# Patient Record
Sex: MALE | Race: WHITE | NOT HISPANIC OR LATINO
[De-identification: names, ages, dates, MRNs, and addresses within clinical notes are randomized per-mention and may not be internally consistent; named-entity substitution may affect disease eponyms.]

---

## 2019-10-28 PROBLEM — Z00.00 ENCOUNTER FOR PREVENTIVE HEALTH EXAMINATION: Status: ACTIVE | Noted: 2019-10-28

## 2019-11-20 ENCOUNTER — APPOINTMENT (OUTPATIENT)
Dept: CARDIOLOGY | Facility: CLINIC | Age: 64
End: 2019-11-20
Payer: MEDICARE

## 2019-11-20 ENCOUNTER — NON-APPOINTMENT (OUTPATIENT)
Age: 64
End: 2019-11-20

## 2019-11-20 VITALS
BODY MASS INDEX: 26.83 KG/M2 | OXYGEN SATURATION: 97 % | SYSTOLIC BLOOD PRESSURE: 130 MMHG | DIASTOLIC BLOOD PRESSURE: 80 MMHG | HEIGHT: 68 IN | WEIGHT: 177 LBS | HEART RATE: 55 BPM

## 2019-11-20 DIAGNOSIS — Z83.438 FAMILY HISTORY OF OTHER DISORDER OF LIPOPROTEIN METABOLISM AND OTHER LIPIDEMIA: ICD-10-CM

## 2019-11-20 DIAGNOSIS — Z82.49 FAMILY HISTORY OF ISCHEMIC HEART DISEASE AND OTHER DISEASES OF THE CIRCULATORY SYSTEM: ICD-10-CM

## 2019-11-20 DIAGNOSIS — F17.200 NICOTINE DEPENDENCE, UNSPECIFIED, UNCOMPLICATED: ICD-10-CM

## 2019-11-20 PROCEDURE — 93306 TTE W/DOPPLER COMPLETE: CPT

## 2019-11-20 PROCEDURE — 93000 ELECTROCARDIOGRAM COMPLETE: CPT

## 2019-11-20 PROCEDURE — 99205 OFFICE O/P NEW HI 60 MIN: CPT

## 2019-11-20 RX ORDER — ATENOLOL 100 MG/1
100 TABLET ORAL DAILY
Qty: 90 | Refills: 3 | Status: ACTIVE | COMMUNITY

## 2019-11-20 RX ORDER — AMLODIPINE BESYLATE AND BENAZEPRIL HYDROCHLORIDE 5; 40 MG/1; MG/1
5-40 CAPSULE ORAL DAILY
Refills: 3 | Status: ACTIVE | COMMUNITY

## 2019-11-20 RX ORDER — CLOPIDOGREL BISULFATE 75 MG/1
75 TABLET, FILM COATED ORAL
Refills: 2 | Status: ACTIVE | COMMUNITY

## 2019-11-20 RX ORDER — ATORVASTATIN CALCIUM 80 MG/1
80 TABLET, FILM COATED ORAL DAILY
Qty: 90 | Refills: 3 | Status: ACTIVE | COMMUNITY

## 2019-11-20 NOTE — HISTORY OF PRESENT ILLNESS
[FreeTextEntry1] : He is a pleasant 64-year-old gentleman with CAD, hypertension, prior PCI in approximately 2015, dyslipidemia, hypertension active tobacco smoker here for cardiac assessment and hopeful clearance for upcoming hernia repair. Complaints of exertional dyspnea at times. Reports eating generally healthy. Available labs reviewed.

## 2019-11-20 NOTE — DISCUSSION/SUMMARY
[___ Week(s)] : [unfilled] week(s) [FreeTextEntry3] : echo, aortic sono, nuclear stress test, carotid sono [FreeTextEntry1] : He is advised to continue on his current antihypertensive medication strategy as well as aspirin and Plavix for dual antiplatelet therapy and high-intensity statin for lipid lowering and CAD treatment. Smoking cessation was encouraged and I spoke with him on the matter for more than 3 minutes offering options. [facility] smoking cessation [facility] also encouraged. I've ordered an echocardiogram to assess LV function and valvular status along with carotid Doppler check carotid disease burden surface is surrogate marker for underlying risk. I've also ordered a nuclear exercise stress test to gauge his underlying coronary ischemic burden. Pending the results of these studies, and a dentist regarding fitness for surgery will be added. We'll call the test results and he will follow up with you for care.I recommended a heart healthy lifestyle including a low-saturated fat, low cholesterol diet with improved aerobic physical fitness over time for cardiovascular benefits.

## 2019-11-20 NOTE — PHYSICAL EXAM
[General Appearance - Well Developed] : well developed [Normal Appearance] : normal appearance [Well Groomed] : well groomed [General Appearance - Well Nourished] : well nourished [No Deformities] : no deformities [General Appearance - In No Acute Distress] : no acute distress [Normal Conjunctiva] : the conjunctiva exhibited no abnormalities [Eyelids - No Xanthelasma] : the eyelids demonstrated no xanthelasmas [Normal Oral Mucosa] : normal oral mucosa [No Oral Pallor] : no oral pallor [No Oral Cyanosis] : no oral cyanosis [Normal Jugular Venous A Waves Present] : normal jugular venous A waves present [Normal Jugular Venous V Waves Present] : normal jugular venous V waves present [No Jugular Venous Garcia A Waves] : no jugular venous garcia A waves [Heart Rate And Rhythm] : heart rate and rhythm were normal [Heart Sounds] : normal S1 and S2 [Murmurs] : no murmurs present [Edema] : no peripheral edema present [1+] : left 1+ [Respiration, Rhythm And Depth] : normal respiratory rhythm and effort [Exaggerated Use Of Accessory Muscles For Inspiration] : no accessory muscle use [Auscultation Breath Sounds / Voice Sounds] : lungs were clear to auscultation bilaterally [Bowel Sounds] : normal bowel sounds [Abdomen Soft] : soft [Abdomen Tenderness] : non-tender [Abdomen Mass (___ Cm)] : no abdominal mass palpated [Abnormal Walk] : normal gait [Gait - Sufficient For Exercise Testing] : the gait was sufficient for exercise testing [Nail Clubbing] : no clubbing of the fingernails [Cyanosis, Localized] : no localized cyanosis [Petechial Hemorrhages (___cm)] : no petechial hemorrhages [Skin Color & Pigmentation] : normal skin color and pigmentation [] : no rash [No Venous Stasis] : no venous stasis [Skin Lesions] : no skin lesions [No Skin Ulcers] : no skin ulcer [No Xanthoma] : no  xanthoma was observed [Oriented To Time, Place, And Person] : oriented to person, place, and time [Affect] : the affect was normal [Mood] : the mood was normal [No Anxiety] : not feeling anxious [Left Carotid Bruit] : no bruit heard over the left carotid [Right Carotid Bruit] : no bruit heard over the right carotid [Bruit] : no bruit heard

## 2019-11-25 ENCOUNTER — APPOINTMENT (OUTPATIENT)
Dept: CARDIOLOGY | Facility: CLINIC | Age: 64
End: 2019-11-25
Payer: MEDICARE

## 2019-11-25 PROCEDURE — 93880 EXTRACRANIAL BILAT STUDY: CPT

## 2019-11-25 PROCEDURE — 93978 VASCULAR STUDY: CPT

## 2019-12-09 PROBLEM — F17.200 CURRENT SMOKER: Status: ACTIVE | Noted: 2019-11-20

## 2019-12-11 ENCOUNTER — APPOINTMENT (OUTPATIENT)
Dept: CARDIOLOGY | Facility: CLINIC | Age: 64
End: 2019-12-11
Payer: MEDICARE

## 2019-12-11 DIAGNOSIS — I10 ESSENTIAL (PRIMARY) HYPERTENSION: ICD-10-CM

## 2019-12-11 DIAGNOSIS — R06.09 OTHER FORMS OF DYSPNEA: ICD-10-CM

## 2019-12-11 DIAGNOSIS — Z86.79 PERSONAL HISTORY OF OTHER DISEASES OF THE CIRCULATORY SYSTEM: ICD-10-CM

## 2019-12-11 DIAGNOSIS — Z86.39 PERSONAL HISTORY OF OTHER ENDOCRINE, NUTRITIONAL AND METABOLIC DISEASE: ICD-10-CM

## 2019-12-11 DIAGNOSIS — R94.31 ABNORMAL ELECTROCARDIOGRAM [ECG] [EKG]: ICD-10-CM

## 2019-12-11 PROCEDURE — A9500: CPT

## 2019-12-11 PROCEDURE — 93015 CV STRESS TEST SUPVJ I&R: CPT

## 2019-12-11 PROCEDURE — 78452 HT MUSCLE IMAGE SPECT MULT: CPT

## 2019-12-16 PROBLEM — Z86.79 HISTORY OF CORONARY ARTERY DISEASE: Status: RESOLVED | Noted: 2019-11-20 | Resolved: 2019-12-16

## 2019-12-16 PROBLEM — Z86.39 HISTORY OF HYPERLIPIDEMIA: Status: RESOLVED | Noted: 2019-11-20 | Resolved: 2019-12-16

## 2019-12-16 PROBLEM — R94.31 ABNORMAL ECG: Status: ACTIVE | Noted: 2019-11-20

## 2019-12-16 PROBLEM — I10 BENIGN ESSENTIAL HTN: Status: RESOLVED | Noted: 2019-11-20 | Resolved: 2019-12-16

## 2019-12-16 PROBLEM — R06.09 DOE (DYSPNEA ON EXERTION): Status: ACTIVE | Noted: 2019-11-20

## 2021-04-10 ENCOUNTER — APPOINTMENT (OUTPATIENT)
Dept: DISASTER EMERGENCY | Facility: OTHER | Age: 66
End: 2021-04-10
Payer: MEDICARE

## 2021-04-10 PROCEDURE — 0011A: CPT

## 2021-05-08 ENCOUNTER — APPOINTMENT (OUTPATIENT)
Dept: DISASTER EMERGENCY | Facility: OTHER | Age: 66
End: 2021-05-08

## 2021-05-10 ENCOUNTER — APPOINTMENT (OUTPATIENT)
Dept: DISASTER EMERGENCY | Facility: OTHER | Age: 66
End: 2021-05-10
Payer: MEDICARE

## 2021-05-10 PROCEDURE — 0012A: CPT

## 2025-01-01 ENCOUNTER — INPATIENT (INPATIENT)
Facility: HOSPITAL | Age: 70
LOS: 13 days | DRG: 293 | End: 2025-07-17
Attending: THORACIC SURGERY (CARDIOTHORACIC VASCULAR SURGERY) | Admitting: THORACIC SURGERY (CARDIOTHORACIC VASCULAR SURGERY)
Payer: COMMERCIAL

## 2025-01-01 ENCOUNTER — INPATIENT (INPATIENT)
Facility: HOSPITAL | Age: 70
LOS: 0 days | Discharge: ANOTHER HOSPITAL | End: 2025-07-03
Attending: INTERNAL MEDICINE | Admitting: INTERNAL MEDICINE
Payer: MEDICARE

## 2025-01-01 ENCOUNTER — RESULT REVIEW (OUTPATIENT)
Age: 70
End: 2025-01-01

## 2025-01-01 ENCOUNTER — APPOINTMENT (OUTPATIENT)
Dept: CARDIOTHORACIC SURGERY | Facility: HOSPITAL | Age: 70
End: 2025-01-01

## 2025-01-01 VITALS — DIASTOLIC BLOOD PRESSURE: 73 MMHG | HEART RATE: 67 BPM | SYSTOLIC BLOOD PRESSURE: 117 MMHG

## 2025-01-01 VITALS
HEART RATE: 69 BPM | TEMPERATURE: 98 F | SYSTOLIC BLOOD PRESSURE: 91 MMHG | RESPIRATION RATE: 12 BRPM | DIASTOLIC BLOOD PRESSURE: 59 MMHG

## 2025-01-01 VITALS — DIASTOLIC BLOOD PRESSURE: 105 MMHG | HEART RATE: 129 BPM | SYSTOLIC BLOOD PRESSURE: 131 MMHG

## 2025-01-01 VITALS — HEART RATE: 145 BPM | RESPIRATION RATE: 14 BRPM | OXYGEN SATURATION: 97 % | TEMPERATURE: 95 F

## 2025-01-01 DIAGNOSIS — R53.2 FUNCTIONAL QUADRIPLEGIA: ICD-10-CM

## 2025-01-01 DIAGNOSIS — E11.9 TYPE 2 DIABETES MELLITUS WITHOUT COMPLICATIONS: ICD-10-CM

## 2025-01-01 DIAGNOSIS — I48.91 UNSPECIFIED ATRIAL FIBRILLATION: ICD-10-CM

## 2025-01-01 DIAGNOSIS — Z98.89 OTHER SPECIFIED POSTPROCEDURAL STATES: Chronic | ICD-10-CM

## 2025-01-01 DIAGNOSIS — I25.5 ISCHEMIC CARDIOMYOPATHY: ICD-10-CM

## 2025-01-01 DIAGNOSIS — Z71.89 OTHER SPECIFIED COUNSELING: ICD-10-CM

## 2025-01-01 DIAGNOSIS — J96.01 ACUTE RESPIRATORY FAILURE WITH HYPOXIA: ICD-10-CM

## 2025-01-01 DIAGNOSIS — Z51.5 ENCOUNTER FOR PALLIATIVE CARE: ICD-10-CM

## 2025-01-01 DIAGNOSIS — R84.5 ABNORMAL MICROBIOLOGICAL FINDINGS IN SPECIMENS FROM RESPIRATORY ORGANS AND THORAX: ICD-10-CM

## 2025-01-01 DIAGNOSIS — R57.0 CARDIOGENIC SHOCK: ICD-10-CM

## 2025-01-01 DIAGNOSIS — N17.9 ACUTE KIDNEY FAILURE, UNSPECIFIED: ICD-10-CM

## 2025-01-01 DIAGNOSIS — I21.9 ACUTE MYOCARDIAL INFARCTION, UNSPECIFIED: ICD-10-CM

## 2025-01-01 DIAGNOSIS — G93.40 ENCEPHALOPATHY, UNSPECIFIED: ICD-10-CM

## 2025-01-01 LAB
-  AMOXICILLIN/CLAVULANIC ACID: SIGNIFICANT CHANGE UP
-  AMPICILLIN/SULBACTAM: SIGNIFICANT CHANGE UP
-  AMPICILLIN: SIGNIFICANT CHANGE UP
-  AZTREONAM: SIGNIFICANT CHANGE UP
-  CEFAZOLIN: SIGNIFICANT CHANGE UP
-  CEFEPIME: SIGNIFICANT CHANGE UP
-  CEFOXITIN: SIGNIFICANT CHANGE UP
-  CEFTRIAXONE: SIGNIFICANT CHANGE UP
-  CIPROFLOXACIN: SIGNIFICANT CHANGE UP
-  CLINDAMYCIN: SIGNIFICANT CHANGE UP
-  ERTAPENEM: SIGNIFICANT CHANGE UP
-  ERYTHROMYCIN: SIGNIFICANT CHANGE UP
-  GENTAMICIN: SIGNIFICANT CHANGE UP
-  GENTAMICIN: SIGNIFICANT CHANGE UP
-  IMIPENEM: SIGNIFICANT CHANGE UP
-  LEVOFLOXACIN: SIGNIFICANT CHANGE UP
-  MEROPENEM: SIGNIFICANT CHANGE UP
-  OXACILLIN: SIGNIFICANT CHANGE UP
-  PIPERACILLIN/TAZOBACTAM: SIGNIFICANT CHANGE UP
-  RIFAMPIN: SIGNIFICANT CHANGE UP
-  TETRACYCLINE: SIGNIFICANT CHANGE UP
-  TIGECYCLINE: SIGNIFICANT CHANGE UP
-  TOBRAMYCIN: SIGNIFICANT CHANGE UP
-  TRIMETHOPRIM/SULFAMETHOXAZOLE: SIGNIFICANT CHANGE UP
-  TRIMETHOPRIM/SULFAMETHOXAZOLE: SIGNIFICANT CHANGE UP
-  VANCOMYCIN: SIGNIFICANT CHANGE UP
A1C WITH ESTIMATED AVERAGE GLUCOSE RESULT: 8.9 % — HIGH (ref 4–5.6)
ALBUMIN SERPL ELPH-MCNC: 2.4 G/DL — LOW (ref 3.3–5)
ALBUMIN SERPL ELPH-MCNC: 3 G/DL — LOW (ref 3.3–5)
ALBUMIN SERPL ELPH-MCNC: 3.1 G/DL — LOW (ref 3.3–5)
ALBUMIN SERPL ELPH-MCNC: 3.3 G/DL — SIGNIFICANT CHANGE UP (ref 3.3–5)
ALBUMIN SERPL ELPH-MCNC: 3.4 G/DL — SIGNIFICANT CHANGE UP (ref 3.3–5)
ALBUMIN SERPL ELPH-MCNC: 3.6 G/DL — SIGNIFICANT CHANGE UP (ref 3.3–5)
ALBUMIN SERPL ELPH-MCNC: 3.7 G/DL — SIGNIFICANT CHANGE UP (ref 3.3–5)
ALBUMIN SERPL ELPH-MCNC: 3.7 G/DL — SIGNIFICANT CHANGE UP (ref 3.3–5)
ALP SERPL-CCNC: 102 U/L — SIGNIFICANT CHANGE UP (ref 40–120)
ALP SERPL-CCNC: 103 U/L — SIGNIFICANT CHANGE UP (ref 40–120)
ALP SERPL-CCNC: 109 U/L — SIGNIFICANT CHANGE UP (ref 40–120)
ALP SERPL-CCNC: 110 U/L — SIGNIFICANT CHANGE UP (ref 40–120)
ALP SERPL-CCNC: 132 U/L — HIGH (ref 40–120)
ALP SERPL-CCNC: 142 U/L — HIGH (ref 40–120)
ALP SERPL-CCNC: 144 U/L — HIGH (ref 40–120)
ALP SERPL-CCNC: 146 U/L — HIGH (ref 40–120)
ALP SERPL-CCNC: 170 U/L — HIGH (ref 40–120)
ALP SERPL-CCNC: 173 U/L — HIGH (ref 40–120)
ALP SERPL-CCNC: 176 U/L — HIGH (ref 40–120)
ALP SERPL-CCNC: 178 U/L — HIGH (ref 40–120)
ALP SERPL-CCNC: 179 U/L — HIGH (ref 40–120)
ALP SERPL-CCNC: 181 U/L — HIGH (ref 40–120)
ALP SERPL-CCNC: 186 U/L — HIGH (ref 40–120)
ALP SERPL-CCNC: 80 U/L — SIGNIFICANT CHANGE UP (ref 40–120)
ALP SERPL-CCNC: 83 U/L — SIGNIFICANT CHANGE UP (ref 40–120)
ALP SERPL-CCNC: 92 U/L — SIGNIFICANT CHANGE UP (ref 40–120)
ALP SERPL-CCNC: 99 U/L — SIGNIFICANT CHANGE UP (ref 40–120)
ALT FLD-CCNC: 10 U/L — SIGNIFICANT CHANGE UP (ref 10–45)
ALT FLD-CCNC: 12 U/L — SIGNIFICANT CHANGE UP (ref 10–45)
ALT FLD-CCNC: 12 U/L — SIGNIFICANT CHANGE UP (ref 10–45)
ALT FLD-CCNC: 13 U/L — SIGNIFICANT CHANGE UP (ref 10–45)
ALT FLD-CCNC: 13 U/L — SIGNIFICANT CHANGE UP (ref 10–45)
ALT FLD-CCNC: 14 U/L — SIGNIFICANT CHANGE UP (ref 10–45)
ALT FLD-CCNC: 16 U/L — SIGNIFICANT CHANGE UP (ref 10–45)
ALT FLD-CCNC: 16 U/L — SIGNIFICANT CHANGE UP (ref 10–45)
ALT FLD-CCNC: 163 U/L — HIGH (ref 10–45)
ALT FLD-CCNC: 20 U/L — SIGNIFICANT CHANGE UP (ref 10–45)
ALT FLD-CCNC: 21 U/L — SIGNIFICANT CHANGE UP (ref 10–45)
ALT FLD-CCNC: 21 U/L — SIGNIFICANT CHANGE UP (ref 10–45)
ALT FLD-CCNC: 225 U/L — HIGH (ref 10–45)
ALT FLD-CCNC: 23 U/L — SIGNIFICANT CHANGE UP (ref 10–45)
ALT FLD-CCNC: 257 U/L — HIGH (ref 10–45)
ALT FLD-CCNC: 297 U/L — HIGH (ref 10–45)
ALT FLD-CCNC: 318 U/L — HIGH (ref 10–45)
ALT FLD-CCNC: 40 U/L — SIGNIFICANT CHANGE UP (ref 10–45)
ALT FLD-CCNC: 91 U/L — HIGH (ref 10–45)
ANION GAP SERPL CALC-SCNC: 14 MMOL/L — SIGNIFICANT CHANGE UP (ref 5–17)
ANION GAP SERPL CALC-SCNC: 16 MMOL/L — SIGNIFICANT CHANGE UP (ref 5–17)
ANION GAP SERPL CALC-SCNC: 18 MMOL/L — HIGH (ref 5–17)
ANION GAP SERPL CALC-SCNC: 19 MMOL/L — HIGH (ref 5–17)
ANION GAP SERPL CALC-SCNC: 19 MMOL/L — HIGH (ref 5–17)
ANION GAP SERPL CALC-SCNC: 20 MMOL/L — HIGH (ref 5–17)
ANION GAP SERPL CALC-SCNC: 21 MMOL/L — HIGH (ref 5–17)
ANION GAP SERPL CALC-SCNC: 22 MMOL/L — HIGH (ref 5–17)
ANION GAP SERPL CALC-SCNC: 22 MMOL/L — HIGH (ref 5–17)
ANION GAP SERPL CALC-SCNC: 37 MMOL/L — HIGH (ref 5–17)
ANISOCYTOSIS BLD QL: SLIGHT — SIGNIFICANT CHANGE UP
APPEARANCE UR: ABNORMAL
APPEARANCE UR: ABNORMAL
APTT BLD: 24 SEC — LOW (ref 26.1–36.8)
APTT BLD: 26.9 SEC — SIGNIFICANT CHANGE UP (ref 26.1–36.8)
APTT BLD: 28.9 SEC — SIGNIFICANT CHANGE UP (ref 26.1–36.8)
APTT BLD: 30.3 SEC — SIGNIFICANT CHANGE UP (ref 26.1–36.8)
APTT BLD: 31.5 SEC — SIGNIFICANT CHANGE UP (ref 26.1–36.8)
APTT BLD: 32.4 SEC — SIGNIFICANT CHANGE UP (ref 26.1–36.8)
APTT BLD: 33.7 SEC — SIGNIFICANT CHANGE UP (ref 26.1–36.8)
APTT BLD: 34.5 SEC — SIGNIFICANT CHANGE UP (ref 26.1–36.8)
APTT BLD: 34.7 SEC — SIGNIFICANT CHANGE UP (ref 26.1–36.8)
APTT BLD: 36.9 SEC — HIGH (ref 26.1–36.8)
APTT BLD: 37.9 SEC — HIGH (ref 26.1–36.8)
APTT BLD: 38.7 SEC — HIGH (ref 26.1–36.8)
APTT BLD: 39.7 SEC — HIGH (ref 26.1–36.8)
APTT BLD: 40.6 SEC — HIGH (ref 26.1–36.8)
APTT BLD: 41.7 SEC — HIGH (ref 26.1–36.8)
APTT BLD: 44.7 SEC — HIGH (ref 26.1–36.8)
APTT BLD: 52.9 SEC — HIGH (ref 26.1–36.8)
APTT BLD: 56.4 SEC — HIGH (ref 26.1–36.8)
APTT BLD: 56.8 SEC — HIGH (ref 26.1–36.8)
APTT BLD: 58.8 SEC — HIGH (ref 26.1–36.8)
APTT BLD: 59.4 SEC — HIGH (ref 26.1–36.8)
APTT BLD: 61.9 SEC — HIGH (ref 26.1–36.8)
APTT BLD: 62 SEC — HIGH (ref 26.1–36.8)
APTT BLD: 63.5 SEC — HIGH (ref 26.1–36.8)
APTT BLD: 63.5 SEC — HIGH (ref 26.1–36.8)
APTT BLD: 66.2 SEC — HIGH (ref 26.1–36.8)
APTT BLD: 67.8 SEC — HIGH (ref 26.1–36.8)
APTT BLD: 71 SEC — HIGH (ref 26.1–36.8)
APTT BLD: 75.6 SEC — HIGH (ref 26.1–36.8)
APTT BLD: 77.4 SEC — HIGH (ref 26.1–36.8)
APTT BLD: 79.2 SEC — HIGH (ref 26.1–36.8)
APTT BLD: 79.8 SEC — HIGH (ref 26.1–36.8)
AST SERPL-CCNC: 114 U/L — HIGH (ref 10–40)
AST SERPL-CCNC: 139 U/L — HIGH (ref 10–40)
AST SERPL-CCNC: 158 U/L — HIGH (ref 10–40)
AST SERPL-CCNC: 254 U/L — HIGH (ref 10–40)
AST SERPL-CCNC: 27 U/L — SIGNIFICANT CHANGE UP (ref 10–40)
AST SERPL-CCNC: 307 U/L — HIGH (ref 10–40)
AST SERPL-CCNC: 340 U/L — HIGH (ref 10–40)
AST SERPL-CCNC: 39 U/L — SIGNIFICANT CHANGE UP (ref 10–40)
AST SERPL-CCNC: 44 U/L — HIGH (ref 10–40)
AST SERPL-CCNC: 46 U/L — HIGH (ref 10–40)
AST SERPL-CCNC: 47 U/L — HIGH (ref 10–40)
AST SERPL-CCNC: 48 U/L — HIGH (ref 10–40)
AST SERPL-CCNC: 52 U/L — HIGH (ref 10–40)
AST SERPL-CCNC: 54 U/L — HIGH (ref 10–40)
AST SERPL-CCNC: 58 U/L — HIGH (ref 10–40)
AST SERPL-CCNC: 72 U/L — HIGH (ref 10–40)
AST SERPL-CCNC: 76 U/L — HIGH (ref 10–40)
AST SERPL-CCNC: 82 U/L — HIGH (ref 10–40)
AST SERPL-CCNC: 86 U/L — HIGH (ref 10–40)
BACTERIA # UR AUTO: NEGATIVE /HPF — SIGNIFICANT CHANGE UP
BACTERIA # UR AUTO: NEGATIVE /HPF — SIGNIFICANT CHANGE UP
BASE EXCESS BLDMV CALC-SCNC: 10.4 MMOL/L — HIGH (ref -3–3)
BASE EXCESS BLDMV CALC-SCNC: 11 MMOL/L — HIGH (ref -3–3)
BASE EXCESS BLDMV CALC-SCNC: 11.2 MMOL/L — HIGH (ref -3–3)
BASE EXCESS BLDMV CALC-SCNC: 11.5 MMOL/L — HIGH (ref -3–3)
BASE EXCESS BLDMV CALC-SCNC: 5.8 MMOL/L — HIGH (ref -3–3)
BASE EXCESS BLDMV CALC-SCNC: 7.5 MMOL/L — HIGH (ref -3–3)
BASE EXCESS BLDMV CALC-SCNC: 7.6 MMOL/L — HIGH (ref -3–3)
BASE EXCESS BLDMV CALC-SCNC: 7.8 MMOL/L — HIGH (ref -3–3)
BASE EXCESS BLDMV CALC-SCNC: 8 MMOL/L — HIGH (ref -3–3)
BASE EXCESS BLDMV CALC-SCNC: 8.1 MMOL/L — HIGH (ref -3–3)
BASE EXCESS BLDMV CALC-SCNC: 8.6 MMOL/L — HIGH (ref -3–3)
BASE EXCESS BLDMV CALC-SCNC: 8.8 MMOL/L — HIGH (ref -3–3)
BASE EXCESS BLDMV CALC-SCNC: 9.1 MMOL/L — HIGH (ref -3–3)
BASE EXCESS BLDMV CALC-SCNC: 9.4 MMOL/L — HIGH (ref -3–3)
BASE EXCESS BLDV CALC-SCNC: -8.2 MMOL/L — LOW (ref -2–3)
BASE EXCESS BLDV CALC-SCNC: 10.6 MMOL/L — HIGH (ref -2–3)
BASE EXCESS BLDV CALC-SCNC: 14.9 MMOL/L — HIGH (ref -2–3)
BASE EXCESS BLDV CALC-SCNC: 15 MMOL/L — HIGH (ref -2–3)
BASE EXCESS BLDV CALC-SCNC: 15.8 MMOL/L — HIGH (ref -2–3)
BASE EXCESS BLDV CALC-SCNC: 17.2 MMOL/L — HIGH (ref -2–3)
BASE EXCESS BLDV CALC-SCNC: 5.5 MMOL/L — HIGH (ref -2–3)
BASE EXCESS BLDV CALC-SCNC: 9.4 MMOL/L — HIGH (ref -2–3)
BASOPHILS # BLD AUTO: 0.02 K/UL — SIGNIFICANT CHANGE UP (ref 0–0.2)
BASOPHILS # BLD AUTO: 0.03 K/UL — SIGNIFICANT CHANGE UP (ref 0–0.2)
BASOPHILS # BLD AUTO: 0.04 K/UL — SIGNIFICANT CHANGE UP (ref 0–0.2)
BASOPHILS # BLD AUTO: 0.04 K/UL — SIGNIFICANT CHANGE UP (ref 0–0.2)
BASOPHILS # BLD AUTO: 0.05 K/UL — SIGNIFICANT CHANGE UP (ref 0–0.2)
BASOPHILS # BLD AUTO: 0.06 K/UL — SIGNIFICANT CHANGE UP (ref 0–0.2)
BASOPHILS # BLD AUTO: 0.08 K/UL — SIGNIFICANT CHANGE UP (ref 0–0.2)
BASOPHILS # BLD AUTO: 0.1 K/UL — SIGNIFICANT CHANGE UP (ref 0–0.2)
BASOPHILS # BLD MANUAL: 0 K/UL — SIGNIFICANT CHANGE UP (ref 0–0.2)
BASOPHILS # BLD MANUAL: 0.08 K/UL — SIGNIFICANT CHANGE UP (ref 0–0.2)
BASOPHILS NFR BLD AUTO: 0.1 % — SIGNIFICANT CHANGE UP (ref 0–2)
BASOPHILS NFR BLD AUTO: 0.2 % — SIGNIFICANT CHANGE UP (ref 0–2)
BASOPHILS NFR BLD AUTO: 0.3 % — SIGNIFICANT CHANGE UP (ref 0–2)
BASOPHILS NFR BLD AUTO: 0.3 % — SIGNIFICANT CHANGE UP (ref 0–2)
BASOPHILS NFR BLD AUTO: 0.4 % — SIGNIFICANT CHANGE UP (ref 0–2)
BASOPHILS NFR BLD AUTO: 0.5 % — SIGNIFICANT CHANGE UP (ref 0–2)
BASOPHILS NFR BLD AUTO: 0.7 % — SIGNIFICANT CHANGE UP (ref 0–2)
BASOPHILS NFR BLD MANUAL: 0 % — SIGNIFICANT CHANGE UP (ref 0–2)
BASOPHILS NFR BLD MANUAL: 0.9 % — SIGNIFICANT CHANGE UP (ref 0–2)
BILIRUB SERPL-MCNC: 1.5 MG/DL — HIGH (ref 0.2–1.2)
BILIRUB SERPL-MCNC: 1.9 MG/DL — HIGH (ref 0.2–1.2)
BILIRUB SERPL-MCNC: 2.1 MG/DL — HIGH (ref 0.2–1.2)
BILIRUB SERPL-MCNC: 2.4 MG/DL — HIGH (ref 0.2–1.2)
BILIRUB SERPL-MCNC: 2.5 MG/DL — HIGH (ref 0.2–1.2)
BILIRUB SERPL-MCNC: 2.7 MG/DL — HIGH (ref 0.2–1.2)
BILIRUB SERPL-MCNC: 3.1 MG/DL — HIGH (ref 0.2–1.2)
BILIRUB SERPL-MCNC: 3.2 MG/DL — HIGH (ref 0.2–1.2)
BILIRUB SERPL-MCNC: 3.6 MG/DL — HIGH (ref 0.2–1.2)
BILIRUB SERPL-MCNC: 4.1 MG/DL — HIGH (ref 0.2–1.2)
BILIRUB SERPL-MCNC: 4.4 MG/DL — HIGH (ref 0.2–1.2)
BILIRUB SERPL-MCNC: 4.5 MG/DL — HIGH (ref 0.2–1.2)
BILIRUB SERPL-MCNC: 4.9 MG/DL — HIGH (ref 0.2–1.2)
BILIRUB SERPL-MCNC: 5.2 MG/DL — HIGH (ref 0.2–1.2)
BILIRUB SERPL-MCNC: 5.3 MG/DL — HIGH (ref 0.2–1.2)
BILIRUB SERPL-MCNC: 5.5 MG/DL — HIGH (ref 0.2–1.2)
BILIRUB SERPL-MCNC: 6.1 MG/DL — HIGH (ref 0.2–1.2)
BILIRUB UR-MCNC: NEGATIVE — SIGNIFICANT CHANGE UP
BILIRUB UR-MCNC: NEGATIVE — SIGNIFICANT CHANGE UP
BLD GP AB SCN SERPL QL: NEGATIVE — SIGNIFICANT CHANGE UP
BLD GP AB SCN SERPL QL: NEGATIVE — SIGNIFICANT CHANGE UP
BLOOD GAS ECMO POST MEMBRANE - ARTERIAL RESULT: SIGNIFICANT CHANGE UP
BLOOD GAS ECMO PRE MEMBRANE - VENOUS RESULT: SIGNIFICANT CHANGE UP
BUN SERPL-MCNC: 100 MG/DL — HIGH (ref 7–23)
BUN SERPL-MCNC: 104 MG/DL — HIGH (ref 7–23)
BUN SERPL-MCNC: 104 MG/DL — HIGH (ref 7–23)
BUN SERPL-MCNC: 106 MG/DL — HIGH (ref 7–23)
BUN SERPL-MCNC: 108 MG/DL — HIGH (ref 7–23)
BUN SERPL-MCNC: 113 MG/DL — HIGH (ref 7–23)
BUN SERPL-MCNC: 114 MG/DL — HIGH (ref 7–23)
BUN SERPL-MCNC: 118 MG/DL — HIGH (ref 7–23)
BUN SERPL-MCNC: 121 MG/DL — HIGH (ref 7–23)
BUN SERPL-MCNC: 126 MG/DL — HIGH (ref 7–23)
BUN SERPL-MCNC: 138 MG/DL — HIGH (ref 7–23)
BUN SERPL-MCNC: 141 MG/DL — HIGH (ref 7–23)
BUN SERPL-MCNC: 144 MG/DL — HIGH (ref 7–23)
BUN SERPL-MCNC: 149 MG/DL — HIGH (ref 7–23)
BUN SERPL-MCNC: 66 MG/DL — HIGH (ref 7–23)
BUN SERPL-MCNC: 92 MG/DL — HIGH (ref 7–23)
BUN SERPL-MCNC: 92 MG/DL — HIGH (ref 7–23)
BUN SERPL-MCNC: 97 MG/DL — HIGH (ref 7–23)
BUN SERPL-MCNC: 99 MG/DL — HIGH (ref 7–23)
BUN SERPL-MCNC: 99 MG/DL — HIGH (ref 7–23)
BURR CELLS BLD QL SMEAR: ABNORMAL
CA-I BLD-SCNC: 0.88 MMOL/L — LOW (ref 1.15–1.33)
CALCIUM SERPL-MCNC: 5.1 MG/DL — CRITICAL LOW (ref 8.4–10.5)
CALCIUM SERPL-MCNC: 7.7 MG/DL — LOW (ref 8.4–10.5)
CALCIUM SERPL-MCNC: 8.1 MG/DL — LOW (ref 8.4–10.5)
CALCIUM SERPL-MCNC: 8.1 MG/DL — LOW (ref 8.4–10.5)
CALCIUM SERPL-MCNC: 8.2 MG/DL — LOW (ref 8.4–10.5)
CALCIUM SERPL-MCNC: 8.3 MG/DL — LOW (ref 8.4–10.5)
CALCIUM SERPL-MCNC: 8.4 MG/DL — SIGNIFICANT CHANGE UP (ref 8.4–10.5)
CALCIUM SERPL-MCNC: 8.6 MG/DL — SIGNIFICANT CHANGE UP (ref 8.4–10.5)
CALCIUM SERPL-MCNC: 8.8 MG/DL — SIGNIFICANT CHANGE UP (ref 8.4–10.5)
CALCIUM SERPL-MCNC: 8.8 MG/DL — SIGNIFICANT CHANGE UP (ref 8.4–10.5)
CALCIUM SERPL-MCNC: 8.9 MG/DL — SIGNIFICANT CHANGE UP (ref 8.4–10.5)
CALCIUM SERPL-MCNC: 9.1 MG/DL — SIGNIFICANT CHANGE UP (ref 8.4–10.5)
CALCIUM SERPL-MCNC: 9.2 MG/DL — SIGNIFICANT CHANGE UP (ref 8.4–10.5)
CALCIUM SERPL-MCNC: 9.4 MG/DL — SIGNIFICANT CHANGE UP (ref 8.4–10.5)
CALCIUM SERPL-MCNC: 9.5 MG/DL — SIGNIFICANT CHANGE UP (ref 8.4–10.5)
CALCIUM SERPL-MCNC: 9.7 MG/DL — SIGNIFICANT CHANGE UP (ref 8.4–10.5)
CAST: 12 /LPF — HIGH (ref 0–4)
CAST: 25 /LPF — HIGH (ref 0–4)
CHLORIDE SERPL-SCNC: 100 MMOL/L — SIGNIFICANT CHANGE UP (ref 96–108)
CHLORIDE SERPL-SCNC: 100 MMOL/L — SIGNIFICANT CHANGE UP (ref 96–108)
CHLORIDE SERPL-SCNC: 116 MMOL/L — HIGH (ref 96–108)
CHLORIDE SERPL-SCNC: 88 MMOL/L — LOW (ref 96–108)
CHLORIDE SERPL-SCNC: 94 MMOL/L — LOW (ref 96–108)
CHLORIDE SERPL-SCNC: 94 MMOL/L — LOW (ref 96–108)
CHLORIDE SERPL-SCNC: 95 MMOL/L — LOW (ref 96–108)
CHLORIDE SERPL-SCNC: 96 MMOL/L — SIGNIFICANT CHANGE UP (ref 96–108)
CHLORIDE SERPL-SCNC: 97 MMOL/L — SIGNIFICANT CHANGE UP (ref 96–108)
CHLORIDE SERPL-SCNC: 97 MMOL/L — SIGNIFICANT CHANGE UP (ref 96–108)
CHLORIDE SERPL-SCNC: 98 MMOL/L — SIGNIFICANT CHANGE UP (ref 96–108)
CHLORIDE SERPL-SCNC: 99 MMOL/L — SIGNIFICANT CHANGE UP (ref 96–108)
CHOLEST SERPL-MCNC: 90 MG/DL — SIGNIFICANT CHANGE UP
CK MB BLD-MCNC: 5.5 % — HIGH (ref 0–3.5)
CK MB CFR SERPL CALC: 46.7 NG/ML — HIGH (ref 0–6.7)
CK SERPL-CCNC: 850 U/L — HIGH (ref 30–200)
CO2 BLDMV-SCNC: 33 MMOL/L — HIGH (ref 21–29)
CO2 BLDMV-SCNC: 34 MMOL/L — HIGH (ref 21–29)
CO2 BLDMV-SCNC: 34 MMOL/L — HIGH (ref 21–29)
CO2 BLDMV-SCNC: 35 MMOL/L — HIGH (ref 21–29)
CO2 BLDMV-SCNC: 36 MMOL/L — HIGH (ref 21–29)
CO2 BLDMV-SCNC: 37 MMOL/L — HIGH (ref 21–29)
CO2 BLDMV-SCNC: 38 MMOL/L — HIGH (ref 21–29)
CO2 BLDMV-SCNC: 39 MMOL/L — HIGH (ref 21–29)
CO2 BLDMV-SCNC: 41 MMOL/L — HIGH (ref 21–29)
CO2 BLDV-SCNC: 21 MMOL/L — LOW (ref 22–26)
CO2 BLDV-SCNC: 33 MMOL/L — HIGH (ref 22–26)
CO2 BLDV-SCNC: 37 MMOL/L — HIGH (ref 22–26)
CO2 BLDV-SCNC: 41 MMOL/L — HIGH (ref 22–26)
CO2 BLDV-SCNC: 42 MMOL/L — HIGH (ref 22–26)
CO2 BLDV-SCNC: 43 MMOL/L — HIGH (ref 22–26)
CO2 BLDV-SCNC: 43 MMOL/L — HIGH (ref 22–26)
CO2 BLDV-SCNC: 45 MMOL/L — CRITICAL HIGH (ref 22–26)
CO2 SERPL-SCNC: 13 MMOL/L — LOW (ref 22–31)
CO2 SERPL-SCNC: 15 MMOL/L — LOW (ref 22–31)
CO2 SERPL-SCNC: 20 MMOL/L — LOW (ref 22–31)
CO2 SERPL-SCNC: 21 MMOL/L — LOW (ref 22–31)
CO2 SERPL-SCNC: 23 MMOL/L — SIGNIFICANT CHANGE UP (ref 22–31)
CO2 SERPL-SCNC: 24 MMOL/L — SIGNIFICANT CHANGE UP (ref 22–31)
CO2 SERPL-SCNC: 25 MMOL/L — SIGNIFICANT CHANGE UP (ref 22–31)
CO2 SERPL-SCNC: 25 MMOL/L — SIGNIFICANT CHANGE UP (ref 22–31)
CO2 SERPL-SCNC: 26 MMOL/L — SIGNIFICANT CHANGE UP (ref 22–31)
CO2 SERPL-SCNC: 28 MMOL/L — SIGNIFICANT CHANGE UP (ref 22–31)
CO2 SERPL-SCNC: 29 MMOL/L — SIGNIFICANT CHANGE UP (ref 22–31)
CO2 SERPL-SCNC: 31 MMOL/L — SIGNIFICANT CHANGE UP (ref 22–31)
CO2 SERPL-SCNC: 31 MMOL/L — SIGNIFICANT CHANGE UP (ref 22–31)
CO2 SERPL-SCNC: 34 MMOL/L — HIGH (ref 22–31)
CO2 SERPL-SCNC: 34 MMOL/L — HIGH (ref 22–31)
COLOR SPEC: YELLOW — SIGNIFICANT CHANGE UP
COLOR SPEC: YELLOW — SIGNIFICANT CHANGE UP
CREAT SERPL-MCNC: 2.87 MG/DL — HIGH (ref 0.5–1.3)
CREAT SERPL-MCNC: 4.09 MG/DL — HIGH (ref 0.5–1.3)
CREAT SERPL-MCNC: 4.11 MG/DL — HIGH (ref 0.5–1.3)
CREAT SERPL-MCNC: 4.16 MG/DL — HIGH (ref 0.5–1.3)
CREAT SERPL-MCNC: 4.4 MG/DL — HIGH (ref 0.5–1.3)
CREAT SERPL-MCNC: 4.42 MG/DL — HIGH (ref 0.5–1.3)
CREAT SERPL-MCNC: 4.45 MG/DL — HIGH (ref 0.5–1.3)
CREAT SERPL-MCNC: 4.47 MG/DL — HIGH (ref 0.5–1.3)
CREAT SERPL-MCNC: 4.69 MG/DL — HIGH (ref 0.5–1.3)
CREAT SERPL-MCNC: 4.82 MG/DL — HIGH (ref 0.5–1.3)
CREAT SERPL-MCNC: 5.09 MG/DL — HIGH (ref 0.5–1.3)
CREAT SERPL-MCNC: 5.14 MG/DL — HIGH (ref 0.5–1.3)
CREAT SERPL-MCNC: 5.22 MG/DL — HIGH (ref 0.5–1.3)
CREAT SERPL-MCNC: 5.62 MG/DL — HIGH (ref 0.5–1.3)
CREAT SERPL-MCNC: 6.45 MG/DL — HIGH (ref 0.5–1.3)
CREAT SERPL-MCNC: 6.75 MG/DL — HIGH (ref 0.5–1.3)
CREAT SERPL-MCNC: 6.97 MG/DL — HIGH (ref 0.5–1.3)
CREAT SERPL-MCNC: 7.2 MG/DL — HIGH (ref 0.5–1.3)
CREAT SERPL-MCNC: 7.28 MG/DL — HIGH (ref 0.5–1.3)
CREAT SERPL-MCNC: 7.86 MG/DL — HIGH (ref 0.5–1.3)
CULTURE RESULTS: ABNORMAL
CULTURE RESULTS: SIGNIFICANT CHANGE UP
DIFF PNL FLD: ABNORMAL
DIFF PNL FLD: ABNORMAL
EGFR: 10 ML/MIN/1.73M2 — LOW
EGFR: 10 ML/MIN/1.73M2 — LOW
EGFR: 11 ML/MIN/1.73M2 — LOW
EGFR: 12 ML/MIN/1.73M2 — LOW
EGFR: 12 ML/MIN/1.73M2 — LOW
EGFR: 13 ML/MIN/1.73M2 — LOW
EGFR: 14 ML/MIN/1.73M2 — LOW
EGFR: 15 ML/MIN/1.73M2 — LOW
EGFR: 23 ML/MIN/1.73M2 — LOW
EGFR: 23 ML/MIN/1.73M2 — LOW
EGFR: 7 ML/MIN/1.73M2 — LOW
EGFR: 8 ML/MIN/1.73M2 — LOW
EGFR: 9 ML/MIN/1.73M2 — LOW
EGFR: 9 ML/MIN/1.73M2 — LOW
EOSINOPHIL # BLD AUTO: 0 K/UL — SIGNIFICANT CHANGE UP (ref 0–0.5)
EOSINOPHIL # BLD AUTO: 0.01 K/UL — SIGNIFICANT CHANGE UP (ref 0–0.5)
EOSINOPHIL # BLD AUTO: 0.02 K/UL — SIGNIFICANT CHANGE UP (ref 0–0.5)
EOSINOPHIL # BLD AUTO: 0.03 K/UL — SIGNIFICANT CHANGE UP (ref 0–0.5)
EOSINOPHIL # BLD AUTO: 0.06 K/UL — SIGNIFICANT CHANGE UP (ref 0–0.5)
EOSINOPHIL # BLD AUTO: 0.11 K/UL — SIGNIFICANT CHANGE UP (ref 0–0.5)
EOSINOPHIL # BLD MANUAL: 0 K/UL — SIGNIFICANT CHANGE UP (ref 0–0.5)
EOSINOPHIL NFR BLD AUTO: 0 % — SIGNIFICANT CHANGE UP (ref 0–6)
EOSINOPHIL NFR BLD AUTO: 0.1 % — SIGNIFICANT CHANGE UP (ref 0–6)
EOSINOPHIL NFR BLD AUTO: 0.2 % — SIGNIFICANT CHANGE UP (ref 0–6)
EOSINOPHIL NFR BLD AUTO: 0.8 % — SIGNIFICANT CHANGE UP (ref 0–6)
EOSINOPHIL NFR BLD MANUAL: 0 % — SIGNIFICANT CHANGE UP (ref 0–6)
ESTIMATED AVERAGE GLUCOSE: 209 MG/DL — HIGH (ref 68–114)
FIBRINOGEN PPP-MCNC: 279 MG/DL — SIGNIFICANT CHANGE UP (ref 200–445)
FIBRINOGEN PPP-MCNC: 292 MG/DL — SIGNIFICANT CHANGE UP (ref 200–445)
FIBRINOGEN PPP-MCNC: 315 MG/DL — SIGNIFICANT CHANGE UP (ref 200–445)
FIBRINOGEN PPP-MCNC: 477 MG/DL — HIGH (ref 200–445)
FIBRINOGEN PPP-MCNC: 477 MG/DL — HIGH (ref 200–445)
FIBRINOGEN PPP-MCNC: 508 MG/DL — HIGH (ref 200–445)
FIBRINOGEN PPP-MCNC: 531 MG/DL — HIGH (ref 200–445)
FIBRINOGEN PPP-MCNC: 570 MG/DL — HIGH (ref 200–445)
FIBRINOGEN PPP-MCNC: 686 MG/DL — HIGH (ref 200–445)
FIBRINOGEN PPP-MCNC: 686 MG/DL — HIGH (ref 200–445)
GAS PNL BLDA: SIGNIFICANT CHANGE UP
GAS PNL BLDMV: SIGNIFICANT CHANGE UP
GAS PNL BLDV: SIGNIFICANT CHANGE UP
GIANT PLATELETS BLD QL SMEAR: PRESENT
GLUCOSE BLDC GLUCOMTR-MCNC: 100 MG/DL — HIGH (ref 70–99)
GLUCOSE BLDC GLUCOMTR-MCNC: 100 MG/DL — HIGH (ref 70–99)
GLUCOSE BLDC GLUCOMTR-MCNC: 102 MG/DL — HIGH (ref 70–99)
GLUCOSE BLDC GLUCOMTR-MCNC: 103 MG/DL — HIGH (ref 70–99)
GLUCOSE BLDC GLUCOMTR-MCNC: 104 MG/DL — HIGH (ref 70–99)
GLUCOSE BLDC GLUCOMTR-MCNC: 105 MG/DL — HIGH (ref 70–99)
GLUCOSE BLDC GLUCOMTR-MCNC: 106 MG/DL — HIGH (ref 70–99)
GLUCOSE BLDC GLUCOMTR-MCNC: 107 MG/DL — HIGH (ref 70–99)
GLUCOSE BLDC GLUCOMTR-MCNC: 107 MG/DL — HIGH (ref 70–99)
GLUCOSE BLDC GLUCOMTR-MCNC: 108 MG/DL — HIGH (ref 70–99)
GLUCOSE BLDC GLUCOMTR-MCNC: 109 MG/DL — HIGH (ref 70–99)
GLUCOSE BLDC GLUCOMTR-MCNC: 110 MG/DL — HIGH (ref 70–99)
GLUCOSE BLDC GLUCOMTR-MCNC: 111 MG/DL — HIGH (ref 70–99)
GLUCOSE BLDC GLUCOMTR-MCNC: 112 MG/DL — HIGH (ref 70–99)
GLUCOSE BLDC GLUCOMTR-MCNC: 113 MG/DL — HIGH (ref 70–99)
GLUCOSE BLDC GLUCOMTR-MCNC: 113 MG/DL — HIGH (ref 70–99)
GLUCOSE BLDC GLUCOMTR-MCNC: 114 MG/DL — HIGH (ref 70–99)
GLUCOSE BLDC GLUCOMTR-MCNC: 115 MG/DL — HIGH (ref 70–99)
GLUCOSE BLDC GLUCOMTR-MCNC: 116 MG/DL — HIGH (ref 70–99)
GLUCOSE BLDC GLUCOMTR-MCNC: 117 MG/DL — HIGH (ref 70–99)
GLUCOSE BLDC GLUCOMTR-MCNC: 117 MG/DL — HIGH (ref 70–99)
GLUCOSE BLDC GLUCOMTR-MCNC: 118 MG/DL — HIGH (ref 70–99)
GLUCOSE BLDC GLUCOMTR-MCNC: 118 MG/DL — HIGH (ref 70–99)
GLUCOSE BLDC GLUCOMTR-MCNC: 119 MG/DL — HIGH (ref 70–99)
GLUCOSE BLDC GLUCOMTR-MCNC: 120 MG/DL — HIGH (ref 70–99)
GLUCOSE BLDC GLUCOMTR-MCNC: 121 MG/DL — HIGH (ref 70–99)
GLUCOSE BLDC GLUCOMTR-MCNC: 121 MG/DL — HIGH (ref 70–99)
GLUCOSE BLDC GLUCOMTR-MCNC: 122 MG/DL — HIGH (ref 70–99)
GLUCOSE BLDC GLUCOMTR-MCNC: 123 MG/DL — HIGH (ref 70–99)
GLUCOSE BLDC GLUCOMTR-MCNC: 124 MG/DL — HIGH (ref 70–99)
GLUCOSE BLDC GLUCOMTR-MCNC: 125 MG/DL — HIGH (ref 70–99)
GLUCOSE BLDC GLUCOMTR-MCNC: 126 MG/DL — HIGH (ref 70–99)
GLUCOSE BLDC GLUCOMTR-MCNC: 127 MG/DL — HIGH (ref 70–99)
GLUCOSE BLDC GLUCOMTR-MCNC: 128 MG/DL — HIGH (ref 70–99)
GLUCOSE BLDC GLUCOMTR-MCNC: 128 MG/DL — HIGH (ref 70–99)
GLUCOSE BLDC GLUCOMTR-MCNC: 129 MG/DL — HIGH (ref 70–99)
GLUCOSE BLDC GLUCOMTR-MCNC: 130 MG/DL — HIGH (ref 70–99)
GLUCOSE BLDC GLUCOMTR-MCNC: 131 MG/DL — HIGH (ref 70–99)
GLUCOSE BLDC GLUCOMTR-MCNC: 132 MG/DL — HIGH (ref 70–99)
GLUCOSE BLDC GLUCOMTR-MCNC: 133 MG/DL — HIGH (ref 70–99)
GLUCOSE BLDC GLUCOMTR-MCNC: 134 MG/DL — HIGH (ref 70–99)
GLUCOSE BLDC GLUCOMTR-MCNC: 135 MG/DL — HIGH (ref 70–99)
GLUCOSE BLDC GLUCOMTR-MCNC: 138 MG/DL — HIGH (ref 70–99)
GLUCOSE BLDC GLUCOMTR-MCNC: 138 MG/DL — HIGH (ref 70–99)
GLUCOSE BLDC GLUCOMTR-MCNC: 139 MG/DL — HIGH (ref 70–99)
GLUCOSE BLDC GLUCOMTR-MCNC: 140 MG/DL — HIGH (ref 70–99)
GLUCOSE BLDC GLUCOMTR-MCNC: 141 MG/DL — HIGH (ref 70–99)
GLUCOSE BLDC GLUCOMTR-MCNC: 142 MG/DL — HIGH (ref 70–99)
GLUCOSE BLDC GLUCOMTR-MCNC: 143 MG/DL — HIGH (ref 70–99)
GLUCOSE BLDC GLUCOMTR-MCNC: 143 MG/DL — HIGH (ref 70–99)
GLUCOSE BLDC GLUCOMTR-MCNC: 144 MG/DL — HIGH (ref 70–99)
GLUCOSE BLDC GLUCOMTR-MCNC: 148 MG/DL — HIGH (ref 70–99)
GLUCOSE BLDC GLUCOMTR-MCNC: 149 MG/DL — HIGH (ref 70–99)
GLUCOSE BLDC GLUCOMTR-MCNC: 150 MG/DL — HIGH (ref 70–99)
GLUCOSE BLDC GLUCOMTR-MCNC: 150 MG/DL — HIGH (ref 70–99)
GLUCOSE BLDC GLUCOMTR-MCNC: 151 MG/DL — HIGH (ref 70–99)
GLUCOSE BLDC GLUCOMTR-MCNC: 151 MG/DL — HIGH (ref 70–99)
GLUCOSE BLDC GLUCOMTR-MCNC: 152 MG/DL — HIGH (ref 70–99)
GLUCOSE BLDC GLUCOMTR-MCNC: 153 MG/DL — HIGH (ref 70–99)
GLUCOSE BLDC GLUCOMTR-MCNC: 154 MG/DL — HIGH (ref 70–99)
GLUCOSE BLDC GLUCOMTR-MCNC: 155 MG/DL — HIGH (ref 70–99)
GLUCOSE BLDC GLUCOMTR-MCNC: 156 MG/DL — HIGH (ref 70–99)
GLUCOSE BLDC GLUCOMTR-MCNC: 156 MG/DL — HIGH (ref 70–99)
GLUCOSE BLDC GLUCOMTR-MCNC: 157 MG/DL — HIGH (ref 70–99)
GLUCOSE BLDC GLUCOMTR-MCNC: 158 MG/DL — HIGH (ref 70–99)
GLUCOSE BLDC GLUCOMTR-MCNC: 159 MG/DL — HIGH (ref 70–99)
GLUCOSE BLDC GLUCOMTR-MCNC: 159 MG/DL — HIGH (ref 70–99)
GLUCOSE BLDC GLUCOMTR-MCNC: 160 MG/DL — HIGH (ref 70–99)
GLUCOSE BLDC GLUCOMTR-MCNC: 162 MG/DL — HIGH (ref 70–99)
GLUCOSE BLDC GLUCOMTR-MCNC: 163 MG/DL — HIGH (ref 70–99)
GLUCOSE BLDC GLUCOMTR-MCNC: 164 MG/DL — HIGH (ref 70–99)
GLUCOSE BLDC GLUCOMTR-MCNC: 164 MG/DL — HIGH (ref 70–99)
GLUCOSE BLDC GLUCOMTR-MCNC: 166 MG/DL — HIGH (ref 70–99)
GLUCOSE BLDC GLUCOMTR-MCNC: 167 MG/DL — HIGH (ref 70–99)
GLUCOSE BLDC GLUCOMTR-MCNC: 168 MG/DL — HIGH (ref 70–99)
GLUCOSE BLDC GLUCOMTR-MCNC: 169 MG/DL — HIGH (ref 70–99)
GLUCOSE BLDC GLUCOMTR-MCNC: 169 MG/DL — HIGH (ref 70–99)
GLUCOSE BLDC GLUCOMTR-MCNC: 170 MG/DL — HIGH (ref 70–99)
GLUCOSE BLDC GLUCOMTR-MCNC: 170 MG/DL — HIGH (ref 70–99)
GLUCOSE BLDC GLUCOMTR-MCNC: 171 MG/DL — HIGH (ref 70–99)
GLUCOSE BLDC GLUCOMTR-MCNC: 171 MG/DL — HIGH (ref 70–99)
GLUCOSE BLDC GLUCOMTR-MCNC: 172 MG/DL — HIGH (ref 70–99)
GLUCOSE BLDC GLUCOMTR-MCNC: 172 MG/DL — HIGH (ref 70–99)
GLUCOSE BLDC GLUCOMTR-MCNC: 173 MG/DL — HIGH (ref 70–99)
GLUCOSE BLDC GLUCOMTR-MCNC: 174 MG/DL — HIGH (ref 70–99)
GLUCOSE BLDC GLUCOMTR-MCNC: 174 MG/DL — HIGH (ref 70–99)
GLUCOSE BLDC GLUCOMTR-MCNC: 175 MG/DL — HIGH (ref 70–99)
GLUCOSE BLDC GLUCOMTR-MCNC: 175 MG/DL — HIGH (ref 70–99)
GLUCOSE BLDC GLUCOMTR-MCNC: 176 MG/DL — HIGH (ref 70–99)
GLUCOSE BLDC GLUCOMTR-MCNC: 180 MG/DL — HIGH (ref 70–99)
GLUCOSE BLDC GLUCOMTR-MCNC: 181 MG/DL — HIGH (ref 70–99)
GLUCOSE BLDC GLUCOMTR-MCNC: 181 MG/DL — HIGH (ref 70–99)
GLUCOSE BLDC GLUCOMTR-MCNC: 183 MG/DL — HIGH (ref 70–99)
GLUCOSE BLDC GLUCOMTR-MCNC: 184 MG/DL — HIGH (ref 70–99)
GLUCOSE BLDC GLUCOMTR-MCNC: 187 MG/DL — HIGH (ref 70–99)
GLUCOSE BLDC GLUCOMTR-MCNC: 191 MG/DL — HIGH (ref 70–99)
GLUCOSE BLDC GLUCOMTR-MCNC: 196 MG/DL — HIGH (ref 70–99)
GLUCOSE BLDC GLUCOMTR-MCNC: 198 MG/DL — HIGH (ref 70–99)
GLUCOSE BLDC GLUCOMTR-MCNC: 202 MG/DL — HIGH (ref 70–99)
GLUCOSE BLDC GLUCOMTR-MCNC: 202 MG/DL — HIGH (ref 70–99)
GLUCOSE BLDC GLUCOMTR-MCNC: 205 MG/DL — HIGH (ref 70–99)
GLUCOSE BLDC GLUCOMTR-MCNC: 206 MG/DL — HIGH (ref 70–99)
GLUCOSE BLDC GLUCOMTR-MCNC: 207 MG/DL — HIGH (ref 70–99)
GLUCOSE BLDC GLUCOMTR-MCNC: 207 MG/DL — HIGH (ref 70–99)
GLUCOSE BLDC GLUCOMTR-MCNC: 213 MG/DL — HIGH (ref 70–99)
GLUCOSE BLDC GLUCOMTR-MCNC: 218 MG/DL — HIGH (ref 70–99)
GLUCOSE BLDC GLUCOMTR-MCNC: 221 MG/DL — HIGH (ref 70–99)
GLUCOSE BLDC GLUCOMTR-MCNC: 222 MG/DL — HIGH (ref 70–99)
GLUCOSE BLDC GLUCOMTR-MCNC: 228 MG/DL — HIGH (ref 70–99)
GLUCOSE BLDC GLUCOMTR-MCNC: 232 MG/DL — HIGH (ref 70–99)
GLUCOSE BLDC GLUCOMTR-MCNC: 237 MG/DL — HIGH (ref 70–99)
GLUCOSE BLDC GLUCOMTR-MCNC: 243 MG/DL — HIGH (ref 70–99)
GLUCOSE BLDC GLUCOMTR-MCNC: 243 MG/DL — HIGH (ref 70–99)
GLUCOSE BLDC GLUCOMTR-MCNC: 255 MG/DL — HIGH (ref 70–99)
GLUCOSE BLDC GLUCOMTR-MCNC: 268 MG/DL — HIGH (ref 70–99)
GLUCOSE BLDC GLUCOMTR-MCNC: 277 MG/DL — HIGH (ref 70–99)
GLUCOSE BLDC GLUCOMTR-MCNC: 304 MG/DL — HIGH (ref 70–99)
GLUCOSE BLDC GLUCOMTR-MCNC: 345 MG/DL — HIGH (ref 70–99)
GLUCOSE BLDC GLUCOMTR-MCNC: 350 MG/DL — HIGH (ref 70–99)
GLUCOSE BLDC GLUCOMTR-MCNC: 379 MG/DL — HIGH (ref 70–99)
GLUCOSE BLDC GLUCOMTR-MCNC: 64 MG/DL — LOW (ref 70–99)
GLUCOSE BLDC GLUCOMTR-MCNC: 77 MG/DL — SIGNIFICANT CHANGE UP (ref 70–99)
GLUCOSE BLDC GLUCOMTR-MCNC: 86 MG/DL — SIGNIFICANT CHANGE UP (ref 70–99)
GLUCOSE BLDC GLUCOMTR-MCNC: 86 MG/DL — SIGNIFICANT CHANGE UP (ref 70–99)
GLUCOSE BLDC GLUCOMTR-MCNC: 88 MG/DL — SIGNIFICANT CHANGE UP (ref 70–99)
GLUCOSE BLDC GLUCOMTR-MCNC: 89 MG/DL — SIGNIFICANT CHANGE UP (ref 70–99)
GLUCOSE BLDC GLUCOMTR-MCNC: 90 MG/DL — SIGNIFICANT CHANGE UP (ref 70–99)
GLUCOSE BLDC GLUCOMTR-MCNC: 91 MG/DL — SIGNIFICANT CHANGE UP (ref 70–99)
GLUCOSE BLDC GLUCOMTR-MCNC: 92 MG/DL — SIGNIFICANT CHANGE UP (ref 70–99)
GLUCOSE BLDC GLUCOMTR-MCNC: 92 MG/DL — SIGNIFICANT CHANGE UP (ref 70–99)
GLUCOSE BLDC GLUCOMTR-MCNC: 93 MG/DL — SIGNIFICANT CHANGE UP (ref 70–99)
GLUCOSE BLDC GLUCOMTR-MCNC: 94 MG/DL — SIGNIFICANT CHANGE UP (ref 70–99)
GLUCOSE BLDC GLUCOMTR-MCNC: 94 MG/DL — SIGNIFICANT CHANGE UP (ref 70–99)
GLUCOSE BLDC GLUCOMTR-MCNC: 95 MG/DL — SIGNIFICANT CHANGE UP (ref 70–99)
GLUCOSE BLDC GLUCOMTR-MCNC: 95 MG/DL — SIGNIFICANT CHANGE UP (ref 70–99)
GLUCOSE BLDC GLUCOMTR-MCNC: 96 MG/DL — SIGNIFICANT CHANGE UP (ref 70–99)
GLUCOSE BLDC GLUCOMTR-MCNC: 96 MG/DL — SIGNIFICANT CHANGE UP (ref 70–99)
GLUCOSE BLDC GLUCOMTR-MCNC: 97 MG/DL — SIGNIFICANT CHANGE UP (ref 70–99)
GLUCOSE BLDC GLUCOMTR-MCNC: 98 MG/DL — SIGNIFICANT CHANGE UP (ref 70–99)
GLUCOSE BLDC GLUCOMTR-MCNC: 99 MG/DL — SIGNIFICANT CHANGE UP (ref 70–99)
GLUCOSE SERPL-MCNC: 100 MG/DL — HIGH (ref 70–99)
GLUCOSE SERPL-MCNC: 106 MG/DL — HIGH (ref 70–99)
GLUCOSE SERPL-MCNC: 112 MG/DL — HIGH (ref 70–99)
GLUCOSE SERPL-MCNC: 115 MG/DL — HIGH (ref 70–99)
GLUCOSE SERPL-MCNC: 130 MG/DL — HIGH (ref 70–99)
GLUCOSE SERPL-MCNC: 130 MG/DL — HIGH (ref 70–99)
GLUCOSE SERPL-MCNC: 131 MG/DL — HIGH (ref 70–99)
GLUCOSE SERPL-MCNC: 135 MG/DL — HIGH (ref 70–99)
GLUCOSE SERPL-MCNC: 142 MG/DL — HIGH (ref 70–99)
GLUCOSE SERPL-MCNC: 154 MG/DL — HIGH (ref 70–99)
GLUCOSE SERPL-MCNC: 159 MG/DL — HIGH (ref 70–99)
GLUCOSE SERPL-MCNC: 166 MG/DL — HIGH (ref 70–99)
GLUCOSE SERPL-MCNC: 167 MG/DL — HIGH (ref 70–99)
GLUCOSE SERPL-MCNC: 257 MG/DL — HIGH (ref 70–99)
GLUCOSE SERPL-MCNC: 295 MG/DL — HIGH (ref 70–99)
GLUCOSE SERPL-MCNC: 87 MG/DL — SIGNIFICANT CHANGE UP (ref 70–99)
GLUCOSE SERPL-MCNC: 93 MG/DL — SIGNIFICANT CHANGE UP (ref 70–99)
GLUCOSE SERPL-MCNC: 93 MG/DL — SIGNIFICANT CHANGE UP (ref 70–99)
GLUCOSE SERPL-MCNC: 95 MG/DL — SIGNIFICANT CHANGE UP (ref 70–99)
GLUCOSE SERPL-MCNC: 97 MG/DL — SIGNIFICANT CHANGE UP (ref 70–99)
GLUCOSE UR QL: NEGATIVE MG/DL — SIGNIFICANT CHANGE UP
GLUCOSE UR QL: NEGATIVE MG/DL — SIGNIFICANT CHANGE UP
GRAM STN FLD: ABNORMAL
HAPTOGLOB SERPL-MCNC: 161 MG/DL — SIGNIFICANT CHANGE UP (ref 34–200)
HAPTOGLOB SERPL-MCNC: 60 MG/DL — SIGNIFICANT CHANGE UP (ref 34–200)
HAPTOGLOB SERPL-MCNC: 90 MG/DL — SIGNIFICANT CHANGE UP (ref 34–200)
HAPTOGLOB SERPL-MCNC: <20 MG/DL — LOW (ref 34–200)
HCO3 BLDMV-SCNC: 31 MMOL/L — HIGH (ref 20–28)
HCO3 BLDMV-SCNC: 32 MMOL/L — HIGH (ref 20–28)
HCO3 BLDMV-SCNC: 32 MMOL/L — HIGH (ref 20–28)
HCO3 BLDMV-SCNC: 33 MMOL/L — HIGH (ref 20–28)
HCO3 BLDMV-SCNC: 34 MMOL/L — HIGH (ref 20–28)
HCO3 BLDMV-SCNC: 35 MMOL/L — HIGH (ref 20–28)
HCO3 BLDMV-SCNC: 36 MMOL/L — HIGH (ref 20–28)
HCO3 BLDMV-SCNC: 36 MMOL/L — HIGH (ref 20–28)
HCO3 BLDMV-SCNC: 37 MMOL/L — HIGH (ref 20–28)
HCO3 BLDMV-SCNC: 39 MMOL/L — HIGH (ref 20–28)
HCO3 BLDV-SCNC: 19 MMOL/L — LOW (ref 22–29)
HCO3 BLDV-SCNC: 32 MMOL/L — HIGH (ref 22–29)
HCO3 BLDV-SCNC: 35 MMOL/L — HIGH (ref 22–29)
HCO3 BLDV-SCNC: 39 MMOL/L — HIGH (ref 22–29)
HCO3 BLDV-SCNC: 41 MMOL/L — HIGH (ref 22–29)
HCO3 BLDV-SCNC: 43 MMOL/L — HIGH (ref 22–29)
HCT VFR BLD CALC: 20.4 % — CRITICAL LOW (ref 39–50)
HCT VFR BLD CALC: 24.5 % — LOW (ref 39–50)
HCT VFR BLD CALC: 24.8 % — LOW (ref 39–50)
HCT VFR BLD CALC: 25 % — LOW (ref 39–50)
HCT VFR BLD CALC: 25.3 % — LOW (ref 39–50)
HCT VFR BLD CALC: 25.5 % — LOW (ref 39–50)
HCT VFR BLD CALC: 25.5 % — LOW (ref 39–50)
HCT VFR BLD CALC: 25.9 % — LOW (ref 39–50)
HCT VFR BLD CALC: 26.1 % — LOW (ref 39–50)
HCT VFR BLD CALC: 26.7 % — LOW (ref 39–50)
HCT VFR BLD CALC: 28.2 % — LOW (ref 39–50)
HCT VFR BLD CALC: 28.3 % — LOW (ref 39–50)
HCT VFR BLD CALC: 28.7 % — LOW (ref 39–50)
HCT VFR BLD CALC: 28.8 % — LOW (ref 39–50)
HCT VFR BLD CALC: 28.8 % — LOW (ref 39–50)
HCT VFR BLD CALC: 29.4 % — LOW (ref 39–50)
HCT VFR BLD CALC: 29.6 % — LOW (ref 39–50)
HCT VFR BLD CALC: 29.6 % — LOW (ref 39–50)
HCT VFR BLD CALC: 29.8 % — LOW (ref 39–50)
HCT VFR BLD CALC: 29.9 % — LOW (ref 39–50)
HCT VFR BLD CALC: 30.1 % — LOW (ref 39–50)
HCT VFR BLD CALC: 30.1 % — LOW (ref 39–50)
HCT VFR BLD CALC: 33.4 % — LOW (ref 39–50)
HCT VFR BLD CALC: 35.5 % — LOW (ref 39–50)
HDLC SERPL-MCNC: 31 MG/DL — LOW
HEPARIN-PF4 AB RESULT: <0.6 U/ML — SIGNIFICANT CHANGE UP (ref 0–0.9)
HGB BLD-MCNC: 10 G/DL — LOW (ref 13–17)
HGB BLD-MCNC: 10 G/DL — LOW (ref 13–17)
HGB BLD-MCNC: 10.1 G/DL — LOW (ref 13–17)
HGB BLD-MCNC: 10.2 G/DL — LOW (ref 13–17)
HGB BLD-MCNC: 10.2 G/DL — LOW (ref 13–17)
HGB BLD-MCNC: 10.5 G/DL — LOW (ref 13–17)
HGB BLD-MCNC: 10.7 G/DL — LOW (ref 13–17)
HGB BLD-MCNC: 11.8 G/DL — LOW (ref 13–17)
HGB BLD-MCNC: 12.3 G/DL — LOW (ref 13–17)
HGB BLD-MCNC: 6.7 G/DL — CRITICAL LOW (ref 13–17)
HGB BLD-MCNC: 8.2 G/DL — LOW (ref 13–17)
HGB BLD-MCNC: 8.4 G/DL — LOW (ref 13–17)
HGB BLD-MCNC: 8.6 G/DL — LOW (ref 13–17)
HGB BLD-MCNC: 8.8 G/DL — LOW (ref 13–17)
HGB BLD-MCNC: 8.9 G/DL — LOW (ref 13–17)
HGB BLD-MCNC: 8.9 G/DL — LOW (ref 13–17)
HGB BLD-MCNC: 9 G/DL — LOW (ref 13–17)
HGB BLD-MCNC: 9.9 G/DL — LOW (ref 13–17)
HGB BLD-MCNC: 9.9 G/DL — LOW (ref 13–17)
HOROWITZ INDEX BLDMV+IHG-RTO: 100 — SIGNIFICANT CHANGE UP
HOROWITZ INDEX BLDMV+IHG-RTO: 28 — SIGNIFICANT CHANGE UP
HOROWITZ INDEX BLDMV+IHG-RTO: 36 — SIGNIFICANT CHANGE UP
HOROWITZ INDEX BLDMV+IHG-RTO: 40 — SIGNIFICANT CHANGE UP
HOROWITZ INDEX BLDV+IHG-RTO: 100 — SIGNIFICANT CHANGE UP
HOROWITZ INDEX BLDV+IHG-RTO: 40 — SIGNIFICANT CHANGE UP
HOROWITZ INDEX BLDV+IHG-RTO: 44 — SIGNIFICANT CHANGE UP
HOROWITZ INDEX BLDV+IHG-RTO: 44 — SIGNIFICANT CHANGE UP
HYALINE CASTS # UR AUTO: PRESENT
IMM GRANULOCYTES # BLD AUTO: 0.16 K/UL — HIGH (ref 0–0.07)
IMM GRANULOCYTES # BLD AUTO: 0.17 K/UL — HIGH (ref 0–0.07)
IMM GRANULOCYTES # BLD AUTO: 0.2 K/UL — HIGH (ref 0–0.07)
IMM GRANULOCYTES # BLD AUTO: 0.24 K/UL — HIGH (ref 0–0.07)
IMM GRANULOCYTES # BLD AUTO: 0.27 K/UL — HIGH (ref 0–0.07)
IMM GRANULOCYTES # BLD AUTO: 0.29 K/UL — HIGH (ref 0–0.07)
IMM GRANULOCYTES # BLD AUTO: 0.36 K/UL — HIGH (ref 0–0.07)
IMM GRANULOCYTES # BLD AUTO: 0.43 K/UL — HIGH (ref 0–0.07)
IMM GRANULOCYTES # BLD AUTO: 0.58 K/UL — HIGH (ref 0–0.07)
IMM GRANULOCYTES # BLD AUTO: 0.58 K/UL — HIGH (ref 0–0.07)
IMM GRANULOCYTES # BLD AUTO: 0.9 K/UL — HIGH (ref 0–0.07)
IMM GRANULOCYTES # BLD AUTO: 0.93 K/UL — HIGH (ref 0–0.07)
IMM GRANULOCYTES # BLD AUTO: 0.93 K/UL — HIGH (ref 0–0.07)
IMM GRANULOCYTES NFR BLD AUTO: 1.8 % — HIGH (ref 0–0.9)
IMM GRANULOCYTES NFR BLD AUTO: 1.9 % — HIGH (ref 0–0.9)
IMM GRANULOCYTES NFR BLD AUTO: 2.1 % — HIGH (ref 0–0.9)
IMM GRANULOCYTES NFR BLD AUTO: 2.3 % — HIGH (ref 0–0.9)
IMM GRANULOCYTES NFR BLD AUTO: 2.5 % — HIGH (ref 0–0.9)
IMM GRANULOCYTES NFR BLD AUTO: 2.7 % — HIGH (ref 0–0.9)
IMM GRANULOCYTES NFR BLD AUTO: 2.9 % — HIGH (ref 0–0.9)
IMM GRANULOCYTES NFR BLD AUTO: 3.1 % — HIGH (ref 0–0.9)
IMM GRANULOCYTES NFR BLD AUTO: 3.7 % — HIGH (ref 0–0.9)
IMM GRANULOCYTES NFR BLD AUTO: 4.2 % — HIGH (ref 0–0.9)
IMM GRANULOCYTES NFR BLD AUTO: 4.3 % — HIGH (ref 0–0.9)
IMM GRANULOCYTES NFR BLD AUTO: 4.5 % — HIGH (ref 0–0.9)
IMM GRANULOCYTES NFR BLD AUTO: 6.9 % — HIGH (ref 0–0.9)
IMMATURE PLATELET FRACTION #: 12.9 K/UL — HIGH (ref 3.9–12.5)
IMMATURE PLATELET FRACTION #: 2.6 K/UL — LOW (ref 3.9–12.5)
IMMATURE PLATELET FRACTION #: 2.7 K/UL — LOW (ref 3.9–12.5)
IMMATURE PLATELET FRACTION #: 2.7 K/UL — LOW (ref 3.9–12.5)
IMMATURE PLATELET FRACTION #: 2.8 K/UL — LOW (ref 3.9–12.5)
IMMATURE PLATELET FRACTION #: 2.9 K/UL — LOW (ref 3.9–12.5)
IMMATURE PLATELET FRACTION #: 3 K/UL — LOW (ref 3.9–12.5)
IMMATURE PLATELET FRACTION #: 3.1 K/UL — LOW (ref 3.9–12.5)
IMMATURE PLATELET FRACTION #: 3.3 K/UL — LOW (ref 3.9–12.5)
IMMATURE PLATELET FRACTION #: 3.4 K/UL — LOW (ref 3.9–12.5)
IMMATURE PLATELET FRACTION #: 3.4 K/UL — LOW (ref 3.9–12.5)
IMMATURE PLATELET FRACTION #: 3.9 K/UL — SIGNIFICANT CHANGE UP (ref 3.9–12.5)
IMMATURE PLATELET FRACTION #: 8.5 K/UL — SIGNIFICANT CHANGE UP (ref 3.9–12.5)
IMMATURE PLATELET FRACTION #: 8.7 K/UL — SIGNIFICANT CHANGE UP (ref 3.9–12.5)
IMMATURE PLATELET FRACTION #: 8.9 K/UL — SIGNIFICANT CHANGE UP (ref 3.9–12.5)
IMMATURE PLATELET FRACTION #: 9.2 K/UL — SIGNIFICANT CHANGE UP (ref 3.9–12.5)
IMMATURE PLATELET FRACTION #: 9.4 K/UL — SIGNIFICANT CHANGE UP (ref 3.9–12.5)
IMMATURE PLATELET FRACTION #: 9.5 K/UL — SIGNIFICANT CHANGE UP (ref 3.9–12.5)
IMMATURE PLATELET FRACTION %: 10.3 % — HIGH (ref 1.6–7.1)
IMMATURE PLATELET FRACTION %: 15.7 % — HIGH (ref 1.6–7.1)
IMMATURE PLATELET FRACTION %: 5 % — SIGNIFICANT CHANGE UP (ref 1.6–7.1)
IMMATURE PLATELET FRACTION %: 5.2 % — SIGNIFICANT CHANGE UP (ref 1.6–7.1)
IMMATURE PLATELET FRACTION %: 6.4 % — SIGNIFICANT CHANGE UP (ref 1.6–7.1)
IMMATURE PLATELET FRACTION %: 6.5 % — SIGNIFICANT CHANGE UP (ref 1.6–7.1)
IMMATURE PLATELET FRACTION %: 6.6 % — SIGNIFICANT CHANGE UP (ref 1.6–7.1)
IMMATURE PLATELET FRACTION %: 6.8 % — SIGNIFICANT CHANGE UP (ref 1.6–7.1)
IMMATURE PLATELET FRACTION %: 6.8 % — SIGNIFICANT CHANGE UP (ref 1.6–7.1)
IMMATURE PLATELET FRACTION %: 7.6 % — HIGH (ref 1.6–7.1)
IMMATURE PLATELET FRACTION %: 7.6 % — HIGH (ref 1.6–7.1)
IMMATURE PLATELET FRACTION %: 8.5 % — HIGH (ref 1.6–7.1)
IMMATURE PLATELET FRACTION %: 9.2 % — HIGH (ref 1.6–7.1)
IMMATURE PLATELET FRACTION %: 9.3 % — HIGH (ref 1.6–7.1)
IMMATURE PLATELET FRACTION %: 9.4 % — HIGH (ref 1.6–7.1)
IMMATURE PLATELET FRACTION %: 9.5 % — HIGH (ref 1.6–7.1)
IMMATURE PLATELET FRACTION %: 9.7 % — HIGH (ref 1.6–7.1)
IMMATURE PLATELET FRACTION %: 9.9 % — HIGH (ref 1.6–7.1)
INR BLD: 1.16 RATIO — SIGNIFICANT CHANGE UP (ref 0.85–1.16)
INR BLD: 1.2 RATIO — HIGH (ref 0.85–1.16)
INR BLD: 1.22 RATIO — HIGH (ref 0.85–1.16)
INR BLD: 1.22 RATIO — HIGH (ref 0.85–1.16)
INR BLD: 1.25 RATIO — HIGH (ref 0.85–1.16)
INR BLD: 1.25 RATIO — HIGH (ref 0.85–1.16)
INR BLD: 1.26 RATIO — HIGH (ref 0.85–1.16)
INR BLD: 1.26 RATIO — HIGH (ref 0.85–1.16)
INR BLD: 1.27 RATIO — HIGH (ref 0.85–1.16)
INR BLD: 1.31 RATIO — HIGH (ref 0.85–1.16)
INR BLD: 1.36 RATIO — HIGH (ref 0.85–1.16)
INR BLD: 1.4 RATIO — HIGH (ref 0.85–1.16)
INR BLD: 1.45 RATIO — HIGH (ref 0.85–1.16)
INR BLD: 1.46 RATIO — HIGH (ref 0.85–1.16)
INR BLD: 1.51 RATIO — HIGH (ref 0.85–1.16)
INR BLD: 1.55 RATIO — HIGH (ref 0.85–1.16)
INR BLD: 1.7 RATIO — HIGH (ref 0.85–1.16)
INR BLD: 1.72 RATIO — HIGH (ref 0.85–1.16)
INR BLD: 1.75 RATIO — HIGH (ref 0.85–1.16)
INR BLD: 1.77 RATIO — HIGH (ref 0.85–1.16)
INR BLD: 1.78 RATIO — HIGH (ref 0.85–1.16)
INR BLD: 1.85 RATIO — HIGH (ref 0.85–1.16)
INR BLD: 2.17 RATIO — HIGH (ref 0.85–1.16)
KETONES UR QL: NEGATIVE MG/DL — SIGNIFICANT CHANGE UP
KETONES UR QL: NEGATIVE MG/DL — SIGNIFICANT CHANGE UP
LDH SERPL L TO P-CCNC: 1061 U/L — HIGH (ref 50–242)
LDH SERPL L TO P-CCNC: 1071 U/L — HIGH (ref 50–242)
LDH SERPL L TO P-CCNC: 1129 U/L — HIGH (ref 50–242)
LDH SERPL L TO P-CCNC: 1293 U/L — HIGH (ref 50–242)
LDH SERPL L TO P-CCNC: 1311 U/L — HIGH (ref 50–242)
LDH SERPL L TO P-CCNC: 1363 U/L — HIGH (ref 50–242)
LDH SERPL L TO P-CCNC: 658 U/L — HIGH (ref 50–242)
LDH SERPL L TO P-CCNC: 691 U/L — HIGH (ref 50–242)
LDH SERPL L TO P-CCNC: 699 U/L — HIGH (ref 50–242)
LDH SERPL L TO P-CCNC: 850 U/L — HIGH (ref 50–242)
LDH SERPL L TO P-CCNC: 872 U/L — HIGH (ref 50–242)
LDH SERPL L TO P-CCNC: 873 U/L — HIGH (ref 50–242)
LDLC SERPL-MCNC: 41 MG/DL — SIGNIFICANT CHANGE UP
LEUKOCYTE ESTERASE UR-ACNC: ABNORMAL
LEUKOCYTE ESTERASE UR-ACNC: NEGATIVE — SIGNIFICANT CHANGE UP
LIPID PNL WITH DIRECT LDL SERPL: 41 MG/DL — SIGNIFICANT CHANGE UP
LYMPHOCYTES # BLD AUTO: 0.31 K/UL — LOW (ref 1–3.3)
LYMPHOCYTES # BLD AUTO: 0.38 K/UL — LOW (ref 1–3.3)
LYMPHOCYTES # BLD AUTO: 0.51 K/UL — LOW (ref 1–3.3)
LYMPHOCYTES # BLD AUTO: 0.52 K/UL — LOW (ref 1–3.3)
LYMPHOCYTES # BLD AUTO: 0.54 K/UL — LOW (ref 1–3.3)
LYMPHOCYTES # BLD AUTO: 0.79 K/UL — LOW (ref 1–3.3)
LYMPHOCYTES # BLD AUTO: 0.8 K/UL — LOW (ref 1–3.3)
LYMPHOCYTES # BLD AUTO: 0.85 K/UL — LOW (ref 1–3.3)
LYMPHOCYTES # BLD AUTO: 0.85 K/UL — LOW (ref 1–3.3)
LYMPHOCYTES # BLD AUTO: 0.89 K/UL — LOW (ref 1–3.3)
LYMPHOCYTES # BLD AUTO: 0.92 K/UL — LOW (ref 1–3.3)
LYMPHOCYTES # BLD AUTO: 1.11 K/UL — SIGNIFICANT CHANGE UP (ref 1–3.3)
LYMPHOCYTES # BLD AUTO: 1.46 K/UL — SIGNIFICANT CHANGE UP (ref 1–3.3)
LYMPHOCYTES # BLD MANUAL: 0.42 K/UL — LOW (ref 1–3.3)
LYMPHOCYTES # BLD MANUAL: 0.44 K/UL — LOW (ref 1–3.3)
LYMPHOCYTES # BLD MANUAL: 1.09 K/UL — SIGNIFICANT CHANGE UP (ref 1–3.3)
LYMPHOCYTES # BLD MANUAL: 1.17 K/UL — SIGNIFICANT CHANGE UP (ref 1–3.3)
LYMPHOCYTES NFR BLD AUTO: 10.5 % — LOW (ref 13–44)
LYMPHOCYTES NFR BLD AUTO: 3.7 % — LOW (ref 13–44)
LYMPHOCYTES NFR BLD AUTO: 3.8 % — LOW (ref 13–44)
LYMPHOCYTES NFR BLD AUTO: 3.9 % — LOW (ref 13–44)
LYMPHOCYTES NFR BLD AUTO: 4.5 % — LOW (ref 13–44)
LYMPHOCYTES NFR BLD AUTO: 5.2 % — LOW (ref 13–44)
LYMPHOCYTES NFR BLD AUTO: 5.3 % — LOW (ref 13–44)
LYMPHOCYTES NFR BLD AUTO: 5.9 % — LOW (ref 13–44)
LYMPHOCYTES NFR BLD AUTO: 5.9 % — LOW (ref 13–44)
LYMPHOCYTES NFR BLD AUTO: 6.1 % — LOW (ref 13–44)
LYMPHOCYTES NFR BLD AUTO: 6.1 % — LOW (ref 13–44)
LYMPHOCYTES NFR BLD AUTO: 6.7 % — LOW (ref 13–44)
LYMPHOCYTES NFR BLD AUTO: 9.2 % — LOW (ref 13–44)
LYMPHOCYTES NFR BLD MANUAL: 1.7 % — LOW (ref 13–44)
LYMPHOCYTES NFR BLD MANUAL: 14.4 % — SIGNIFICANT CHANGE UP (ref 13–44)
LYMPHOCYTES NFR BLD MANUAL: 5.2 % — LOW (ref 13–44)
LYMPHOCYTES NFR BLD MANUAL: 8.1 % — LOW (ref 13–44)
MACROCYTES BLD QL: SLIGHT — SIGNIFICANT CHANGE UP
MACROCYTES BLD QL: SLIGHT — SIGNIFICANT CHANGE UP
MAGNESIUM SERPL-MCNC: 1.8 MG/DL — SIGNIFICANT CHANGE UP (ref 1.6–2.6)
MAGNESIUM SERPL-MCNC: 2.2 MG/DL — SIGNIFICANT CHANGE UP (ref 1.6–2.6)
MAGNESIUM SERPL-MCNC: 2.4 MG/DL — SIGNIFICANT CHANGE UP (ref 1.6–2.6)
MAGNESIUM SERPL-MCNC: 2.5 MG/DL — SIGNIFICANT CHANGE UP (ref 1.6–2.6)
MAGNESIUM SERPL-MCNC: 2.6 MG/DL — SIGNIFICANT CHANGE UP (ref 1.6–2.6)
MAGNESIUM SERPL-MCNC: 2.7 MG/DL — HIGH (ref 1.6–2.6)
MAGNESIUM SERPL-MCNC: 2.8 MG/DL — HIGH (ref 1.6–2.6)
MAGNESIUM SERPL-MCNC: 2.9 MG/DL — HIGH (ref 1.6–2.6)
MAGNESIUM SERPL-MCNC: 3 MG/DL — HIGH (ref 1.6–2.6)
MAGNESIUM SERPL-MCNC: 3 MG/DL — HIGH (ref 1.6–2.6)
MAGNESIUM SERPL-MCNC: 3.1 MG/DL — HIGH (ref 1.6–2.6)
MAGNESIUM SERPL-MCNC: 3.2 MG/DL — HIGH (ref 1.6–2.6)
MAGNESIUM SERPL-MCNC: 3.4 MG/DL — HIGH (ref 1.6–2.6)
MAGNESIUM SERPL-MCNC: 3.9 MG/DL — HIGH (ref 1.6–2.6)
MANUAL METAMYELOCYTE #: 0.08 K/UL — HIGH (ref 0–0)
MANUAL METAMYELOCYTE #: 0.22 K/UL — HIGH (ref 0–0)
MANUAL MYELOCYTE #: 0.22 K/UL — HIGH (ref 0–0)
MANUAL NEUTROPHIL BANDS #: 0.26 K/UL — SIGNIFICANT CHANGE UP (ref 0–0.84)
MANUAL NEUTROPHIL BANDS #: 1.07 K/UL — HIGH (ref 0–0.84)
MANUAL NEUTROPHIL BANDS #: 1.24 K/UL — HIGH (ref 0–0.84)
MANUAL NEUTROPHIL BANDS #: 3.3 K/UL — HIGH (ref 0–0.84)
MANUAL NRBC #: 0.17 K/UL — HIGH (ref 0–0)
MANUAL NRBC #: 1.21 K/UL — HIGH (ref 0–0)
MANUAL NRBC #: 1.59 K/UL — HIGH (ref 0–0)
MCHC RBC-ENTMCNC: 28.3 PG — SIGNIFICANT CHANGE UP (ref 27–34)
MCHC RBC-ENTMCNC: 28.5 PG — SIGNIFICANT CHANGE UP (ref 27–34)
MCHC RBC-ENTMCNC: 28.9 PG — SIGNIFICANT CHANGE UP (ref 27–34)
MCHC RBC-ENTMCNC: 29.4 PG — SIGNIFICANT CHANGE UP (ref 27–34)
MCHC RBC-ENTMCNC: 29.6 PG — SIGNIFICANT CHANGE UP (ref 27–34)
MCHC RBC-ENTMCNC: 29.7 PG — SIGNIFICANT CHANGE UP (ref 27–34)
MCHC RBC-ENTMCNC: 29.8 PG — SIGNIFICANT CHANGE UP (ref 27–34)
MCHC RBC-ENTMCNC: 29.9 PG — SIGNIFICANT CHANGE UP (ref 27–34)
MCHC RBC-ENTMCNC: 30 PG — SIGNIFICANT CHANGE UP (ref 27–34)
MCHC RBC-ENTMCNC: 30.1 PG — SIGNIFICANT CHANGE UP (ref 27–34)
MCHC RBC-ENTMCNC: 30.2 PG — SIGNIFICANT CHANGE UP (ref 27–34)
MCHC RBC-ENTMCNC: 30.3 PG — SIGNIFICANT CHANGE UP (ref 27–34)
MCHC RBC-ENTMCNC: 30.5 PG — SIGNIFICANT CHANGE UP (ref 27–34)
MCHC RBC-ENTMCNC: 30.5 PG — SIGNIFICANT CHANGE UP (ref 27–34)
MCHC RBC-ENTMCNC: 30.6 PG — SIGNIFICANT CHANGE UP (ref 27–34)
MCHC RBC-ENTMCNC: 32.8 G/DL — SIGNIFICANT CHANGE UP (ref 32–36)
MCHC RBC-ENTMCNC: 32.9 G/DL — SIGNIFICANT CHANGE UP (ref 32–36)
MCHC RBC-ENTMCNC: 32.9 G/DL — SIGNIFICANT CHANGE UP (ref 32–36)
MCHC RBC-ENTMCNC: 33 G/DL — SIGNIFICANT CHANGE UP (ref 32–36)
MCHC RBC-ENTMCNC: 33.2 G/DL — SIGNIFICANT CHANGE UP (ref 32–36)
MCHC RBC-ENTMCNC: 33.5 G/DL — SIGNIFICANT CHANGE UP (ref 32–36)
MCHC RBC-ENTMCNC: 33.6 G/DL — SIGNIFICANT CHANGE UP (ref 32–36)
MCHC RBC-ENTMCNC: 34.1 G/DL — SIGNIFICANT CHANGE UP (ref 32–36)
MCHC RBC-ENTMCNC: 34.5 G/DL — SIGNIFICANT CHANGE UP (ref 32–36)
MCHC RBC-ENTMCNC: 34.6 G/DL — SIGNIFICANT CHANGE UP (ref 32–36)
MCHC RBC-ENTMCNC: 34.7 G/DL — SIGNIFICANT CHANGE UP (ref 32–36)
MCHC RBC-ENTMCNC: 34.8 G/DL — SIGNIFICANT CHANGE UP (ref 32–36)
MCHC RBC-ENTMCNC: 34.9 G/DL — SIGNIFICANT CHANGE UP (ref 32–36)
MCHC RBC-ENTMCNC: 35.1 G/DL — SIGNIFICANT CHANGE UP (ref 32–36)
MCHC RBC-ENTMCNC: 35.2 G/DL — SIGNIFICANT CHANGE UP (ref 32–36)
MCHC RBC-ENTMCNC: 35.3 G/DL — SIGNIFICANT CHANGE UP (ref 32–36)
MCHC RBC-ENTMCNC: 35.5 G/DL — SIGNIFICANT CHANGE UP (ref 32–36)
MCHC RBC-ENTMCNC: 35.5 G/DL — SIGNIFICANT CHANGE UP (ref 32–36)
MCHC RBC-ENTMCNC: 35.7 G/DL — SIGNIFICANT CHANGE UP (ref 32–36)
MCHC RBC-ENTMCNC: 36 G/DL — SIGNIFICANT CHANGE UP (ref 32–36)
MCV RBC AUTO: 84 FL — SIGNIFICANT CHANGE UP (ref 80–100)
MCV RBC AUTO: 84.4 FL — SIGNIFICANT CHANGE UP (ref 80–100)
MCV RBC AUTO: 84.7 FL — SIGNIFICANT CHANGE UP (ref 80–100)
MCV RBC AUTO: 84.8 FL — SIGNIFICANT CHANGE UP (ref 80–100)
MCV RBC AUTO: 85 FL — SIGNIFICANT CHANGE UP (ref 80–100)
MCV RBC AUTO: 85.3 FL — SIGNIFICANT CHANGE UP (ref 80–100)
MCV RBC AUTO: 85.4 FL — SIGNIFICANT CHANGE UP (ref 80–100)
MCV RBC AUTO: 85.6 FL — SIGNIFICANT CHANGE UP (ref 80–100)
MCV RBC AUTO: 85.6 FL — SIGNIFICANT CHANGE UP (ref 80–100)
MCV RBC AUTO: 85.8 FL — SIGNIFICANT CHANGE UP (ref 80–100)
MCV RBC AUTO: 85.8 FL — SIGNIFICANT CHANGE UP (ref 80–100)
MCV RBC AUTO: 85.9 FL — SIGNIFICANT CHANGE UP (ref 80–100)
MCV RBC AUTO: 85.9 FL — SIGNIFICANT CHANGE UP (ref 80–100)
MCV RBC AUTO: 86 FL — SIGNIFICANT CHANGE UP (ref 80–100)
MCV RBC AUTO: 86 FL — SIGNIFICANT CHANGE UP (ref 80–100)
MCV RBC AUTO: 86.1 FL — SIGNIFICANT CHANGE UP (ref 80–100)
MCV RBC AUTO: 86.4 FL — SIGNIFICANT CHANGE UP (ref 80–100)
MCV RBC AUTO: 86.4 FL — SIGNIFICANT CHANGE UP (ref 80–100)
MCV RBC AUTO: 86.9 FL — SIGNIFICANT CHANGE UP (ref 80–100)
MCV RBC AUTO: 87.4 FL — SIGNIFICANT CHANGE UP (ref 80–100)
MCV RBC AUTO: 87.8 FL — SIGNIFICANT CHANGE UP (ref 80–100)
MCV RBC AUTO: 88.1 FL — SIGNIFICANT CHANGE UP (ref 80–100)
MCV RBC AUTO: 90.1 FL — SIGNIFICANT CHANGE UP (ref 80–100)
MCV RBC AUTO: 90.3 FL — SIGNIFICANT CHANGE UP (ref 80–100)
METAMYELOCYTES # FLD: 0.9 % — HIGH (ref 0–0)
METAMYELOCYTES # FLD: 0.9 % — HIGH (ref 0–0)
METAMYELOCYTES NFR BLD: 0.9 % — HIGH (ref 0–0)
METAMYELOCYTES NFR BLD: 0.9 % — HIGH (ref 0–0)
METHOD TYPE: SIGNIFICANT CHANGE UP
METHOD TYPE: SIGNIFICANT CHANGE UP
MONOCYTES # BLD AUTO: 0.39 K/UL — SIGNIFICANT CHANGE UP (ref 0–0.9)
MONOCYTES # BLD AUTO: 0.53 K/UL — SIGNIFICANT CHANGE UP (ref 0–0.9)
MONOCYTES # BLD AUTO: 0.71 K/UL — SIGNIFICANT CHANGE UP (ref 0–0.9)
MONOCYTES # BLD AUTO: 0.72 K/UL — SIGNIFICANT CHANGE UP (ref 0–0.9)
MONOCYTES # BLD AUTO: 0.82 K/UL — SIGNIFICANT CHANGE UP (ref 0–0.9)
MONOCYTES # BLD AUTO: 0.88 K/UL — SIGNIFICANT CHANGE UP (ref 0–0.9)
MONOCYTES # BLD AUTO: 1.03 K/UL — HIGH (ref 0–0.9)
MONOCYTES # BLD AUTO: 1.31 K/UL — HIGH (ref 0–0.9)
MONOCYTES # BLD AUTO: 1.32 K/UL — HIGH (ref 0–0.9)
MONOCYTES # BLD AUTO: 1.47 K/UL — HIGH (ref 0–0.9)
MONOCYTES # BLD AUTO: 1.49 K/UL — HIGH (ref 0–0.9)
MONOCYTES # BLD AUTO: 1.64 K/UL — HIGH (ref 0–0.9)
MONOCYTES # BLD AUTO: 1.65 K/UL — HIGH (ref 0–0.9)
MONOCYTES # BLD MANUAL: 0.29 K/UL — SIGNIFICANT CHANGE UP (ref 0–0.9)
MONOCYTES # BLD MANUAL: 0.48 K/UL — SIGNIFICANT CHANGE UP (ref 0–0.9)
MONOCYTES # BLD MANUAL: 0.52 K/UL — SIGNIFICANT CHANGE UP (ref 0–0.9)
MONOCYTES # BLD MANUAL: 1.29 K/UL — HIGH (ref 0–0.9)
MONOCYTES NFR BLD AUTO: 10.3 % — SIGNIFICANT CHANGE UP (ref 2–14)
MONOCYTES NFR BLD AUTO: 10.4 % — SIGNIFICANT CHANGE UP (ref 2–14)
MONOCYTES NFR BLD AUTO: 11.8 % — SIGNIFICANT CHANGE UP (ref 2–14)
MONOCYTES NFR BLD AUTO: 5.1 % — SIGNIFICANT CHANGE UP (ref 2–14)
MONOCYTES NFR BLD AUTO: 5.8 % — SIGNIFICANT CHANGE UP (ref 2–14)
MONOCYTES NFR BLD AUTO: 5.9 % — SIGNIFICANT CHANGE UP (ref 2–14)
MONOCYTES NFR BLD AUTO: 6.3 % — SIGNIFICANT CHANGE UP (ref 2–14)
MONOCYTES NFR BLD AUTO: 6.3 % — SIGNIFICANT CHANGE UP (ref 2–14)
MONOCYTES NFR BLD AUTO: 7.9 % — SIGNIFICANT CHANGE UP (ref 2–14)
MONOCYTES NFR BLD AUTO: 8.3 % — SIGNIFICANT CHANGE UP (ref 2–14)
MONOCYTES NFR BLD AUTO: 8.4 % — SIGNIFICANT CHANGE UP (ref 2–14)
MONOCYTES NFR BLD AUTO: 9.5 % — SIGNIFICANT CHANGE UP (ref 2–14)
MONOCYTES NFR BLD AUTO: 9.5 % — SIGNIFICANT CHANGE UP (ref 2–14)
MONOCYTES NFR BLD MANUAL: 3.3 % — SIGNIFICANT CHANGE UP (ref 2–14)
MONOCYTES NFR BLD MANUAL: 3.5 % — SIGNIFICANT CHANGE UP (ref 2–14)
MONOCYTES NFR BLD MANUAL: 5.2 % — SIGNIFICANT CHANGE UP (ref 2–14)
MONOCYTES NFR BLD MANUAL: 6.8 % — SIGNIFICANT CHANGE UP (ref 2–14)
MRSA PCR RESULT.: SIGNIFICANT CHANGE UP
MYELOCYTES NFR BLD: 0.9 % — HIGH (ref 0–0)
NEUTROPHILS # BLD AUTO: 11.12 K/UL — HIGH (ref 1.8–7.4)
NEUTROPHILS # BLD AUTO: 11.54 K/UL — HIGH (ref 1.8–7.4)
NEUTROPHILS # BLD AUTO: 12.05 K/UL — HIGH (ref 1.8–7.4)
NEUTROPHILS # BLD AUTO: 12.91 K/UL — HIGH (ref 1.8–7.4)
NEUTROPHILS # BLD AUTO: 17.06 K/UL — HIGH (ref 1.8–7.4)
NEUTROPHILS # BLD AUTO: 17.67 K/UL — HIGH (ref 1.8–7.4)
NEUTROPHILS # BLD AUTO: 20.86 K/UL — HIGH (ref 1.8–7.4)
NEUTROPHILS # BLD AUTO: 5.87 K/UL — SIGNIFICANT CHANGE UP (ref 1.8–7.4)
NEUTROPHILS # BLD AUTO: 6.46 K/UL — SIGNIFICANT CHANGE UP (ref 1.8–7.4)
NEUTROPHILS # BLD AUTO: 6.96 K/UL — SIGNIFICANT CHANGE UP (ref 1.8–7.4)
NEUTROPHILS # BLD AUTO: 7.18 K/UL — SIGNIFICANT CHANGE UP (ref 1.8–7.4)
NEUTROPHILS # BLD AUTO: 7.26 K/UL — SIGNIFICANT CHANGE UP (ref 1.8–7.4)
NEUTROPHILS # BLD AUTO: 8.07 K/UL — HIGH (ref 1.8–7.4)
NEUTROPHILS # BLD MANUAL: 21.63 K/UL — HIGH (ref 1.8–7.4)
NEUTROPHILS # BLD MANUAL: 5.72 K/UL — SIGNIFICANT CHANGE UP (ref 1.8–7.4)
NEUTROPHILS # BLD MANUAL: 6.28 K/UL — SIGNIFICANT CHANGE UP (ref 1.8–7.4)
NEUTROPHILS # BLD MANUAL: 9.53 K/UL — HIGH (ref 1.8–7.4)
NEUTROPHILS NFR BLD AUTO: 77.4 % — HIGH (ref 43–77)
NEUTROPHILS NFR BLD AUTO: 78.8 % — HIGH (ref 43–77)
NEUTROPHILS NFR BLD AUTO: 79.6 % — HIGH (ref 43–77)
NEUTROPHILS NFR BLD AUTO: 79.6 % — HIGH (ref 43–77)
NEUTROPHILS NFR BLD AUTO: 81.1 % — HIGH (ref 43–77)
NEUTROPHILS NFR BLD AUTO: 82.2 % — HIGH (ref 43–77)
NEUTROPHILS NFR BLD AUTO: 82.6 % — HIGH (ref 43–77)
NEUTROPHILS NFR BLD AUTO: 82.7 % — HIGH (ref 43–77)
NEUTROPHILS NFR BLD AUTO: 84 % — HIGH (ref 43–77)
NEUTROPHILS NFR BLD AUTO: 84.3 % — HIGH (ref 43–77)
NEUTROPHILS NFR BLD AUTO: 84.4 % — HIGH (ref 43–77)
NEUTROPHILS NFR BLD AUTO: 85.1 % — HIGH (ref 43–77)
NEUTROPHILS NFR BLD AUTO: 85.9 % — HIGH (ref 43–77)
NEUTROPHILS NFR BLD MANUAL: 65.8 % — SIGNIFICANT CHANGE UP (ref 43–77)
NEUTROPHILS NFR BLD MANUAL: 74.7 % — SIGNIFICANT CHANGE UP (ref 43–77)
NEUTROPHILS NFR BLD MANUAL: 75.4 % — SIGNIFICANT CHANGE UP (ref 43–77)
NEUTROPHILS NFR BLD MANUAL: 87 % — HIGH (ref 43–77)
NEUTS BAND # BLD: 14.8 % — HIGH (ref 0–8)
NEUTS BAND # BLD: 22.8 % — HIGH (ref 0–8)
NEUTS BAND # BLD: 3.4 % — SIGNIFICANT CHANGE UP (ref 0–8)
NEUTS BAND # BLD: 4.3 % — SIGNIFICANT CHANGE UP (ref 0–8)
NEUTS BAND NFR BLD: 14.8 % — HIGH (ref 0–8)
NEUTS BAND NFR BLD: 22.8 % — HIGH (ref 0–8)
NEUTS BAND NFR BLD: 3.4 % — SIGNIFICANT CHANGE UP (ref 0–8)
NEUTS BAND NFR BLD: 4.3 % — SIGNIFICANT CHANGE UP (ref 0–8)
NITRITE UR-MCNC: NEGATIVE — SIGNIFICANT CHANGE UP
NITRITE UR-MCNC: NEGATIVE — SIGNIFICANT CHANGE UP
NONHDLC SERPL-MCNC: 60 MG/DL — SIGNIFICANT CHANGE UP
NRBC # BLD AUTO: 0.02 K/UL — HIGH (ref 0–0)
NRBC # BLD AUTO: 0.02 K/UL — HIGH (ref 0–0)
NRBC # BLD AUTO: 0.03 K/UL — HIGH (ref 0–0)
NRBC # BLD AUTO: 0.03 K/UL — HIGH (ref 0–0)
NRBC # BLD AUTO: 0.04 K/UL — HIGH (ref 0–0)
NRBC # BLD AUTO: 0.05 K/UL — HIGH (ref 0–0)
NRBC # BLD AUTO: 0.05 K/UL — HIGH (ref 0–0)
NRBC # BLD AUTO: 0.06 K/UL — HIGH (ref 0–0)
NRBC # BLD AUTO: 0.06 K/UL — HIGH (ref 0–0)
NRBC # BLD AUTO: 0.07 K/UL — HIGH (ref 0–0)
NRBC # BLD AUTO: 0.08 K/UL — HIGH (ref 0–0)
NRBC # BLD AUTO: 0.11 K/UL — HIGH (ref 0–0)
NRBC # BLD AUTO: 0.11 K/UL — HIGH (ref 0–0)
NRBC # BLD AUTO: 0.15 K/UL — HIGH (ref 0–0)
NRBC # BLD AUTO: 0.16 K/UL — HIGH (ref 0–0)
NRBC # BLD AUTO: 0.35 K/UL — HIGH (ref 0–0)
NRBC # BLD AUTO: 0.54 K/UL — HIGH (ref 0–0)
NRBC # BLD AUTO: 0.63 K/UL — HIGH (ref 0–0)
NRBC # BLD AUTO: 0.79 K/UL — HIGH (ref 0–0)
NRBC # BLD AUTO: 1.02 K/UL — HIGH (ref 0–0)
NRBC # BLD: 11 /100 WBCS — HIGH (ref 0–0)
NRBC # BLD: 16 /100 WBCS — HIGH (ref 0–0)
NRBC # BLD: 2 /100 WBCS — HIGH (ref 0–0)
NRBC # FLD: 0.02 K/UL — HIGH (ref 0–0)
NRBC # FLD: 0.02 K/UL — HIGH (ref 0–0)
NRBC # FLD: 0.03 K/UL — HIGH (ref 0–0)
NRBC # FLD: 0.03 K/UL — HIGH (ref 0–0)
NRBC # FLD: 0.04 K/UL — HIGH (ref 0–0)
NRBC # FLD: 0.05 K/UL — HIGH (ref 0–0)
NRBC # FLD: 0.05 K/UL — HIGH (ref 0–0)
NRBC # FLD: 0.06 K/UL — HIGH (ref 0–0)
NRBC # FLD: 0.06 K/UL — HIGH (ref 0–0)
NRBC # FLD: 0.07 K/UL — HIGH (ref 0–0)
NRBC # FLD: 0.08 K/UL — HIGH (ref 0–0)
NRBC # FLD: 0.11 K/UL — HIGH (ref 0–0)
NRBC # FLD: 0.11 K/UL — HIGH (ref 0–0)
NRBC # FLD: 0.15 K/UL — HIGH (ref 0–0)
NRBC # FLD: 0.16 K/UL — HIGH (ref 0–0)
NRBC # FLD: 0.35 K/UL — HIGH (ref 0–0)
NRBC # FLD: 0.54 K/UL — HIGH (ref 0–0)
NRBC # FLD: 0.63 K/UL — HIGH (ref 0–0)
NRBC # FLD: 0.79 K/UL — HIGH (ref 0–0)
NRBC # FLD: 1.02 K/UL — HIGH (ref 0–0)
NRBC BLD AUTO-RTO: 0 /100 WBCS — SIGNIFICANT CHANGE UP (ref 0–0)
NRBC BLD AUTO-RTO: 10 /100 WBCS — HIGH (ref 0–0)
NRBC BLD AUTO-RTO: 11 /100 WBCS — HIGH (ref 0–0)
NRBC BLD AUTO-RTO: 2 /100 WBCS — HIGH (ref 0–0)
NRBC BLD AUTO-RTO: 4 /100 WBCS — HIGH (ref 0–0)
NRBC BLD AUTO-RTO: 5 /100 WBCS — HIGH (ref 0–0)
NRBC BLD AUTO-RTO: 6 /100 WBCS — HIGH (ref 0–0)
NRBC BLD-RTO: 11 /100 WBCS — HIGH (ref 0–0)
NRBC BLD-RTO: 16 /100 WBCS — HIGH (ref 0–0)
NRBC BLD-RTO: 2 /100 WBCS — HIGH (ref 0–0)
O2 CT VFR BLD CALC: 28 MMHG — LOW (ref 30–65)
O2 CT VFR BLD CALC: 29 MMHG — LOW (ref 30–65)
O2 CT VFR BLD CALC: 33 MMHG — SIGNIFICANT CHANGE UP (ref 30–65)
O2 CT VFR BLD CALC: 34 MMHG — SIGNIFICANT CHANGE UP (ref 30–65)
O2 CT VFR BLD CALC: 35 MMHG — SIGNIFICANT CHANGE UP (ref 30–65)
O2 CT VFR BLD CALC: 36 MMHG — SIGNIFICANT CHANGE UP (ref 30–65)
O2 CT VFR BLD CALC: 38 MMHG — SIGNIFICANT CHANGE UP (ref 30–65)
O2 CT VFR BLD CALC: 38 MMHG — SIGNIFICANT CHANGE UP (ref 30–65)
O2 CT VFR BLD CALC: 39 MMHG — SIGNIFICANT CHANGE UP (ref 30–65)
O2 CT VFR BLD CALC: 41 MMHG — SIGNIFICANT CHANGE UP (ref 30–65)
O2 CT VFR BLD CALC: 42 MMHG — SIGNIFICANT CHANGE UP (ref 30–65)
O2 CT VFR BLD CALC: 43 MMHG — SIGNIFICANT CHANGE UP (ref 30–65)
ORGANISM # SPEC MICROSCOPIC CNT: ABNORMAL
PA ADP PRP-ACNC: 10 PRU — LOW (ref 182–335)
PA ADP PRP-ACNC: 105 PRU — LOW (ref 182–335)
PA ADP PRP-ACNC: 116 PRU — LOW (ref 182–335)
PA ADP PRP-ACNC: 133 PRU — LOW (ref 182–335)
PA ADP PRP-ACNC: 136 PRU — LOW (ref 182–335)
PA ADP PRP-ACNC: 137 PRU — LOW (ref 182–335)
PA ADP PRP-ACNC: 221 PRU — SIGNIFICANT CHANGE UP (ref 182–335)
PA ADP PRP-ACNC: 62 PRU — LOW (ref 182–335)
PA ADP PRP-ACNC: 7 PRU — LOW (ref 182–335)
PA ADP PRP-ACNC: 88 PRU — LOW (ref 182–335)
PA ADP PRP-ACNC: SIGNIFICANT CHANGE UP PRU (ref 182–335)
PCO2 BLDMV: 42 MMHG — SIGNIFICANT CHANGE UP (ref 30–65)
PCO2 BLDMV: 43 MMHG — SIGNIFICANT CHANGE UP (ref 30–65)
PCO2 BLDMV: 45 MMHG — SIGNIFICANT CHANGE UP (ref 30–65)
PCO2 BLDMV: 47 MMHG — SIGNIFICANT CHANGE UP (ref 30–65)
PCO2 BLDMV: 48 MMHG — SIGNIFICANT CHANGE UP (ref 30–65)
PCO2 BLDMV: 48 MMHG — SIGNIFICANT CHANGE UP (ref 30–65)
PCO2 BLDMV: 49 MMHG — SIGNIFICANT CHANGE UP (ref 30–65)
PCO2 BLDMV: 51 MMHG — SIGNIFICANT CHANGE UP (ref 30–65)
PCO2 BLDMV: 64 MMHG — SIGNIFICANT CHANGE UP (ref 30–65)
PCO2 BLDV: 46 MMHG — SIGNIFICANT CHANGE UP (ref 42–55)
PCO2 BLDV: 51 MMHG — SIGNIFICANT CHANGE UP (ref 42–55)
PCO2 BLDV: 52 MMHG — SIGNIFICANT CHANGE UP (ref 42–55)
PCO2 BLDV: 52 MMHG — SIGNIFICANT CHANGE UP (ref 42–55)
PCO2 BLDV: 54 MMHG — SIGNIFICANT CHANGE UP (ref 42–55)
PCO2 BLDV: 54 MMHG — SIGNIFICANT CHANGE UP (ref 42–55)
PCO2 BLDV: 55 MMHG — SIGNIFICANT CHANGE UP (ref 42–55)
PCO2 BLDV: 67 MMHG — HIGH (ref 42–55)
PF4 HEPARIN CMPLX AB SER-ACNC: NEGATIVE — SIGNIFICANT CHANGE UP
PH BLDMV: 7.39 — SIGNIFICANT CHANGE UP (ref 7.32–7.45)
PH BLDMV: 7.43 — SIGNIFICANT CHANGE UP (ref 7.32–7.45)
PH BLDMV: 7.45 — SIGNIFICANT CHANGE UP (ref 7.32–7.45)
PH BLDMV: 7.46 — HIGH (ref 7.32–7.45)
PH BLDMV: 7.46 — HIGH (ref 7.32–7.45)
PH BLDMV: 7.47 — HIGH (ref 7.32–7.45)
PH BLDMV: 7.48 — HIGH (ref 7.32–7.45)
PH BLDMV: 7.48 — HIGH (ref 7.32–7.45)
PH BLDMV: 7.49 — HIGH (ref 7.32–7.45)
PH BLDV: 7.23 — LOW (ref 7.32–7.43)
PH BLDV: 7.37 — SIGNIFICANT CHANGE UP (ref 7.32–7.43)
PH BLDV: 7.4 — SIGNIFICANT CHANGE UP (ref 7.32–7.43)
PH BLDV: 7.44 — HIGH (ref 7.32–7.43)
PH BLDV: 7.48 — HIGH (ref 7.32–7.43)
PH BLDV: 7.49 — HIGH (ref 7.32–7.43)
PH BLDV: 7.5 — HIGH (ref 7.32–7.43)
PH BLDV: 7.51 — HIGH (ref 7.32–7.43)
PH UR: 5 — SIGNIFICANT CHANGE UP (ref 5–8)
PH UR: 5 — SIGNIFICANT CHANGE UP (ref 5–8)
PHOSPHATE SERPL-MCNC: 10.5 MG/DL — HIGH (ref 2.5–4.5)
PHOSPHATE SERPL-MCNC: 2.3 MG/DL — LOW (ref 2.5–4.5)
PHOSPHATE SERPL-MCNC: 3.3 MG/DL — SIGNIFICANT CHANGE UP (ref 2.5–4.5)
PHOSPHATE SERPL-MCNC: 3.4 MG/DL — SIGNIFICANT CHANGE UP (ref 2.5–4.5)
PHOSPHATE SERPL-MCNC: 3.6 MG/DL — SIGNIFICANT CHANGE UP (ref 2.5–4.5)
PHOSPHATE SERPL-MCNC: 3.9 MG/DL — SIGNIFICANT CHANGE UP (ref 2.5–4.5)
PHOSPHATE SERPL-MCNC: 4.2 MG/DL — SIGNIFICANT CHANGE UP (ref 2.5–4.5)
PHOSPHATE SERPL-MCNC: 4.2 MG/DL — SIGNIFICANT CHANGE UP (ref 2.5–4.5)
PHOSPHATE SERPL-MCNC: 4.3 MG/DL — SIGNIFICANT CHANGE UP (ref 2.5–4.5)
PHOSPHATE SERPL-MCNC: 4.4 MG/DL — SIGNIFICANT CHANGE UP (ref 2.5–4.5)
PHOSPHATE SERPL-MCNC: 4.5 MG/DL — SIGNIFICANT CHANGE UP (ref 2.5–4.5)
PHOSPHATE SERPL-MCNC: 4.9 MG/DL — HIGH (ref 2.5–4.5)
PHOSPHATE SERPL-MCNC: 5.1 MG/DL — HIGH (ref 2.5–4.5)
PHOSPHATE SERPL-MCNC: 5.8 MG/DL — HIGH (ref 2.5–4.5)
PHOSPHATE SERPL-MCNC: 6.3 MG/DL — HIGH (ref 2.5–4.5)
PHOSPHATE SERPL-MCNC: 7 MG/DL — HIGH (ref 2.5–4.5)
PLAT MORPH BLD: NORMAL — SIGNIFICANT CHANGE UP
PLATELET # BLD AUTO: 101 K/UL — LOW (ref 150–400)
PLATELET # BLD AUTO: 106 K/UL — LOW (ref 150–400)
PLATELET # BLD AUTO: 115 K/UL — LOW (ref 150–400)
PLATELET # BLD AUTO: 126 K/UL — LOW (ref 150–400)
PLATELET # BLD AUTO: 132 K/UL — LOW (ref 150–400)
PLATELET # BLD AUTO: 133 K/UL — LOW (ref 150–400)
PLATELET # BLD AUTO: 31 K/UL — LOW (ref 150–400)
PLATELET # BLD AUTO: 34 K/UL — LOW (ref 150–400)
PLATELET # BLD AUTO: 38 K/UL — LOW (ref 150–400)
PLATELET # BLD AUTO: 42 K/UL — LOW (ref 150–400)
PLATELET # BLD AUTO: 42 K/UL — LOW (ref 150–400)
PLATELET # BLD AUTO: 49 K/UL — LOW (ref 150–400)
PLATELET # BLD AUTO: 50 K/UL — LOW (ref 150–400)
PLATELET # BLD AUTO: 51 K/UL — LOW (ref 150–400)
PLATELET # BLD AUTO: 53 K/UL — LOW (ref 150–400)
PLATELET # BLD AUTO: 53 K/UL — LOW (ref 150–400)
PLATELET # BLD AUTO: 57 K/UL — LOW (ref 150–400)
PLATELET # BLD AUTO: 82 K/UL — LOW (ref 150–400)
PLATELET # BLD AUTO: 90 K/UL — LOW (ref 150–400)
PLATELET # BLD AUTO: 91 K/UL — LOW (ref 150–400)
PLATELET # BLD AUTO: 92 K/UL — LOW (ref 150–400)
PLATELET # BLD AUTO: 93 K/UL — LOW (ref 150–400)
PLATELET # BLD AUTO: 96 K/UL — LOW (ref 150–400)
PLATELET # BLD AUTO: 98 K/UL — LOW (ref 150–400)
PLATELET COUNT - ESTIMATE: ABNORMAL
PLATELET COUNT - ESTIMATE: ABNORMAL
PMV BLD: 10.3 FL — SIGNIFICANT CHANGE UP (ref 7–13)
PMV BLD: 10.8 FL — SIGNIFICANT CHANGE UP (ref 7–13)
PMV BLD: 11.3 FL — SIGNIFICANT CHANGE UP (ref 7–13)
PMV BLD: 11.4 FL — SIGNIFICANT CHANGE UP (ref 7–13)
PMV BLD: 11.8 FL — SIGNIFICANT CHANGE UP (ref 7–13)
PMV BLD: 11.9 FL — SIGNIFICANT CHANGE UP (ref 7–13)
PMV BLD: 12 FL — SIGNIFICANT CHANGE UP (ref 7–13)
PMV BLD: 12.2 FL — SIGNIFICANT CHANGE UP (ref 7–13)
PMV BLD: 12.2 FL — SIGNIFICANT CHANGE UP (ref 7–13)
PMV BLD: 12.3 FL — SIGNIFICANT CHANGE UP (ref 7–13)
PMV BLD: 12.4 FL — SIGNIFICANT CHANGE UP (ref 7–13)
PMV BLD: 12.5 FL — SIGNIFICANT CHANGE UP (ref 7–13)
PMV BLD: 12.5 FL — SIGNIFICANT CHANGE UP (ref 7–13)
PMV BLD: 12.6 FL — SIGNIFICANT CHANGE UP (ref 7–13)
PMV BLD: 12.7 FL — SIGNIFICANT CHANGE UP (ref 7–13)
PMV BLD: 12.8 FL — SIGNIFICANT CHANGE UP (ref 7–13)
PMV BLD: 12.8 FL — SIGNIFICANT CHANGE UP (ref 7–13)
PMV BLD: 12.9 FL — SIGNIFICANT CHANGE UP (ref 7–13)
PMV BLD: 13.1 FL — HIGH (ref 7–13)
PMV BLD: 13.2 FL — HIGH (ref 7–13)
PMV BLD: 13.3 FL — HIGH (ref 7–13)
PMV BLD: 14.2 FL — HIGH (ref 7–13)
PMV BLD: 14.5 FL — HIGH (ref 7–13)
PMV BLD: SIGNIFICANT CHANGE UP FL (ref 7–13)
PO2 BLDV: 35 MMHG — SIGNIFICANT CHANGE UP (ref 25–45)
PO2 BLDV: 40 MMHG — SIGNIFICANT CHANGE UP (ref 25–45)
PO2 BLDV: 42 MMHG — SIGNIFICANT CHANGE UP (ref 25–45)
PO2 BLDV: 46 MMHG — HIGH (ref 25–45)
PO2 BLDV: 52 MMHG — HIGH (ref 25–45)
PO2 BLDV: 77 MMHG — HIGH (ref 25–45)
POTASSIUM SERPL-MCNC: 2.7 MMOL/L — CRITICAL LOW (ref 3.5–5.3)
POTASSIUM SERPL-MCNC: 3.4 MMOL/L — LOW (ref 3.5–5.3)
POTASSIUM SERPL-MCNC: 3.5 MMOL/L — SIGNIFICANT CHANGE UP (ref 3.5–5.3)
POTASSIUM SERPL-MCNC: 3.5 MMOL/L — SIGNIFICANT CHANGE UP (ref 3.5–5.3)
POTASSIUM SERPL-MCNC: 3.6 MMOL/L — SIGNIFICANT CHANGE UP (ref 3.5–5.3)
POTASSIUM SERPL-MCNC: 3.8 MMOL/L — SIGNIFICANT CHANGE UP (ref 3.5–5.3)
POTASSIUM SERPL-MCNC: 3.9 MMOL/L — SIGNIFICANT CHANGE UP (ref 3.5–5.3)
POTASSIUM SERPL-MCNC: 3.9 MMOL/L — SIGNIFICANT CHANGE UP (ref 3.5–5.3)
POTASSIUM SERPL-MCNC: 4 MMOL/L — SIGNIFICANT CHANGE UP (ref 3.5–5.3)
POTASSIUM SERPL-MCNC: 4 MMOL/L — SIGNIFICANT CHANGE UP (ref 3.5–5.3)
POTASSIUM SERPL-MCNC: 4.1 MMOL/L — SIGNIFICANT CHANGE UP (ref 3.5–5.3)
POTASSIUM SERPL-MCNC: 4.2 MMOL/L — SIGNIFICANT CHANGE UP (ref 3.5–5.3)
POTASSIUM SERPL-MCNC: 4.4 MMOL/L — SIGNIFICANT CHANGE UP (ref 3.5–5.3)
POTASSIUM SERPL-MCNC: 4.4 MMOL/L — SIGNIFICANT CHANGE UP (ref 3.5–5.3)
POTASSIUM SERPL-MCNC: 4.6 MMOL/L — SIGNIFICANT CHANGE UP (ref 3.5–5.3)
POTASSIUM SERPL-MCNC: 5.1 MMOL/L — SIGNIFICANT CHANGE UP (ref 3.5–5.3)
POTASSIUM SERPL-SCNC: 2.7 MMOL/L — CRITICAL LOW (ref 3.5–5.3)
POTASSIUM SERPL-SCNC: 3.4 MMOL/L — LOW (ref 3.5–5.3)
POTASSIUM SERPL-SCNC: 3.5 MMOL/L — SIGNIFICANT CHANGE UP (ref 3.5–5.3)
POTASSIUM SERPL-SCNC: 3.5 MMOL/L — SIGNIFICANT CHANGE UP (ref 3.5–5.3)
POTASSIUM SERPL-SCNC: 3.6 MMOL/L — SIGNIFICANT CHANGE UP (ref 3.5–5.3)
POTASSIUM SERPL-SCNC: 3.8 MMOL/L — SIGNIFICANT CHANGE UP (ref 3.5–5.3)
POTASSIUM SERPL-SCNC: 3.9 MMOL/L — SIGNIFICANT CHANGE UP (ref 3.5–5.3)
POTASSIUM SERPL-SCNC: 3.9 MMOL/L — SIGNIFICANT CHANGE UP (ref 3.5–5.3)
POTASSIUM SERPL-SCNC: 4 MMOL/L — SIGNIFICANT CHANGE UP (ref 3.5–5.3)
POTASSIUM SERPL-SCNC: 4 MMOL/L — SIGNIFICANT CHANGE UP (ref 3.5–5.3)
POTASSIUM SERPL-SCNC: 4.1 MMOL/L — SIGNIFICANT CHANGE UP (ref 3.5–5.3)
POTASSIUM SERPL-SCNC: 4.2 MMOL/L — SIGNIFICANT CHANGE UP (ref 3.5–5.3)
POTASSIUM SERPL-SCNC: 4.4 MMOL/L — SIGNIFICANT CHANGE UP (ref 3.5–5.3)
POTASSIUM SERPL-SCNC: 4.4 MMOL/L — SIGNIFICANT CHANGE UP (ref 3.5–5.3)
POTASSIUM SERPL-SCNC: 4.6 MMOL/L — SIGNIFICANT CHANGE UP (ref 3.5–5.3)
POTASSIUM SERPL-SCNC: 5.1 MMOL/L — SIGNIFICANT CHANGE UP (ref 3.5–5.3)
PROT SERPL-MCNC: 3.9 G/DL — LOW (ref 6–8.3)
PROT SERPL-MCNC: 4.9 G/DL — LOW (ref 6–8.3)
PROT SERPL-MCNC: 5 G/DL — LOW (ref 6–8.3)
PROT SERPL-MCNC: 5 G/DL — LOW (ref 6–8.3)
PROT SERPL-MCNC: 5.1 G/DL — LOW (ref 6–8.3)
PROT SERPL-MCNC: 5.3 G/DL — LOW (ref 6–8.3)
PROT SERPL-MCNC: 5.5 G/DL — LOW (ref 6–8.3)
PROT SERPL-MCNC: 5.6 G/DL — LOW (ref 6–8.3)
PROT SERPL-MCNC: 5.6 G/DL — LOW (ref 6–8.3)
PROT SERPL-MCNC: 5.8 G/DL — LOW (ref 6–8.3)
PROT SERPL-MCNC: 5.9 G/DL — LOW (ref 6–8.3)
PROT SERPL-MCNC: 6 G/DL — SIGNIFICANT CHANGE UP (ref 6–8.3)
PROT SERPL-MCNC: 6 G/DL — SIGNIFICANT CHANGE UP (ref 6–8.3)
PROT SERPL-MCNC: 6.1 G/DL — SIGNIFICANT CHANGE UP (ref 6–8.3)
PROT SERPL-MCNC: 6.1 G/DL — SIGNIFICANT CHANGE UP (ref 6–8.3)
PROT SERPL-MCNC: 6.2 G/DL — SIGNIFICANT CHANGE UP (ref 6–8.3)
PROT SERPL-MCNC: 6.5 G/DL — SIGNIFICANT CHANGE UP (ref 6–8.3)
PROT UR-MCNC: 30 MG/DL
PROT UR-MCNC: SIGNIFICANT CHANGE UP MG/DL
PROTHROM AB SERPL-ACNC: 13.3 SEC — SIGNIFICANT CHANGE UP (ref 9.9–13.4)
PROTHROM AB SERPL-ACNC: 13.6 SEC — HIGH (ref 9.9–13.4)
PROTHROM AB SERPL-ACNC: 14 SEC — HIGH (ref 9.9–13.4)
PROTHROM AB SERPL-ACNC: 14 SEC — HIGH (ref 9.9–13.4)
PROTHROM AB SERPL-ACNC: 14.2 SEC — HIGH (ref 9.9–13.4)
PROTHROM AB SERPL-ACNC: 14.3 SEC — HIGH (ref 9.9–13.4)
PROTHROM AB SERPL-ACNC: 14.3 SEC — HIGH (ref 9.9–13.4)
PROTHROM AB SERPL-ACNC: 14.4 SEC — HIGH (ref 9.9–13.4)
PROTHROM AB SERPL-ACNC: 14.5 SEC — HIGH (ref 9.9–13.4)
PROTHROM AB SERPL-ACNC: 14.9 SEC — HIGH (ref 9.9–13.4)
PROTHROM AB SERPL-ACNC: 15.6 SEC — HIGH (ref 9.9–13.4)
PROTHROM AB SERPL-ACNC: 16 SEC — HIGH (ref 9.9–13.4)
PROTHROM AB SERPL-ACNC: 16.6 SEC — HIGH (ref 9.9–13.4)
PROTHROM AB SERPL-ACNC: 16.6 SEC — HIGH (ref 9.9–13.4)
PROTHROM AB SERPL-ACNC: 17.1 SEC — HIGH (ref 9.9–13.4)
PROTHROM AB SERPL-ACNC: 17.7 SEC — HIGH (ref 9.9–13.4)
PROTHROM AB SERPL-ACNC: 19.3 SEC — HIGH (ref 9.9–13.4)
PROTHROM AB SERPL-ACNC: 19.7 SEC — HIGH (ref 9.9–13.4)
PROTHROM AB SERPL-ACNC: 19.8 SEC — HIGH (ref 9.9–13.4)
PROTHROM AB SERPL-ACNC: 20.1 SEC — HIGH (ref 9.9–13.4)
PROTHROM AB SERPL-ACNC: 20.4 SEC — HIGH (ref 9.9–13.4)
PROTHROM AB SERPL-ACNC: 21 SEC — HIGH (ref 9.9–13.4)
PROTHROM AB SERPL-ACNC: 24.8 SEC — HIGH (ref 9.9–13.4)
RBC # BLD: 2.26 M/UL — LOW (ref 4.2–5.8)
RBC # BLD: 2.79 M/UL — LOW (ref 4.2–5.8)
RBC # BLD: 2.86 M/UL — LOW (ref 4.2–5.8)
RBC # BLD: 2.87 M/UL — LOW (ref 4.2–5.8)
RBC # BLD: 2.91 M/UL — LOW (ref 4.2–5.8)
RBC # BLD: 2.95 M/UL — LOW (ref 4.2–5.8)
RBC # BLD: 2.98 M/UL — LOW (ref 4.2–5.8)
RBC # BLD: 3.02 M/UL — LOW (ref 4.2–5.8)
RBC # BLD: 3.03 M/UL — LOW (ref 4.2–5.8)
RBC # BLD: 3.11 M/UL — LOW (ref 4.2–5.8)
RBC # BLD: 3.33 M/UL — LOW (ref 4.2–5.8)
RBC # BLD: 3.34 M/UL — LOW (ref 4.2–5.8)
RBC # BLD: 3.35 M/UL — LOW (ref 4.2–5.8)
RBC # BLD: 3.4 M/UL — LOW (ref 4.2–5.8)
RBC # BLD: 3.44 M/UL — LOW (ref 4.2–5.8)
RBC # BLD: 3.47 M/UL — LOW (ref 4.2–5.8)
RBC # BLD: 3.48 M/UL — LOW (ref 4.2–5.8)
RBC # BLD: 3.5 M/UL — LOW (ref 4.2–5.8)
RBC # BLD: 3.5 M/UL — LOW (ref 4.2–5.8)
RBC # BLD: 3.51 M/UL — LOW (ref 4.2–5.8)
RBC # BLD: 3.94 M/UL — LOW (ref 4.2–5.8)
RBC # BLD: 4.03 M/UL — LOW (ref 4.2–5.8)
RBC # FLD: 14 % — SIGNIFICANT CHANGE UP (ref 10.3–14.5)
RBC # FLD: 14.1 % — SIGNIFICANT CHANGE UP (ref 10.3–14.5)
RBC # FLD: 14.3 % — SIGNIFICANT CHANGE UP (ref 10.3–14.5)
RBC # FLD: 14.3 % — SIGNIFICANT CHANGE UP (ref 10.3–14.5)
RBC # FLD: 14.4 % — SIGNIFICANT CHANGE UP (ref 10.3–14.5)
RBC # FLD: 14.5 % — SIGNIFICANT CHANGE UP (ref 10.3–14.5)
RBC # FLD: 14.6 % — HIGH (ref 10.3–14.5)
RBC # FLD: 15 % — HIGH (ref 10.3–14.5)
RBC # FLD: 15.3 % — HIGH (ref 10.3–14.5)
RBC # FLD: 15.3 % — HIGH (ref 10.3–14.5)
RBC # FLD: 15.6 % — HIGH (ref 10.3–14.5)
RBC # FLD: 15.7 % — HIGH (ref 10.3–14.5)
RBC # FLD: 15.9 % — HIGH (ref 10.3–14.5)
RBC # FLD: 16 % — HIGH (ref 10.3–14.5)
RBC # FLD: 16.2 % — HIGH (ref 10.3–14.5)
RBC # FLD: 16.7 % — HIGH (ref 10.3–14.5)
RBC # FLD: 17.5 % — HIGH (ref 10.3–14.5)
RBC BLD AUTO: SIGNIFICANT CHANGE UP
RBC CASTS # UR COMP ASSIST: 4 /HPF — SIGNIFICANT CHANGE UP (ref 0–4)
RBC CASTS # UR COMP ASSIST: 9 /HPF — HIGH (ref 0–4)
REVIEW: SIGNIFICANT CHANGE UP
REVIEW: SIGNIFICANT CHANGE UP
RH IG SCN BLD-IMP: POSITIVE — SIGNIFICANT CHANGE UP
RH IG SCN BLD-IMP: POSITIVE — SIGNIFICANT CHANGE UP
S AUREUS DNA NOSE QL NAA+PROBE: DETECTED
SAO2 % BLDMV: 51.2 — LOW (ref 60–90)
SAO2 % BLDMV: 56 — LOW (ref 60–90)
SAO2 % BLDMV: 56.4 — LOW (ref 60–90)
SAO2 % BLDMV: 56.5 — LOW (ref 60–90)
SAO2 % BLDMV: 57.9 — LOW (ref 60–90)
SAO2 % BLDMV: 58.1 — LOW (ref 60–90)
SAO2 % BLDMV: 58.9 — LOW (ref 60–90)
SAO2 % BLDMV: 59.7 — LOW (ref 60–90)
SAO2 % BLDMV: 59.9 — LOW (ref 60–90)
SAO2 % BLDMV: 61.1 — SIGNIFICANT CHANGE UP (ref 60–90)
SAO2 % BLDMV: 62.9 — SIGNIFICANT CHANGE UP (ref 60–90)
SAO2 % BLDMV: 63.2 — SIGNIFICANT CHANGE UP (ref 60–90)
SAO2 % BLDMV: 65.9 — SIGNIFICANT CHANGE UP (ref 60–90)
SAO2 % BLDMV: 69.7 — SIGNIFICANT CHANGE UP (ref 60–90)
SAO2 % BLDV: 58 % — LOW (ref 67–88)
SAO2 % BLDV: 66.1 % — LOW (ref 67–88)
SAO2 % BLDV: 69.7 % — SIGNIFICANT CHANGE UP (ref 67–88)
SAO2 % BLDV: 69.7 % — SIGNIFICANT CHANGE UP (ref 67–88)
SAO2 % BLDV: 70 % — SIGNIFICANT CHANGE UP (ref 67–88)
SAO2 % BLDV: 77.4 % — SIGNIFICANT CHANGE UP (ref 67–88)
SAO2 % BLDV: 82.7 % — SIGNIFICANT CHANGE UP (ref 67–88)
SAO2 % BLDV: 94.1 % — HIGH (ref 67–88)
SODIUM SERPL-SCNC: 137 MMOL/L — SIGNIFICANT CHANGE UP (ref 135–145)
SODIUM SERPL-SCNC: 138 MMOL/L — SIGNIFICANT CHANGE UP (ref 135–145)
SODIUM SERPL-SCNC: 139 MMOL/L — SIGNIFICANT CHANGE UP (ref 135–145)
SODIUM SERPL-SCNC: 139 MMOL/L — SIGNIFICANT CHANGE UP (ref 135–145)
SODIUM SERPL-SCNC: 140 MMOL/L — SIGNIFICANT CHANGE UP (ref 135–145)
SODIUM SERPL-SCNC: 140 MMOL/L — SIGNIFICANT CHANGE UP (ref 135–145)
SODIUM SERPL-SCNC: 141 MMOL/L — SIGNIFICANT CHANGE UP (ref 135–145)
SODIUM SERPL-SCNC: 141 MMOL/L — SIGNIFICANT CHANGE UP (ref 135–145)
SODIUM SERPL-SCNC: 143 MMOL/L — SIGNIFICANT CHANGE UP (ref 135–145)
SODIUM SERPL-SCNC: 144 MMOL/L — SIGNIFICANT CHANGE UP (ref 135–145)
SODIUM SERPL-SCNC: 145 MMOL/L — SIGNIFICANT CHANGE UP (ref 135–145)
SODIUM SERPL-SCNC: 146 MMOL/L — HIGH (ref 135–145)
SODIUM SERPL-SCNC: 147 MMOL/L — HIGH (ref 135–145)
SODIUM SERPL-SCNC: 149 MMOL/L — HIGH (ref 135–145)
SP GR SPEC: 1.01 — SIGNIFICANT CHANGE UP (ref 1–1.03)
SP GR SPEC: 1.01 — SIGNIFICANT CHANGE UP (ref 1–1.03)
SPECIMEN SOURCE: SIGNIFICANT CHANGE UP
SQUAMOUS # UR AUTO: 11 /HPF — HIGH (ref 0–5)
SQUAMOUS # UR AUTO: 9 /HPF — HIGH (ref 0–5)
T3 SERPL-MCNC: 59 NG/DL — LOW (ref 80–200)
T4 AB SER-ACNC: 5.7 UG/DL — SIGNIFICANT CHANGE UP (ref 4.6–12)
T4 FREE SERPL-MCNC: 1 NG/DL — SIGNIFICANT CHANGE UP (ref 0.9–1.8)
TRIGL SERPL-MCNC: 96 MG/DL — SIGNIFICANT CHANGE UP
TROPONIN T, HIGH SENSITIVITY RESULT: 5380 NG/L — HIGH (ref 0–51)
TROPONIN T, HIGH SENSITIVITY RESULT: 6398 NG/L — HIGH (ref 0–51)
TROPONIN T, HIGH SENSITIVITY RESULT: 7141 NG/L — HIGH (ref 0–51)
TROPONIN T, HIGH SENSITIVITY RESULT: 7283 NG/L — HIGH (ref 0–51)
TROPONIN T, HIGH SENSITIVITY RESULT: 7417 NG/L — HIGH (ref 0–51)
TROPONIN T, HIGH SENSITIVITY RESULT: 9630 NG/L — HIGH (ref 0–51)
TSH SERPL-MCNC: 2.73 UIU/ML — SIGNIFICANT CHANGE UP (ref 0.27–4.2)
UROBILINOGEN FLD QL: 0.2 MG/DL — SIGNIFICANT CHANGE UP (ref 0.2–1)
UROBILINOGEN FLD QL: 0.2 MG/DL — SIGNIFICANT CHANGE UP (ref 0.2–1)
VANCOMYCIN TROUGH SERPL-MCNC: 5.6 UG/ML — LOW (ref 10–20)
WBC # BLD: 10.93 K/UL — HIGH (ref 3.8–10.5)
WBC # BLD: 13.95 K/UL — HIGH (ref 3.8–10.5)
WBC # BLD: 14.04 K/UL — HIGH (ref 3.8–10.5)
WBC # BLD: 14.49 K/UL — HIGH (ref 3.8–10.5)
WBC # BLD: 14.85 K/UL — HIGH (ref 3.8–10.5)
WBC # BLD: 15.64 K/UL — HIGH (ref 3.8–10.5)
WBC # BLD: 16.53 K/UL — HIGH (ref 3.8–10.5)
WBC # BLD: 16.59 K/UL — HIGH (ref 3.8–10.5)
WBC # BLD: 17.95 K/UL — HIGH (ref 3.8–10.5)
WBC # BLD: 19.29 K/UL — HIGH (ref 3.8–10.5)
WBC # BLD: 20.2 K/UL — HIGH (ref 3.8–10.5)
WBC # BLD: 20.59 K/UL — HIGH (ref 3.8–10.5)
WBC # BLD: 20.76 K/UL — HIGH (ref 3.8–10.5)
WBC # BLD: 20.79 K/UL — HIGH (ref 3.8–10.5)
WBC # BLD: 24.86 K/UL — HIGH (ref 3.8–10.5)
WBC # BLD: 7.59 K/UL — SIGNIFICANT CHANGE UP (ref 3.8–10.5)
WBC # BLD: 7.65 K/UL — SIGNIFICANT CHANGE UP (ref 3.8–10.5)
WBC # BLD: 8.41 K/UL — SIGNIFICANT CHANGE UP (ref 3.8–10.5)
WBC # BLD: 8.52 K/UL — SIGNIFICANT CHANGE UP (ref 3.8–10.5)
WBC # BLD: 8.85 K/UL — SIGNIFICANT CHANGE UP (ref 3.8–10.5)
WBC # BLD: 9.22 K/UL — SIGNIFICANT CHANGE UP (ref 3.8–10.5)
WBC # BLD: 9.28 K/UL — SIGNIFICANT CHANGE UP (ref 3.8–10.5)
WBC # BLD: 9.58 K/UL — SIGNIFICANT CHANGE UP (ref 3.8–10.5)
WBC # BLD: 9.95 K/UL — SIGNIFICANT CHANGE UP (ref 3.8–10.5)
WBC # FLD AUTO: 10.93 K/UL — HIGH (ref 3.8–10.5)
WBC # FLD AUTO: 13.95 K/UL — HIGH (ref 3.8–10.5)
WBC # FLD AUTO: 14.04 K/UL — HIGH (ref 3.8–10.5)
WBC # FLD AUTO: 14.49 K/UL — HIGH (ref 3.8–10.5)
WBC # FLD AUTO: 14.85 K/UL — HIGH (ref 3.8–10.5)
WBC # FLD AUTO: 15.64 K/UL — HIGH (ref 3.8–10.5)
WBC # FLD AUTO: 16.53 K/UL — HIGH (ref 3.8–10.5)
WBC # FLD AUTO: 16.59 K/UL — HIGH (ref 3.8–10.5)
WBC # FLD AUTO: 17.95 K/UL — HIGH (ref 3.8–10.5)
WBC # FLD AUTO: 19.29 K/UL — HIGH (ref 3.8–10.5)
WBC # FLD AUTO: 20.2 K/UL — HIGH (ref 3.8–10.5)
WBC # FLD AUTO: 20.59 K/UL — HIGH (ref 3.8–10.5)
WBC # FLD AUTO: 20.76 K/UL — HIGH (ref 3.8–10.5)
WBC # FLD AUTO: 20.79 K/UL — HIGH (ref 3.8–10.5)
WBC # FLD AUTO: 24.86 K/UL — HIGH (ref 3.8–10.5)
WBC # FLD AUTO: 7.59 K/UL — SIGNIFICANT CHANGE UP (ref 3.8–10.5)
WBC # FLD AUTO: 7.65 K/UL — SIGNIFICANT CHANGE UP (ref 3.8–10.5)
WBC # FLD AUTO: 8.41 K/UL — SIGNIFICANT CHANGE UP (ref 3.8–10.5)
WBC # FLD AUTO: 8.52 K/UL — SIGNIFICANT CHANGE UP (ref 3.8–10.5)
WBC # FLD AUTO: 8.85 K/UL — SIGNIFICANT CHANGE UP (ref 3.8–10.5)
WBC # FLD AUTO: 9.22 K/UL — SIGNIFICANT CHANGE UP (ref 3.8–10.5)
WBC # FLD AUTO: 9.28 K/UL — SIGNIFICANT CHANGE UP (ref 3.8–10.5)
WBC # FLD AUTO: 9.58 K/UL — SIGNIFICANT CHANGE UP (ref 3.8–10.5)
WBC # FLD AUTO: 9.95 K/UL — SIGNIFICANT CHANGE UP (ref 3.8–10.5)
WBC UR QL: 4 /HPF — SIGNIFICANT CHANGE UP (ref 0–5)
WBC UR QL: 8 /HPF — HIGH (ref 0–5)

## 2025-01-01 PROCEDURE — 71250 CT THORAX DX C-: CPT

## 2025-01-01 PROCEDURE — 82553 CREATINE MB FRACTION: CPT

## 2025-01-01 PROCEDURE — 85520 HEPARIN ASSAY: CPT

## 2025-01-01 PROCEDURE — 31500 INSERT EMERGENCY AIRWAY: CPT

## 2025-01-01 PROCEDURE — 99232 SBSQ HOSP IP/OBS MODERATE 35: CPT | Mod: GC

## 2025-01-01 PROCEDURE — 83615 LACTATE (LD) (LDH) ENZYME: CPT

## 2025-01-01 PROCEDURE — 94002 VENT MGMT INPAT INIT DAY: CPT

## 2025-01-01 PROCEDURE — 81001 URINALYSIS AUTO W/SCOPE: CPT

## 2025-01-01 PROCEDURE — 99292 CRITICAL CARE ADDL 30 MIN: CPT

## 2025-01-01 PROCEDURE — 97530 THERAPEUTIC ACTIVITIES: CPT

## 2025-01-01 PROCEDURE — 74176 CT ABD & PELVIS W/O CONTRAST: CPT

## 2025-01-01 PROCEDURE — 86891 AUTOLOGOUS BLOOD OP SALVAGE: CPT

## 2025-01-01 PROCEDURE — 97162 PT EVAL MOD COMPLEX 30 MIN: CPT

## 2025-01-01 PROCEDURE — 99233 SBSQ HOSP IP/OBS HIGH 50: CPT | Mod: GC

## 2025-01-01 PROCEDURE — 99291 CRITICAL CARE FIRST HOUR: CPT

## 2025-01-01 PROCEDURE — 82962 GLUCOSE BLOOD TEST: CPT

## 2025-01-01 PROCEDURE — 93308 TTE F-UP OR LMTD: CPT

## 2025-01-01 PROCEDURE — 83605 ASSAY OF LACTIC ACID: CPT

## 2025-01-01 PROCEDURE — 85027 COMPLETE CBC AUTOMATED: CPT

## 2025-01-01 PROCEDURE — 84100 ASSAY OF PHOSPHORUS: CPT

## 2025-01-01 PROCEDURE — 87040 BLOOD CULTURE FOR BACTERIA: CPT

## 2025-01-01 PROCEDURE — 82947 ASSAY GLUCOSE BLOOD QUANT: CPT

## 2025-01-01 PROCEDURE — 94640 AIRWAY INHALATION TREATMENT: CPT

## 2025-01-01 PROCEDURE — 82550 ASSAY OF CK (CPK): CPT

## 2025-01-01 PROCEDURE — 85610 PROTHROMBIN TIME: CPT

## 2025-01-01 PROCEDURE — 97116 GAIT TRAINING THERAPY: CPT

## 2025-01-01 PROCEDURE — 86850 RBC ANTIBODY SCREEN: CPT

## 2025-01-01 PROCEDURE — 86901 BLOOD TYPING SEROLOGIC RH(D): CPT

## 2025-01-01 PROCEDURE — 80061 LIPID PANEL: CPT

## 2025-01-01 PROCEDURE — 85730 THROMBOPLASTIN TIME PARTIAL: CPT

## 2025-01-01 PROCEDURE — 85576 BLOOD PLATELET AGGREGATION: CPT

## 2025-01-01 PROCEDURE — 86022 PLATELET ANTIBODIES: CPT

## 2025-01-01 PROCEDURE — 80053 COMPREHEN METABOLIC PANEL: CPT

## 2025-01-01 PROCEDURE — 99291 CRITICAL CARE FIRST HOUR: CPT | Mod: 25

## 2025-01-01 PROCEDURE — 93010 ELECTROCARDIOGRAM REPORT: CPT

## 2025-01-01 PROCEDURE — 87077 CULTURE AEROBIC IDENTIFY: CPT

## 2025-01-01 PROCEDURE — 93306 TTE W/DOPPLER COMPLETE: CPT

## 2025-01-01 PROCEDURE — 83735 ASSAY OF MAGNESIUM: CPT

## 2025-01-01 PROCEDURE — 93308 TTE F-UP OR LMTD: CPT | Mod: 26

## 2025-01-01 PROCEDURE — 84484 ASSAY OF TROPONIN QUANT: CPT

## 2025-01-01 PROCEDURE — G0545: CPT

## 2025-01-01 PROCEDURE — 85025 COMPLETE CBC W/AUTO DIFF WBC: CPT

## 2025-01-01 PROCEDURE — 87641 MR-STAPH DNA AMP PROBE: CPT

## 2025-01-01 PROCEDURE — 36415 COLL VENOUS BLD VENIPUNCTURE: CPT

## 2025-01-01 PROCEDURE — 84443 ASSAY THYROID STIM HORMONE: CPT

## 2025-01-01 PROCEDURE — P9045: CPT

## 2025-01-01 PROCEDURE — 93325 DOPPLER ECHO COLOR FLOW MAPG: CPT

## 2025-01-01 PROCEDURE — 71045 X-RAY EXAM CHEST 1 VIEW: CPT | Mod: 26,76

## 2025-01-01 PROCEDURE — 99292 CRITICAL CARE ADDL 30 MIN: CPT | Mod: 25

## 2025-01-01 PROCEDURE — P9016: CPT

## 2025-01-01 PROCEDURE — 93320 DOPPLER ECHO COMPLETE: CPT

## 2025-01-01 PROCEDURE — 83036 HEMOGLOBIN GLYCOSYLATED A1C: CPT

## 2025-01-01 PROCEDURE — 99223 1ST HOSP IP/OBS HIGH 75: CPT

## 2025-01-01 PROCEDURE — 84480 ASSAY TRIIODOTHYRONINE (T3): CPT

## 2025-01-01 PROCEDURE — 74018 RADEX ABDOMEN 1 VIEW: CPT | Mod: 26

## 2025-01-01 PROCEDURE — C9460: CPT

## 2025-01-01 PROCEDURE — 99233 SBSQ HOSP IP/OBS HIGH 50: CPT

## 2025-01-01 PROCEDURE — P9100: CPT

## 2025-01-01 PROCEDURE — 86965 POOLING BLOOD PLATELETS: CPT

## 2025-01-01 PROCEDURE — P9047: CPT

## 2025-01-01 PROCEDURE — 33949 ECMO/ECLS DAILY MGMT ARTERY: CPT

## 2025-01-01 PROCEDURE — 82330 ASSAY OF CALCIUM: CPT

## 2025-01-01 PROCEDURE — 85384 FIBRINOGEN ACTIVITY: CPT

## 2025-01-01 PROCEDURE — 71045 X-RAY EXAM CHEST 1 VIEW: CPT | Mod: 26

## 2025-01-01 PROCEDURE — 87070 CULTURE OTHR SPECIMN AEROBIC: CPT

## 2025-01-01 PROCEDURE — 99291 CRITICAL CARE FIRST HOUR: CPT | Mod: GC

## 2025-01-01 PROCEDURE — 99223 1ST HOSP IP/OBS HIGH 75: CPT | Mod: GC

## 2025-01-01 PROCEDURE — 87640 STAPH A DNA AMP PROBE: CPT

## 2025-01-01 PROCEDURE — 85018 HEMOGLOBIN: CPT

## 2025-01-01 PROCEDURE — 71045 X-RAY EXAM CHEST 1 VIEW: CPT | Mod: 26,77

## 2025-01-01 PROCEDURE — 85014 HEMATOCRIT: CPT

## 2025-01-01 PROCEDURE — 84132 ASSAY OF SERUM POTASSIUM: CPT

## 2025-01-01 PROCEDURE — 71045 X-RAY EXAM CHEST 1 VIEW: CPT

## 2025-01-01 PROCEDURE — 86923 COMPATIBILITY TEST ELECTRIC: CPT

## 2025-01-01 PROCEDURE — 83010 ASSAY OF HAPTOGLOBIN QUANT: CPT

## 2025-01-01 PROCEDURE — 93926 LOWER EXTREMITY STUDY: CPT | Mod: 26,LT

## 2025-01-01 PROCEDURE — 84439 ASSAY OF FREE THYROXINE: CPT

## 2025-01-01 PROCEDURE — 93460 R&L HRT ART/VENTRICLE ANGIO: CPT | Mod: 26,XU

## 2025-01-01 PROCEDURE — 86900 BLOOD TYPING SEROLOGIC ABO: CPT

## 2025-01-01 PROCEDURE — 84295 ASSAY OF SERUM SODIUM: CPT

## 2025-01-01 PROCEDURE — 87186 SC STD MICRODIL/AGAR DIL: CPT

## 2025-01-01 PROCEDURE — 82803 BLOOD GASES ANY COMBINATION: CPT

## 2025-01-01 PROCEDURE — 93926 LOWER EXTREMITY STUDY: CPT

## 2025-01-01 PROCEDURE — 93750 INTERROGATION VAD IN PERSON: CPT

## 2025-01-01 PROCEDURE — 82435 ASSAY OF BLOOD CHLORIDE: CPT

## 2025-01-01 PROCEDURE — 97165 OT EVAL LOW COMPLEX 30 MIN: CPT

## 2025-01-01 PROCEDURE — P9037: CPT

## 2025-01-01 PROCEDURE — 93321 DOPPLER ECHO F-UP/LMTD STD: CPT

## 2025-01-01 PROCEDURE — 92950 HEART/LUNG RESUSCITATION CPR: CPT

## 2025-01-01 PROCEDURE — C1889: CPT

## 2025-01-01 PROCEDURE — C1751: CPT

## 2025-01-01 PROCEDURE — 74018 RADEX ABDOMEN 1 VIEW: CPT

## 2025-01-01 PROCEDURE — 36800 INSERTION OF CANNULA: CPT | Mod: LT

## 2025-01-01 PROCEDURE — 80048 BASIC METABOLIC PNL TOTAL CA: CPT

## 2025-01-01 PROCEDURE — 31720 CLEARANCE OF AIRWAYS: CPT

## 2025-01-01 PROCEDURE — 84436 ASSAY OF TOTAL THYROXINE: CPT

## 2025-01-01 PROCEDURE — P9012: CPT

## 2025-01-01 PROCEDURE — 92941 PRQ TRLML REVSC TOT OCCL AMI: CPT | Mod: LD

## 2025-01-01 PROCEDURE — 80202 ASSAY OF VANCOMYCIN: CPT

## 2025-01-01 PROCEDURE — 87205 SMEAR GRAM STAIN: CPT

## 2025-01-01 PROCEDURE — 99232 SBSQ HOSP IP/OBS MODERATE 35: CPT

## 2025-01-01 PROCEDURE — 93306 TTE W/DOPPLER COMPLETE: CPT | Mod: 26

## 2025-01-01 PROCEDURE — 94003 VENT MGMT INPAT SUBQ DAY: CPT

## 2025-01-01 PROCEDURE — 33952 ECMO/ECLS INSJ PRPH CANNULA: CPT

## 2025-01-01 PROCEDURE — 74176 CT ABD & PELVIS W/O CONTRAST: CPT | Mod: 26

## 2025-01-01 PROCEDURE — 36430 TRANSFUSION BLD/BLD COMPNT: CPT

## 2025-01-01 PROCEDURE — 99221 1ST HOSP IP/OBS SF/LOW 40: CPT

## 2025-01-01 PROCEDURE — 93321 DOPPLER ECHO F-UP/LMTD STD: CPT | Mod: 26

## 2025-01-01 PROCEDURE — 93325 DOPPLER ECHO COLOR FLOW MAPG: CPT | Mod: 26

## 2025-01-01 PROCEDURE — 99231 SBSQ HOSP IP/OBS SF/LOW 25: CPT

## 2025-01-01 PROCEDURE — 35226 REPAIR BLOOD VESSEL DIR LXTR: CPT | Mod: RT

## 2025-01-01 PROCEDURE — 99222 1ST HOSP IP/OBS MODERATE 55: CPT

## 2025-01-01 PROCEDURE — 99291 CRITICAL CARE FIRST HOUR: CPT | Mod: 24,25

## 2025-01-01 PROCEDURE — 93005 ELECTROCARDIOGRAM TRACING: CPT

## 2025-01-01 PROCEDURE — 33984 ECMO/ECLS RMVL PRPH CANNULA: CPT

## 2025-01-01 PROCEDURE — P9073: CPT

## 2025-01-01 PROCEDURE — 71250 CT THORAX DX C-: CPT | Mod: 26

## 2025-01-01 PROCEDURE — 90945 DIALYSIS ONE EVALUATION: CPT | Mod: GC

## 2025-01-01 DEVICE — CATH THERMDIL SWAN GANZ VIP 7.5FR
Type: IMPLANTABLE DEVICE | Status: NON-FUNCTIONAL
Removed: 2025-07-08

## 2025-01-01 DEVICE — FLOSEAL WITH RECOTHROM THROMBIN 10ML
Type: IMPLANTABLE DEVICE | Status: NON-FUNCTIONAL
Removed: 2025-07-08

## 2025-01-01 DEVICE — KIT CVC 2LUM MAC 9FR CHG
Type: IMPLANTABLE DEVICE | Status: NON-FUNCTIONAL
Removed: 2025-07-08

## 2025-01-01 DEVICE — SURGICEL FIBRILLAR 2 X 4"
Type: IMPLANTABLE DEVICE | Status: NON-FUNCTIONAL
Removed: 2025-07-08

## 2025-01-01 RX ORDER — OLANZAPINE 10 MG/1
5 TABLET ORAL ONCE
Refills: 0 | Status: COMPLETED | OUTPATIENT
Start: 2025-01-01 | End: 2025-01-01

## 2025-01-01 RX ORDER — VASOPRESSIN 20 [USP'U]/ML
0.02 INJECTION INTRAVENOUS
Qty: 40 | Refills: 0 | Status: DISCONTINUED | OUTPATIENT
Start: 2025-01-01 | End: 2025-01-01

## 2025-01-01 RX ORDER — DEXTROSE 50 % IN WATER 50 %
25 SYRINGE (ML) INTRAVENOUS ONCE
Refills: 0 | Status: COMPLETED | OUTPATIENT
Start: 2025-01-01 | End: 2025-01-01

## 2025-01-01 RX ORDER — HYDROMORPHONE/SOD CHLOR,ISO/PF 2 MG/10 ML
0.5 SYRINGE (ML) INJECTION ONCE
Refills: 0 | Status: DISCONTINUED | OUTPATIENT
Start: 2025-01-01 | End: 2025-01-01

## 2025-01-01 RX ORDER — CEFEPIME 2 G/20ML
1000 INJECTION, POWDER, FOR SOLUTION INTRAVENOUS EVERY 12 HOURS
Refills: 0 | Status: DISCONTINUED | OUTPATIENT
Start: 2025-01-01 | End: 2025-01-01

## 2025-01-01 RX ORDER — AMIODARONE HYDROCHLORIDE 50 MG/ML
400 INJECTION, SOLUTION INTRAVENOUS EVERY 8 HOURS
Refills: 0 | Status: DISCONTINUED | OUTPATIENT
Start: 2025-01-01 | End: 2025-01-01

## 2025-01-01 RX ORDER — ACETAMINOPHEN 500 MG/5ML
1000 LIQUID (ML) ORAL ONCE
Refills: 0 | Status: COMPLETED | OUTPATIENT
Start: 2025-01-01 | End: 2025-01-01

## 2025-01-01 RX ORDER — ALBUMIN (HUMAN) 12.5 G/50ML
250 INJECTION, SOLUTION INTRAVENOUS ONCE
Refills: 0 | Status: COMPLETED | OUTPATIENT
Start: 2025-01-01 | End: 2025-01-01

## 2025-01-01 RX ORDER — HYDROMORPHONE/SOD CHLOR,ISO/PF 2 MG/10 ML
1 SYRINGE (ML) INJECTION
Qty: 10 | Refills: 0 | Status: DISCONTINUED | OUTPATIENT
Start: 2025-01-01 | End: 2025-01-01

## 2025-01-01 RX ORDER — BUMETANIDE 1 MG/1
0.25 TABLET ORAL
Qty: 20 | Refills: 0 | Status: DISCONTINUED | OUTPATIENT
Start: 2025-01-01 | End: 2025-01-01

## 2025-01-01 RX ORDER — HYDROMORPHONE/SOD CHLOR,ISO/PF 2 MG/10 ML
0.5 SYRINGE (ML) INJECTION EVERY 6 HOURS
Refills: 0 | Status: DISCONTINUED | OUTPATIENT
Start: 2025-01-01 | End: 2025-01-01

## 2025-01-01 RX ORDER — DEXMEDETOMIDINE HYDROCHLORIDE IN SODIUM CHLORIDE 4 UG/ML
0.5 INJECTION INTRAVENOUS
Qty: 200 | Refills: 0 | Status: DISCONTINUED | OUTPATIENT
Start: 2025-01-01 | End: 2025-01-01

## 2025-01-01 RX ORDER — ETOMIDATE 2 MG/ML
20 AMPUL (ML) INTRAVENOUS ONCE
Refills: 0 | Status: COMPLETED | OUTPATIENT
Start: 2025-01-01 | End: 2025-01-01

## 2025-01-01 RX ORDER — DEXTROSE 50 % IN WATER 50 %
10 SYRINGE (ML) INTRAVENOUS ONCE
Refills: 0 | Status: COMPLETED | OUTPATIENT
Start: 2025-01-01 | End: 2025-01-01

## 2025-01-01 RX ORDER — NOREPINEPHRINE BITARTRATE 8 MG
0.05 SOLUTION INTRAVENOUS
Qty: 8 | Refills: 0 | Status: DISCONTINUED | OUTPATIENT
Start: 2025-01-01 | End: 2025-01-01

## 2025-01-01 RX ORDER — CALCIUM GLUCONATE 20 MG/ML
1 INJECTION, SOLUTION INTRAVENOUS ONCE
Refills: 0 | Status: COMPLETED | OUTPATIENT
Start: 2025-01-01 | End: 2025-01-01

## 2025-01-01 RX ORDER — AMIODARONE HYDROCHLORIDE 50 MG/ML
150 INJECTION, SOLUTION INTRAVENOUS ONCE
Refills: 0 | Status: COMPLETED | OUTPATIENT
Start: 2025-01-01 | End: 2025-01-01

## 2025-01-01 RX ORDER — MAGNESIUM SULFATE 500 MG/ML
2 SYRINGE (ML) INJECTION ONCE
Refills: 0 | Status: COMPLETED | OUTPATIENT
Start: 2025-01-01 | End: 2025-01-01

## 2025-01-01 RX ORDER — LIDOCAINE HYDROCHLORIDE 20 MG/ML
2 JELLY TOPICAL DAILY
Refills: 0 | Status: DISCONTINUED | OUTPATIENT
Start: 2025-01-01 | End: 2025-01-01

## 2025-01-01 RX ORDER — OLANZAPINE 10 MG/1
2.5 TABLET ORAL AT BEDTIME
Refills: 0 | Status: DISCONTINUED | OUTPATIENT
Start: 2025-01-01 | End: 2025-01-01

## 2025-01-01 RX ORDER — HYDROMORPHONE/SOD CHLOR,ISO/PF 2 MG/10 ML
1 SYRINGE (ML) INJECTION EVERY 4 HOURS
Refills: 0 | Status: DISCONTINUED | OUTPATIENT
Start: 2025-01-01 | End: 2025-01-01

## 2025-01-01 RX ORDER — MIDAZOLAM IN 0.9 % SOD.CHLORID 1 MG/ML
1 PLASTIC BAG, INJECTION (ML) INTRAVENOUS
Refills: 0 | Status: DISCONTINUED | OUTPATIENT
Start: 2025-01-01 | End: 2025-01-01

## 2025-01-01 RX ORDER — AMIODARONE HYDROCHLORIDE 50 MG/ML
1 INJECTION, SOLUTION INTRAVENOUS
Qty: 450 | Refills: 0 | Status: DISCONTINUED | OUTPATIENT
Start: 2025-01-01 | End: 2025-01-01

## 2025-01-01 RX ORDER — ROCURONIUM BROMIDE 10 MG/ML
70 INJECTION, SOLUTION INTRAVENOUS ONCE
Refills: 0 | Status: COMPLETED | OUTPATIENT
Start: 2025-01-01 | End: 2025-01-01

## 2025-01-01 RX ORDER — SODIUM CHLORIDE 9 G/1000ML
500 INJECTION, SOLUTION INTRAVENOUS ONCE
Refills: 0 | Status: COMPLETED | OUTPATIENT
Start: 2025-01-01 | End: 2025-01-01

## 2025-01-01 RX ORDER — TRAZODONE HCL 100 MG
50 TABLET ORAL AT BEDTIME
Refills: 0 | Status: DISCONTINUED | OUTPATIENT
Start: 2025-01-01 | End: 2025-01-01

## 2025-01-01 RX ORDER — FENTANYL CITRATE-0.9 % NACL/PF 100MCG/2ML
25 SYRINGE (ML) INTRAVENOUS ONCE
Refills: 0 | Status: DISCONTINUED | OUTPATIENT
Start: 2025-01-01 | End: 2025-01-01

## 2025-01-01 RX ORDER — CISATRACURIUM BESYLATE 10 MG/5ML
3 INJECTION, SOLUTION INTRAVENOUS
Qty: 200 | Refills: 0 | Status: DISCONTINUED | OUTPATIENT
Start: 2025-01-01 | End: 2025-01-01

## 2025-01-01 RX ORDER — CLOPIDOGREL BISULFATE 75 MG/1
75 TABLET, FILM COATED ORAL DAILY
Refills: 0 | Status: DISCONTINUED | OUTPATIENT
Start: 2025-01-01 | End: 2025-01-01

## 2025-01-01 RX ORDER — POLYETHYLENE GLYCOL 3350 17 G/17G
17 POWDER, FOR SOLUTION ORAL DAILY
Refills: 0 | Status: DISCONTINUED | OUTPATIENT
Start: 2025-01-01 | End: 2025-01-01

## 2025-01-01 RX ORDER — GLYCOPYRROLATE 0.2 MG/ML
0.4 INJECTION INTRAMUSCULAR; INTRAVENOUS
Refills: 0 | Status: DISCONTINUED | OUTPATIENT
Start: 2025-01-01 | End: 2025-01-01

## 2025-01-01 RX ORDER — VANCOMYCIN HCL IN 5 % DEXTROSE 1.5G/250ML
1000 PLASTIC BAG, INJECTION (ML) INTRAVENOUS EVERY 24 HOURS
Refills: 0 | Status: DISCONTINUED | OUTPATIENT
Start: 2025-01-01 | End: 2025-01-01

## 2025-01-01 RX ORDER — PIPERACILLIN-TAZO-DEXTROSE,ISO 3.375G/5
3.38 IV SOLUTION, PIGGYBACK PREMIX FROZEN(ML) INTRAVENOUS ONCE
Refills: 0 | Status: COMPLETED | OUTPATIENT
Start: 2025-01-01 | End: 2025-01-01

## 2025-01-01 RX ORDER — HYDROMORPHONE/SOD CHLOR,ISO/PF 2 MG/10 ML
1 SYRINGE (ML) INJECTION
Refills: 0 | Status: DISCONTINUED | OUTPATIENT
Start: 2025-01-01 | End: 2025-01-01

## 2025-01-01 RX ORDER — CALCIUM GLUCONATE 20 MG/ML
2 INJECTION, SOLUTION INTRAVENOUS ONCE
Refills: 0 | Status: COMPLETED | OUTPATIENT
Start: 2025-01-01 | End: 2025-01-01

## 2025-01-01 RX ORDER — HEPARIN SODIUM 1000 [USP'U]/ML
1000 INJECTION INTRAVENOUS; SUBCUTANEOUS
Qty: 25000 | Refills: 0 | Status: DISCONTINUED | OUTPATIENT
Start: 2025-01-01 | End: 2025-01-01

## 2025-01-01 RX ORDER — HEPARIN SODIUM 1000 [USP'U]/ML
INJECTION INTRAVENOUS; SUBCUTANEOUS
Qty: 25000 | Refills: 0 | Status: DISCONTINUED | OUTPATIENT
Start: 2025-01-01 | End: 2025-01-01

## 2025-01-01 RX ORDER — HYDROMORPHONE/SOD CHLOR,ISO/PF 2 MG/10 ML
0.5 SYRINGE (ML) INJECTION
Refills: 0 | Status: DISCONTINUED | OUTPATIENT
Start: 2025-01-01 | End: 2025-01-01

## 2025-01-01 RX ORDER — HYDROMORPHONE/SOD CHLOR,ISO/PF 2 MG/10 ML
2 SYRINGE (ML) INJECTION ONCE
Refills: 0 | Status: DISCONTINUED | OUTPATIENT
Start: 2025-01-01 | End: 2025-01-01

## 2025-01-01 RX ORDER — PROPOFOL 10 MG/ML
30 INJECTION, EMULSION INTRAVENOUS
Qty: 500 | Refills: 0 | Status: DISCONTINUED | OUTPATIENT
Start: 2025-01-01 | End: 2025-01-01

## 2025-01-01 RX ORDER — CISATRACURIUM BESYLATE 10 MG/5ML
18 INJECTION, SOLUTION INTRAVENOUS ONCE
Refills: 0 | Status: DISCONTINUED | OUTPATIENT
Start: 2025-01-01 | End: 2025-01-01

## 2025-01-01 RX ORDER — CALCIUM CHLORIDE 100 MG/ML
1000 INJECTION, SOLUTION INTRAVENOUS ONCE
Refills: 0 | Status: COMPLETED | OUTPATIENT
Start: 2025-01-01 | End: 2025-01-01

## 2025-01-01 RX ORDER — ALBUMIN (HUMAN) 12.5 G/50ML
250 INJECTION, SOLUTION INTRAVENOUS
Refills: 0 | Status: COMPLETED | OUTPATIENT
Start: 2025-01-01 | End: 2025-01-01

## 2025-01-01 RX ORDER — HEPARIN SODIUM 1000 [USP'U]/ML
INJECTION INTRAVENOUS; SUBCUTANEOUS
Qty: 12500 | Refills: 0 | Status: DISCONTINUED | OUTPATIENT
Start: 2025-01-01 | End: 2025-01-01

## 2025-01-01 RX ORDER — ALBUMIN (HUMAN) 12.5 G/50ML
100 INJECTION, SOLUTION INTRAVENOUS ONCE
Refills: 0 | Status: COMPLETED | OUTPATIENT
Start: 2025-01-01 | End: 2025-01-01

## 2025-01-01 RX ORDER — BUMETANIDE 1 MG/1
2 TABLET ORAL ONCE
Refills: 0 | Status: COMPLETED | OUTPATIENT
Start: 2025-01-01 | End: 2025-01-01

## 2025-01-01 RX ORDER — DIGOXIN 250 UG/1
250 TABLET ORAL ONCE
Refills: 0 | Status: COMPLETED | OUTPATIENT
Start: 2025-01-01 | End: 2025-01-01

## 2025-01-01 RX ORDER — LORAZEPAM 4 MG/ML
0.5 VIAL (ML) INJECTION ONCE
Refills: 0 | Status: DISCONTINUED | OUTPATIENT
Start: 2025-01-01 | End: 2025-01-01

## 2025-01-01 RX ORDER — FENTANYL CITRATE-0.9 % NACL/PF 100MCG/2ML
50 SYRINGE (ML) INTRAVENOUS ONCE
Refills: 0 | Status: DISCONTINUED | OUTPATIENT
Start: 2025-01-01 | End: 2025-01-01

## 2025-01-01 RX ORDER — SCOPOLAMINE 1 MG/3D
1 PATCH, EXTENDED RELEASE TRANSDERMAL
Refills: 0 | Status: DISCONTINUED | OUTPATIENT
Start: 2025-01-01 | End: 2025-01-01

## 2025-01-01 RX ORDER — SCOPOLAMINE 1 MG/3D
1 PATCH, EXTENDED RELEASE TRANSDERMAL ONCE
Refills: 0 | Status: COMPLETED | OUTPATIENT
Start: 2025-01-01 | End: 2025-01-01

## 2025-01-01 RX ORDER — HEPARIN SODIUM 1000 [USP'U]/ML
300 INJECTION INTRAVENOUS; SUBCUTANEOUS
Qty: 25000 | Refills: 0 | Status: DISCONTINUED | OUTPATIENT
Start: 2025-01-01 | End: 2025-01-01

## 2025-01-01 RX ORDER — CEFEPIME 2 G/20ML
500 INJECTION, POWDER, FOR SOLUTION INTRAVENOUS EVERY 12 HOURS
Refills: 0 | Status: COMPLETED | OUTPATIENT
Start: 2025-01-01 | End: 2025-01-01

## 2025-01-01 RX ORDER — SODIUM CHLORIDE 0.65 %
1 AEROSOL, SPRAY (ML) NASAL
Refills: 0 | Status: DISCONTINUED | OUTPATIENT
Start: 2025-01-01 | End: 2025-01-01

## 2025-01-01 RX ORDER — OLANZAPINE 10 MG/1
2.5 TABLET ORAL ONCE
Refills: 0 | Status: COMPLETED | OUTPATIENT
Start: 2025-01-01 | End: 2025-01-01

## 2025-01-01 RX ORDER — ONDANSETRON HCL/PF 4 MG/2 ML
4 VIAL (ML) INJECTION ONCE
Refills: 0 | Status: COMPLETED | OUTPATIENT
Start: 2025-01-01 | End: 2025-01-01

## 2025-01-01 RX ORDER — DEXTROSE 50 % IN WATER 50 %
25 SYRINGE (ML) INTRAVENOUS
Refills: 0 | Status: DISCONTINUED | OUTPATIENT
Start: 2025-01-01 | End: 2025-01-01

## 2025-01-01 RX ORDER — MIDAZOLAM IN 0.9 % SOD.CHLORID 1 MG/ML
2 PLASTIC BAG, INJECTION (ML) INTRAVENOUS ONCE
Refills: 0 | Status: COMPLETED | OUTPATIENT
Start: 2025-01-01 | End: 2025-01-01

## 2025-01-01 RX ORDER — VANCOMYCIN HCL IN 5 % DEXTROSE 1.5G/250ML
1000 PLASTIC BAG, INJECTION (ML) INTRAVENOUS ONCE
Refills: 0 | Status: COMPLETED | OUTPATIENT
Start: 2025-01-01 | End: 2025-01-01

## 2025-01-01 RX ORDER — CALCIUM GLUCONATE 20 MG/ML
2 INJECTION, SOLUTION INTRAVENOUS ONCE
Refills: 0 | Status: DISCONTINUED | OUTPATIENT
Start: 2025-01-01 | End: 2025-01-01

## 2025-01-01 RX ORDER — OLANZAPINE 10 MG/1
5 TABLET ORAL
Refills: 0 | Status: DISCONTINUED | OUTPATIENT
Start: 2025-01-01 | End: 2025-01-01

## 2025-01-01 RX ORDER — QUETIAPINE FUMARATE 25 MG/1
25 TABLET ORAL AT BEDTIME
Refills: 0 | Status: DISCONTINUED | OUTPATIENT
Start: 2025-01-01 | End: 2025-01-01

## 2025-01-01 RX ORDER — LIDOCAINE HCL/PF 10 MG/ML
100 VIAL (ML) INJECTION ONCE
Refills: 0 | Status: COMPLETED | OUTPATIENT
Start: 2025-01-01 | End: 2025-01-01

## 2025-01-01 RX ORDER — OLANZAPINE 10 MG/1
2.5 TABLET ORAL
Refills: 0 | Status: DISCONTINUED | OUTPATIENT
Start: 2025-01-01 | End: 2025-01-01

## 2025-01-01 RX ORDER — LIDOCAINE HCL/PF 10 MG/ML
2 VIAL (ML) INJECTION
Qty: 2 | Refills: 0 | Status: DISCONTINUED | OUTPATIENT
Start: 2025-01-01 | End: 2025-01-01

## 2025-01-01 RX ORDER — PIPERACILLIN-TAZO-DEXTROSE,ISO 3.375G/5
3.38 IV SOLUTION, PIGGYBACK PREMIX FROZEN(ML) INTRAVENOUS EVERY 12 HOURS
Refills: 0 | Status: DISCONTINUED | OUTPATIENT
Start: 2025-01-01 | End: 2025-01-01

## 2025-01-01 RX ORDER — CEFAZOLIN SODIUM IN 0.9 % NACL 3 G/100 ML
INTRAVENOUS SOLUTION, PIGGYBACK (ML) INTRAVENOUS
Refills: 0 | Status: DISCONTINUED | OUTPATIENT
Start: 2025-01-01 | End: 2025-01-01

## 2025-01-01 RX ORDER — ACETAMINOPHEN 500 MG/5ML
650 LIQUID (ML) ORAL EVERY 6 HOURS
Refills: 0 | Status: DISCONTINUED | OUTPATIENT
Start: 2025-01-01 | End: 2025-01-01

## 2025-01-01 RX ORDER — AMIODARONE HYDROCHLORIDE 50 MG/ML
0.5 INJECTION, SOLUTION INTRAVENOUS
Qty: 450 | Refills: 0 | Status: DISCONTINUED | OUTPATIENT
Start: 2025-01-01 | End: 2025-01-01

## 2025-01-01 RX ORDER — DEXMEDETOMIDINE HYDROCHLORIDE IN SODIUM CHLORIDE 4 UG/ML
0.3 INJECTION INTRAVENOUS
Qty: 200 | Refills: 0 | Status: DISCONTINUED | OUTPATIENT
Start: 2025-01-01 | End: 2025-01-01

## 2025-01-01 RX ORDER — SODIUM BICARBONATE 1 MEQ/ML
50 SYRINGE (ML) INTRAVENOUS ONCE
Refills: 0 | Status: COMPLETED | OUTPATIENT
Start: 2025-01-01 | End: 2025-01-01

## 2025-01-01 RX ORDER — SENNA 187 MG
2 TABLET ORAL AT BEDTIME
Refills: 0 | Status: DISCONTINUED | OUTPATIENT
Start: 2025-01-01 | End: 2025-01-01

## 2025-01-01 RX ORDER — CEFEPIME 2 G/20ML
1000 INJECTION, POWDER, FOR SOLUTION INTRAVENOUS ONCE
Refills: 0 | Status: COMPLETED | OUTPATIENT
Start: 2025-01-01 | End: 2025-01-01

## 2025-01-01 RX ORDER — MILRINONE LACTATE 1 MG/ML
0.12 INJECTION, SOLUTION INTRAVENOUS
Qty: 20 | Refills: 0 | Status: DISCONTINUED | OUTPATIENT
Start: 2025-01-01 | End: 2025-01-01

## 2025-01-01 RX ORDER — IPRATROPIUM BROMIDE AND ALBUTEROL SULFATE .5; 2.5 MG/3ML; MG/3ML
3 SOLUTION RESPIRATORY (INHALATION) EVERY 6 HOURS
Refills: 0 | Status: DISCONTINUED | OUTPATIENT
Start: 2025-01-01 | End: 2025-01-01

## 2025-01-01 RX ORDER — AMIODARONE HYDROCHLORIDE 50 MG/ML
INJECTION, SOLUTION INTRAVENOUS
Refills: 0 | Status: DISCONTINUED | OUTPATIENT
Start: 2025-01-01 | End: 2025-01-01

## 2025-01-01 RX ORDER — BUDESONIDE 0.25 MG/2ML
0.5 SUSPENSION RESPIRATORY (INHALATION) EVERY 12 HOURS
Refills: 0 | Status: DISCONTINUED | OUTPATIENT
Start: 2025-01-01 | End: 2025-01-01

## 2025-01-01 RX ORDER — TRAZODONE HCL 100 MG
25 TABLET ORAL AT BEDTIME
Refills: 0 | Status: DISCONTINUED | OUTPATIENT
Start: 2025-01-01 | End: 2025-01-01

## 2025-01-01 RX ORDER — MIDODRINE HYDROCHLORIDE 5 MG/1
10 TABLET ORAL EVERY 8 HOURS
Refills: 0 | Status: DISCONTINUED | OUTPATIENT
Start: 2025-01-01 | End: 2025-01-01

## 2025-01-01 RX ORDER — ASPIRIN 325 MG
81 TABLET ORAL DAILY
Refills: 0 | Status: DISCONTINUED | OUTPATIENT
Start: 2025-01-01 | End: 2025-01-01

## 2025-01-01 RX ORDER — DEXMEDETOMIDINE HYDROCHLORIDE IN SODIUM CHLORIDE 4 UG/ML
1 INJECTION INTRAVENOUS
Qty: 200 | Refills: 0 | Status: DISCONTINUED | OUTPATIENT
Start: 2025-01-01 | End: 2025-01-01

## 2025-01-01 RX ORDER — GLYCOPYRROLATE 0.2 MG/ML
2 INJECTION INTRAMUSCULAR; INTRAVENOUS ONCE
Refills: 0 | Status: DISCONTINUED | OUTPATIENT
Start: 2025-01-01 | End: 2025-01-01

## 2025-01-01 RX ORDER — BISACODYL 5 MG
10 TABLET, DELAYED RELEASE (ENTERIC COATED) ORAL EVERY 24 HOURS
Refills: 0 | Status: DISCONTINUED | OUTPATIENT
Start: 2025-01-01 | End: 2025-01-01

## 2025-01-01 RX ORDER — MIDAZOLAM IN 0.9 % SOD.CHLORID 1 MG/ML
4 PLASTIC BAG, INJECTION (ML) INTRAVENOUS ONCE
Refills: 0 | Status: DISCONTINUED | OUTPATIENT
Start: 2025-01-01 | End: 2025-01-01

## 2025-01-01 RX ORDER — HYDROMORPHONE/SOD CHLOR,ISO/PF 2 MG/10 ML
1 SYRINGE (ML) INJECTION
Qty: 100 | Refills: 0 | Status: DISCONTINUED | OUTPATIENT
Start: 2025-01-01 | End: 2025-01-01

## 2025-01-01 RX ORDER — ALBUMIN (HUMAN) 12.5 G/50ML
50 INJECTION, SOLUTION INTRAVENOUS ONCE
Refills: 0 | Status: COMPLETED | OUTPATIENT
Start: 2025-01-01 | End: 2025-01-01

## 2025-01-01 RX ORDER — CANGRELOR 50 MG/1
0.25 INJECTION, POWDER, LYOPHILIZED, FOR SOLUTION INTRAVENOUS
Qty: 50 | Refills: 0 | Status: DISCONTINUED | OUTPATIENT
Start: 2025-01-01 | End: 2025-01-01

## 2025-01-01 RX ORDER — OLANZAPINE 10 MG/1
5 TABLET ORAL AT BEDTIME
Refills: 0 | Status: DISCONTINUED | OUTPATIENT
Start: 2025-01-01 | End: 2025-01-01

## 2025-01-01 RX ORDER — CEFEPIME 2 G/20ML
INJECTION, POWDER, FOR SOLUTION INTRAVENOUS
Refills: 0 | Status: DISCONTINUED | OUTPATIENT
Start: 2025-01-01 | End: 2025-01-01

## 2025-01-01 RX ORDER — HYDROMORPHONE/SOD CHLOR,ISO/PF 2 MG/10 ML
0.5 SYRINGE (ML) INJECTION EVERY 4 HOURS
Refills: 0 | Status: DISCONTINUED | OUTPATIENT
Start: 2025-01-01 | End: 2025-01-01

## 2025-01-01 RX ORDER — MUPIROCIN CALCIUM 20 MG/G
1 CREAM TOPICAL EVERY 12 HOURS
Refills: 0 | Status: COMPLETED | OUTPATIENT
Start: 2025-01-01 | End: 2025-01-01

## 2025-01-01 RX ORDER — QUETIAPINE FUMARATE 25 MG/1
25 TABLET ORAL DAILY
Refills: 0 | Status: DISCONTINUED | OUTPATIENT
Start: 2025-01-01 | End: 2025-01-01

## 2025-01-01 RX ORDER — ALBUMIN (HUMAN) 12.5 G/50ML
250 INJECTION, SOLUTION INTRAVENOUS
Refills: 0 | Status: DISCONTINUED | OUTPATIENT
Start: 2025-01-01 | End: 2025-01-01

## 2025-01-01 RX ORDER — BUMETANIDE 1 MG/1
2 TABLET ORAL
Qty: 20 | Refills: 0 | Status: DISCONTINUED | OUTPATIENT
Start: 2025-01-01 | End: 2025-01-01

## 2025-01-01 RX ORDER — ONDANSETRON HCL/PF 4 MG/2 ML
4 VIAL (ML) INJECTION EVERY 6 HOURS
Refills: 0 | Status: DISCONTINUED | OUTPATIENT
Start: 2025-01-01 | End: 2025-01-01

## 2025-01-01 RX ORDER — DEXTROSE 50 % IN WATER 50 %
50 SYRINGE (ML) INTRAVENOUS
Refills: 0 | Status: DISCONTINUED | OUTPATIENT
Start: 2025-01-01 | End: 2025-01-01

## 2025-01-01 RX ORDER — DEXMEDETOMIDINE HYDROCHLORIDE IN SODIUM CHLORIDE 4 UG/ML
0.2 INJECTION INTRAVENOUS
Qty: 200 | Refills: 0 | Status: DISCONTINUED | OUTPATIENT
Start: 2025-01-01 | End: 2025-01-01

## 2025-01-01 RX ORDER — CEFAZOLIN SODIUM IN 0.9 % NACL 3 G/100 ML
2000 INTRAVENOUS SOLUTION, PIGGYBACK (ML) INTRAVENOUS EVERY 12 HOURS
Refills: 0 | Status: DISCONTINUED | OUTPATIENT
Start: 2025-01-01 | End: 2025-01-01

## 2025-01-01 RX ADMIN — GLYCOPYRROLATE 0.4 MILLIGRAM(S): 0.2 INJECTION INTRAMUSCULAR; INTRAVENOUS at 01:00

## 2025-01-01 RX ADMIN — IPRATROPIUM BROMIDE AND ALBUTEROL SULFATE 3 MILLILITER(S): .5; 2.5 SOLUTION RESPIRATORY (INHALATION) at 23:08

## 2025-01-01 RX ADMIN — Medication 0.5 MILLIGRAM(S): at 02:51

## 2025-01-01 RX ADMIN — Medication 1 MG/HR: at 19:02

## 2025-01-01 RX ADMIN — Medication 0.5 MILLIGRAM(S): at 15:40

## 2025-01-01 RX ADMIN — DEXMEDETOMIDINE HYDROCHLORIDE IN SODIUM CHLORIDE 11.3 MICROGRAM(S)/KG/HR: 4 INJECTION INTRAVENOUS at 01:01

## 2025-01-01 RX ADMIN — Medication 1 MILLIGRAM(S): at 22:15

## 2025-01-01 RX ADMIN — AMIODARONE HYDROCHLORIDE 400 MILLIGRAM(S): 50 INJECTION, SOLUTION INTRAVENOUS at 13:45

## 2025-01-01 RX ADMIN — Medication 50 MILLIEQUIVALENT(S): at 00:10

## 2025-01-01 RX ADMIN — Medication 40 MILLIGRAM(S): at 17:19

## 2025-01-01 RX ADMIN — Medication 50 MILLIEQUIVALENT(S): at 01:39

## 2025-01-01 RX ADMIN — HEPARIN SODIUM 3 UNIT(S)/HR: 1000 INJECTION INTRAVENOUS; SUBCUTANEOUS at 06:11

## 2025-01-01 RX ADMIN — Medication 40 MILLIGRAM(S): at 17:04

## 2025-01-01 RX ADMIN — Medication 25 MICROGRAM(S): at 15:00

## 2025-01-01 RX ADMIN — Medication 400 MILLIGRAM(S): at 14:01

## 2025-01-01 RX ADMIN — Medication 50 MILLIEQUIVALENT(S): at 00:34

## 2025-01-01 RX ADMIN — Medication 0.5 MILLIGRAM(S): at 15:00

## 2025-01-01 RX ADMIN — Medication 0.5 MILLIGRAM(S): at 15:21

## 2025-01-01 RX ADMIN — Medication 50 MILLIEQUIVALENT(S): at 09:41

## 2025-01-01 RX ADMIN — Medication 1 APPLICATION(S): at 05:04

## 2025-01-01 RX ADMIN — Medication 40 MILLIGRAM(S): at 13:04

## 2025-01-01 RX ADMIN — OLANZAPINE 5 MILLIGRAM(S): 10 TABLET ORAL at 21:57

## 2025-01-01 RX ADMIN — Medication 2 MILLIGRAM(S): at 06:45

## 2025-01-01 RX ADMIN — Medication 0.5 MILLIGRAM(S): at 23:13

## 2025-01-01 RX ADMIN — OLANZAPINE 2.5 MILLIGRAM(S): 10 TABLET ORAL at 21:09

## 2025-01-01 RX ADMIN — Medication 1000 MILLIGRAM(S): at 04:15

## 2025-01-01 RX ADMIN — Medication 0.5 MILLIGRAM(S): at 05:00

## 2025-01-01 RX ADMIN — Medication 3 UNIT(S)/HR: at 20:23

## 2025-01-01 RX ADMIN — Medication 0.5 MILLIGRAM(S): at 04:45

## 2025-01-01 RX ADMIN — Medication 1 APPLICATION(S): at 05:12

## 2025-01-01 RX ADMIN — HEPARIN SODIUM 6 UNIT(S)/HR: 1000 INJECTION INTRAVENOUS; SUBCUTANEOUS at 20:23

## 2025-01-01 RX ADMIN — Medication 81 MILLIGRAM(S): at 15:13

## 2025-01-01 RX ADMIN — HEPARIN SODIUM 10 UNIT(S): 1000 INJECTION INTRAVENOUS; SUBCUTANEOUS at 19:42

## 2025-01-01 RX ADMIN — Medication 10 MILLILITER(S): at 22:19

## 2025-01-01 RX ADMIN — Medication 250 MILLIGRAM(S): at 14:56

## 2025-01-01 RX ADMIN — VASOPRESSIN 3 UNIT(S)/MIN: 20 INJECTION INTRAVENOUS at 18:11

## 2025-01-01 RX ADMIN — Medication 4 MILLIGRAM(S): at 10:33

## 2025-01-01 RX ADMIN — Medication 1.69 MICROGRAM(S)/KG/MIN: at 08:12

## 2025-01-01 RX ADMIN — Medication 100 MILLIGRAM(S): at 17:25

## 2025-01-01 RX ADMIN — Medication 3.38 GRAM(S): at 07:27

## 2025-01-01 RX ADMIN — Medication 3 UNIT(S)/HR: at 19:18

## 2025-01-01 RX ADMIN — Medication 40 MILLIGRAM(S): at 12:14

## 2025-01-01 RX ADMIN — Medication 15 MG/MIN: at 20:56

## 2025-01-01 RX ADMIN — Medication 3 MILLILITER(S): at 05:23

## 2025-01-01 RX ADMIN — OLANZAPINE 2.5 MILLIGRAM(S): 10 TABLET ORAL at 07:32

## 2025-01-01 RX ADMIN — Medication 2 TABLET(S): at 21:11

## 2025-01-01 RX ADMIN — Medication 1000 MILLIGRAM(S): at 12:30

## 2025-01-01 RX ADMIN — NOREPINEPHRINE BITARTRATE 8.44 MICROGRAM(S)/KG/MIN: 8 SOLUTION at 20:44

## 2025-01-01 RX ADMIN — LIDOCAINE HYDROCHLORIDE 2 PATCH: 20 JELLY TOPICAL at 11:00

## 2025-01-01 RX ADMIN — Medication 50 MILLIEQUIVALENT(S): at 09:14

## 2025-01-01 RX ADMIN — Medication 40 MILLIGRAM(S): at 17:15

## 2025-01-01 RX ADMIN — BUMETANIDE 10 MG/HR: 1 TABLET ORAL at 20:23

## 2025-01-01 RX ADMIN — AMIODARONE HYDROCHLORIDE 600 MILLIGRAM(S): 50 INJECTION, SOLUTION INTRAVENOUS at 20:10

## 2025-01-01 RX ADMIN — CEFEPIME 100 MILLIGRAM(S): 2 INJECTION, POWDER, FOR SOLUTION INTRAVENOUS at 06:04

## 2025-01-01 RX ADMIN — MIDODRINE HYDROCHLORIDE 10 MILLIGRAM(S): 5 TABLET ORAL at 05:12

## 2025-01-01 RX ADMIN — Medication 3 UNIT(S)/HR: at 07:24

## 2025-01-01 RX ADMIN — MIDODRINE HYDROCHLORIDE 10 MILLIGRAM(S): 5 TABLET ORAL at 21:57

## 2025-01-01 RX ADMIN — Medication 3 UNIT(S)/HR: at 07:54

## 2025-01-01 RX ADMIN — Medication 0.5 MILLIGRAM(S): at 01:31

## 2025-01-01 RX ADMIN — Medication 1 MILLIGRAM(S): at 18:00

## 2025-01-01 RX ADMIN — HEPARIN SODIUM 10 UNIT(S)/HR: 1000 INJECTION INTRAVENOUS; SUBCUTANEOUS at 19:18

## 2025-01-01 RX ADMIN — DEXMEDETOMIDINE HYDROCHLORIDE IN SODIUM CHLORIDE 11.3 MICROGRAM(S)/KG/HR: 4 INJECTION INTRAVENOUS at 19:16

## 2025-01-01 RX ADMIN — Medication 50 MILLIEQUIVALENT(S): at 22:57

## 2025-01-01 RX ADMIN — Medication 15 MILLILITER(S): at 05:07

## 2025-01-01 RX ADMIN — Medication 3 UNIT(S)/HR: at 19:15

## 2025-01-01 RX ADMIN — Medication 40 MILLIGRAM(S): at 05:11

## 2025-01-01 RX ADMIN — Medication 1 MG/HR: at 22:13

## 2025-01-01 RX ADMIN — Medication 0.5 MILLIGRAM(S): at 00:25

## 2025-01-01 RX ADMIN — ALBUMIN (HUMAN) 125 MILLILITER(S): 12.5 INJECTION, SOLUTION INTRAVENOUS at 10:12

## 2025-01-01 RX ADMIN — AMIODARONE HYDROCHLORIDE 33.3 MG/MIN: 50 INJECTION, SOLUTION INTRAVENOUS at 22:32

## 2025-01-01 RX ADMIN — CEFEPIME 100 MILLIGRAM(S): 2 INJECTION, POWDER, FOR SOLUTION INTRAVENOUS at 18:00

## 2025-01-01 RX ADMIN — NOREPINEPHRINE BITARTRATE 8.44 MICROGRAM(S)/KG/MIN: 8 SOLUTION at 08:12

## 2025-01-01 RX ADMIN — HEPARIN SODIUM 10 UNIT(S)/HR: 1000 INJECTION INTRAVENOUS; SUBCUTANEOUS at 01:35

## 2025-01-01 RX ADMIN — Medication 10 MILLILITER(S): at 07:59

## 2025-01-01 RX ADMIN — Medication 10 MILLILITER(S): at 19:16

## 2025-01-01 RX ADMIN — Medication 1 MILLIGRAM(S): at 06:15

## 2025-01-01 RX ADMIN — CALCIUM GLUCONATE 100 GRAM(S): 20 INJECTION, SOLUTION INTRAVENOUS at 05:37

## 2025-01-01 RX ADMIN — Medication 1 MG/HR: at 00:11

## 2025-01-01 RX ADMIN — CLOPIDOGREL BISULFATE 75 MILLIGRAM(S): 75 TABLET, FILM COATED ORAL at 15:13

## 2025-01-01 RX ADMIN — Medication 0.5 MILLIGRAM(S): at 14:28

## 2025-01-01 RX ADMIN — Medication 1 MG/HR: at 19:00

## 2025-01-01 RX ADMIN — Medication 25 MICROGRAM(S): at 19:45

## 2025-01-01 RX ADMIN — Medication 3.38 MICROGRAM(S)/KG/MIN: at 19:19

## 2025-01-01 RX ADMIN — BUMETANIDE 10 MG/HR: 1 TABLET ORAL at 21:36

## 2025-01-01 RX ADMIN — Medication 2 TABLET(S): at 21:10

## 2025-01-01 RX ADMIN — Medication 0.5 MILLIGRAM(S): at 20:45

## 2025-01-01 RX ADMIN — Medication 25 MILLILITER(S): at 01:33

## 2025-01-01 RX ADMIN — AMIODARONE HYDROCHLORIDE 33.3 MG/MIN: 50 INJECTION, SOLUTION INTRAVENOUS at 01:35

## 2025-01-01 RX ADMIN — GLYCOPYRROLATE 0.4 MILLIGRAM(S): 0.2 INJECTION INTRAMUSCULAR; INTRAVENOUS at 17:47

## 2025-01-01 RX ADMIN — Medication 15 MILLILITER(S): at 17:41

## 2025-01-01 RX ADMIN — Medication 250 MILLIGRAM(S): at 20:57

## 2025-01-01 RX ADMIN — Medication 50 MILLIEQUIVALENT(S): at 18:00

## 2025-01-01 RX ADMIN — HEPARIN SODIUM 3 UNIT(S)/HR: 1000 INJECTION INTRAVENOUS; SUBCUTANEOUS at 19:18

## 2025-01-01 RX ADMIN — CEFEPIME 100 MILLIGRAM(S): 2 INJECTION, POWDER, FOR SOLUTION INTRAVENOUS at 05:19

## 2025-01-01 RX ADMIN — HEPARIN SODIUM 10 UNIT(S): 1000 INJECTION INTRAVENOUS; SUBCUTANEOUS at 08:28

## 2025-01-01 RX ADMIN — Medication 50 MILLIEQUIVALENT(S): at 00:11

## 2025-01-01 RX ADMIN — Medication 50 MILLIEQUIVALENT(S): at 08:20

## 2025-01-01 RX ADMIN — CALCIUM GLUCONATE 100 GRAM(S): 20 INJECTION, SOLUTION INTRAVENOUS at 20:56

## 2025-01-01 RX ADMIN — Medication 0.5 MILLIGRAM(S): at 12:47

## 2025-01-01 RX ADMIN — GLYCOPYRROLATE 0.4 MILLIGRAM(S): 0.2 INJECTION INTRAMUSCULAR; INTRAVENOUS at 05:14

## 2025-01-01 RX ADMIN — HEPARIN SODIUM 4 UNIT(S)/HR: 1000 INJECTION INTRAVENOUS; SUBCUTANEOUS at 07:35

## 2025-01-01 RX ADMIN — CANGRELOR 6.75 MICROGRAM(S)/KG/MIN: 50 INJECTION, POWDER, LYOPHILIZED, FOR SOLUTION INTRAVENOUS at 07:32

## 2025-01-01 RX ADMIN — HEPARIN SODIUM 10 UNIT(S)/HR: 1000 INJECTION INTRAVENOUS; SUBCUTANEOUS at 07:54

## 2025-01-01 RX ADMIN — CALCIUM GLUCONATE 200 GRAM(S): 20 INJECTION, SOLUTION INTRAVENOUS at 08:26

## 2025-01-01 RX ADMIN — QUETIAPINE FUMARATE 25 MILLIGRAM(S): 25 TABLET ORAL at 07:58

## 2025-01-01 RX ADMIN — DEXMEDETOMIDINE HYDROCHLORIDE IN SODIUM CHLORIDE 11.3 MICROGRAM(S)/KG/HR: 4 INJECTION INTRAVENOUS at 01:38

## 2025-01-01 RX ADMIN — Medication 40 MILLIGRAM(S): at 15:13

## 2025-01-01 RX ADMIN — Medication 1 APPLICATION(S): at 06:00

## 2025-01-01 RX ADMIN — Medication 0.5 MILLIGRAM(S): at 12:15

## 2025-01-01 RX ADMIN — CEFEPIME 100 MILLIGRAM(S): 2 INJECTION, POWDER, FOR SOLUTION INTRAVENOUS at 18:04

## 2025-01-01 RX ADMIN — Medication 25 GRAM(S): at 19:42

## 2025-01-01 RX ADMIN — OLANZAPINE 5 MILLIGRAM(S): 10 TABLET ORAL at 07:58

## 2025-01-01 RX ADMIN — ALBUMIN (HUMAN) 500 MILLILITER(S): 12.5 INJECTION, SOLUTION INTRAVENOUS at 11:30

## 2025-01-01 RX ADMIN — Medication 3 UNIT(S)/HR: at 19:40

## 2025-01-01 RX ADMIN — HEPARIN SODIUM 10 UNIT(S): 1000 INJECTION INTRAVENOUS; SUBCUTANEOUS at 03:04

## 2025-01-01 RX ADMIN — AMIODARONE HYDROCHLORIDE 16.7 MG/MIN: 50 INJECTION, SOLUTION INTRAVENOUS at 20:00

## 2025-01-01 RX ADMIN — Medication 50 MICROGRAM(S): at 02:38

## 2025-01-01 RX ADMIN — Medication 50 MILLIGRAM(S): at 21:10

## 2025-01-01 RX ADMIN — Medication 1000 MILLIGRAM(S): at 03:21

## 2025-01-01 RX ADMIN — IPRATROPIUM BROMIDE AND ALBUTEROL SULFATE 3 MILLILITER(S): .5; 2.5 SOLUTION RESPIRATORY (INHALATION) at 23:12

## 2025-01-01 RX ADMIN — AMIODARONE HYDROCHLORIDE 33.3 MG/MIN: 50 INJECTION, SOLUTION INTRAVENOUS at 07:25

## 2025-01-01 RX ADMIN — ALBUMIN (HUMAN) 500 MILLILITER(S): 12.5 INJECTION, SOLUTION INTRAVENOUS at 15:30

## 2025-01-01 RX ADMIN — DEXMEDETOMIDINE HYDROCHLORIDE IN SODIUM CHLORIDE 6.75 MICROGRAM(S)/KG/HR: 4 INJECTION INTRAVENOUS at 14:46

## 2025-01-01 RX ADMIN — Medication 16.9 MICROGRAM(S)/KG/MIN: at 19:54

## 2025-01-01 RX ADMIN — Medication 3 UNIT(S)/HR: at 19:16

## 2025-01-01 RX ADMIN — ROCURONIUM BROMIDE 70 MILLIGRAM(S): 10 INJECTION, SOLUTION INTRAVENOUS at 12:41

## 2025-01-01 RX ADMIN — IPRATROPIUM BROMIDE AND ALBUTEROL SULFATE 3 MILLILITER(S): .5; 2.5 SOLUTION RESPIRATORY (INHALATION) at 17:51

## 2025-01-01 RX ADMIN — Medication 25 MICROGRAM(S): at 08:00

## 2025-01-01 RX ADMIN — HEPARIN SODIUM 10 UNIT(S): 1000 INJECTION INTRAVENOUS; SUBCUTANEOUS at 20:00

## 2025-01-01 RX ADMIN — Medication 25 GRAM(S): at 15:41

## 2025-01-01 RX ADMIN — CANGRELOR 10.8 MICROGRAM(S)/KG/MIN: 50 INJECTION, POWDER, LYOPHILIZED, FOR SOLUTION INTRAVENOUS at 19:40

## 2025-01-01 RX ADMIN — Medication 0.5 MILLIGRAM(S): at 03:21

## 2025-01-01 RX ADMIN — Medication 2 MILLIGRAM(S): at 02:45

## 2025-01-01 RX ADMIN — MUPIROCIN CALCIUM 1 APPLICATION(S): 20 CREAM TOPICAL at 18:29

## 2025-01-01 RX ADMIN — CANGRELOR 20.3 MICROGRAM(S)/KG/MIN: 50 INJECTION, POWDER, LYOPHILIZED, FOR SOLUTION INTRAVENOUS at 19:19

## 2025-01-01 RX ADMIN — Medication 40 MILLIGRAM(S): at 12:34

## 2025-01-01 RX ADMIN — CALCIUM GLUCONATE 200 GRAM(S): 20 INJECTION, SOLUTION INTRAVENOUS at 01:33

## 2025-01-01 RX ADMIN — Medication 25 MICROGRAM(S): at 23:00

## 2025-01-01 RX ADMIN — HEPARIN SODIUM 10 UNIT(S): 1000 INJECTION INTRAVENOUS; SUBCUTANEOUS at 20:53

## 2025-01-01 RX ADMIN — Medication 1 APPLICATION(S): at 05:05

## 2025-01-01 RX ADMIN — OLANZAPINE 2.5 MILLIGRAM(S): 10 TABLET ORAL at 21:01

## 2025-01-01 RX ADMIN — HEPARIN SODIUM 10 UNIT(S): 1000 INJECTION INTRAVENOUS; SUBCUTANEOUS at 22:53

## 2025-01-01 RX ADMIN — Medication 1000 MILLIGRAM(S): at 14:31

## 2025-01-01 RX ADMIN — Medication 50 MILLIEQUIVALENT(S): at 20:44

## 2025-01-01 RX ADMIN — Medication 400 MILLIGRAM(S): at 04:00

## 2025-01-01 RX ADMIN — IPRATROPIUM BROMIDE AND ALBUTEROL SULFATE 3 MILLILITER(S): .5; 2.5 SOLUTION RESPIRATORY (INHALATION) at 05:14

## 2025-01-01 RX ADMIN — Medication 1 APPLICATION(S): at 05:11

## 2025-01-01 RX ADMIN — Medication 1 MILLIGRAM(S): at 06:30

## 2025-01-01 RX ADMIN — Medication 3 MILLILITER(S): at 05:06

## 2025-01-01 RX ADMIN — Medication 50 MILLIEQUIVALENT(S): at 01:14

## 2025-01-01 RX ADMIN — AMIODARONE HYDROCHLORIDE 400 MILLIGRAM(S): 50 INJECTION, SOLUTION INTRAVENOUS at 21:01

## 2025-01-01 RX ADMIN — GLYCOPYRROLATE 0.4 MILLIGRAM(S): 0.2 INJECTION INTRAMUSCULAR; INTRAVENOUS at 19:30

## 2025-01-01 RX ADMIN — CANGRELOR 2.7 MICROGRAM(S)/KG/MIN: 50 INJECTION, POWDER, LYOPHILIZED, FOR SOLUTION INTRAVENOUS at 20:45

## 2025-01-01 RX ADMIN — MIDODRINE HYDROCHLORIDE 10 MILLIGRAM(S): 5 TABLET ORAL at 21:07

## 2025-01-01 RX ADMIN — DEXMEDETOMIDINE HYDROCHLORIDE IN SODIUM CHLORIDE 6.75 MICROGRAM(S)/KG/HR: 4 INJECTION INTRAVENOUS at 20:46

## 2025-01-01 RX ADMIN — Medication 400 MILLIGRAM(S): at 21:58

## 2025-01-01 RX ADMIN — BUMETANIDE 2 MILLIGRAM(S): 1 TABLET ORAL at 21:37

## 2025-01-01 RX ADMIN — NOREPINEPHRINE BITARTRATE 8.44 MICROGRAM(S)/KG/MIN: 8 SOLUTION at 19:54

## 2025-01-01 RX ADMIN — CANGRELOR 2.7 MICROGRAM(S)/KG/MIN: 50 INJECTION, POWDER, LYOPHILIZED, FOR SOLUTION INTRAVENOUS at 07:24

## 2025-01-01 RX ADMIN — Medication 1 MILLIGRAM(S): at 05:14

## 2025-01-01 RX ADMIN — CALCIUM GLUCONATE 100 GRAM(S): 20 INJECTION, SOLUTION INTRAVENOUS at 22:46

## 2025-01-01 RX ADMIN — Medication 1 APPLICATION(S): at 05:38

## 2025-01-01 RX ADMIN — IPRATROPIUM BROMIDE AND ALBUTEROL SULFATE 3 MILLILITER(S): .5; 2.5 SOLUTION RESPIRATORY (INHALATION) at 17:36

## 2025-01-01 RX ADMIN — SCOPOLAMINE 1 PATCH: 1 PATCH, EXTENDED RELEASE TRANSDERMAL at 08:40

## 2025-01-01 RX ADMIN — Medication 0.5 MILLIGRAM(S): at 19:30

## 2025-01-01 RX ADMIN — Medication 1 APPLICATION(S): at 05:52

## 2025-01-01 RX ADMIN — CANGRELOR 16.2 MICROGRAM(S)/KG/MIN: 50 INJECTION, POWDER, LYOPHILIZED, FOR SOLUTION INTRAVENOUS at 19:18

## 2025-01-01 RX ADMIN — Medication 0.5 MILLIGRAM(S): at 19:45

## 2025-01-01 RX ADMIN — VASOPRESSIN 3 UNIT(S)/MIN: 20 INJECTION INTRAVENOUS at 07:54

## 2025-01-01 RX ADMIN — Medication 10 MILLILITER(S): at 19:18

## 2025-01-01 RX ADMIN — HEPARIN SODIUM 4 UNIT(S)/HR: 1000 INJECTION INTRAVENOUS; SUBCUTANEOUS at 07:25

## 2025-01-01 RX ADMIN — Medication 1 APPLICATION(S): at 05:39

## 2025-01-01 RX ADMIN — PROPOFOL 16.2 MICROGRAM(S)/KG/MIN: 10 INJECTION, EMULSION INTRAVENOUS at 19:20

## 2025-01-01 RX ADMIN — Medication 1 MG/HR: at 19:47

## 2025-01-01 RX ADMIN — GLYCOPYRROLATE 0.4 MILLIGRAM(S): 0.2 INJECTION INTRAMUSCULAR; INTRAVENOUS at 00:10

## 2025-01-01 RX ADMIN — Medication 25 MICROGRAM(S): at 08:02

## 2025-01-01 RX ADMIN — Medication 1000 MILLIGRAM(S): at 22:58

## 2025-01-01 RX ADMIN — Medication 40 MILLIGRAM(S): at 11:08

## 2025-01-01 RX ADMIN — OLANZAPINE 5 MILLIGRAM(S): 10 TABLET ORAL at 11:13

## 2025-01-01 RX ADMIN — Medication 25 MICROGRAM(S): at 21:39

## 2025-01-01 RX ADMIN — CALCIUM GLUCONATE 200 GRAM(S): 20 INJECTION, SOLUTION INTRAVENOUS at 11:54

## 2025-01-01 RX ADMIN — NOREPINEPHRINE BITARTRATE 8.44 MICROGRAM(S)/KG/MIN: 8 SOLUTION at 07:58

## 2025-01-01 RX ADMIN — Medication 40 MILLIEQUIVALENT(S): at 12:34

## 2025-01-01 RX ADMIN — Medication 0.5 MILLIGRAM(S): at 22:43

## 2025-01-01 RX ADMIN — AMIODARONE HYDROCHLORIDE 33.3 MG/MIN: 50 INJECTION, SOLUTION INTRAVENOUS at 08:00

## 2025-01-01 RX ADMIN — BUMETANIDE 10 MG/HR: 1 TABLET ORAL at 06:11

## 2025-01-01 RX ADMIN — AMIODARONE HYDROCHLORIDE 400 MILLIGRAM(S): 50 INJECTION, SOLUTION INTRAVENOUS at 13:40

## 2025-01-01 RX ADMIN — VASOPRESSIN 3 UNIT(S)/MIN: 20 INJECTION INTRAVENOUS at 07:58

## 2025-01-01 RX ADMIN — Medication 40 MILLIGRAM(S): at 05:38

## 2025-01-01 RX ADMIN — ALBUMIN (HUMAN) 500 MILLILITER(S): 12.5 INJECTION, SOLUTION INTRAVENOUS at 12:09

## 2025-01-01 RX ADMIN — Medication 15 MG/MIN: at 01:35

## 2025-01-01 RX ADMIN — GLYCOPYRROLATE 0.4 MILLIGRAM(S): 0.2 INJECTION INTRAMUSCULAR; INTRAVENOUS at 17:49

## 2025-01-01 RX ADMIN — NOREPINEPHRINE BITARTRATE 8.44 MICROGRAM(S)/KG/MIN: 8 SOLUTION at 07:54

## 2025-01-01 RX ADMIN — Medication 3 UNIT(S)/HR: at 08:50

## 2025-01-01 RX ADMIN — LIDOCAINE HYDROCHLORIDE 2 PATCH: 20 JELLY TOPICAL at 08:34

## 2025-01-01 RX ADMIN — CEFEPIME 100 MILLIGRAM(S): 2 INJECTION, POWDER, FOR SOLUTION INTRAVENOUS at 05:11

## 2025-01-01 RX ADMIN — Medication 50 MILLIEQUIVALENT(S): at 02:31

## 2025-01-01 RX ADMIN — Medication 25 GRAM(S): at 19:56

## 2025-01-01 RX ADMIN — OLANZAPINE 2.5 MILLIGRAM(S): 10 TABLET ORAL at 21:06

## 2025-01-01 RX ADMIN — Medication 0.5 MILLIGRAM(S): at 08:15

## 2025-01-01 RX ADMIN — OLANZAPINE 5 MILLIGRAM(S): 10 TABLET ORAL at 14:13

## 2025-01-01 RX ADMIN — Medication 250 MILLIGRAM(S): at 16:00

## 2025-01-01 RX ADMIN — Medication 25 GRAM(S): at 12:34

## 2025-01-01 RX ADMIN — CLOPIDOGREL BISULFATE 75 MILLIGRAM(S): 75 TABLET, FILM COATED ORAL at 11:55

## 2025-01-01 RX ADMIN — Medication 0.5 MILLIGRAM(S): at 10:04

## 2025-01-01 RX ADMIN — HEPARIN SODIUM 3 UNIT(S)/HR: 1000 INJECTION INTRAVENOUS; SUBCUTANEOUS at 08:44

## 2025-01-01 RX ADMIN — AMIODARONE HYDROCHLORIDE 400 MILLIGRAM(S): 50 INJECTION, SOLUTION INTRAVENOUS at 05:28

## 2025-01-01 RX ADMIN — Medication 0.5 MILLIGRAM(S): at 09:47

## 2025-01-01 RX ADMIN — GLYCOPYRROLATE 0.4 MILLIGRAM(S): 0.2 INJECTION INTRAMUSCULAR; INTRAVENOUS at 01:05

## 2025-01-01 RX ADMIN — Medication 3 MILLILITER(S): at 17:22

## 2025-01-01 RX ADMIN — Medication 40 MILLIEQUIVALENT(S): at 18:21

## 2025-01-01 RX ADMIN — Medication 1 MG/HR: at 07:07

## 2025-01-01 RX ADMIN — Medication 25 MICROGRAM(S): at 07:45

## 2025-01-01 RX ADMIN — BUDESONIDE 0.5 MILLIGRAM(S): 0.25 SUSPENSION RESPIRATORY (INHALATION) at 23:08

## 2025-01-01 RX ADMIN — Medication 0.5 MILLIGRAM(S): at 20:00

## 2025-01-01 RX ADMIN — Medication 3 MILLILITER(S): at 05:57

## 2025-01-01 RX ADMIN — Medication 2 TABLET(S): at 21:05

## 2025-01-01 RX ADMIN — CEFEPIME 100 MILLIGRAM(S): 2 INJECTION, POWDER, FOR SOLUTION INTRAVENOUS at 17:05

## 2025-01-01 RX ADMIN — Medication 40 MILLIGRAM(S): at 17:24

## 2025-01-01 RX ADMIN — DIGOXIN 250 MICROGRAM(S): 250 TABLET ORAL at 15:41

## 2025-01-01 RX ADMIN — Medication 50 MILLIEQUIVALENT(S): at 20:58

## 2025-01-01 RX ADMIN — MIDODRINE HYDROCHLORIDE 10 MILLIGRAM(S): 5 TABLET ORAL at 11:56

## 2025-01-01 RX ADMIN — AMIODARONE HYDROCHLORIDE 400 MILLIGRAM(S): 50 INJECTION, SOLUTION INTRAVENOUS at 15:22

## 2025-01-01 RX ADMIN — Medication 1 MILLIGRAM(S): at 03:20

## 2025-01-01 RX ADMIN — OLANZAPINE 2.5 MILLIGRAM(S): 10 TABLET ORAL at 08:27

## 2025-01-01 RX ADMIN — OLANZAPINE 5 MILLIGRAM(S): 10 TABLET ORAL at 21:07

## 2025-01-01 RX ADMIN — Medication 3 UNIT(S)/HR: at 20:44

## 2025-01-01 RX ADMIN — Medication 2 MILLIGRAM(S): at 18:30

## 2025-01-01 RX ADMIN — DEXMEDETOMIDINE HYDROCHLORIDE IN SODIUM CHLORIDE 11.3 MICROGRAM(S)/KG/HR: 4 INJECTION INTRAVENOUS at 07:58

## 2025-01-01 RX ADMIN — Medication 50 MILLIEQUIVALENT(S): at 01:09

## 2025-01-01 RX ADMIN — CALCIUM CHLORIDE 1000 MILLIGRAM(S): 100 INJECTION, SOLUTION INTRAVENOUS at 20:44

## 2025-01-01 RX ADMIN — HEPARIN SODIUM 0.5 UNIT(S)/HR: 1000 INJECTION INTRAVENOUS; SUBCUTANEOUS at 19:19

## 2025-01-01 RX ADMIN — IPRATROPIUM BROMIDE AND ALBUTEROL SULFATE 3 MILLILITER(S): .5; 2.5 SOLUTION RESPIRATORY (INHALATION) at 05:03

## 2025-01-01 RX ADMIN — HEPARIN SODIUM 10 UNIT(S)/HR: 1000 INJECTION INTRAVENOUS; SUBCUTANEOUS at 18:12

## 2025-01-01 RX ADMIN — VASOPRESSIN 3 UNIT(S)/MIN: 20 INJECTION INTRAVENOUS at 01:35

## 2025-01-01 RX ADMIN — DEXMEDETOMIDINE HYDROCHLORIDE IN SODIUM CHLORIDE 11.3 MICROGRAM(S)/KG/HR: 4 INJECTION INTRAVENOUS at 07:07

## 2025-01-01 RX ADMIN — Medication 40 MILLIGRAM(S): at 05:07

## 2025-01-01 RX ADMIN — HEPARIN SODIUM 10 UNIT(S): 1000 INJECTION INTRAVENOUS; SUBCUTANEOUS at 07:36

## 2025-01-01 RX ADMIN — SCOPOLAMINE 1 PATCH: 1 PATCH, EXTENDED RELEASE TRANSDERMAL at 07:43

## 2025-01-01 RX ADMIN — Medication 10 MILLILITER(S): at 08:13

## 2025-01-01 RX ADMIN — MUPIROCIN CALCIUM 1 APPLICATION(S): 20 CREAM TOPICAL at 05:07

## 2025-01-01 RX ADMIN — AMIODARONE HYDROCHLORIDE 400 MILLIGRAM(S): 50 INJECTION, SOLUTION INTRAVENOUS at 21:05

## 2025-01-01 RX ADMIN — Medication 15 MILLILITER(S): at 05:38

## 2025-01-01 RX ADMIN — MUPIROCIN CALCIUM 1 APPLICATION(S): 20 CREAM TOPICAL at 05:20

## 2025-01-01 RX ADMIN — CANGRELOR 6.75 MICROGRAM(S)/KG/MIN: 50 INJECTION, POWDER, LYOPHILIZED, FOR SOLUTION INTRAVENOUS at 20:55

## 2025-01-01 RX ADMIN — Medication 2 TABLET(S): at 21:01

## 2025-01-01 RX ADMIN — GLYCOPYRROLATE 0.4 MILLIGRAM(S): 0.2 INJECTION INTRAMUSCULAR; INTRAVENOUS at 11:11

## 2025-01-01 RX ADMIN — DEXMEDETOMIDINE HYDROCHLORIDE IN SODIUM CHLORIDE 4.5 MICROGRAM(S)/KG/HR: 4 INJECTION INTRAVENOUS at 19:21

## 2025-01-01 RX ADMIN — CALCIUM GLUCONATE 200 GRAM(S): 20 INJECTION, SOLUTION INTRAVENOUS at 18:13

## 2025-01-01 RX ADMIN — MUPIROCIN CALCIUM 1 APPLICATION(S): 20 CREAM TOPICAL at 18:15

## 2025-01-01 RX ADMIN — Medication 1 MILLIGRAM(S): at 14:00

## 2025-01-01 RX ADMIN — NOREPINEPHRINE BITARTRATE 8.44 MICROGRAM(S)/KG/MIN: 8 SOLUTION at 18:11

## 2025-01-01 RX ADMIN — DEXMEDETOMIDINE HYDROCHLORIDE IN SODIUM CHLORIDE 11.3 MICROGRAM(S)/KG/HR: 4 INJECTION INTRAVENOUS at 01:36

## 2025-01-01 RX ADMIN — LIDOCAINE HYDROCHLORIDE 2 PATCH: 20 JELLY TOPICAL at 23:02

## 2025-01-01 RX ADMIN — NOREPINEPHRINE BITARTRATE 8.44 MICROGRAM(S)/KG/MIN: 8 SOLUTION at 08:44

## 2025-01-01 RX ADMIN — OLANZAPINE 2.5 MILLIGRAM(S): 10 TABLET ORAL at 21:11

## 2025-01-01 RX ADMIN — DEXMEDETOMIDINE HYDROCHLORIDE IN SODIUM CHLORIDE 11.3 MICROGRAM(S)/KG/HR: 4 INJECTION INTRAVENOUS at 18:10

## 2025-01-01 RX ADMIN — BUMETANIDE 20 MG/HR: 1 TABLET ORAL at 07:34

## 2025-01-01 RX ADMIN — MIDODRINE HYDROCHLORIDE 10 MILLIGRAM(S): 5 TABLET ORAL at 14:59

## 2025-01-01 RX ADMIN — POLYETHYLENE GLYCOL 3350 17 GRAM(S): 17 POWDER, FOR SOLUTION ORAL at 17:15

## 2025-01-01 RX ADMIN — CEFEPIME 100 MILLIGRAM(S): 2 INJECTION, POWDER, FOR SOLUTION INTRAVENOUS at 05:12

## 2025-01-01 RX ADMIN — IPRATROPIUM BROMIDE AND ALBUTEROL SULFATE 3 MILLILITER(S): .5; 2.5 SOLUTION RESPIRATORY (INHALATION) at 17:31

## 2025-01-01 RX ADMIN — Medication 0.5 MILLIGRAM(S): at 20:15

## 2025-01-01 RX ADMIN — Medication 50 MILLIEQUIVALENT(S): at 00:52

## 2025-01-01 RX ADMIN — PROPOFOL 16.2 MICROGRAM(S)/KG/MIN: 10 INJECTION, EMULSION INTRAVENOUS at 19:53

## 2025-01-01 RX ADMIN — Medication 50 MICROGRAM(S): at 17:43

## 2025-01-01 RX ADMIN — Medication 40 MILLIGRAM(S): at 05:01

## 2025-01-01 RX ADMIN — Medication 0.5 MILLIGRAM(S): at 08:27

## 2025-01-01 RX ADMIN — OLANZAPINE 2.5 MILLIGRAM(S): 10 TABLET ORAL at 09:00

## 2025-01-01 RX ADMIN — VASOPRESSIN 3 UNIT(S)/MIN: 20 INJECTION INTRAVENOUS at 19:15

## 2025-01-01 RX ADMIN — Medication 0.5 MILLIGRAM(S): at 12:55

## 2025-01-01 RX ADMIN — Medication 400 MILLIGRAM(S): at 16:43

## 2025-01-01 RX ADMIN — HEPARIN SODIUM 4 UNIT(S)/HR: 1000 INJECTION INTRAVENOUS; SUBCUTANEOUS at 19:16

## 2025-01-01 RX ADMIN — CEFEPIME 100 MILLIGRAM(S): 2 INJECTION, POWDER, FOR SOLUTION INTRAVENOUS at 18:39

## 2025-01-01 RX ADMIN — Medication 0.5 MILLIGRAM(S): at 09:45

## 2025-01-01 RX ADMIN — DEXMEDETOMIDINE HYDROCHLORIDE IN SODIUM CHLORIDE 11.3 MICROGRAM(S)/KG/HR: 4 INJECTION INTRAVENOUS at 07:54

## 2025-01-01 RX ADMIN — Medication 3 MILLILITER(S): at 05:14

## 2025-01-01 RX ADMIN — Medication 1 MILLIGRAM(S): at 18:15

## 2025-01-01 RX ADMIN — Medication 10 MILLILITER(S): at 07:58

## 2025-01-01 RX ADMIN — HEPARIN SODIUM 10 UNIT(S): 1000 INJECTION INTRAVENOUS; SUBCUTANEOUS at 01:04

## 2025-01-01 RX ADMIN — HEPARIN SODIUM 3 UNIT(S)/HR: 1000 INJECTION INTRAVENOUS; SUBCUTANEOUS at 14:02

## 2025-01-01 RX ADMIN — Medication 81 MILLIGRAM(S): at 11:54

## 2025-01-01 RX ADMIN — ALBUMIN (HUMAN) 50 MILLILITER(S): 12.5 INJECTION, SOLUTION INTRAVENOUS at 06:02

## 2025-01-01 RX ADMIN — Medication 10 MILLILITER(S): at 07:33

## 2025-01-01 RX ADMIN — Medication 1 MILLIGRAM(S): at 14:15

## 2025-01-01 RX ADMIN — AMIODARONE HYDROCHLORIDE 400 MILLIGRAM(S): 50 INJECTION, SOLUTION INTRAVENOUS at 05:01

## 2025-01-01 RX ADMIN — IPRATROPIUM BROMIDE AND ALBUTEROL SULFATE 3 MILLILITER(S): .5; 2.5 SOLUTION RESPIRATORY (INHALATION) at 23:41

## 2025-01-01 RX ADMIN — Medication 0.5 MILLIGRAM(S): at 00:09

## 2025-01-01 RX ADMIN — IPRATROPIUM BROMIDE AND ALBUTEROL SULFATE 3 MILLILITER(S): .5; 2.5 SOLUTION RESPIRATORY (INHALATION) at 05:23

## 2025-01-01 RX ADMIN — GLYCOPYRROLATE 0.4 MILLIGRAM(S): 0.2 INJECTION INTRAMUSCULAR; INTRAVENOUS at 06:09

## 2025-01-01 RX ADMIN — BUMETANIDE 10 MG/HR: 1 TABLET ORAL at 19:19

## 2025-01-01 RX ADMIN — DEXMEDETOMIDINE HYDROCHLORIDE IN SODIUM CHLORIDE 11.3 MICROGRAM(S)/KG/HR: 4 INJECTION INTRAVENOUS at 19:49

## 2025-01-01 RX ADMIN — Medication 25 GRAM(S): at 20:55

## 2025-01-01 RX ADMIN — Medication 1 MILLIGRAM(S): at 03:35

## 2025-01-01 RX ADMIN — Medication 3 MILLILITER(S): at 17:34

## 2025-01-01 RX ADMIN — Medication 1 APPLICATION(S): at 05:36

## 2025-01-01 RX ADMIN — HEPARIN SODIUM 10 UNIT(S): 1000 INJECTION INTRAVENOUS; SUBCUTANEOUS at 18:13

## 2025-01-01 RX ADMIN — Medication 400 MILLIGRAM(S): at 12:00

## 2025-01-01 RX ADMIN — Medication 2 MILLIGRAM(S): at 03:00

## 2025-01-01 RX ADMIN — MUPIROCIN CALCIUM 1 APPLICATION(S): 20 CREAM TOPICAL at 17:16

## 2025-01-01 RX ADMIN — HEPARIN SODIUM 3 UNIT(S)/HR: 1000 INJECTION INTRAVENOUS; SUBCUTANEOUS at 09:21

## 2025-01-01 RX ADMIN — SCOPOLAMINE 1 PATCH: 1 PATCH, EXTENDED RELEASE TRANSDERMAL at 19:00

## 2025-01-01 RX ADMIN — Medication 1 MILLIGRAM(S): at 05:29

## 2025-01-01 RX ADMIN — CEFEPIME 100 MILLIGRAM(S): 2 INJECTION, POWDER, FOR SOLUTION INTRAVENOUS at 17:41

## 2025-01-01 RX ADMIN — Medication 50 MILLIEQUIVALENT(S): at 01:53

## 2025-01-01 RX ADMIN — Medication 50 MILLIEQUIVALENT(S): at 19:42

## 2025-01-01 RX ADMIN — CANGRELOR 2.7 MICROGRAM(S)/KG/MIN: 50 INJECTION, POWDER, LYOPHILIZED, FOR SOLUTION INTRAVENOUS at 07:35

## 2025-01-01 RX ADMIN — CALCIUM GLUCONATE 200 GRAM(S): 20 INJECTION, SOLUTION INTRAVENOUS at 05:59

## 2025-01-01 RX ADMIN — HEPARIN SODIUM 10 UNIT(S): 1000 INJECTION INTRAVENOUS; SUBCUTANEOUS at 19:22

## 2025-01-01 RX ADMIN — PROPOFOL 16.2 MICROGRAM(S)/KG/MIN: 10 INJECTION, EMULSION INTRAVENOUS at 08:45

## 2025-01-01 RX ADMIN — Medication 40 MILLIEQUIVALENT(S): at 15:53

## 2025-01-01 RX ADMIN — Medication 40 MILLIGRAM(S): at 05:10

## 2025-01-01 RX ADMIN — VASOPRESSIN 3 UNIT(S)/MIN: 20 INJECTION INTRAVENOUS at 19:49

## 2025-01-01 RX ADMIN — CLOPIDOGREL BISULFATE 75 MILLIGRAM(S): 75 TABLET, FILM COATED ORAL at 12:36

## 2025-01-01 RX ADMIN — Medication 25 MICROGRAM(S): at 20:00

## 2025-01-01 RX ADMIN — NOREPINEPHRINE BITARTRATE 8.44 MICROGRAM(S)/KG/MIN: 8 SOLUTION at 19:15

## 2025-01-01 RX ADMIN — Medication 100 MILLIGRAM(S): at 20:58

## 2025-01-01 RX ADMIN — Medication 1 MG/HR: at 00:26

## 2025-01-01 RX ADMIN — BUMETANIDE 10 MG/HR: 1 TABLET ORAL at 08:12

## 2025-01-01 RX ADMIN — POLYETHYLENE GLYCOL 3350 17 GRAM(S): 17 POWDER, FOR SOLUTION ORAL at 11:09

## 2025-01-01 RX ADMIN — Medication 2 MILLIGRAM(S): at 07:00

## 2025-01-01 RX ADMIN — NOREPINEPHRINE BITARTRATE 8.44 MICROGRAM(S)/KG/MIN: 8 SOLUTION at 07:34

## 2025-01-01 RX ADMIN — POLYETHYLENE GLYCOL 3350 17 GRAM(S): 17 POWDER, FOR SOLUTION ORAL at 11:29

## 2025-01-01 RX ADMIN — Medication 0.5 MILLIGRAM(S): at 12:00

## 2025-01-01 RX ADMIN — CEFEPIME 100 MILLIGRAM(S): 2 INJECTION, POWDER, FOR SOLUTION INTRAVENOUS at 05:38

## 2025-01-01 RX ADMIN — Medication 20 MILLIEQUIVALENT(S): at 05:37

## 2025-01-01 RX ADMIN — SCOPOLAMINE 1 PATCH: 1 PATCH, EXTENDED RELEASE TRANSDERMAL at 18:06

## 2025-01-01 RX ADMIN — SODIUM CHLORIDE 250 MILLILITER(S): 9 INJECTION, SOLUTION INTRAVENOUS at 14:14

## 2025-01-01 RX ADMIN — Medication 2 MILLIGRAM(S): at 21:00

## 2025-01-01 RX ADMIN — Medication 2 MILLIGRAM(S): at 20:45

## 2025-01-01 RX ADMIN — Medication 20 MILLIGRAM(S): at 12:41

## 2025-01-01 RX ADMIN — HEPARIN SODIUM 10 UNIT(S)/HR: 1000 INJECTION INTRAVENOUS; SUBCUTANEOUS at 11:59

## 2025-01-01 RX ADMIN — GLYCOPYRROLATE 0.4 MILLIGRAM(S): 0.2 INJECTION INTRAMUSCULAR; INTRAVENOUS at 13:02

## 2025-01-01 RX ADMIN — Medication 25 GRAM(S): at 05:11

## 2025-01-01 RX ADMIN — POLYETHYLENE GLYCOL 3350 17 GRAM(S): 17 POWDER, FOR SOLUTION ORAL at 15:22

## 2025-01-01 RX ADMIN — DEXMEDETOMIDINE HYDROCHLORIDE IN SODIUM CHLORIDE 11.3 MICROGRAM(S)/KG/HR: 4 INJECTION INTRAVENOUS at 04:41

## 2025-01-01 RX ADMIN — Medication 25 GRAM(S): at 17:04

## 2025-01-01 RX ADMIN — Medication 10 MILLILITER(S): at 19:50

## 2025-01-01 RX ADMIN — Medication 4 MILLILITER(S): at 05:03

## 2025-01-01 RX ADMIN — Medication 50 MICROGRAM(S): at 19:45

## 2025-01-01 RX ADMIN — Medication 3 UNIT(S)/HR: at 14:03

## 2025-01-01 RX ADMIN — DEXMEDETOMIDINE HYDROCHLORIDE IN SODIUM CHLORIDE 6.75 MICROGRAM(S)/KG/HR: 4 INJECTION INTRAVENOUS at 07:59

## 2025-01-01 RX ADMIN — Medication 40 MILLIGRAM(S): at 12:37

## 2025-01-01 RX ADMIN — Medication 1 SPRAY(S): at 12:17

## 2025-01-01 RX ADMIN — HEPARIN SODIUM 10 UNIT(S): 1000 INJECTION INTRAVENOUS; SUBCUTANEOUS at 19:16

## 2025-01-01 RX ADMIN — Medication 50 MILLIEQUIVALENT(S): at 00:35

## 2025-01-01 RX ADMIN — Medication 0.5 MILLIGRAM(S): at 08:30

## 2025-01-01 RX ADMIN — Medication 0.5 MILLIGRAM(S): at 04:20

## 2025-01-01 RX ADMIN — HEPARIN SODIUM 10 UNIT(S): 1000 INJECTION INTRAVENOUS; SUBCUTANEOUS at 08:50

## 2025-01-01 RX ADMIN — Medication 2 MILLIGRAM(S): at 18:15

## 2025-01-01 RX ADMIN — Medication 15 MILLILITER(S): at 17:24

## 2025-01-01 RX ADMIN — Medication 3 UNIT(S)/HR: at 20:56

## 2025-01-01 RX ADMIN — MUPIROCIN CALCIUM 1 APPLICATION(S): 20 CREAM TOPICAL at 17:05

## 2025-01-01 RX ADMIN — MUPIROCIN CALCIUM 1 APPLICATION(S): 20 CREAM TOPICAL at 17:15

## 2025-01-01 RX ADMIN — CANGRELOR 10.8 MICROGRAM(S)/KG/MIN: 50 INJECTION, POWDER, LYOPHILIZED, FOR SOLUTION INTRAVENOUS at 07:58

## 2025-01-01 RX ADMIN — Medication 3 MILLILITER(S): at 18:02

## 2025-01-01 RX ADMIN — Medication 1 MG/HR: at 21:58

## 2025-01-01 RX ADMIN — Medication 1 APPLICATION(S): at 05:19

## 2025-01-01 RX ADMIN — IPRATROPIUM BROMIDE AND ALBUTEROL SULFATE 3 MILLILITER(S): .5; 2.5 SOLUTION RESPIRATORY (INHALATION) at 17:52

## 2025-01-01 RX ADMIN — AMIODARONE HYDROCHLORIDE 600 MILLIGRAM(S): 50 INJECTION, SOLUTION INTRAVENOUS at 18:12

## 2025-01-01 RX ADMIN — Medication 3 MILLILITER(S): at 17:32

## 2025-01-01 RX ADMIN — CEFEPIME 100 MILLIGRAM(S): 2 INJECTION, POWDER, FOR SOLUTION INTRAVENOUS at 18:14

## 2025-01-01 RX ADMIN — BUMETANIDE 10 MG/HR: 1 TABLET ORAL at 08:52

## 2025-01-01 RX ADMIN — HEPARIN SODIUM 3 UNIT(S)/HR: 1000 INJECTION INTRAVENOUS; SUBCUTANEOUS at 07:58

## 2025-01-01 RX ADMIN — Medication 1 MILLIGRAM(S): at 16:45

## 2025-01-01 RX ADMIN — HEPARIN SODIUM 4 UNIT(S)/HR: 1000 INJECTION INTRAVENOUS; SUBCUTANEOUS at 19:41

## 2025-01-01 RX ADMIN — AMIODARONE HYDROCHLORIDE 400 MILLIGRAM(S): 50 INJECTION, SOLUTION INTRAVENOUS at 21:10

## 2025-01-01 RX ADMIN — Medication 2 TABLET(S): at 21:09

## 2025-01-01 RX ADMIN — CANGRELOR 20.3 MICROGRAM(S)/KG/MIN: 50 INJECTION, POWDER, LYOPHILIZED, FOR SOLUTION INTRAVENOUS at 12:09

## 2025-01-01 RX ADMIN — Medication 0.5 MILLIGRAM(S): at 01:46

## 2025-01-01 RX ADMIN — Medication 50 MILLIEQUIVALENT(S): at 20:29

## 2025-01-01 RX ADMIN — AMIODARONE HYDROCHLORIDE 16.7 MG/MIN: 50 INJECTION, SOLUTION INTRAVENOUS at 01:58

## 2025-01-01 RX ADMIN — Medication 25 GRAM(S): at 03:16

## 2025-01-01 RX ADMIN — Medication 40 MILLIGRAM(S): at 21:16

## 2025-01-01 RX ADMIN — Medication 3 UNIT(S)/HR: at 05:26

## 2025-01-01 RX ADMIN — CANGRELOR 2.7 MICROGRAM(S)/KG/MIN: 50 INJECTION, POWDER, LYOPHILIZED, FOR SOLUTION INTRAVENOUS at 04:40

## 2025-01-01 RX ADMIN — IPRATROPIUM BROMIDE AND ALBUTEROL SULFATE 3 MILLILITER(S): .5; 2.5 SOLUTION RESPIRATORY (INHALATION) at 05:05

## 2025-01-01 RX ADMIN — Medication 1 APPLICATION(S): at 05:20

## 2025-01-01 RX ADMIN — DEXMEDETOMIDINE HYDROCHLORIDE IN SODIUM CHLORIDE 11.3 MICROGRAM(S)/KG/HR: 4 INJECTION INTRAVENOUS at 11:58

## 2025-01-01 RX ADMIN — AMIODARONE HYDROCHLORIDE 33.3 MG/MIN: 50 INJECTION, SOLUTION INTRAVENOUS at 06:10

## 2025-01-01 RX ADMIN — Medication 1 MILLIGRAM(S): at 22:00

## 2025-01-01 RX ADMIN — CANGRELOR 6.75 MICROGRAM(S)/KG/MIN: 50 INJECTION, POWDER, LYOPHILIZED, FOR SOLUTION INTRAVENOUS at 20:24

## 2025-01-01 RX ADMIN — Medication 50 MILLIEQUIVALENT(S): at 00:30

## 2025-01-01 RX ADMIN — NOREPINEPHRINE BITARTRATE 8.44 MICROGRAM(S)/KG/MIN: 8 SOLUTION at 20:23

## 2025-01-01 RX ADMIN — BUDESONIDE 0.5 MILLIGRAM(S): 0.25 SUSPENSION RESPIRATORY (INHALATION) at 17:51

## 2025-01-01 RX ADMIN — CLOPIDOGREL BISULFATE 75 MILLIGRAM(S): 75 TABLET, FILM COATED ORAL at 11:08

## 2025-01-01 RX ADMIN — Medication 0.5 MILLIGRAM(S): at 06:15

## 2025-01-01 RX ADMIN — Medication 16.9 MICROGRAM(S)/KG/MIN: at 08:44

## 2025-01-01 RX ADMIN — Medication 1 APPLICATION(S): at 05:17

## 2025-01-01 RX ADMIN — Medication 50 MICROGRAM(S): at 18:39

## 2025-01-01 RX ADMIN — MIDODRINE HYDROCHLORIDE 10 MILLIGRAM(S): 5 TABLET ORAL at 05:05

## 2025-01-01 RX ADMIN — OLANZAPINE 2.5 MILLIGRAM(S): 10 TABLET ORAL at 08:01

## 2025-01-01 RX ADMIN — CANGRELOR 2.7 MICROGRAM(S)/KG/MIN: 50 INJECTION, POWDER, LYOPHILIZED, FOR SOLUTION INTRAVENOUS at 19:16

## 2025-01-01 RX ADMIN — Medication 3 UNIT(S)/HR: at 01:35

## 2025-01-01 RX ADMIN — AMIODARONE HYDROCHLORIDE 600 MILLIGRAM(S): 50 INJECTION, SOLUTION INTRAVENOUS at 20:30

## 2025-01-01 RX ADMIN — CEFEPIME 100 MILLIGRAM(S): 2 INJECTION, POWDER, FOR SOLUTION INTRAVENOUS at 17:16

## 2025-01-01 RX ADMIN — Medication 3 UNIT(S)/HR: at 19:49

## 2025-01-01 RX ADMIN — MUPIROCIN CALCIUM 1 APPLICATION(S): 20 CREAM TOPICAL at 05:11

## 2025-01-01 RX ADMIN — Medication 3 MILLILITER(S): at 17:53

## 2025-01-01 RX ADMIN — IPRATROPIUM BROMIDE AND ALBUTEROL SULFATE 3 MILLILITER(S): .5; 2.5 SOLUTION RESPIRATORY (INHALATION) at 23:07

## 2025-01-01 RX ADMIN — Medication 0.5 MILLIGRAM(S): at 02:45

## 2025-01-01 RX ADMIN — HEPARIN SODIUM 10 UNIT(S): 1000 INJECTION INTRAVENOUS; SUBCUTANEOUS at 07:59

## 2025-01-01 RX ADMIN — IPRATROPIUM BROMIDE AND ALBUTEROL SULFATE 3 MILLILITER(S): .5; 2.5 SOLUTION RESPIRATORY (INHALATION) at 11:30

## 2025-01-01 RX ADMIN — Medication 3 UNIT(S)/HR: at 07:35

## 2025-01-01 RX ADMIN — Medication 10 MILLILITER(S): at 19:41

## 2025-01-01 RX ADMIN — CALCIUM GLUCONATE 200 GRAM(S): 20 INJECTION, SOLUTION INTRAVENOUS at 21:00

## 2025-01-01 RX ADMIN — Medication 3 UNIT(S)/HR: at 11:58

## 2025-01-01 RX ADMIN — Medication 1 MILLIGRAM(S): at 10:50

## 2025-01-01 RX ADMIN — DEXMEDETOMIDINE HYDROCHLORIDE IN SODIUM CHLORIDE 11.3 MICROGRAM(S)/KG/HR: 4 INJECTION INTRAVENOUS at 19:00

## 2025-01-01 RX ADMIN — Medication 40 MILLIGRAM(S): at 11:54

## 2025-01-01 RX ADMIN — DEXMEDETOMIDINE HYDROCHLORIDE IN SODIUM CHLORIDE 22.5 MICROGRAM(S)/KG/HR: 4 INJECTION INTRAVENOUS at 20:45

## 2025-01-01 RX ADMIN — ALBUMIN (HUMAN) 50 MILLILITER(S): 12.5 INJECTION, SOLUTION INTRAVENOUS at 01:39

## 2025-01-01 RX ADMIN — CANGRELOR 16.2 MICROGRAM(S)/KG/MIN: 50 INJECTION, POWDER, LYOPHILIZED, FOR SOLUTION INTRAVENOUS at 08:12

## 2025-01-01 RX ADMIN — DEXMEDETOMIDINE HYDROCHLORIDE IN SODIUM CHLORIDE 6.75 MICROGRAM(S)/KG/HR: 4 INJECTION INTRAVENOUS at 19:18

## 2025-01-01 RX ADMIN — Medication 10 UNIT(S): at 22:20

## 2025-01-01 RX ADMIN — Medication 25 MICROGRAM(S): at 15:15

## 2025-01-01 RX ADMIN — DEXMEDETOMIDINE HYDROCHLORIDE IN SODIUM CHLORIDE 6.75 MICROGRAM(S)/KG/HR: 4 INJECTION INTRAVENOUS at 07:36

## 2025-01-01 RX ADMIN — Medication 100 MILLIGRAM(S): at 05:16

## 2025-01-01 RX ADMIN — Medication 1000 MILLIGRAM(S): at 15:20

## 2025-01-01 RX ADMIN — Medication 25 MICROGRAM(S): at 08:30

## 2025-01-01 RX ADMIN — Medication 50 MICROGRAM(S): at 20:00

## 2025-01-01 RX ADMIN — Medication 1 MILLIGRAM(S): at 11:11

## 2025-01-01 RX ADMIN — Medication 1 APPLICATION(S): at 05:08

## 2025-01-01 RX ADMIN — DEXMEDETOMIDINE HYDROCHLORIDE IN SODIUM CHLORIDE 11.3 MICROGRAM(S)/KG/HR: 4 INJECTION INTRAVENOUS at 06:25

## 2025-01-01 RX ADMIN — CANGRELOR 16.2 MICROGRAM(S)/KG/MIN: 50 INJECTION, POWDER, LYOPHILIZED, FOR SOLUTION INTRAVENOUS at 02:39

## 2025-01-01 RX ADMIN — Medication 50 MILLIEQUIVALENT(S): at 07:46

## 2025-01-01 RX ADMIN — Medication 50 MILLIEQUIVALENT(S): at 21:11

## 2025-01-01 RX ADMIN — OLANZAPINE 2.5 MILLIGRAM(S): 10 TABLET ORAL at 09:08

## 2025-01-01 RX ADMIN — Medication 33.8 MICROGRAM(S)/KG/MIN: at 12:50

## 2025-01-01 RX ADMIN — ALBUMIN (HUMAN) 50 MILLILITER(S): 12.5 INJECTION, SOLUTION INTRAVENOUS at 18:13

## 2025-01-01 RX ADMIN — BUDESONIDE 0.5 MILLIGRAM(S): 0.25 SUSPENSION RESPIRATORY (INHALATION) at 05:18

## 2025-01-01 RX ADMIN — Medication 3 MILLILITER(S): at 05:17

## 2025-01-01 RX ADMIN — CEFEPIME 100 MILLIGRAM(S): 2 INJECTION, POWDER, FOR SOLUTION INTRAVENOUS at 05:30

## 2025-01-01 RX ADMIN — Medication 40 MILLIGRAM(S): at 05:16

## 2025-01-01 RX ADMIN — AMIODARONE HYDROCHLORIDE 16.7 MG/MIN: 50 INJECTION, SOLUTION INTRAVENOUS at 08:13

## 2025-01-01 RX ADMIN — Medication 1000 MILLIGRAM(S): at 22:45

## 2025-01-01 RX ADMIN — Medication 0.5 MILLIGRAM(S): at 21:00

## 2025-01-01 RX ADMIN — CEFEPIME 100 MILLIGRAM(S): 2 INJECTION, POWDER, FOR SOLUTION INTRAVENOUS at 05:05

## 2025-01-01 RX ADMIN — HEPARIN SODIUM 10 UNIT(S): 1000 INJECTION INTRAVENOUS; SUBCUTANEOUS at 07:33

## 2025-01-01 RX ADMIN — NOREPINEPHRINE BITARTRATE 8.44 MICROGRAM(S)/KG/MIN: 8 SOLUTION at 19:19

## 2025-01-01 RX ADMIN — AMIODARONE HYDROCHLORIDE 33.3 MG/MIN: 50 INJECTION, SOLUTION INTRAVENOUS at 19:55

## 2025-01-01 RX ADMIN — Medication 4 MILLILITER(S): at 17:20

## 2025-01-01 RX ADMIN — ALBUMIN (HUMAN) 125 MILLILITER(S): 12.5 INJECTION, SOLUTION INTRAVENOUS at 14:30

## 2025-01-01 RX ADMIN — Medication 25 MICROGRAM(S): at 23:15

## 2025-01-01 RX ADMIN — Medication 50 MILLIEQUIVALENT(S): at 00:36

## 2025-01-01 RX ADMIN — Medication 1 APPLICATION(S): at 06:10

## 2025-01-01 RX ADMIN — Medication 1 MG/HR: at 07:22

## 2025-01-01 RX ADMIN — IPRATROPIUM BROMIDE AND ALBUTEROL SULFATE 3 MILLILITER(S): .5; 2.5 SOLUTION RESPIRATORY (INHALATION) at 17:20

## 2025-01-01 RX ADMIN — Medication 400 MILLIGRAM(S): at 02:51

## 2025-01-01 RX ADMIN — ALBUMIN (HUMAN) 500 MILLILITER(S): 12.5 INJECTION, SOLUTION INTRAVENOUS at 16:05

## 2025-01-01 RX ADMIN — MUPIROCIN CALCIUM 1 APPLICATION(S): 20 CREAM TOPICAL at 06:04

## 2025-01-01 RX ADMIN — POLYETHYLENE GLYCOL 3350 17 GRAM(S): 17 POWDER, FOR SOLUTION ORAL at 11:16

## 2025-01-01 RX ADMIN — Medication 0.5 MILLIGRAM(S): at 03:00

## 2025-01-01 RX ADMIN — DEXMEDETOMIDINE HYDROCHLORIDE IN SODIUM CHLORIDE 4.5 MICROGRAM(S)/KG/HR: 4 INJECTION INTRAVENOUS at 08:14

## 2025-01-01 RX ADMIN — Medication 50 MICROGRAM(S): at 03:00

## 2025-01-01 RX ADMIN — Medication 81 MILLIGRAM(S): at 12:37

## 2025-01-01 RX ADMIN — DEXMEDETOMIDINE HYDROCHLORIDE IN SODIUM CHLORIDE 6.75 MICROGRAM(S)/KG/HR: 4 INJECTION INTRAVENOUS at 20:25

## 2025-01-01 RX ADMIN — BUMETANIDE 2 MILLIGRAM(S): 1 TABLET ORAL at 16:31

## 2025-01-01 RX ADMIN — Medication 200 GRAM(S): at 21:45

## 2025-01-01 RX ADMIN — Medication 3 UNIT(S)/HR: at 07:59

## 2025-01-01 RX ADMIN — IPRATROPIUM BROMIDE AND ALBUTEROL SULFATE 3 MILLILITER(S): .5; 2.5 SOLUTION RESPIRATORY (INHALATION) at 11:24

## 2025-01-01 RX ADMIN — Medication 10 MILLILITER(S): at 20:24

## 2025-01-01 RX ADMIN — Medication 100 MILLIGRAM(S): at 17:19

## 2025-01-01 RX ADMIN — Medication 400 MILLIGRAM(S): at 22:24

## 2025-01-01 RX ADMIN — Medication 0.5 MILLIGRAM(S): at 07:51

## 2025-01-01 RX ADMIN — Medication 0.5 MILLIGRAM(S): at 08:00

## 2025-01-01 RX ADMIN — DEXMEDETOMIDINE HYDROCHLORIDE IN SODIUM CHLORIDE 4.5 MICROGRAM(S)/KG/HR: 4 INJECTION INTRAVENOUS at 08:46

## 2025-01-01 RX ADMIN — Medication 10 MILLILITER(S): at 19:21

## 2025-01-01 RX ADMIN — Medication 20 MILLIEQUIVALENT(S): at 10:12

## 2025-01-01 RX ADMIN — Medication 0.5 MILLIGRAM(S): at 12:40

## 2025-01-01 RX ADMIN — NOREPINEPHRINE BITARTRATE 8.44 MICROGRAM(S)/KG/MIN: 8 SOLUTION at 21:00

## 2025-01-01 RX ADMIN — Medication 15 MILLILITER(S): at 05:16

## 2025-01-01 RX ADMIN — Medication 25 MICROGRAM(S): at 21:55

## 2025-01-01 RX ADMIN — OLANZAPINE 2.5 MILLIGRAM(S): 10 TABLET ORAL at 07:46

## 2025-01-01 RX ADMIN — Medication 50 MILLIEQUIVALENT(S): at 02:42

## 2025-01-01 RX ADMIN — OLANZAPINE 5 MILLIGRAM(S): 10 TABLET ORAL at 18:10

## 2025-01-01 RX ADMIN — Medication 1 APPLICATION(S): at 06:04

## 2025-01-01 RX ADMIN — Medication 3 UNIT(S)/HR: at 08:12

## 2025-01-01 RX ADMIN — Medication 3 UNIT(S)/HR: at 18:10

## 2025-01-01 RX ADMIN — AMIODARONE HYDROCHLORIDE 16.7 MG/MIN: 50 INJECTION, SOLUTION INTRAVENOUS at 18:03

## 2025-01-01 RX ADMIN — Medication 0.5 MILLIGRAM(S): at 06:00

## 2025-01-01 RX ADMIN — IPRATROPIUM BROMIDE AND ALBUTEROL SULFATE 3 MILLILITER(S): .5; 2.5 SOLUTION RESPIRATORY (INHALATION) at 11:38

## 2025-01-01 RX ADMIN — Medication 1 APPLICATION(S): at 21:10

## 2025-01-01 RX ADMIN — Medication 50 MILLIEQUIVALENT(S): at 02:00

## 2025-01-01 RX ADMIN — Medication 50 MILLIEQUIVALENT(S): at 09:45

## 2025-01-01 RX ADMIN — Medication 50 MILLIEQUIVALENT(S): at 20:55

## 2025-01-01 RX ADMIN — CALCIUM CHLORIDE 1000 MILLIGRAM(S): 100 INJECTION, SOLUTION INTRAVENOUS at 19:53

## 2025-01-01 RX ADMIN — HEPARIN SODIUM 4 UNIT(S)/HR: 1000 INJECTION INTRAVENOUS; SUBCUTANEOUS at 20:53

## 2025-01-01 RX ADMIN — Medication 2 TABLET(S): at 21:57

## 2025-01-01 RX ADMIN — BUMETANIDE 20 MG/HR: 1 TABLET ORAL at 20:55

## 2025-01-01 RX ADMIN — Medication 1000 MILLIGRAM(S): at 17:31

## 2025-01-01 RX ADMIN — MUPIROCIN CALCIUM 1 APPLICATION(S): 20 CREAM TOPICAL at 05:38

## 2025-01-01 RX ADMIN — OLANZAPINE 2.5 MILLIGRAM(S): 10 TABLET ORAL at 17:23

## 2025-01-01 RX ADMIN — NOREPINEPHRINE BITARTRATE 8.44 MICROGRAM(S)/KG/MIN: 8 SOLUTION at 20:55

## 2025-01-01 RX ADMIN — CALCIUM GLUCONATE 100 GRAM(S): 20 INJECTION, SOLUTION INTRAVENOUS at 06:21

## 2025-01-01 RX ADMIN — BUMETANIDE 2 MILLIGRAM(S): 1 TABLET ORAL at 04:47

## 2025-01-01 RX ADMIN — Medication 50 MILLIEQUIVALENT(S): at 05:53

## 2025-01-01 RX ADMIN — VASOPRESSIN 3 UNIT(S)/MIN: 20 INJECTION INTRAVENOUS at 11:59

## 2025-01-01 RX ADMIN — Medication 50 MILLIEQUIVALENT(S): at 01:00

## 2025-01-01 RX ADMIN — AMIODARONE HYDROCHLORIDE 33.3 MG/MIN: 50 INJECTION, SOLUTION INTRAVENOUS at 19:20

## 2025-01-01 RX ADMIN — Medication 400 MILLIGRAM(S): at 15:04

## 2025-06-29 ENCOUNTER — EMERGENCY (EMERGENCY)
Facility: HOSPITAL | Age: 70
LOS: 0 days | Discharge: HOME | End: 2025-06-30
Attending: STUDENT IN AN ORGANIZED HEALTH CARE EDUCATION/TRAINING PROGRAM
Payer: MEDICARE

## 2025-06-29 VITALS
RESPIRATION RATE: 17 BRPM | SYSTOLIC BLOOD PRESSURE: 119 MMHG | WEIGHT: 199.96 LBS | TEMPERATURE: 98 F | OXYGEN SATURATION: 96 % | HEART RATE: 65 BPM | HEIGHT: 66 IN | DIASTOLIC BLOOD PRESSURE: 87 MMHG

## 2025-06-29 DIAGNOSIS — R11.2 NAUSEA WITH VOMITING, UNSPECIFIED: ICD-10-CM

## 2025-06-29 DIAGNOSIS — I10 ESSENTIAL (PRIMARY) HYPERTENSION: ICD-10-CM

## 2025-06-29 DIAGNOSIS — E87.6 HYPOKALEMIA: ICD-10-CM

## 2025-06-29 DIAGNOSIS — R07.89 OTHER CHEST PAIN: ICD-10-CM

## 2025-06-29 DIAGNOSIS — Z98.89 OTHER SPECIFIED POSTPROCEDURAL STATES: Chronic | ICD-10-CM

## 2025-06-29 DIAGNOSIS — E11.9 TYPE 2 DIABETES MELLITUS WITHOUT COMPLICATIONS: ICD-10-CM

## 2025-06-29 DIAGNOSIS — R94.4 ABNORMAL RESULTS OF KIDNEY FUNCTION STUDIES: ICD-10-CM

## 2025-06-29 DIAGNOSIS — I25.10 ATHEROSCLEROTIC HEART DISEASE OF NATIVE CORONARY ARTERY WITHOUT ANGINA PECTORIS: ICD-10-CM

## 2025-06-29 DIAGNOSIS — Z95.5 PRESENCE OF CORONARY ANGIOPLASTY IMPLANT AND GRAFT: ICD-10-CM

## 2025-06-29 LAB
ALBUMIN SERPL ELPH-MCNC: 3.8 G/DL — SIGNIFICANT CHANGE UP (ref 3.3–5)
ALP SERPL-CCNC: 118 U/L — SIGNIFICANT CHANGE UP (ref 40–120)
ALT FLD-CCNC: 40 U/L — SIGNIFICANT CHANGE UP (ref 12–78)
ANION GAP SERPL CALC-SCNC: 8 MMOL/L — SIGNIFICANT CHANGE UP (ref 5–17)
AST SERPL-CCNC: 33 U/L — SIGNIFICANT CHANGE UP (ref 15–37)
BASOPHILS # BLD AUTO: 0.06 K/UL — SIGNIFICANT CHANGE UP (ref 0–0.2)
BASOPHILS NFR BLD AUTO: 0.5 % — SIGNIFICANT CHANGE UP (ref 0–2)
BILIRUB SERPL-MCNC: 0.6 MG/DL — SIGNIFICANT CHANGE UP (ref 0.2–1.2)
BUN SERPL-MCNC: 22 MG/DL — SIGNIFICANT CHANGE UP (ref 7–23)
CALCIUM SERPL-MCNC: 8.7 MG/DL — SIGNIFICANT CHANGE UP (ref 8.5–10.1)
CHLORIDE SERPL-SCNC: 105 MMOL/L — SIGNIFICANT CHANGE UP (ref 96–108)
CO2 SERPL-SCNC: 25 MMOL/L — SIGNIFICANT CHANGE UP (ref 22–31)
CREAT SERPL-MCNC: 1.48 MG/DL — HIGH (ref 0.5–1.3)
EGFR: 50 ML/MIN/1.73M2 — LOW
EGFR: 50 ML/MIN/1.73M2 — LOW
EOSINOPHIL # BLD AUTO: 0.12 K/UL — SIGNIFICANT CHANGE UP (ref 0–0.5)
EOSINOPHIL NFR BLD AUTO: 0.9 % — SIGNIFICANT CHANGE UP (ref 0–6)
GLUCOSE SERPL-MCNC: 321 MG/DL — HIGH (ref 70–99)
HCT VFR BLD CALC: 42.7 % — SIGNIFICANT CHANGE UP (ref 39–50)
HGB BLD-MCNC: 14.8 G/DL — SIGNIFICANT CHANGE UP (ref 13–17)
IMM GRANULOCYTES NFR BLD AUTO: 0.6 % — SIGNIFICANT CHANGE UP (ref 0–0.9)
LIDOCAIN IGE QN: 49 U/L — SIGNIFICANT CHANGE UP (ref 13–75)
LYMPHOCYTES # BLD AUTO: 28.7 % — SIGNIFICANT CHANGE UP (ref 13–44)
LYMPHOCYTES # BLD AUTO: 3.72 K/UL — HIGH (ref 1–3.3)
MCHC RBC-ENTMCNC: 30.5 PG — SIGNIFICANT CHANGE UP (ref 27–34)
MCHC RBC-ENTMCNC: 34.7 G/DL — SIGNIFICANT CHANGE UP (ref 32–36)
MCV RBC AUTO: 87.9 FL — SIGNIFICANT CHANGE UP (ref 80–100)
MONOCYTES # BLD AUTO: 1.08 K/UL — HIGH (ref 0–0.9)
MONOCYTES NFR BLD AUTO: 8.3 % — SIGNIFICANT CHANGE UP (ref 2–14)
NEUTROPHILS # BLD AUTO: 7.88 K/UL — HIGH (ref 1.8–7.4)
NEUTROPHILS NFR BLD AUTO: 61 % — SIGNIFICANT CHANGE UP (ref 43–77)
NRBC BLD AUTO-RTO: 0 /100 WBCS — SIGNIFICANT CHANGE UP (ref 0–0)
PLATELET # BLD AUTO: 204 K/UL — SIGNIFICANT CHANGE UP (ref 150–400)
POTASSIUM SERPL-MCNC: 3.3 MMOL/L — LOW (ref 3.5–5.3)
POTASSIUM SERPL-SCNC: 3.3 MMOL/L — LOW (ref 3.5–5.3)
PROT SERPL-MCNC: 7.4 GM/DL — SIGNIFICANT CHANGE UP (ref 6–8.3)
RBC # BLD: 4.86 M/UL — SIGNIFICANT CHANGE UP (ref 4.2–5.8)
RBC # FLD: 13.8 % — SIGNIFICANT CHANGE UP (ref 10.3–14.5)
SODIUM SERPL-SCNC: 138 MMOL/L — SIGNIFICANT CHANGE UP (ref 135–145)
TROPONIN I, HIGH SENSITIVITY RESULT: 19.8 NG/L — SIGNIFICANT CHANGE UP
WBC # BLD: 12.94 K/UL — HIGH (ref 3.8–10.5)
WBC # FLD AUTO: 12.94 K/UL — HIGH (ref 3.8–10.5)

## 2025-06-29 PROCEDURE — 93010 ELECTROCARDIOGRAM REPORT: CPT

## 2025-06-29 PROCEDURE — 99285 EMERGENCY DEPT VISIT HI MDM: CPT | Mod: 25

## 2025-06-29 RX ORDER — ACETAMINOPHEN 500 MG/5ML
1000 LIQUID (ML) ORAL ONCE
Refills: 0 | Status: COMPLETED | OUTPATIENT
Start: 2025-06-29 | End: 2025-06-29

## 2025-06-29 RX ORDER — ONDANSETRON HCL/PF 4 MG/2 ML
4 VIAL (ML) INJECTION ONCE
Refills: 0 | Status: COMPLETED | OUTPATIENT
Start: 2025-06-29 | End: 2025-06-29

## 2025-06-29 RX ADMIN — Medication 400 MILLIGRAM(S): at 23:19

## 2025-06-29 RX ADMIN — Medication 4 MILLIGRAM(S): at 23:20

## 2025-06-29 NOTE — ED ADULT NURSE NOTE - NSFALLHARMRISKINTERV_ED_ALL_ED

## 2025-06-29 NOTE — ED ADULT NURSE NOTE - OBJECTIVE STATEMENT
AAOx3 pt presents to ED c/o sternal chest pain radiating to back of neck approx 1 PTA. Pt vomited. Pt denies SOB, dizziness. Respirations spontaneous and unlabored, Pt pale and diaphoretic. PMH: HTN, DM, Cardiac stent

## 2025-06-29 NOTE — ED ADULT TRIAGE NOTE - CHIEF COMPLAINT QUOTE
hx of DM PT reports mid chest pain r/t to neck x 1 hr  pt diphtheric  and pale. He noted  gave self 20 units of insulin 1 hr. hx of DM PT reports mid chest pain r/t to neck x 1 hr  pt diaphoretic and pale. He noted  gave self 20 units of insulin 1 hr.

## 2025-06-29 NOTE — ED ADULT NURSE NOTE - CHIEF COMPLAINT QUOTE
hx of DM PT reports mid chest pain r/t to neck x 1 hr  pt diaphoretic and pale. He noted  gave self 20 units of insulin 1 hr.

## 2025-06-30 VITALS
SYSTOLIC BLOOD PRESSURE: 121 MMHG | DIASTOLIC BLOOD PRESSURE: 70 MMHG | HEART RATE: 69 BPM | OXYGEN SATURATION: 97 % | TEMPERATURE: 98 F | RESPIRATION RATE: 19 BRPM

## 2025-06-30 PROCEDURE — 71275 CT ANGIOGRAPHY CHEST: CPT | Mod: 26

## 2025-06-30 PROCEDURE — 71045 X-RAY EXAM CHEST 1 VIEW: CPT | Mod: 26

## 2025-06-30 PROCEDURE — 74174 CTA ABD&PLVS W/CONTRAST: CPT | Mod: 26

## 2025-06-30 RX ORDER — IBUPROFEN 200 MG
600 TABLET ORAL ONCE
Refills: 0 | Status: COMPLETED | OUTPATIENT
Start: 2025-06-30 | End: 2025-06-30

## 2025-06-30 RX ADMIN — Medication 600 MILLIGRAM(S): at 03:59

## 2025-06-30 RX ADMIN — Medication 4 MILLIGRAM(S): at 01:01

## 2025-06-30 NOTE — ED PROVIDER NOTE - NS ED ROS FT
CONSTITUTIONAL: No weight loss, fever, chills, weakness or fatigue.      HEENT:    Eyes: No visual loss, blurred vision, double vision or yellow sclerae.  Ears, Nose, Throat: No hearing loss, sneezing, congestion, runny nose or sore throat.    CARDIOVASCULAR: No chest pressure or chest discomfort. No palpitations or edema.  Positive for chest pain    RESPIRATORY: No shortness of breath, cough or sputum.    GASTROINTESTINAL: No anorexia or diarrhea. No abdominal pain or blood.  Positive for nausea and vomiting    SKIN: No rash or itching.    GENITOURINARY: Patient denies any changes to bowel or bladder function.    NEUROLOGICAL: No headache, dizziness, syncope, paralysis, ataxia, numbness or tingling in the extremities. No change in bowel or bladder control.    MUSCULOSKELETAL: No muscle, back pain, joint pain or stiffness.

## 2025-06-30 NOTE — ED PROVIDER NOTE - CLINICAL SUMMARY MEDICAL DECISION MAKING FREE TEXT BOX
This patient is a 69-year-old male presenting to the emergency department today for chest pain.  White blood cell pack 0.94.  Likely reactive.  No acute anemia.  No thrombocytopenia.  CMP shows no significant electrolyte abnormalities.  Lipase negative.  Low suspicion for pancreatitis.  Mild hypokalemia which can be corrected with diet.  Mild elevation of creatinine at 1.48.  Was given 1 L bolus of normal saline.    No other significant electrolyte abnormalities noted.  Patient's chest x-ray shows no pneumonia or pneumothoraces.  EKG shows no acute ischemic changes.  No evidence of acute arrhythmias noted.    As the patient continued pain despite the administration of Tylenol, CT angio of the chest as well as abdomen pelvis ordered to evaluate for any signs of aortic dissection.  No evidence of aortic dissection noted, however there is concern for possible pulmonary carcinoma.    Did discuss these findings with the patient who expressed understanding.    Patient was given morphine for pain control.  He states the pain is slightly improved after morphine.  States that he typically takes Motrin at home for pain control.  Will be given Motrin while in the emergency department here.  We offered admission to the patient for uncontrolled pain, however he declined.  States that he would like to be discharged to home and follow-up outpatient with his PCP as well as outpatient with pulmonology.  He will be given to the patient.    At this time, we believe that he is safe for discharge to home with outpatient follow-up with his PCP.  He expresses understanding and is amenable to this plan.

## 2025-06-30 NOTE — ED PROVIDER NOTE - OBJECTIVE STATEMENT
This patient is a 69-year-old male presenting to the emergency department today for chest pain.  He has a past medical history of hypertension.  He has CAD status post stent placement.  States that he started experiencing midsternal chest pain that is worse when he that was resolving.  Associated with episode of nausea and vomiting.  He has a history of diabetes.  He states that the pain occurred 1 hour prior to arrival.  It is sharp.  Chief complaint states that it radiated up to the neck, however he does not endorse any radiation of this pain to me.  Is likely the arm.  Not tearing in the back.  States that he believes it is esophagus.  He states that he has no other complaints at this time.  He did have 1 episode of emesis while in the emergency department this time.

## 2025-06-30 NOTE — ED PROVIDER NOTE - NSFOLLOWUPCLINICS_GEN_ALL_ED_FT
[facility]monolgy and Sleep [facility]  Pulmonology  [address] [place] [address], [place] [address]  Phone: [phone]  Fax:

## 2025-06-30 NOTE — ED PROVIDER NOTE - PATIENT PORTAL LINK FT
You can access the FollowMyHealth Patient Portal offered by [facility] by registering at the following website: [email or web address]. By joining [facility]’s FollowMyHealth portal, you will also be able to view your health information using other applications (apps) compatible with our system.

## 2025-07-03 ENCOUNTER — RESULT REVIEW (OUTPATIENT)
Age: 70
End: 2025-07-03

## 2025-07-03 NOTE — H&P ADULT - PROBLEM SELECTOR PLAN 1
- Continue VA ECMO and Impella CP support  - VA ECMO flowing 4L, Impella CP at P-3  - Right-sided yocasta for monitoring  - TTE to look at Impella CP placement and heart function  - Start 1/2 Purge Heparin solution with upgrade to full purge once PTT stable and no signs of bleeding  - Consider systemic heparin for anticoagulation once stable for devices  - Monitoring for neuro status, off paralytic currently  - Will discuss when to start antiplatelet therapy for CURTIS  - Closely monitor perfusion indices like lactate, central venous saturation, CMP

## 2025-07-03 NOTE — ED PROVIDER NOTE - CLINICAL SUMMARY MEDICAL DECISION MAKING FREE TEXT BOX
Patient was altered, unresponsive to questions, could not follow commands, moving upper extremities on initial encounter, normotensive, with a pulse, saturating 100% on room air.  Plan to get CBC, CMP, VBG with lactate, troponin, proBNP, cxr. EKG showed ST elevations to inferior, anterior septal leads.  Cath Lab activated at that time.  Prior to cardiology coming to ED patient lost pulse, CPR immediately started.  ROSC achieved.  Patient lost pulses a total of 3 times prior to being transported to Cath Lab, 1 run of V. tach; multiple rounds of CPR, 2 epi, lido x2, amio 150 mg, bicarb X2 was given.  Intubated, ET tube placement confirmed with chest x-ray, end tidal and bilateral breath sounds noted; RSI meds Etom 20mg, Aaron 70mg.

## 2025-07-03 NOTE — H&P ADULT - NSHPLABSRESULTS_GEN_ALL_CORE
12.3   14.49 )-----------( 133      ( 03 Jul 2025 18:45 )             35.5     07-03    140  |  88[L]  |  100[H]  ----------------------------<  295[H]  3.6   |  15[L]  |  4.47[H]    Ca    7.7[L]      03 Jul 2025 18:45  Phos  10.5     07-03  Mg     2.7     07-03    TPro  5.3[L]  /  Alb  3.1[L]  /  TBili  2.5[H]  /  DBili  x   /  AST  307[H]  /  ALT  297[H]  /  AlkPhos  103  07-03    PT/INR - ( 03 Jul 2025 18:45 )   PT: 24.8 sec;   INR: 2.17 ratio         PTT - ( 03 Jul 2025 18:45 )  PTT:79.2 sec    ABG - ( 03 Jul 2025 21:53 )  pH, Arterial: 7.39  pH, Blood: x     /  pCO2: 39    /  pO2: 334   / HCO3: 24    / Base Excess: -1.2  /  SaO2: 99.7

## 2025-07-03 NOTE — ED ADULT TRIAGE NOTE - CHIEF COMPLAINT QUOTE
pt was at MD office had a fall unresponsive 1 round CPR performed at MD office no shock was advised at that time    pt brought direct to [facility] pt was at MD office building had a fall unresponsive 1 round CPR performed at MD office no shock was advised at that time    pt brought direct to [facility]

## 2025-07-03 NOTE — BRIEF OPERATIVE NOTE - OPERATION/FINDINGS
Peripheral VA ECMO  25 Fr. venous Cannula Left Femoral Vein  17 Fr. Arterial Cannula Right Femoral Artery w/ a 5 Fr. Distal Reperfusion Cannula

## 2025-07-03 NOTE — ED PROVIDER NOTE - ATTENDING CONTRIBUTION TO CARE
ABDOMINAL PAIN/NAUSEA/VOMITING
69yoM PMHx DM, HTN, HLD, Afib, brought by EMS after collapsing in the lobby of his doctor's office just prior to arrival. [employer] on scene first did one round of CPR, EMS arrived found patient awake, with normotensive blood pressure but junctional rhythm HR 40s, complaining of shortness of breath. Per EMS, he became increasingly confused, combative, and then hypoxic to 80s% on NRB in the field. FS in the field in the 200s. EJ was placed by EMS and fluids started, no other meds given prior to arrival. Due to patient's agitation and confusion, pt [place] in [place]uffs by [place] en route to the hospital. On arrival, pt satting 100% on nonrebreather, awake, altered, nonresponsive, HR 60s-70s, combative. 2 ativan given to enable IV placement, evaluation and EKG. EKG showed anterolateral ST elevations, code stemi activated immediately. Spoke with wife and daughter who report patient had chest pain and shortness of breath for a week prior. Cardiology arrived at bedside and pt lost pulses, 2 rounds of CPR performed, epi given, ROSC achieved. Pt admitted to cardiology for urgent cath.

## 2025-07-03 NOTE — H&P ADULT - PROBLEM SELECTOR PLAN 5
- New onset afib  - Amio bolus and Amio drip, continue for now  - Will consider anticoagulation if no bleeding

## 2025-07-03 NOTE — ED PROVIDER NOTE - PROGRESS NOTE DETAILS
ARNOLDO-- note delayed due to critical pt  on arrival pt unresponsive, combative  pt noted to have STEMI on EKG -- ST elevations in lateral leads ARNOLDO-- note delayed due to critical pt  on arrival pt unresponsive, combative  pt noted to have STEMI on EKG -- ST elevations in  anterolateral leads. Code stemi activated. Discussed with family likely need for urgent intubation and transfer to cath lab

## 2025-07-03 NOTE — ED PROVIDER NOTE - PHYSICAL EXAMINATION
GENERAL: unresponsive, awake, alert  HEENT:  Atraumatic  CHEST/LUNG: Chest rise equal bilaterally, clear breath sounds b/l  HEART: Regular rate and rhythm  EXTREMITIES:  Extremities warm  PSYCH: Awake, moaning, unresponsive to questions, unable to follow commands  SKIN: No obvious rashes or lesions GENERAL: unresponsive, awake, eyes open spontaneously  HEENT:  Atraumatic  CHEST/LUNG: Chest rise equal bilaterally, clear breath sounds b/l  HEART: Regular rate and rhythm  EXTREMITIES:  Extremities warm  PSYCH: Awake, moaning, unresponsive to questions, unable to follow commands  SKIN: No obvious rashes or lesions

## 2025-07-03 NOTE — H&P ADULT - NSICDXPASTMEDICALHX_GEN_ALL_CORE_FT
PAST MEDICAL HISTORY:  CAD (coronary artery disease)     Diabetes     Hyperlipemia     Hypertension

## 2025-07-03 NOTE — ED ADULT TRIAGE NOTE - CCCP TRG CHIEF CMPLNT
cardiac arrest   pt arrives with EMS had junctional rhythm  after CPR   pt bag mask ventilated and was combative and cyanotic in field

## 2025-07-03 NOTE — H&P ADULT - ASSESSMENT
68 y/o Male w/PMHx Hypertension, hyperlipidemia, diabetes, CAD with stents, current smoker (2-3 PPD) brought in to [facility] by EMS from PCP's office status post cardiac arrest, ROSC achieved in 5-10 min.  Patient was in the lobby of his PCP when he was not feeling well, family told patient to sit down, and when came back with a wheelchair patient lost pulse.  Police showed up on scene and did 1 round of CPR, no meds were given.  ROSC achieved.  Patient brought in by EMS, altered, moaning, awake, unresponsive to questions on nonrebreather saturating in the 80s.  As per family patient has been feeling chest pain the past few days. On arrival to the ed, he was agitated and fighting restraints. unclear mental status otherwise, was moving his leg at the end of first round of ACLS. In the ED, pt lost his pulse, ACLS started, regained a pulse after 2 min. Patient was given etomidate, versed, and intubated. Then pt lost a pulse again, regained a pulse after another round of CPR. Patient started on epi gtt. pt had runs of VT, was given total of 2 epi, lido x2, amio 150 mg. Was given bicarb x2. Patient brought to cath lab, underwent LHC which showed LAD occlusion. Left femoral Impella CP inserted for support, s/p CURTIS to the LAD. Upgraded to VA ECMO for continued cardiogenic shock after stent placement. Patient was then transferred to [facility] from [facility] for further care.

## 2025-07-03 NOTE — ED PROVIDER NOTE - OBJECTIVE STATEMENT
69yoM PMHx 69yoM PMHx Hypertension, hyperlipidemia, diabetes, CAD with stents brought in by EMS from PCPs office status post cardiac arrest, ROSC achieved.  Patient was in the lobby of his PCP when he was not feeling well, family told patient to sit down, and when came back with a wheelchair patient lost pulse.  Police showed up on scene and did 1 round of CPR, no meds were given.  ROSC achieved.  Patient brought in by EMS, altered, moaning, awake, unresponsive to questions on nonrebreather saturating in the 80s.  As per family patient has been feeling chest pain the past few days.

## 2025-07-03 NOTE — PROGRESS NOTE ADULT - SUBJECTIVE AND OBJECTIVE BOX
Patient seen and examined at the bedside.    Remains critically ill on continuous ICU monitoring.      Brief Summary:  70 yo M with cardiogenic shock s/p Impella, ECMO placement on 7/03/2025      24 Hour events:      Objective:  Vital Signs Last 24 Hrs  T(C): 35.2 (03 Jul 2025 18:15), Max: 35.2 (03 Jul 2025 18:15)  T(F): 95.4 (03 Jul 2025 18:15), Max: 95.4 (03 Jul 2025 18:15)  HR: 134 (03 Jul 2025 19:00) (37 - 150)  BP: 117/73 (03 Jul 2025 13:06) (110/93 - 136/100)  BP(mean): 83 (03 Jul 2025 13:06) (83 - 114)  RR: 12 (03 Jul 2025 19:00) (9 - 20)  SpO2: 97% (03 Jul 2025 18:15) (97% - 100%)    Parameters below as of 03 Jul 2025 18:15  Patient On (Oxygen Delivery Method): ventilator    O2 Concentration (%): 50    Mode: AC/ CMV (Assist Control/ Continuous Mandatory Ventilation)  RR (machine): 12  FiO2: 50  PEEP: 10  ITime: 1  MAP: 13  PC: 10  PIP: 22              Physical Exam:   General: Intubated  Neurology: Arousable, able to follow commands  Respiratory: Clear bilaterally   CV: Sinus tachycardia/Afib  Abdominal: Soft, Nontender  Extremities: Warm, well-perfused  -------------------------------------------------------------------------------------------------------------------------------    Labs:                        12.3   14.49 )-----------( 133      ( 03 Jul 2025 18:45 )             35.5     07-03    140  |  88[L]  |  100[H]  ----------------------------<  295[H]  3.6   |  15[L]  |  4.47[H]    Ca    7.7[L]      03 Jul 2025 18:45  Phos  10.5     07-03  Mg     2.7     07-03    TPro  5.3[L]  /  Alb  3.1[L]  /  TBili  2.5[H]  /  DBili  x   /  AST  307[H]  /  ALT  297[H]  /  AlkPhos  103  07-03    LIVER FUNCTIONS - ( 03 Jul 2025 18:45 )  Alb: 3.1 g/dL / Pro: 5.3 g/dL / ALK PHOS: 103 U/L / ALT: 297 U/L / AST: 307 U/L / GGT: x           PT/INR - ( 03 Jul 2025 18:45 )   PT: 24.8 sec;   INR: 2.17 ratio         PTT - ( 03 Jul 2025 18:45 )  PTT:79.2 sec  ABG - ( 03 Jul 2025 18:38 )  pH, Arterial: 7.26  pH, Blood: x     /  pCO2: 39    /  pO2: 329   / HCO3: 18    / Base Excess: -9.0  /  SaO2: 100.0       ------------------------------------------------------------------------------------------------------------------------------  Assessment:  Cardiogenic shock s/p Impella, ECMO placement on 7/03/2025    Hemodynamic instability  Hypovolemia  Lactic acidosis  Post op respiratory failure  Acute blood loss anemia  Thrombocytopenia    Plan:   ***Neuro***  Postoperative acute pain control with Dilaudid    ***Cardiovascular***  At high risk for hemodynamic instability and cardiac arrhythmias.  Impella 93, flow 1.41 l/min  ECMO 3600 RPMS, flow 3.9 l/min, sweep 1  Trend Lactate.  MAP > 65 mm Hg. Titrate Epi and Levo.  Amiodarone infusion for rate control.    ***Pulmonary***  Postoperative acute respiratory failure.  Wean ventilator as tolerated.   Deep breathing and coughing exercises.  Wean oxygen as able.    ***GI***  NPO for now.   Protonix for stress ulcer prophylaxis     ***Renal***  Trend Creatinine   Replete electrolytes as indicated.    ***ID***  No antibiotic coverage.   Trend leukocytosis.    ***Endocrine***  No active issues  Monitor blood sugars    ***Hematology***  Acute blood loss anemia and thrombocytopenia   Heparin purge for impella  No current transfusion indication.    ***Skin/MSK***  PT/OT  OOBC  Frequent turning, sacral wound care    *** Lines ***  7/3 RIJ Intro, RRAL    Care plan discussed with the ICU care team.   Patient remains critical, at risk for life threatening decompensation.    I have spent 30 minutes providing critical care management to this patient.    By signing my name below, I, Kraig Diaz, attest that this documentation has been prepared under the direction and in the presence of Macho Rodriguez MD.  Electronically signed: Kraig Diaz, 07-03-25 @ 19:27    I, Macho Rodriguez MD,  personally performed the services described in this documentation. all medical record entries made by the scribe were at my direction and in my presence. I have reviewed the chart and agree that the record reflects my personal performance and is accurate and complete  Electronically signed: Macho Rodriguez MD. Patient seen and examined at the bedside.    Remains critically ill on continuous ICU monitoring.      Brief Summary:  68 yo M Hypertension, hyperlipidemia, diabetes, CAD with stents  7/3 PCP office cardiac arrest with ROSC followed by 3 more recurrent arrests en route to [facility] and while at [facility] with defibrillation for VT. LHC at [facility] with LAD occlusion s/p CURTIS + Left femoral artery Impella CP placement. Persistent cardiogenic shock requiring emergent VA ECMO cannulation. Transfer to [facility]     Arrived at [facility] sedated on nimbex infusion.  Inotropic support with epinephrine, Vasopressors support with norepinephrine  VA ECMO: Right Fem artery with DPC, Left Fem vein; Impella CP in Left fem artery   Patient wife at bedside, updated regarding patient critical condition.   Acute onset AF with RVR requiring Amio bolus and infusion   Emergent bedside TTE   Impella with suction alarm, found to be deep, requiring withdrawal under TTE guidance     Objective:  Vital Signs Last 24 Hrs  T(C): 35.2 (03 Jul 2025 18:15), Max: 35.2 (03 Jul 2025 18:15)  T(F): 95.4 (03 Jul 2025 18:15), Max: 95.4 (03 Jul 2025 18:15)  HR: 134 (03 Jul 2025 19:00) (37 - 150)  BP: 117/73 (03 Jul 2025 13:06) (110/93 - 136/100)  BP(mean): 83 (03 Jul 2025 13:06) (83 - 114)  RR: 12 (03 Jul 2025 19:00) (9 - 20)  SpO2: 97% (03 Jul 2025 18:15) (97% - 100%)    Parameters below as of 03 Jul 2025 18:15  Patient On (Oxygen Delivery Method): ventilator    O2 Concentration (%): 50    Mode: AC/ CMV (Assist Control/ Continuous Mandatory Ventilation)  RR (machine): 12  FiO2: 50  PEEP: 10  ITime: 1  MAP: 13  PC: 10  PIP: 22              Physical Exam:   General: Intubated  Neurology: sedated   Respiratory: Clear bilaterally   CV: Atrial Fibrillation   Abdominal: Soft, Nontender  Extremities: Cool extremities, dopplerable pulses, Right femoral arterial sheath with DPC, Left fem venous ECMO sheath, Left fem arterial impella CP  -------------------------------------------------------------------------------------------------------------------------------    Labs:                        12.3   14.49 )-----------( 133      ( 03 Jul 2025 18:45 )             35.5     07-03    140  |  88[L]  |  100[H]  ----------------------------<  295[H]  3.6   |  15[L]  |  4.47[H]    Ca    7.7[L]      03 Jul 2025 18:45  Phos  10.5     07-03  Mg     2.7     07-03    TPro  5.3[L]  /  Alb  3.1[L]  /  TBili  2.5[H]  /  DBili  x   /  AST  307[H]  /  ALT  297[H]  /  AlkPhos  103  07-03    LIVER FUNCTIONS - ( 03 Jul 2025 18:45 )  Alb: 3.1 g/dL / Pro: 5.3 g/dL / ALK PHOS: 103 U/L / ALT: 297 U/L / AST: 307 U/L / GGT: x           PT/INR - ( 03 Jul 2025 18:45 )   PT: 24.8 sec;   INR: 2.17 ratio         PTT - ( 03 Jul 2025 18:45 )  PTT:79.2 sec  ABG - ( 03 Jul 2025 18:38 )  pH, Arterial: 7.26  pH, Blood: x     /  pCO2: 39    /  pO2: 329   / HCO3: 18    / Base Excess: -9.0  /  SaO2: 100.0       ------------------------------------------------------------------------------------------------------------------------------  Assessment:  68 y/o M in Cardiogenic shock as a result of STEMI. LAD stent placement requiring left fem Impella CP and VA ECMO placement on 7/03/2025    Cardiogenic shock s/p STEMI with cardiac arrest x 4   Ventricular tachycardia  Atrial Fibrillation  Impellla CP   VA ECMO  Hemodynamic instability  Acute Lactic acidosis/Severe metabolic acidosis   Acute hypoxic respiratory failure   Thrombocytopenia  Leukocytosis  Acute blood loss anemia   VANDANA 2/2 ATN  Hyperglycemia  Hypocalcemia  Transaminitis       Plan:   ***Neuro***  Wean sedation to obtain neuro status s/p arrest   Discontinue Nimbex infusion  Maintain normothermia s/p cardiac arrest   PRN pain medication for comfort     ***CV***   Invasive hemodynamic monitoring, assess perfusion indices    Hemodynamic instability and cardiac arrhythmias, monitor hemodynamics and telemetry   Cardiogenic Shock, ongoing resuscitation, volume/titration of vasopressors to maintain MAP > 65mmHg   Inotropic Support with Epinephrine to maintain C.I. > 2 central venous O2 saturation > 60   Fem-Fem VA ECMO 3600RPM, 3.9L, sweep 2  Left Fem Impella CP at P3, 1.8L - adjusted under TTE guidance 2/2 suction alarms for malposition to 3.5cm  Atrial fibrillation with RVR - amiodarone bolus and infusion   s/p CURTIS to LAD - cangrelor on hold for now    ***Pulmonary***  Acute hypoxic respiratory failure.  Wean ventilator as tolerated.   Wean oxygen as able.    ***GI***  NPO for now.   Place NGT  Protonix for stress ulcer prophylaxis   Transaminitis - trend for now, high risk for shock liver.     ***Renal***  VANDANA 2/2 ATN  Trend BUN/Creatinine  Yap Cathter for strict I&O monitoring  Patient at high risk for developing renal failure requiring CRRT   Replete electrolytes as indicated.    ***ID***  No active issues   Trend leukocytosis.    ***Endocrine***  Hyperglycemia - insulin infusion     ***Hematology***  Acute blood loss anemia and thrombocytopenia   Maintain active type and screen  Start with 1/2 heparin purge for impella CP and advance based on PTT trend     ***Skin/MSK***  Frequent turning for DTI prevention     *** Lines ***  7/3 RIJ TLC  7/3 RRAL    ***MISC***  Had long discussion with patient's spouse, Yuliana James at bedside. Explained patient's critical condition and given opportunity to ask questions. Patient expressed understanding.     Care plan discussed with the ICU care team.   Patient remains critical, at risk for life threatening decompensation.    I have spent 110 minutes providing critical care management to this patient.    By signing my name below, I, Kraig Diaz, attest that this documentation has been prepared under the direction and in the presence of Macho Rodriguez MD.  Electronically signed: Kriag Diaz, 07-03-25 @ 19:27    I, Macho Rodriguez MD,  personally performed the services described in this documentation. all medical record entries made by the scribe were at my direction and in my presence. I have reviewed the chart and agree that the record reflects my personal performance and is accurate and complete  Electronically signed: Macho Rodriguez MD.

## 2025-07-03 NOTE — CHART NOTE - NSCHARTNOTEFT_GEN_A_CORE
Mr. Jorge Roque is a 70 yo M with HTN, HLD, DM, who p/w ooh cardiac arrest.    Pt was going to his pcp, when in the waiting room he lost consciousness. pt's daughter and wife said that he was feeling chest pain since Friday, had episode of vomiting. Someone started cpr immediately, for 5-10 min until EMS came. when EMS came, they did a round of cpr, and no meds were given since he regained a pulse.     on arrival to the ed, he was agitated and fighting restraints. unclear mental status otherwise, was moving his leg at the end of first round of acls.    in the ed, pt lost his pulse, ACLS started, regained a pulse after 2 min. pt was given etomidate, versed, intubated. then pt lost a pulse again, regained a pulse after another round of cpr. pt started on epi gtt. pt had runs of VT, was given total of 2 epi, lido x2, amio 150 mg. was given bicarb x2.     Registration Time: per ED notes  Initial EC:29pm  Called by ED: 12:33pm  Saw patient at bedside: 12:34pm  Called Cath Attendin:39pm  Balloon Time: per cath lab notes    pt dispo to CCU Mr. Jorge Roque is a 70 yo M with HTN, HLD, DM, who p/w ooh cardiac arrest.    Pt was going to his pcp, when in the waiting room he lost consciousness. pt's daughter and wife said that he was feeling chest pain since Friday, had episode of vomiting. Someone started cpr immediately, for 5-10 min until EMS came. when EMS came, they did a round of cpr, and no meds were given since he regained a pulse.     on arrival to the ed, he was agitated and fighting restraints. unclear mental status otherwise, was moving his leg at the end of first round of acls.    in the ed, pt lost his pulse, ACLS started, regained a pulse after 2 min. pt was given etomidate, versed, intubated. then pt lost a pulse again, regained a pulse after another round of cpr. pt started on epi gtt. pt had runs of VT, was given total of 2 epi, lido x2, amio 150 mg. was given bicarb x2.     Registration Time: per ED notes  Initial EC:29pm  Called by ED: 12:33pm  Saw patient at bedside: 12:34pm  Called Cath Attendin:39pm  Balloon Time: per cath lab notes    pt dispo to CCU              Attending notes:  called to ER for cardiac arrest, pt arrested in the field, brought in , arrested multiple times in the ED> epi pushes give, started on epi drip. intubated. bedside POCUS showing LV standstill. compressions continued. pt subsequently went into VT, given sheryl 150, lido 100 x2. regained puls, bp 110s systolic, brought to cath lab. personally participated in cardiac arrest for >50 min  pt in cath lab.

## 2025-07-03 NOTE — ED PROVIDER NOTE - CCCP TRG CHIEF CMPLNT
POSTPARTUM DISCHARGE INSTRUCTIONS FOR MOM    YOB: 1986   Age: 33 y.o.               Admit Date: 3/27/2020     Discharge Date: 3/29/2020  Attending Doctor:  Portia Poe D.O.                  Allergies:  Patient has no known allergies.    Discharged to home by car. Discharged via walking, hospital escort: Yes.  Special equipment needed: Not Applicable  Belongings with: Personal  Be sure to schedule a follow-up appointment with your primary care doctor or any specialists as instructed.     Discharge Plan:   Diet Plan: Discussed  Activity Level: Discussed  Confirmed Follow up Appointment: Patient to Call and Schedule Appointment  Medication Reconciliation Updated: Yes  Influenza Vaccine Indication: Patient Refuses    TPC or [facility] in 5 weeks for vaginal; 1 week for incision check.   To resume daily PNV and iron supplement if needed with hydration.   Patient to RT TPC or ER if any of the following occur:  Fever over 100.5  Severe abdominal pain  Red streaks or painful masses in the breasts  Foul smelling discharge or lochia  Heavy vaginal bleeding saturating a pad per hour  S/s of PP depression    REASONS TO CALL YOUR OBSTETRICIAN:  1.   Persistent fever or shaking chills (Temperature higher than 100.4)  2.   Heavy bleeding (soaking more than 1 pad per hour); Passing clots  3.   Foul odor from vagina  4.   Mastitis (Breast infection; breast pain, chills, fever, redness)  5.   Urinary pain, burning or frequency  6.   Episiotomy infection  7.   Abdominal incision infection  8.   Severe depression longer than 24 hours    HAND WASHING  · Prior to handling the baby.  · Before breastfeeding or bottle feeding baby.  · After using the bathroom or changing the baby's diaper.    WOUND CARE  Ask your physician for additional care instructions.  In general:    ·  Incision:      · Keep clean and dry.    · Do NOT lift anything heavier than your baby for up to 6 weeks.    · There should not be  "any opening or pus.      VAGINAL CARE  · Nothing inside vagina for 6 weeks: no sexual intercourse, tampons or douching.  · Bleeding may continue for 2-4 weeks.  Amount may vary.    · Call your physician for heavy bleeding which means soaking more than 1 pad per hour    BIRTH CONTROL  · It is possible to become pregnant at any time after delivery and while breastfeeding.  · Plan to discuss a method of birth control with your physician at your follow up visit. visit.    DIET AND ELIMINATION  · Eating more fiber (bran cereal, fruits, and vegetables) and drinking plenty of fluids will help to avoid constipation.  · Urinary frequency after childbirth is normal.    POSTPARTUM BLUES  During the first few days after birth, you may experience a sense of the \"blues\" which may include impatience, irritability or even crying.  These feeling come and go quickly.  However, as many as 1 in 10 women experience emotional symptoms known as postpartum depression.    Postpartum depression:  May start as early as the second or third day after delivery or take several weeks or months to develop.  Symptoms of \"blues\" are present, but are more intense:  Crying spells; loss of appetite; feelings of hopelessness or loss of control; fear of touching the baby; over concern or no concern at all about the baby; little or no concern about your own appearance/caring for yourself; and/or inability to sleep or excessive sleeping.  Contact your physician if you are experiencing any of these symptoms.    Crisis Hotline:  · [place] Crisis Hotline:  3-920-XSCCBLO  Or 1-602.185.7713  · [place] Crisis Hotline:  1-413.588.8726  Or 429-160-9303    PREVENTING SHAKEN BABY:  If you are angry or stressed, PUT THE BABY IN THE CRIB, step away, take some deep breaths, and wait until you are calm to care for the baby.  DO NOT SHAKE THE BABY.  You are not alone, call a supporter for help.    · Crisis Call Center 24/7 crisis line 757-103-7542 or 1-412.286.8356  · You " "can also text them, text \"ANSWER\" to 592873    QUIT SMOKING/TOBACCO USE:  I understand the use of any tobacco products increases my chance of suffering from future heart disease and could cause other illnesses which may shorten my life. Quitting the use of tobacco products is the single most important thing I can do to improve my health. For further information on smoking / tobacco cessation call a Toll Free Quit Line at 1-522.896.4351 (*National Cancer [place]) or 1-920.124.3383 (American Lung Association) or you can access the web based program at www.lungusa.org.    · [place] Tobacco Users Help Line:  (592) 138-4862       Toll Free: 1-115.587.8522  · Quit Tobacco Program [facility] (294)105-0547    DEPRESSION / SUICIDE RISK:  As you are discharged from this [facility], it is important to learn how to keep safe from harming yourself.    Recognize the warning signs:  · Abrupt changes in personality, positive or negative- including increase in energy   · Giving away possessions  · Change in eating patterns- significant weight changes-  positive or negative  · Change in sleeping patterns- unable to sleep or sleeping all the time   · Unwillingness or inability to communicate  · Depression  · Unusual sadness, discouragement and loneliness  · Talk of wanting to die  · Neglect of personal appearance   · Rebelliousness- reckless behavior  · Withdrawal from people/activities they love  · Confusion- inability to concentrate     If you or a loved one observes any of these behaviors or has concerns about self-harm, here's what you can do:  · Talk about it- your feelings and reasons for harming yourself  · Remove any means that you might use to hurt yourself (examples: pills, rope, extension cords, firearm)  · Get professional help from the community (Mental Health, Substance Abuse, psychological counseling)  · Do not be alone:Call your Safe Contact- someone whom you trust who will be there " cardiac arrest   pt arrives with EMS had junctional rhythm  after CPR   pt bag mask ventilated and was combative and cyanotic in field for you.  · Call your local CRISIS HOTLINE 988-8226 or 815-105-1758  · Call your local Children's Mobile Crisis Response Team [place] (604) 015-1211 or www.[employer]pig  · Call the toll free National Suicide Prevention Hotlines   · National Suicide Prevention Lifeline 417-378-QJXK (2791)  · National Hope Line Network 800-SUICIDE (726-5537)    DISCHARGE SURVEY:  Thank you for choosing [facility].  We hope we provided you with very good care.  You may be receiving a survey in the mail.  Please fill it out.  Your opinion is valuable to us.    ADDITIONAL EDUCATIONAL MATERIALS GIVEN TO PATIENT:        My signature on this form indicates that:  1.  I have reviewed and understand the above information  2.  My questions regarding this information have been answered to my satisfaction.  3.  I have formulated a plan with my discharge nurse to obtain my prescribed medication for home.

## 2025-07-03 NOTE — H&P ADULT - HISTORY OF PRESENT ILLNESS
70 y/o Male w/PMHx Hypertension, hyperlipidemia, diabetes, CAD with stents brought in by EMS from PCP's office status post cardiac arrest, ROSC achieved in 5-10 min.  Patient was in the lobby of his PCP when he was not feeling well, family told patient to sit down, and when came back with a wheelchair patient lost pulse.  Police showed up on scene and did 1 round of CPR, no meds were given.  ROSC achieved.  Patient brought in by EMS, altered, moaning, awake, unresponsive to questions on nonrebreather saturating in the 80s.  As per family patient has been feeling chest pain the past few days. On arrival to the ed, he was agitated and fighting restraints. unclear mental status otherwise, was moving his leg at the end of first round of ACLS. In the ED, pt lost his pulse, ACLS started, regained a pulse after 2 min. Patient was given etomidate, versed, and intubated. Then pt lost a pulse again, regained a pulse after another round of CPR. Patient started on epi gtt. pt had runs of VT, was given total of 2 epi, lido x2, amio 150 mg. Was given bicarb x2. Patient brought to cath lab, underwent LHC which showed LAD occlusion. s/p CURTIS, Ieft femoral Impella CP inserted for support, and upgraded to VA ECMO for continued cardiogenic shock. 68 y/o Male w/PMHx Hypertension, hyperlipidemia, diabetes, CAD with stents, current smoker (2-3 PPD) brought in to [facility] by EMS from PCP's office status post cardiac arrest, ROSC achieved in 5-10 min.  Patient was in the lobby of his PCP when he was not feeling well, family told patient to sit down, and when came back with a wheelchair patient lost pulse.  Police showed up on scene and did 1 round of CPR, no meds were given.  ROSC achieved.  Patient brought in by EMS, altered, moaning, awake, unresponsive to questions on nonrebreather saturating in the 80s.  As per family patient has been feeling chest pain the past few days. On arrival to the ed, he was agitated and fighting restraints. unclear mental status otherwise, was moving his leg at the end of first round of ACLS. In the ED, pt lost his pulse, ACLS started, regained a pulse after 2 min. Patient was given etomidate, versed, and intubated. Then pt lost a pulse again, regained a pulse after another round of CPR. Patient started on epi gtt. pt had runs of VT, was given total of 2 epi, lido x2, amio 150 mg. Was given bicarb x2. Patient brought to cath lab, underwent LHC which showed LAD occlusion. Left femoral Impella CP inserted for support, s/p CURTIS to the LAD. Upgraded to VA ECMO (25Fr left femoral venous cannula, 17Fr right femoral arterial cannula with 6Fr distal reperfusion cannula) for continued cardiogenic shock after stent placement. Patient was then [facility] from [facility] for further care.

## 2025-07-03 NOTE — H&P ADULT - NSHPPHYSICALEXAM_GEN_ALL_CORE
Physical Exam:  General: Intubated, sedated on mechanical support devices.  Neuro: Sedated and paralyzed.   Skin: No erythema, ecchymosis or cyanosis.  Head: NCAT.  Eyes: Pupils equal and reactive.  Neck: Supple and midline. No JVD.  CV: Afib. Irregular rhythm.  Pulm: CTABL, no wheezing, rhonchi.  : Yap in place making good urine. No hematuria.  GI: Abd soft, NT, ND, positive BS throughout.  Vasc: Groins soft without bleeding or hematoma. Distal pulses present b/l. Left femoral Impella CP, Left femoral venous cannula, right femoral arterial cannula, right SFA distal reperfusion cannula, right femoral venous sheath.

## 2025-07-03 NOTE — BRIEF OPERATIVE NOTE - COMMENTS
I, Flip Beavers PA-C provided direct first assist support to the surgeon during this surgical procedure. My involvement included positioning, prepping and draping the patient prior to surgery, ensuring clear visibility and exposure for the surgeon by using instruments such as retractors and suction, closing surgical incisions and dressing wounds. As well as other tasks as directed by the surgeon.

## 2025-07-03 NOTE — PROGRESS NOTE ADULT - SUBJECTIVE AND OBJECTIVE BOX
----------------------------------------------------------------------------------------------------  ECMO Daily Management Note   ----------------------------------------------------------------------------------------------------  INTERVAL EVENTS:     ----------------------------------------------------------------------------------------------------  69yM   VA  / VV / VAV   ECMO  for:  Refractory cardiogenic shock / Post-cardiotomy shock /  Pulmonary embolus / Hypoxemic/Hypercapnic Respiratory failure         Date of initiation:        Cannulae:   25Fr R Fem Venous,  19Fr L Fem Arterial with 6Fr Distal reperfusion catheter     Continue current support,   Wean trial as tolerated   Anticoagulation w/hep gtt, goal Xa 0.2-0.4, PTT 50-60    =============================================================  Weight (kg): 90 (07-03-25)        Height (cm): 168 (07-03-25)   BSA (m2): 2 (07-03-25)        BMI (kg/m2): 31.9 (07-03-25)    ECMO  RPM:  3600 (03 Jul 2025 19:00)      Pump Flow (Lpm):  3.9 (03 Jul 2025 19:00)              Sweep  (L/min):   2 (03 Jul 2025 19:00)       FiO2 (%):  1 (03 Jul 2025 19:00)  Pre-membrane -  Pressure (mm/Hg):   213 (03 Jul 2025 19:00)  03 Jul 2025 18:42  pH: 7.23  / pCO2: 48    /  pO2: 61    /  HCO3: 20    /   Base Excess: -7.4  / SvO2: 88.1       Post-membrane - Pressure (mm/Hg):  188 (03 Jul 2025 19:00)   ( 03 Jul 2025 18:42 )  pH: 7.26  /  pCO2: 41    / pO2: 428   /  HCO3: 18    /  Base Excess: -8.3  /  SaO2: 100.0          aMIOdarone Infusion 1 mG/Min IV Continuous <Continuous>  aMIOdarone IVPB 150 milliGRAM(s) IV Intermittent once  EPINEPHrine    Infusion 0.05 MICROgram(s)/kG/Min IV Continuous <Continuous>  norepinephrine Infusion 0.05 MICROgram(s)/kG/Min IV Continuous <Continuous>    Vent Mode: AC/ CMV (Assist Control/ Continuous Mandatory Ventilation)  RR (machine): 12, FiO2: 50, PEEP: 10, MAP: 13, PC: 10, PIP: 22    (03 Jul 2025 18:38) pH, Art: 7.26/pCO2: 39/pO2: 329/HCO3: 18/BE: -9.0/SaO2: 100.0/LAC: 13.3, --   (03 Jul 2025 15:55) pH, Art: 7.15/pCO2: 40/pO2: 601/HCO3: 14/BE: -14.2/SaO2: 100.0/LAC: 16.2, --   (03 Jul 2025 15:19) pH, Art: 7.17/pCO2: 39/pO2: 274/HCO3: 14/BE: -13.6/SaO2: 100.0/LAC: 17.0, --   (03 Jul 2025 14:29) pH, Art: 7.17/pCO2: 39/pO2: 330/HCO3: 14/BE: -13.6/SaO2: 100.0/LAC: 17.0, --   (03 Jul 2025 14:20) pH, Art: 7.27/pCO2: 37/pO2: 387/HCO3: 17/BE: -9.2/SaO2: 100.0/LAC: 17.0, --     (03 Jul 2025 18:57) pH, Hitesh: 7.23/pCO2: 46/pO2: 77 /HCO3: 19/BE: -8.2/MVO2: /SvO2: 94.1/LAC: ,   (03 Jul 2025 13:48) pH, Hitesh: 7.01/pCO2: 55/pO2: 49 /HCO3: 14/BE: -17.3/MVO2: /SvO2: 61/LAC: 16.4,   (03 Jul 2025 13:42) pH, Hitesh: /pCO2: /pO2:  /HCO3: /BE: /MVO2: /SvO2: 60/LAC: ,     ----------------------------------------------------------------------------------------------------  ANTICOAGULATION  heparin 25 Units/mL (IMPELLA VAD) Purge Solution  Unit(s) Ventricular Assist Device <Continuous>    (03 Jul 2025 18:45)  aPTT: 79.2  Xa: 0.47  PT: 24.8  INR: 2.17   Fibrinogen: 279   Platelets: 133     (03 Jul 2025 18:45)  Hemoglobin: 12.3    LDH: 1311    Haptoglobin: -----   PFH:  -----    ----------------------------------------------------------------------------------------------------  Daily ECMO Checklist:   Progress report received from perfusionist   Circuit checked/inspected   Emergency equipment and supplies checked   Cannulation site inspection     I have reviewed all of the above information as well as pertinent labs/imaging/testing and care plan with the bedside nurse, perfusionist and care team members and provided my recommendations for support.   ECMO Management was separate from critical care billing time.     ----------------------------------------------------------------------------------------------------  ECMO Daily Management Note   ----------------------------------------------------------------------------------------------------  INTERVAL EVENTS:     ----------------------------------------------------------------------------------------------------  69yM   VA ECMO  for:  Refractory cardiogenic shock   Date of initiation: 7/3/25       Cannulae:   25Fr R Fem Venous,  17Fr L Fem Arterial with 6Fr Distal reperfusion catheter     Continue current support,   Anticoagulation w/hep gtt, goal Xa 0.2-0.4, PTT 50-60    =============================================================  Weight (kg): 90 (07-03-25)        Height (cm): 168 (07-03-25)   BSA (m2): 2 (07-03-25)        BMI (kg/m2): 31.9 (07-03-25)    ECMO  RPM:  3600 (03 Jul 2025 19:00)      Pump Flow (Lpm):  3.9 (03 Jul 2025 19:00)              Sweep  (L/min):   2 (03 Jul 2025 19:00)       FiO2 (%):  1 (03 Jul 2025 19:00)  Pre-membrane -  Pressure (mm/Hg):   213 (03 Jul 2025 19:00)  03 Jul 2025 18:42  pH: 7.23  / pCO2: 48    /  pO2: 61    /  HCO3: 20    /   Base Excess: -7.4  / SvO2: 88.1       Post-membrane - Pressure (mm/Hg):  188 (03 Jul 2025 19:00)   ( 03 Jul 2025 18:42 )  pH: 7.26  /  pCO2: 41    / pO2: 428   /  HCO3: 18    /  Base Excess: -8.3  /  SaO2: 100.0          aMIOdarone Infusion 1 mG/Min IV Continuous <Continuous>  aMIOdarone IVPB 150 milliGRAM(s) IV Intermittent once  EPINEPHrine    Infusion 0.05 MICROgram(s)/kG/Min IV Continuous <Continuous>  norepinephrine Infusion 0.05 MICROgram(s)/kG/Min IV Continuous <Continuous>    Vent Mode: AC/ CMV (Assist Control/ Continuous Mandatory Ventilation)  RR (machine): 12, FiO2: 50, PEEP: 10, MAP: 13, PC: 10, PIP: 22    (03 Jul 2025 18:38) pH, Art: 7.26/pCO2: 39/pO2: 329/HCO3: 18/BE: -9.0/SaO2: 100.0/LAC: 13.3, --   (03 Jul 2025 15:55) pH, Art: 7.15/pCO2: 40/pO2: 601/HCO3: 14/BE: -14.2/SaO2: 100.0/LAC: 16.2, --   (03 Jul 2025 15:19) pH, Art: 7.17/pCO2: 39/pO2: 274/HCO3: 14/BE: -13.6/SaO2: 100.0/LAC: 17.0, --   (03 Jul 2025 14:29) pH, Art: 7.17/pCO2: 39/pO2: 330/HCO3: 14/BE: -13.6/SaO2: 100.0/LAC: 17.0, --   (03 Jul 2025 14:20) pH, Art: 7.27/pCO2: 37/pO2: 387/HCO3: 17/BE: -9.2/SaO2: 100.0/LAC: 17.0, --     (03 Jul 2025 18:57) pH, Hitesh: 7.23/pCO2: 46/pO2: 77 /HCO3: 19/BE: -8.2/MVO2: /SvO2: 94.1/LAC: ,   (03 Jul 2025 13:48) pH, Hitesh: 7.01/pCO2: 55/pO2: 49 /HCO3: 14/BE: -17.3/MVO2: /SvO2: 61/LAC: 16.4,   (03 Jul 2025 13:42) pH, Hitesh: /pCO2: /pO2:  /HCO3: /BE: /MVO2: /SvO2: 60/LAC: ,     ----------------------------------------------------------------------------------------------------  ANTICOAGULATION  heparin 25 Units/mL (IMPELLA VAD) Purge Solution  Unit(s) Ventricular Assist Device <Continuous>    (03 Jul 2025 18:45)  aPTT: 79.2  Xa: 0.47  PT: 24.8  INR: 2.17   Fibrinogen: 279   Platelets: 133     (03 Jul 2025 18:45)  Hemoglobin: 12.3    LDH: 1311    Haptoglobin: -----   PFH:  -----    ----------------------------------------------------------------------------------------------------  Daily ECMO Checklist:   Progress report received from perfusionist   Circuit checked/inspected   Emergency equipment and supplies checked   Cannulation site inspection     I have reviewed all of the above information as well as pertinent labs/imaging/testing and care plan with the bedside nurse, perfusionist and care team members and provided my recommendations for support.   ECMO Management was separate from critical care billing time.

## 2025-07-04 NOTE — CONSULT NOTE ADULT - SUBJECTIVE AND OBJECTIVE BOX
HPI:  68 y/o Male w/PMHx Hypertension, hyperlipidemia, diabetes, CAD with stents, current smoker (2-3 PPD) brought in to [facility] by EMS from PCP's office status post cardiac arrest, ROSC achieved in 5-10 min.  Patient was in the lobby of his PCP when he was not feeling well, family told patient to sit down, and when came back with a wheelchair patient lost pulse.  Police showed up on scene and did 1 round of CPR, no meds were given.  ROSC achieved.  Patient brought in by EMS, altered, moaning, awake, unresponsive to questions on nonrebreather saturating in the 80s.  As per family patient has been feeling chest pain the past few days. On arrival to the ed, he was agitated and fighting restraints. unclear mental status otherwise, was moving his leg at the end of first round of ACLS. In the ED, pt lost his pulse, ACLS started, regained a pulse after 2 min. Patient was given etomidate, versed, and intubated. Then pt lost a pulse again, regained a pulse after another round of CPR. Patient started on epi gtt. pt had runs of VT, was given total of 2 epi, lido x2, amio 150 mg. Was given bicarb x2. Patient brought to cath lab, underwent LHC which showed LAD occlusion. Left femoral Impella CP inserted for support, s/p CURTIS to the LAD. Upgraded to VA ECMO (25Fr left femoral venous cannula, 17Fr right femoral arterial cannula with 6Fr distal reperfusion cannula) for continued cardiogenic shock after stent placement. Patient was then transferred to [facility] from [facility] for further care. (2025 20:11)      PAST MEDICAL & SURGICAL HISTORY:  Hypertension  CAD (coronary artery disease)  Hyperlipemi  Diabetes  S/P cardiac catheterization      REVIEW OF SYSTEMS  14 point ROS done and found to be negative or noncontributory other than noted in HPI.     MEDICATIONS  (STANDING):  aMIOdarone Infusion 1 mG/Min (33.3 mL/Hr) IV Continuous <Continuous>  bumetanide Infusion 2 mG/Hr (10 mL/Hr) IV Continuous <Continuous>  cangrelor Infusion 0.75 MICROgram(s)/kG/Min (20.3 mL/Hr) IV Continuous <Continuous>  ceFAZolin   IVPB 2000 milliGRAM(s) IV Intermittent every 12 hours  chlorhexidine 0.12% Liquid 15 milliLiter(s) Oral Mucosa every 12 hours  chlorhexidine 0.12% Liquid 15 milliLiter(s) Oral Mucosa two times a day  dexMEDEtomidine Infusion 0.2 MICROgram(s)/kG/Hr (4.5 mL/Hr) IV Continuous <Continuous>  dextrose 50% Injectable 50 milliLiter(s) IV Push every 15 minutes  dextrose 50% Injectable 25 milliLiter(s) IV Push every 15 minutes  EPINEPHrine    Infusion 0.05 MICROgram(s)/kG/Min (16.9 mL/Hr) IV Continuous <Continuous>  heparin  Infusion 300 Unit(s)/Hr (3 mL/Hr) IV Continuous <Continuous>  heparin 50 Units/mL (IMPELLA VAD) Purge Solution  Unit(s) (10 mL/Hr) Ventricular Assist Device <Continuous>  insulin regular Infusion 3 Unit(s)/Hr (3 mL/Hr) IV Continuous <Continuous>  norepinephrine Infusion 0.05 MICROgram(s)/kG/Min (8.44 mL/Hr) IV Continuous <Continuous>  pantoprazole  Injectable 40 milliGRAM(s) IV Push every 12 hours  propofol Infusion 30 MICROgram(s)/kG/Min (16.2 mL/Hr) IV Continuous <Continuous>  sodium chloride 0.9%. 1000 milliLiter(s) (10 mL/Hr) IV Continuous <Continuous>    MEDICATIONS  (PRN):    Allergies  No Known Allergies  Intolerances    SOCIAL HISTORY:  FAMILY HISTORY:      Vital Signs Last 24 Hrs  T(C): 36.3 (2025 08:00), Max: 36.5 (2025 00:00)  T(F): 97.3 (2025 08:00), Max: 97.7 (2025 00:00)  HR: 124 (2025 11:00) (37 - 150)  BP: 117/73 (2025 13:06) (110/93 - 136/100)  BP(mean): 83 (2025 13:06) (83 - 114)  RR: 7 (2025 10:30) (4 - 31)  SpO2: 100% (2025 11:00) (78% - 100%)    Parameters below as of 2025 08:00  Patient On (Oxygen Delivery Method): ventilator    General:Sedated  HEENT: EOM intact.  Neck: Neck supple. JVP not elevated. No masses  Chest: Clear to auscultation bilaterally  CV: Normal S1 and S2. No murmurs, rub, or gallops. Radial pulses normal, warm peripherally  Abdomen: Soft, non-distended, non-tender  Skin: No rashes or skin breakdown  Extremities: No LE edema  Neurology: Sedated. responsive with wean  Psych: Affect normal  O2 Concentration (%): 40    PHYSICAL EXAM:    LABS:                        11.8   9.22  )-----------( 115      ( 2025 00:05 )             33.4     07-04    140  |  94[L]  |  99[H]  ----------------------------<  257[H]  3.5   |  24  |  4.11[H]    Ca    9.1      2025 00:05  Phos  3.9     07-04  Mg     3.0     07-04    TPro  5.0[L]  /  Alb  3.0[L]  /  TBili  3.1[H]  /  DBili  x   /  AST  340[H]  /  ALT  318[H]  /  AlkPhos  92  07-04  PT/INR - ( 2025 05:00 )   PT: 20.4 sec;   INR: 1.78 ratio    PTT - ( 2025 08:13 )  PTT:52.9 sec  Urinalysis Basic - ( 2025 10:05 )    Color: Yellow / Appearance: Cloudy / S.010 / pH: x  Gluc: x / Ketone: x  / Bili: Negative / Urobili: 0.2 mg/dL   Blood: x / Protein: Trace mg/dL / Nitrite: Negative   Leuk Esterase: Trace / RBC: 9 /HPF / WBC 4 /HPF   Sq Epi: x / Non Sq Epi: 11 /HPF / Bacteria: Negative /HPF

## 2025-07-04 NOTE — PROGRESS NOTE ADULT - SUBJECTIVE AND OBJECTIVE BOX
Patient seen and examined at the bedside.    Remains critically ill on continuous ICU monitoring.      Brief Summary:  70 yo M presented 7/3/25 to [facility] with STEMI and cardiac arrest.  IABP -> Impella CP,  LAD stented.  Upgraded to VA ECMO and transferred to [facility]     Recent events:  Amiodarone added for ectopy / a fib.  On Epinephrine.  Following commands when sedation is lightened.  On Bumex infusion.    Objective:  Vital Signs Last 24 Hrs  T(C): 36.3 (04 Jul 2025 08:00), Max: 36.5 (04 Jul 2025 00:00)  T(F): 97.3 (04 Jul 2025 08:00), Max: 97.7 (04 Jul 2025 00:00)  HR: 131 (04 Jul 2025 09:14) (37 - 150)  BP: 117/73 (03 Jul 2025 13:06) (110/93 - 136/100)  BP(mean): 83 (03 Jul 2025 13:06) (83 - 114)  RR: 13 (04 Jul 2025 09:00) (7 - 31)  SpO2: 93% (04 Jul 2025 09:14) (84% - 100%)    Parameters below as of 04 Jul 2025 08:00  Patient On (Oxygen Delivery Method): ventilator    O2 Concentration (%): 40    Mode: AC/ CMV (Assist Control/ Continuous Mandatory Ventilation)  RR (machine): 12  FiO2: 40  PEEP: 10  ITime: 1  MAP: 13  PC: 10  PIP: 20              Physical Exam:   General: Alert and interactive   Neurology: Oriented, following commands  Respiratory: Clear bilaterally   CV: Irregular  Abdominal: Soft, Nontender  Extremities: Warm, well-perfused  Yap  -------------------------------------------------------------------------------------------------------------------------------    Labs:                        11.8   9.22  )-----------( 115      ( 04 Jul 2025 00:05 )             33.4     07-04    140  |  94[L]  |  99[H]  ----------------------------<  257[H]  3.5   |  24  |  4.11[H]    Ca    9.1      04 Jul 2025 00:05  Phos  3.9     07-04  Mg     3.0     07-04    TPro  5.0[L]  /  Alb  3.0[L]  /  TBili  3.1[H]  /  DBili  x   /  AST  340[H]  /  ALT  318[H]  /  AlkPhos  92  07-04    LIVER FUNCTIONS - ( 04 Jul 2025 00:05 )  Alb: 3.0 g/dL / Pro: 5.0 g/dL / ALK PHOS: 92 U/L / ALT: 318 U/L / AST: 340 U/L / GGT: x           PT/INR - ( 04 Jul 2025 05:00 )   PT: 20.4 sec;   INR: 1.78 ratio      PTT - ( 04 Jul 2025 08:13 )  PTT:52.9 sec  ABG - ( 04 Jul 2025 07:58 )  pH, Arterial: 7.50  pH, Blood: x     /  pCO2: 38    /  pO2: 280   / HCO3: 30    / Base Excess: 6.0   /  SaO2: 99.8        ------------------------------------------------------------------------------------------------------------------------------  Assessment:  70 yo M presented 7/3/25 to [facility] with STEMI and cardiac arrest.  IABP -> Impella CP,  LAD stented.  Upgraded to VA ECMO and transferred to [facility]     STEMI  Cardiogenic shock  Acute respiratory failure  Thrombocytopenia  Hyperglycemia  Hypokalemia  VANDANA  Shock liver, Hyperbilirubinemia      Plan:   ***Neuro***  Sedation with Precedex as needed while intubated.  prns for discomfort.  Neuro exam is favorable.    ***Cardiovascular***  On Epinephrine - will wean.  Amiodarone for A fib and Ectopy.  Cangrelor for recent stent.  Heparin for anticoagulation.    ***Pulmonary***  Acute respiratory failure.  Wean ventilator as tolerated.    ***GI***  NPO for now.  Protonix for stress ulcer prophylaxis   Bowel regimen.  Trend LFTs, avoid hepatotoxins.    ***Renal***  VANDANA  - Trend Creatinine   Yap catheter for strict I/O measurements.  Bumex to achieve urine output.  Hypokalemia repleted.    ***ID***  Ancef for 48 hours fabby-cannulation.    ***Endocrine***  Hyperglycemia - Insulin infusion.    ***Hematology***  Thrombocytopenia - no current transfusion indication  Heparin for anticoagulation, trend PTT.  Trend CBC and coags and monitor for bleeding.        Care plan discussed with the ICU care team.   Patient remains critical, at risk for life threatening decompensation.    I have spent 65 minutes providing critical care management to this patient.      I, Tammie Scales MD, personally performed the services described in this documentation. I have reviewed the chart and agree that the record reflects my personal performance and is accurate and complete.  Electronically signed: Tammie Scales MD

## 2025-07-04 NOTE — PHYSICAL THERAPY INITIAL EVALUATION ADULT - PERTINENT HX OF CURRENT PROBLEM, REHAB EVAL
70 y/o Male w/PMHx Hypertension, hyperlipidemia, diabetes, CAD with stents, current smoker (2-3 PPD) brought in to [facility] by EMS from PCP's office status post cardiac arrest, ROSC achieved in 5-10 min.  Patient was in the lobby of his PCP when he was not feeling well, family told patient to sit down, and when came back with a wheelchair patient lost pulse.  Police showed up on scene and did 1 round of CPR, no meds were given.  ROSC achieved.  Patient brought in by EMS, altered, moaning, awake, unresponsive to questions on nonrebreather saturating in the 80s.  As per family patient has been feeling chest pain the past few days. On arrival to the ed, he was agitated and fighting restraints. unclear mental status otherwise, was moving his leg at the end of first round of ACLS. In the ED, pt lost his pulse, ACLS started, regained a pulse after 2 min. Patient was given etomidate, versed, and intubated. Then pt lost a pulse again, regained a pulse after another round of CPR. Patient started on epi gtt. pt had runs of VT, was given total of 2 epi, lido x2, amio 150 mg. Was given bicarb x2. Patient brought to cath lab, underwent LHC which showed LAD occlusion. Left femoral Impella CP inserted for support, s/p CURTIS to the LAD. Upgraded to VA ECMO (25Fr left femoral venous cannula, 17Fr right femoral arterial cannula with 6Fr distal reperfusion cannula) for continued cardiogenic shock after stent placement. Patient was then transferred to [facility] from [facility] for further care.    CXR: Catheters and tubes in place. No radiographic evidence of active chest disease. 68 y/o Male w/PMHx Hypertension, hyperlipidemia, diabetes, CAD with stents, current smoker (2-3 PPD) brought in to [facility] by EMS from PCP's office status post cardiac arrest, ROSC achieved in 5-10 min.  Patient was in the lobby of his PCP when he was not feeling well, family told patient to sit down, and when came back with a wheelchair patient lost pulse.  Police showed up on scene and did 1 round of CPR, no meds were given.  ROSC achieved.  Patient brought in by EMS, altered, moaning, awake, unresponsive to questions on nonrebreather saturating in the 80s.  As per family patient has been feeling chest pain the past few days. On arrival to the ed, he was agitated and fighting restraints. unclear mental status otherwise, was moving his leg at the end of first round of ACLS. In the ED, pt lost his pulse, ACLS started, regained a pulse after 2 min. Patient was given etomidate, versed, and intubated. Then pt lost a pulse again, regained a pulse after another round of CPR. Patient started on epi gtt. pt had runs of VT, was given total of 2 epi, lido x2, amio 150 mg. Was given bicarb x2. Patient brought to cath lab, underwent LHC which showed LAD occlusion. Left femoral Impella CP inserted for support, s/p CURTIS to the LAD. Upgraded to VA ECMO (25Fr left femoral venous cannula, 17Fr right femoral arterial cannula with 6Fr distal reperfusion cannula) for continued cardiogenic shock after stent placement. Patient was then transferred to [facility] from [facility] for further care.  s/p extubation 7/4.    CXR: Catheters and tubes in place. No radiographic evidence of active chest disease.

## 2025-07-04 NOTE — PROGRESS NOTE ADULT - SUBJECTIVE AND OBJECTIVE BOX
----------------------------------------------------------------------------------------------------  ECMO Daily Management Note   ----------------------------------------------------------------------------------------------------    68 yo M s/p STEMI and cardiac arrest presented to [facility] in shock.  Stent to LAD, Impella CP + VA ECMO   VA ECMO  for:  Refractory cardiogenic shock  Date of initiation: 7/3/25  Cannulae:  25Fr L Fem Venous,  19Fr R Fem Arterial with 6Fr Distal reperfusion catheter   Cannula sites examined, well-secured.    ----------------------------------------------------------------------------------------------------    Recent events:  Following commands  Cannula sites ok.    Plan:  Flows decreased  Wean Epinephrine.  Anticoagulation with Heparin infusion, goal PTT 40s    =============================================================  Weight (kg): 90 (07-03-25)        Height (cm): 168 (07-03-25)   BSA (m2): 2 (07-03-25)        BMI (kg/m2): 31.9 (07-03-25)    ECMO  RPM:  3300 (04 Jul 2025 11:00)      Pump Flow (Lpm):  3.29 (04 Jul 2025 11:00)              Sweep  (L/min):   2 (04 Jul 2025 11:00)       FiO2 (%):  1 (04 Jul 2025 11:00)  Pre-membrane -  Pressure (mm/Hg):   176 (04 Jul 2025 11:00)  03 Jul 2025 18:42  pH: 7.23  / pCO2: 48    /  pO2: 61    /  HCO3: 20    /   Base Excess: -7.4  / SvO2: 88.1       Post-membrane - Pressure (mm/Hg):  156 (04 Jul 2025 11:00)   ( 04 Jul 2025 08:15 )  pH: 7.38  /  pCO2: 53    / pO2: 452   /  HCO3: 31    /  Base Excess: 4.9   /  SaO2: 99.9           aMIOdarone Infusion 1 mG/Min IV Continuous <Continuous>  bumetanide Infusion 2 mG/Hr IV Continuous <Continuous>  EPINEPHrine    Infusion 0.05 MICROgram(s)/kG/Min IV Continuous <Continuous>  norepinephrine Infusion 0.05 MICROgram(s)/kG/Min IV Continuous <Continuous>    Vent Mode: CPAP with PS  FiO2: 40, PEEP: 10, PS: 12, MAP: 13, PIP: 25    (04 Jul 2025 11:25) pH, Art: 7.48/pCO2: 40/pO2: 254/HCO3: 30/BE: 5.8/SaO2: 99.5/LAC: 1.8, --   (04 Jul 2025 07:58) pH, Art: 7.50/pCO2: 38/pO2: 280/HCO3: 30/BE: 6.0/SaO2: 99.8/LAC: 1.7, --   (04 Jul 2025 04:01) pH, Art: 7.48/pCO2: 42/pO2: 387/HCO3: 31/BE: 7.1/SaO2: 100.0/LAC: 2.7, --   (04 Jul 2025 02:49) pH, Art: 7.46/pCO2: 43/pO2: 375/HCO3: 31/BE: 6.1/SaO2: 100.0/LAC: 3.5, --   (03 Jul 2025 23:55) pH, Art: 7.42/pCO2: 42/pO2: 363/HCO3: 27/BE: 2.4/SaO2: 100.0/LAC: 7.1, --     (04 Jul 2025 11:25) pH, Hitesh: 7.41/pCO2: 52/pO2: 43 /HCO3: 33/BE: 7.1/MVO2: /SvO2: 75.8/LAC: 1.9,   (04 Jul 2025 07:58) pH, Hitesh: 7.40/pCO2: 50/pO2: 49 /HCO3: 31/BE: 5.3/MVO2: /SvO2: 82.0/LAC: 1.7,   (04 Jul 2025 04:01) pH, Hitesh: 7.40/pCO2: 53/pO2: 61 /HCO3: 33/BE: 6.7/MVO2: /SvO2: 90.0/LAC: 2.8,   (03 Jul 2025 18:57) pH, Hitesh: 7.23/pCO2: 46/pO2: 77 /HCO3: 19/BE: -8.2/MVO2: /SvO2: 94.1/LAC: ,   (03 Jul 2025 13:48) pH, Hitesh: 7.01/pCO2: 55/pO2: 49 /HCO3: 14/BE: -17.3/MVO2: /SvO2: 61/LAC: 16.4,     ----------------------------------------------------------------------------------------------------  ANTICOAGULATION  cangrelor Infusion 0.75 MICROgram(s)/kG/Min IV Continuous <Continuous>  heparin  Infusion 300 Unit(s)/Hr IV Continuous <Continuous>  heparin 50 Units/mL (IMPELLA VAD) Purge Solution  Unit(s) Ventricular Assist Device <Continuous>    (04 Jul 2025 08:13)  aPTT: 52.9  Xa: 0.23  PT: -----  INR: -----   Fibrinogen: -----  Platelets: -----  (04 Jul 2025 05:00)  aPTT: 36.9  Xa: 0.10  PT: 20.4  INR: 1.78   Fibrinogen: -----  Platelets: -----  (04 Jul 2025 00:05)  aPTT: 24.0  Xa: <0.04  PT: 19.7  INR: 1.72   Fibrinogen: 315   Platelets: 115   (03 Jul 2025 18:45)  aPTT: 79.2  Xa: 0.47  PT: 24.8  INR: 2.17   Fibrinogen: 279   Platelets: 133     (04 Jul 2025 00:05)  Hemoglobin: 11.8    LDH: 1363    Haptoglobin: 90     PFH:  -----  (03 Jul 2025 18:49)  Hemoglobin: -----    LDH: -----    Haptoglobin: 161    PFH:  -----  (03 Jul 2025 18:45)  Hemoglobin: 12.3    LDH: 1311    Haptoglobin: -----   PFH:  -----    ----------------------------------------------------------------------------------------------------  Daily ECMO Checklist:   Progress report received from perfusionist   Circuit checked/inspected   Emergency equipment and supplies checked   Cannulation site inspection     I have reviewed all of the above information as well as pertinent labs/imaging/testing and care plan with the bedside nurse, perfusionist and care team members and provided my recommendations for support.   ECMO Management was separate from critical care billing time.

## 2025-07-04 NOTE — PHYSICAL THERAPY INITIAL EVALUATION ADULT - STRENGTHENING, PT EVAL
GOAL: Pt will improve b/l  (UE/LE as able) strength by at least 1/2 MMT grade within 2-3 weeks to assist with performing functional mobility and ADLs.

## 2025-07-04 NOTE — PHYSICAL THERAPY INITIAL EVALUATION ADULT - RANGE OF MOTION EXAMINATION, REHAB EVAL
b/l ankle wfl. Hip/knee n/a 2/2 femoral lines+ impella+ECMO./bilateral upper extremity ROM was WFL (within functional limits)

## 2025-07-04 NOTE — CONSULT NOTE ADULT - CRITICAL CARE ATTENDING COMMENT
69M PMHx CAD s/p dLAD stent 2015, poorly-controlled DM2, HTN, HLD, and active smoker p/w SCAI-E AMI-CS +OHCA. Per report, was experiencing chest pain for 1 week, went to PCP's office, had VT arrest in the waiting room, ROSC achieved after 1 cycle CPR, then recurrent VT arrests at OSH ED. Brought to OSH cath lab, RHC performed revealing low output, IABP placed and subsequently upgraded to Impella CP. Underwent pLAD stent x1 with post ROSA 1 flow, cannulated to percutaneous VA-ECMO for refractory shock. Intubated prior during cardiac arrest. Has non-oliguric ATN and shock liver. Overnight went into Afib/RVR.    Recommendations:  - VA-ECMO 3300rpm, 3.7lpm  - Impella CP for vent at P4  - wean epi as able given Afib  - amio load for Afib  - defer [place] for now  - cangrelor/hep drip, goal anti-Xa 0.2-0.3

## 2025-07-04 NOTE — CONSULT NOTE ADULT - ASSESSMENT
69-year-old male with PMHx of HTN, HLD, DM2, CAD with stents, current smoker presenting as transfer from [facility] s/p cardiac arrest x4 on VA ECMO in setting of LAD occlusion s/p DESx1. Nephrology consulted for non-oliguric VANDANA.

## 2025-07-04 NOTE — PHYSICAL THERAPY INITIAL EVALUATION ADULT - REFERRING PHYSICIAN, REHAB EVAL
PT Orders: PT Evaluate and treat PT Orders: PT Evaluate and treat- per team, pt cleared for bed level activity. normal...

## 2025-07-04 NOTE — CONSULT NOTE ADULT - NSCONSULTADDITIONALINFOA_GEN_ALL_CORE
Procedure: Impella CP VAD interrogation    Procedure Code: 33541  Interrogation of ventricular assist device (VAD), in person, with physician or other qualified health care professional analysis of device parameters (e.g., drivelines, alarms, power surges), review of device function (e.g., flow and volume status, septum status, recovery), with programming, if performed, and report    Performed by: Hansel Isaac MD    Procedure Details:  The Impella CP was interrogated at bedside.    Access Site:  The Impella sheath is angled appropriately.  The access site is clean, dry and intact.  There is no significant oozing or bleeding at the access site.    Device Alarms:  There were no suction alarms , positioning alarms or power surges noted.    Flow and Volume status:  Impella power level noted at P4 with flow rate of 1.7 L/min.  Examination of the LVEDP/CO trend showed LVEDP of 3 mmHg.  The total cardiac output was 5.8 L/min, with Impella 1.7 L/min and native 0.4 L/min.    Position and Septum Status:  On transthoracic echocardiography, the Impella CP inlet was 3.6 cm from the annulus of the aortic valve (appropriate position). The septum was noted to be midline. TTE showed LVEF 5-10 %.    Anticoagulation and Purge:  The patient was noted to have sub-therapeutic pTT on systemic heparin.  D5W + 12,500 u of heparin solution was running on the Impella purge.    Plan/Reprogramming:  Maintain Impella CP at P4.

## 2025-07-04 NOTE — CONSULT NOTE ADULT - ASSESSMENT
69-year-old male with PMHx of HTN, HLD, DM2, CAD with stents, current smoker presenting as transfer from [facility] s/p cardiac arrest x4. At [facility] underwent LHC which showed LAD occlusion. Left femoral Impella CP inserted for support, s/p CURTIS to the LAD. Upgraded to VA ECMO (25Fr left femoral venous cannula, 17Fr right femoral arterial cannula with 6Fr distal reperfusion cannula) for continued cardiogenic shock after stent placement. Patient was then transferred to [facility] from [facility] for further care    Currently in the CTICU on VA ECMO and Impella CP, P3.

## 2025-07-04 NOTE — PHYSICAL THERAPY INITIAL EVALUATION ADULT - LEVEL OF INDEPENDENCE, REHAB EVAL
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 He should be examined every year to make sure the testicles remain in the scrotum as he grows.   
Pt cleared only for bed level activity. pt with b/l femoral cath- ECMO+ impella./unable to perform

## 2025-07-04 NOTE — CONSULT NOTE ADULT - SUBJECTIVE AND OBJECTIVE BOX
Health system DIVISION OF KIDNEY DISEASES AND HYPERTENSION -- [phone]  -- INITIAL CONSULT NOTE  --------------------------------------------------------------------------------  HPI: 69-year-old male with PMHx of HTN, HLD, DM2, CAD with stents, current smoker presenting as transfer from [facility] s/p cardiac arrest x4 on VA ECMO in setting of LAD occlusion s/p DESx1. Nephrology consulted for non-oliguric VANDANA.     On review of [facility], SCr prior to [facility] admission was 1.48 (6/29/25) and 1.14 (8/26/24). SCr on admission 4.47 (7/3) and now 4.11 (7/4) on VA ECMO. U/A (7/4) cloudy, mod blood, trace LE, trace protein, 9 RBC. CTA (6/30) with complex L renal cyst and small R renal cysts.     Pt. seen and examined at bedside earlier today in CTU. Pt. is intubated and sedated. Hemodynamically unstable on epi gtt, VA ECMO. Bumex 2mg IVP and 2mg/hr gtt started ~5:30AM 7/4 AM for decreasing UOP. CVPs have remained 7-8 and UOP is 100cc/hr since bumex gtt initiated. Unable to obtain ROS due to current clinical status.         PAST HISTORY  --------------------------------------------------------------------------------  PAST MEDICAL & SURGICAL HISTORY:  Hypertension      CAD (coronary artery disease)      Hyperlipemia      Diabetes      S/P cardiac catheterization  1999        FAMILY HISTORY:    PAST SOCIAL HISTORY:    ALLERGIES & MEDICATIONS  --------------------------------------------------------------------------------  Allergies    No Known Allergies    Intolerances      Standing Inpatient Medications  aMIOdarone Infusion 1 mG/Min IV Continuous <Continuous>  bumetanide Infusion 2 mG/Hr IV Continuous <Continuous>  cangrelor Infusion 0.75 MICROgram(s)/kG/Min IV Continuous <Continuous>  ceFAZolin   IVPB 2000 milliGRAM(s) IV Intermittent every 12 hours  chlorhexidine 0.12% Liquid 15 milliLiter(s) Oral Mucosa every 12 hours  chlorhexidine 0.12% Liquid 15 milliLiter(s) Oral Mucosa two times a day  dexMEDEtomidine Infusion 0.2 MICROgram(s)/kG/Hr IV Continuous <Continuous>  dextrose 50% Injectable 50 milliLiter(s) IV Push every 15 minutes  dextrose 50% Injectable 25 milliLiter(s) IV Push every 15 minutes  EPINEPHrine    Infusion 0.05 MICROgram(s)/kG/Min IV Continuous <Continuous>  heparin  Infusion 300 Unit(s)/Hr IV Continuous <Continuous>  heparin 50 Units/mL (IMPELLA VAD) Purge Solution  Unit(s) Ventricular Assist Device <Continuous>  insulin regular Infusion 3 Unit(s)/Hr IV Continuous <Continuous>  norepinephrine Infusion 0.05 MICROgram(s)/kG/Min IV Continuous <Continuous>  pantoprazole  Injectable 40 milliGRAM(s) IV Push every 12 hours  propofol Infusion 30 MICROgram(s)/kG/Min IV Continuous <Continuous>  sodium chloride 0.9%. 1000 milliLiter(s) IV Continuous <Continuous>    PRN Inpatient Medications      REVIEW OF SYSTEMS  --------------------------------------------------------------------------------  Unable to obtain ROS due to current clinical status.     VITALS/PHYSICAL EXAM  --------------------------------------------------------------------------------  T(C): 36.3 (07-04-25 @ 08:00), Max: 36.5 (07-04-25 @ 00:00)  HR: 124 (07-04-25 @ 11:00) (37 - 150)  BP: 117/73 (07-03-25 @ 13:06) (110/93 - 136/100)  RR: 7 (07-04-25 @ 10:30) (4 - 31)  SpO2: 100% (07-04-25 @ 11:00) (78% - 100%)  Wt(kg): --  Height (cm): 168 (07-03-25 @ 18:28)  Weight (kg): 90 (07-03-25 @ 18:28)  BMI (kg/m2): 31.9 (07-03-25 @ 18:28)  BSA (m2): 2 (07-03-25 @ 18:28)      07-03-25 @ 07:01  -  07-04-25 @ 07:00  --------------------------------------------------------  IN: 2178.2 mL / OUT: 1200 mL / NET: 978.2 mL    07-04-25 @ 07:01  -  07-04-25 @ 11:24  --------------------------------------------------------  IN: 473.1 mL / OUT: 400 mL / NET: 73.1 mL      Physical Exam:  Gen: intubated/sedated  Pulm: decreased  CV: tachycardic  Abd: +BS, soft  : +raya with yellow urine  Extremities: no bilateral LE edema  Neuro: sedated  Skin: Warm  Vascular access: VA ecmo cannulation     LABS/STUDIES  --------------------------------------------------------------------------------              11.8   9.22  >-----------<  115      [07-04-25 @ 00:05]              33.4     140  |  94  |  99  ----------------------------<  257      [07-04-25 @ 00:05]  3.5   |  24  |  4.11        Ca     9.1     [07-04-25 @ 00:05]      iCa    0.88     [07-03 @ 18:46]      Mg     3.0     [07-04-25 @ 00:05]      Phos  3.9     [07-04-25 @ 00:05]    TPro  5.0  /  Alb  3.0  /  TBili  3.1  /  DBili  x   /  AST  340  /  ALT  318  /  AlkPhos  92  [07-04-25 @ 00:05]    PT/INR: PT 20.4 , INR 1.78       [07-04-25 @ 05:00]  PTT: 52.9       [07-04-25 @ 08:13]    LDH 1363      [07-04-25 @ 00:05]    Creatinine Trend:  SCr 4.11 [07-04 @ 00:05]  SCr 4.47 [07-03 @ 18:45]  SCr 1.48 [06-29 @ 23:15]    Urinalysis - [07-04-25 @ 10:05]      Color Yellow / Appearance Cloudy / SG 1.010 / pH 5.0      Gluc Negative / Ketone   / Bili Negative / Urobili 0.2       Blood Moderate / Protein Trace / Leuk Est Trace / Nitrite Negative      RBC 9 / WBC 4 / Hyaline  / Gran  / Sq Epi  / Non Sq Epi 11 / Bacteria Negative      TSH 2.73      [07-03-25 @ 19:21]  Lipid: chol 90, TG 96, HDL 31, LDL --      [07-03-25 @ 19:06]

## 2025-07-05 ENCOUNTER — RESULT REVIEW (OUTPATIENT)
Age: 70
End: 2025-07-05

## 2025-07-05 NOTE — PROGRESS NOTE ADULT - SUBJECTIVE AND OBJECTIVE BOX
----------------------------------------------------------------------------------------------------  ECMO Daily Management Note   ----------------------------------------------------------------------------------------------------  68 yo M s/p STEMI and cardiac arrest presented to [facility] in shock.  Stent to LAD, Impella CP + VA ECMO   VA ECMO  for:  Refractory cardiogenic shock  Date of initiation: 7/3/25  Cannulae:  25Fr L Fem Venous,  19Fr R Fem Arterial with 6Fr Distal reperfusion catheter   Cannula sites examined, well-secured.      Recent events:  Following commands  Cannula sites ok.    Plan:  Flows decreased  Wean Epinephrine.  Anticoagulation with Heparin infusion, goal PTT 40s        =============================================================  Weight (kg): 90 (07-03-25)        Height (cm): 168 (07-03-25)   BSA (m2): 2 (07-03-25)        BMI (kg/m2): 31.9 (07-03-25)    ECMO  RPM:  3000 (05 Jul 2025 09:00)      Pump Flow (Lpm):  3 (05 Jul 2025 09:00)              Sweep  (L/min):   2 (05 Jul 2025 09:00)       FiO2 (%):  1 (05 Jul 2025 09:00)  Pre-membrane -  Pressure (mm/Hg):   150 (05 Jul 2025 09:00)      Post-membrane - Pressure (mm/Hg):  137 (05 Jul 2025 09:00)   ( 05 Jul 2025 09:15 )  pH: 7.38  /  pCO2: 51    / pO2: 439   /  HCO3: 30    /  Base Excess: 3.9   /  SaO2: 100.0          aMIOdarone Infusion 0.5 mG/Min IV Continuous <Continuous>  bumetanide Infusion 1 mG/Hr IV Continuous <Continuous>  norepinephrine Infusion 0.05 MICROgram(s)/kG/Min IV Continuous <Continuous>    Vent Mode: CPAP with PS  FiO2: 60, PEEP: 6, PS: 8, MAP: 9, PIP: 17    (05 Jul 2025 09:02) pH, Art: 7.51/pCO2: 36/pO2: 125/HCO3: 29/BE: 5.5/SaO2: 99.6/LAC: 1.4, --   (05 Jul 2025 08:25) pH, Art: 7.45/pCO2: 42/pO2: 88/HCO3: 29/BE: 4.7/SaO2: 99.1/LAC: 1.3, --   (05 Jul 2025 08:07) pH, Art: 7.50/pCO2: 36/pO2: 99/HCO3: 28/BE: 4.8/SaO2: 99.1/LAC: 1.3, --   (05 Jul 2025 03:36) pH, Art: 7.47/pCO2: 39/pO2: 117/HCO3: 28/BE: 4.4/SaO2: 99.5/LAC: 1.6, --   (05 Jul 2025 00:00) pH, Art: 7.46/pCO2: 39/pO2: 144/HCO3: 28/BE: 3.6/SaO2: 100.0/LAC: 1.4, --     (05 Jul 2025 08:07) pH, Hitesh: 7.42/pCO2: 49/pO2: 38 /HCO3: 32/BE: 6.3/MVO2: /SvO2: 66.5/LAC: 1.5,   (05 Jul 2025 00:00) pH, Hitesh: 7.39/pCO2: 50/pO2: 48 /HCO3: 30/BE: 4.5/MVO2: /SvO2: 74.9/LAC: 1.5,   (04 Jul 2025 21:50) pH, Hitesh: 7.39/pCO2: 52/pO2: 43 /HCO3: 32/BE: 5.5/MVO2: /SvO2: 71.9/LAC: 1.7,   (04 Jul 2025 19:55) pH, Hitesh: 7.40/pCO2: 51/pO2: 46 /HCO3: 32/BE: 5.5/MVO2: /SvO2: 77.4/LAC: ,   (04 Jul 2025 16:03) pH, Hitesh: 7.40/pCO2: 48/pO2: 49 /HCO3: 30/BE: 4.1/MVO2: /SvO2: 78.5/LAC: 1.6,     ----------------------------------------------------------------------------------------------------  ANTICOAGULATION  cangrelor Infusion 0.6 MICROgram(s)/kG/Min IV Continuous <Continuous>  heparin  Infusion 300 Unit(s)/Hr IV Continuous <Continuous>  heparin 50 Units/mL (IMPELLA VAD) Purge Solution  Unit(s) Ventricular Assist Device <Continuous>    (05 Jul 2025 08:09)  aPTT: 33.7  Xa: 0.10  PT: -----  INR: -----   Fibrinogen: -----  Platelets: -----  (05 Jul 2025 05:12)  aPTT: 39.7  Xa: 0.13  PT: 17.1  INR: 1.51   Fibrinogen: -----  Platelets: -----  (05 Jul 2025 00:10)  aPTT: 71.0  Xa: 0.29  PT: 19.3  INR: 1.70   Fibrinogen: 292   Platelets: 42    (04 Jul 2025 20:02)  aPTT: 75.6  Xa: 0.40  PT: 19.8  INR: 1.75   Fibrinogen: -----  Platelets: -----    (05 Jul 2025 00:10)  Hemoglobin: 10.5    LDH: 1129    Haptoglobin: -----   PFH:  -----  (04 Jul 2025 17:37)  Hemoglobin: 10.7    LDH: -----    Haptoglobin: -----   PFH:  -----  (04 Jul 2025 12:22)  Hemoglobin: -----    LDH: -----    Haptoglobin: <20    PFH:  -----  (04 Jul 2025 12:11)  Hemoglobin: 10.5    LDH: 1293    Haptoglobin: -----   PFH:  -----    ----------------------------------------------------------------------------------------------------  Daily ECMO Checklist:   Progress report received from perfusionist   Circuit checked/inspected   Emergency equipment and supplies checked   Cannulation site inspection     I have reviewed all of the above information as well as pertinent labs/imaging/testing and care plan with the bedside nurse, perfusionist and care team members and provided my recommendations for support.   ECMO Management was separate from critical care billing time.     ----------------------------------------------------------------------------------------------------  ECMO Daily Management Note   ----------------------------------------------------------------------------------------------------  70 yo M s/p STEMI and cardiac arrest presented to [facility] in shock.  Stent to LAD, Impella CP + VA ECMO   VA ECMO  for:  Refractory cardiogenic shock  Date of initiation: 7/3/25  Cannulae:  25Fr L Fem Venous,  19Fr R Fem Arterial with 6Fr Distal reperfusion catheter   Cannula sites examined, well-secured.      Recent events:  Cannula sites ok  On cangrelol, Heparin Purge and systemic    Plan:  Flows decreased  Extubated, off Epi    =============================================================  Weight (kg): 90 (07-03-25)        Height (cm): 168 (07-03-25)   BSA (m2): 2 (07-03-25)        BMI (kg/m2): 31.9 (07-03-25)    ECMO  RPM:  3000 (05 Jul 2025 09:00)      Pump Flow (Lpm):  3 (05 Jul 2025 09:00)              Sweep  (L/min):   2 (05 Jul 2025 09:00)       FiO2 (%):  1 (05 Jul 2025 09:00)  Pre-membrane -  Pressure (mm/Hg):   150 (05 Jul 2025 09:00)      Post-membrane - Pressure (mm/Hg):  137 (05 Jul 2025 09:00)   ( 05 Jul 2025 09:15 )  pH: 7.38  /  pCO2: 51    / pO2: 439   /  HCO3: 30    /  Base Excess: 3.9   /  SaO2: 100.0          aMIOdarone Infusion 0.5 mG/Min IV Continuous <Continuous>  bumetanide Infusion 1 mG/Hr IV Continuous <Continuous>  norepinephrine Infusion 0.05 MICROgram(s)/kG/Min IV Continuous <Continuous>    Vent Mode: CPAP with PS  FiO2: 60, PEEP: 6, PS: 8, MAP: 9, PIP: 17    (05 Jul 2025 09:02) pH, Art: 7.51/pCO2: 36/pO2: 125/HCO3: 29/BE: 5.5/SaO2: 99.6/LAC: 1.4, --   (05 Jul 2025 08:25) pH, Art: 7.45/pCO2: 42/pO2: 88/HCO3: 29/BE: 4.7/SaO2: 99.1/LAC: 1.3, --   (05 Jul 2025 08:07) pH, Art: 7.50/pCO2: 36/pO2: 99/HCO3: 28/BE: 4.8/SaO2: 99.1/LAC: 1.3, --   (05 Jul 2025 03:36) pH, Art: 7.47/pCO2: 39/pO2: 117/HCO3: 28/BE: 4.4/SaO2: 99.5/LAC: 1.6, --   (05 Jul 2025 00:00) pH, Art: 7.46/pCO2: 39/pO2: 144/HCO3: 28/BE: 3.6/SaO2: 100.0/LAC: 1.4, --     (05 Jul 2025 08:07) pH, Hitesh: 7.42/pCO2: 49/pO2: 38 /HCO3: 32/BE: 6.3/MVO2: /SvO2: 66.5/LAC: 1.5,   (05 Jul 2025 00:00) pH, Hitesh: 7.39/pCO2: 50/pO2: 48 /HCO3: 30/BE: 4.5/MVO2: /SvO2: 74.9/LAC: 1.5,   (04 Jul 2025 21:50) pH, Hitesh: 7.39/pCO2: 52/pO2: 43 /HCO3: 32/BE: 5.5/MVO2: /SvO2: 71.9/LAC: 1.7,   (04 Jul 2025 19:55) pH, Hitesh: 7.40/pCO2: 51/pO2: 46 /HCO3: 32/BE: 5.5/MVO2: /SvO2: 77.4/LAC: ,   (04 Jul 2025 16:03) pH, Hitesh: 7.40/pCO2: 48/pO2: 49 /HCO3: 30/BE: 4.1/MVO2: /SvO2: 78.5/LAC: 1.6,     ----------------------------------------------------------------------------------------------------  ANTICOAGULATION  cangrelor Infusion 0.6 MICROgram(s)/kG/Min IV Continuous <Continuous>  heparin  Infusion 300 Unit(s)/Hr IV Continuous <Continuous>  heparin 50 Units/mL (IMPELLA VAD) Purge Solution  Unit(s) Ventricular Assist Device <Continuous>    (05 Jul 2025 08:09)  aPTT: 33.7  Xa: 0.10  PT: -----  INR: -----   Fibrinogen: -----  Platelets: -----  (05 Jul 2025 05:12)  aPTT: 39.7  Xa: 0.13  PT: 17.1  INR: 1.51   Fibrinogen: -----  Platelets: -----  (05 Jul 2025 00:10)  aPTT: 71.0  Xa: 0.29  PT: 19.3  INR: 1.70   Fibrinogen: 292   Platelets: 42    (04 Jul 2025 20:02)  aPTT: 75.6  Xa: 0.40  PT: 19.8  INR: 1.75   Fibrinogen: -----  Platelets: -----    (05 Jul 2025 00:10)  Hemoglobin: 10.5    LDH: 1129    Haptoglobin: -----   PFH:  -----  (04 Jul 2025 17:37)  Hemoglobin: 10.7    LDH: -----    Haptoglobin: -----   PFH:  -----  (04 Jul 2025 12:22)  Hemoglobin: -----    LDH: -----    Haptoglobin: <20    PFH:  -----  (04 Jul 2025 12:11)  Hemoglobin: 10.5    LDH: 1293    Haptoglobin: -----   PFH:  -----    ----------------------------------------------------------------------------------------------------  Daily ECMO Checklist:   Progress report received from perfusionist   Circuit checked/inspected   Emergency equipment and supplies checked   Cannulation site inspection     I have reviewed all of the above information as well as pertinent labs/imaging/testing and care plan with the bedside nurse, perfusionist and care team members and provided my recommendations for support.   ECMO Management was separate from critical care billing time.

## 2025-07-05 NOTE — PROGRESS NOTE ADULT - SUBJECTIVE AND OBJECTIVE BOX
[facility] Division of Kidney Diseases & Hypertension  FOLLOW UP NOTE  [phone]--------------------------------------------------------------------------------  Chief Complaint: VANDANA     24 hour events/subjective: Pt. seen and examined at bedside earlier today in CTU prior to extubation. Awake, minimally sedated and intubated, following commands. Remains on epi gtt, and bumex gtt 2mg/hr.     PAST HISTORY  --------------------------------------------------------------------------------  No significant changes to PMH, PSH, FHx, SHx from H&P unless otherwise noted.    ALLERGIES & MEDICATIONS  --------------------------------------------------------------------------------  Allergies    No Known Allergies    Intolerances      Standing Inpatient Medications  aMIOdarone Infusion 0.5 mG/Min IV Continuous <Continuous>  bumetanide Infusion 1 mG/Hr IV Continuous <Continuous>  cangrelor Infusion 0.6 MICROgram(s)/kG/Min IV Continuous <Continuous>  ceFAZolin   IVPB 2000 milliGRAM(s) IV Intermittent every 12 hours  chlorhexidine 2% Cloths 1 Application(s) Topical <User Schedule>  dexMEDEtomidine Infusion 0.5 MICROgram(s)/kG/Hr IV Continuous <Continuous>  dextrose 50% Injectable 50 milliLiter(s) IV Push every 15 minutes  dextrose 50% Injectable 25 milliLiter(s) IV Push every 15 minutes  heparin  Infusion 300 Unit(s)/Hr IV Continuous <Continuous>  heparin 50 Units/mL (IMPELLA VAD) Purge Solution  Unit(s) Ventricular Assist Device <Continuous>  insulin regular Infusion 3 Unit(s)/Hr IV Continuous <Continuous>  norepinephrine Infusion 0.05 MICROgram(s)/kG/Min IV Continuous <Continuous>  OLANZapine Disintegrating Tablet 5 milliGRAM(s) Oral once  pantoprazole  Injectable 40 milliGRAM(s) IV Push every 12 hours  potassium chloride  10 mEq/50 mL IVPB 10 milliEquivalent(s) IV Intermittent every 1 hour  sodium chloride 0.9%. 1000 milliLiter(s) IV Continuous <Continuous>    PRN Inpatient Medications      REVIEW OF SYSTEMS  --------------------------------------------------------------------------------  Unable to obtain ROS due to current clinical status.     VITALS/PHYSICAL EXAM  --------------------------------------------------------------------------------  T(C): 36.3 (07-05-25 @ 08:00), Max: 36.5 (07-04-25 @ 20:00)  HR: 103 (07-05-25 @ 10:00) (102 - 133)  BP: --  RR: 20 (07-05-25 @ 10:00) (3 - 33)  SpO2: 97% (07-05-25 @ 09:52) (81% - 100%)  Wt(kg): --  Height (cm): 168 (07-03-25 @ 18:28)  Weight (kg): 90 (07-03-25 @ 18:28)  BMI (kg/m2): 31.9 (07-03-25 @ 18:28)  BSA (m2): 2 (07-03-25 @ 18:28)      07-04-25 @ 07:01  -  07-05-25 @ 07:00  --------------------------------------------------------  IN: 3132.3 mL / OUT: 4025 mL / NET: -892.7 mL    07-05-25 @ 07:01  -  07-05-25 @ 10:41  --------------------------------------------------------  IN: 465.5 mL / OUT: 675 mL / NET: -209.5 mL      Physical Exam:  Gen: intubated, minimally sedated.  Pulm: decreased  CV: tachycardic  Abd: +BS, soft  : +raya with yellow urine  Extremities: no bilateral LE edema  Neuro: following commands   Skin: Warm  Vascular access: VA ecmo cannulation     LABS/STUDIES  --------------------------------------------------------------------------------              10.5   7.65  >-----------<  42       [07-05-25 @ 00:10]              29.8     137  |  96  |  104  ----------------------------<  95      [07-05-25 @ 00:10]  4.2   |  25  |  4.82        Ca     8.8     [07-05-25 @ 00:10]      iCa    0.88     [07-03 @ 18:46]      Mg     3.2     [07-05-25 @ 00:10]      Phos  4.5     [07-05-25 @ 00:10]    TPro  4.9  /  Alb  3.3  /  TBili  5.3  /  DBili  x   /  AST  158  /  ALT  225  /  AlkPhos  83  [07-05-25 @ 00:10]    PT/INR: PT 17.1 , INR 1.51       [07-05-25 @ 05:12]  PTT: 33.7       [07-05-25 @ 08:09]    LDH 1129      [07-05-25 @ 00:10]    Creatinine Trend:  SCr 4.82 [07-05 @ 00:10]  SCr 4.42 [07-04 @ 12:11]  SCr 4.11 [07-04 @ 00:05]  SCr 4.47 [07-03 @ 18:45]  SCr 1.48 [06-29 @ 23:15]    Urinalysis - [07-05-25 @ 00:10]      Color  / Appearance  / SG  / pH       Gluc 95 / Ketone   / Bili  / Urobili        Blood  / Protein  / Leuk Est  / Nitrite       RBC  / WBC  / Hyaline  / Gran  / Sq Epi  / Non Sq Epi  / Bacteria       TSH 2.73      [07-03-25 @ 19:21]  Lipid: chol 90, TG 96, HDL 31, LDL --      [07-03-25 @ 19:06]

## 2025-07-05 NOTE — PROGRESS NOTE ADULT - SUBJECTIVE AND OBJECTIVE BOX
Patient seen and examined at the bedside.    Remains critically ill on continuous ICU monitoring, at risk for life threatening decompensation.  All Labs, data reviewed. Plan of care discussed in length during multi-disciplinary ICU rounds.       Brief Summary:  *[age] yo M/F with pre op dx s/p surgery on XX/YY*      24 Hour events:      Objective:  Vital Signs Last 24 Hrs  T(C): 36.3 (05 Jul 2025 08:00), Max: 36.5 (04 Jul 2025 20:00)  T(F): 97.3 (05 Jul 2025 08:00), Max: 97.7 (04 Jul 2025 20:00)  HR: 115 (05 Jul 2025 07:15) (102 - 133)  BP: --  BP(mean): --  RR: 25 (05 Jul 2025 07:15) (3 - 33)  SpO2: 98% (05 Jul 2025 07:15) (78% - 100%)    Parameters below as of 05 Jul 2025 08:00  Patient On (Oxygen Delivery Method): ventilator    O2 Concentration (%): 40    Mode: AC/ CMV (Assist Control/ Continuous Mandatory Ventilation)  RR (machine): 12  FiO2: 40  PEEP: 6  ITime: 1  MAP: 9  PC: 10  PIP: 16              Physical Exam:   General: Alert and interactive   Neurology: Oriented, following commands  Respiratory: Clear bilaterally   CV: Sinus   *If on ECMO/LVAD Add settings+ AC Therapy*  Abdominal: Soft, Nontender  Extremities: Warm, well-perfused  -------------------------------------------------------------------------------------------------------------------------------    Labs:                        10.5   7.65  )-----------( 42       ( 05 Jul 2025 00:10 )             29.8     07-05    137  |  96  |  104[H]  ----------------------------<  95  4.2   |  25  |  4.82[H]    Ca    8.8      05 Jul 2025 00:10  Phos  4.5     07-05  Mg     3.2     07-05    TPro  4.9[L]  /  Alb  3.3  /  TBili  5.3[H]  /  DBili  x   /  AST  158[H]  /  ALT  225[H]  /  AlkPhos  83  07-05    LIVER FUNCTIONS - ( 05 Jul 2025 00:10 )  Alb: 3.3 g/dL / Pro: 4.9 g/dL / ALK PHOS: 83 U/L / ALT: 225 U/L / AST: 158 U/L / GGT: x           PT/INR - ( 05 Jul 2025 05:12 )   PT: 17.1 sec;   INR: 1.51 ratio         PTT - ( 05 Jul 2025 05:12 )  PTT:39.7 sec  ABG - ( 05 Jul 2025 03:36 )  pH, Arterial: 7.47  pH, Blood: x     /  pCO2: 39    /  pO2: 117   / HCO3: 28    / Base Excess: 4.4   /  SaO2: 99.5                  ALL MEDICATIONS   MEDICATIONS  (STANDING):  aMIOdarone Infusion 0.5 mG/Min (16.7 mL/Hr) IV Continuous <Continuous>  bumetanide Infusion 2 mG/Hr (10 mL/Hr) IV Continuous <Continuous>  cangrelor Infusion 0.75 MICROgram(s)/kG/Min (20.3 mL/Hr) IV Continuous <Continuous>  ceFAZolin   IVPB 2000 milliGRAM(s) IV Intermittent every 12 hours  chlorhexidine 0.12% Liquid 15 milliLiter(s) Oral Mucosa every 12 hours  chlorhexidine 2% Cloths 1 Application(s) Topical <User Schedule>  dexMEDEtomidine Infusion 0.2 MICROgram(s)/kG/Hr (4.5 mL/Hr) IV Continuous <Continuous>  dextrose 50% Injectable 50 milliLiter(s) IV Push every 15 minutes  dextrose 50% Injectable 25 milliLiter(s) IV Push every 15 minutes  EPINEPHrine    Infusion 0.01 MICROgram(s)/kG/Min (1.69 mL/Hr) IV Continuous <Continuous>  heparin 25 Units/mL (IMPELLA VAD) Purge Solution  Unit(s) (10 mL/Hr) Ventricular Assist Device <Continuous>  insulin regular Infusion 3 Unit(s)/Hr (3 mL/Hr) IV Continuous <Continuous>  norepinephrine Infusion 0.05 MICROgram(s)/kG/Min (8.44 mL/Hr) IV Continuous <Continuous>  pantoprazole  Injectable 40 milliGRAM(s) IV Push every 12 hours  propofol Infusion 30 MICROgram(s)/kG/Min (16.2 mL/Hr) IV Continuous <Continuous>  sodium chloride 0.9%. 1000 milliLiter(s) (10 mL/Hr) IV Continuous <Continuous>    MEDICATIONS  (PRN):    ------------------------------------------------------------------------------------------------------------------------------  Assessment:    At risk for delirium   At risk for hemodynamic decompensation  At high risk for cardiac arrythmias   At high risk for respiratory complications        Plan:   ***Neuro***  Maintain day/night cycle to prevenct ICU delerium   Postoperative acute pain control with Tylenol, Gabapentin, and prns.  Meds for sleep at bedtime     ***Cardiovascular***  At high risk for hemodynamic instability and cardiac arrhythmias.  Monitor for sign of hypoperfusion, trend lactate  *If on ECMO/LVAD/Impella/IABP* Mechanical circulatory support   Maintain MAPs > 65 mm Hg. Titrate *pressor name*    Titrate *inotrope name* Keep CVP<10.  *Add other cardiac meds*  Monitor chest tube output.      ***Pulmonary***  *Leave black if supplemental O2*  Postoperative acute respiratory insufficiency/failure  Wean ventilator as tolerated. Lung protective strategy  *If extubated* Deep breathing and coughing exercises, IS, Mobilization, nebs, Chest PT  Wean oxygen as able. *Remove if not on vent*      ***GI***  FEES Eval *Only if consulted*  Keep NPO for now.  Continue Tube feeds *if on Tfs*  Protonix/Pepcid for stress ulcer prophylaxis   Bowel regimen.   Trend LFTs    ***Renal***  VANDANA / CKD/ ESRD on HD ***Remove if not applicable***  Trend Creatinine/BUN  Yap catheter for strict I/O measurements. *remove if not present*  Replete electrolytes as indicated.  Diuresis *Specifiy med*      ***ID***  Leukocytosis *above 11*  Leukopenia *Below 3*  *If no abxs*  Monitor off antibiotics   *summarize abx/indication*  Secondary prophylaxis *Leave blank*     ***Endocrine***  Insulin per protocol for Hyperglycemia     ***Hematology***  Acute blood loss anemia and thrombocytopenia   no current transfusion indication *change if recieved products, or if no thrombocytopenia*  Heparin/Argatroban/Lovenox for anticoagulation/ DVT prophylaxis         IConnie MD, personally performed the services described in this documentation. all medical record entries made by the scribe were at my direction and in my presence. I have reviewed the chart and agree that the record reflects my personal performance and is accurate and complete  Electronically signed:   Connie Anderson MD  CT ICU attending     ICU time: ** mins     By signing my name below, I, Nadeem Yanes, attest that this documentation has been prepared under the direction and in the presence of Connie Anderson MD  Electronically signed: Nadeem Yanes, 07-05-25 @ 07:32

## 2025-07-05 NOTE — PROGRESS NOTE ADULT - PROBLEM SELECTOR PLAN 1
VANDANA likely hemodynamically mediated iso of refractory cardiogenic shock/arrests and ALKA from CTAs + C s/p CURTIS. [facility] SC[facility] prior to [facility] admission was 1.48 (6/29/25) and 1.14 (8/26/24). SCr on admission 4.47 (7/3) and now 4.82 (7/5) on VA ECMO (previously with IABP then Impella). U/A (7/4) cloudy, mod blood, trace LE, trace protein, 9 RBC. CTA (6/30) with complex L renal cyst and small R renal cysts. Raya catheter in place, bumex  2mg/hr gtt started ~5:30AM 7/4 AM for decreasing UOP. CVPs have remained 6-8 and UOP is 100-300cc/hr since bumex gtt initiated. Net negative -812 cc in 24 hours. Hemodynamically unstable on epi gtt, VA ECMO.    - Continue with bumex gtt 2mg/hr to maintain UOP.  - No current urgent indication for RRT at this time from volume or electrolyte perspective. Repeat CBC/CMP and volume status eval in PM to reassess.   - Maintain raya catheter   - Monitor labs and I/Os. Avoid nephrotoxins including, ACE/ARB, NSAIDs, contrast, etc.    If you have any questions, please feel free to contact me:  Lola Faustin MD PGY-5  Nephrology Chief Fellow  [employer] Teams (Preferred)/ Pager [phone]   (After 5pm or on weekends please page the on-call fellow).

## 2025-07-05 NOTE — PROGRESS NOTE ADULT - ATTENDING COMMENTS
VANDANA-ATN in the setting of cardiac arrest, now on ECMO   Urinating well on Bumex gtt- 100 cc/hr   Does not need renal replacement therapy from either a clearance or volume standpoint. Creatinine may go up ( peaks before it gets better) but if electrolytes/ volume status remain okay, there would be no indication to dialyze.    rosita floyd  nephrology attending   please contact me on TEAMS   Office- [phone]

## 2025-07-05 NOTE — PROGRESS NOTE ADULT - ASSESSMENT
69-year-old male with PMHx of HTN, HLD, DM2, CAD with stents, current smoker presenting as transfer from [facility] s/p cardiac arrest x4. At [facility] underwent LHC which showed LAD occlusion. Left femoral Impella CP inserted for support, s/p CURTIS to the LAD. Upgraded to VA ECMO (25Fr left femoral venous cannula, 17Fr right femoral arterial cannula with 6Fr distal reperfusion cannula) for continued cardiogenic shock after stent placement. Patient was then transferred to [facility] from [facility] for further care    Currently in the CTICU on VA ECMO and Impella CP, P3.

## 2025-07-05 NOTE — PROGRESS NOTE ADULT - ASSESSMENT
69-year-old male with PMHx of HTN, HLD, DM2, CAD with stents, current smoker presenting as transfer from [facility] s/p cardiac arrest x4 on VA ECMO in setting of LAD occlusion s/p DESx1. Nephrology consulted for non-oliguric VANDANA.

## 2025-07-05 NOTE — PROGRESS NOTE ADULT - PROBLEM SELECTOR PLAN 1
Currently on VA-ECMO and Impella CP . Now extubated  - Currently on Epinephrine  - Now off levo  - Currently on Amiodarone  - remains on Bumex @2mg/hr; Agree with reducing today. Diuresis for goal net negative balance  - Trend perfusion markers

## 2025-07-05 NOTE — PROGRESS NOTE ADULT - SUBJECTIVE AND OBJECTIVE BOX
Subjective:  - Now extubated,   - hypotensive remains on Epinephrine    Medications:  aMIOdarone Infusion 0.5 mG/Min IV Continuous <Continuous>  bumetanide Infusion 1 mG/Hr IV Continuous <Continuous>  cangrelor Infusion 0.6 MICROgram(s)/kG/Min IV Continuous <Continuous>  ceFAZolin   IVPB 2000 milliGRAM(s) IV Intermittent every 12 hours  chlorhexidine 2% Cloths 1 Application(s) Topical <User Schedule>  dexMEDEtomidine Infusion 0.5 MICROgram(s)/kG/Hr IV Continuous <Continuous>  dextrose 50% Injectable 50 milliLiter(s) IV Push every 15 minutes  dextrose 50% Injectable 25 milliLiter(s) IV Push every 15 minutes  heparin  Infusion 300 Unit(s)/Hr IV Continuous <Continuous>  heparin 50 Units/mL (IMPELLA VAD) Purge Solution  Unit(s) Ventricular Assist Device <Continuous>  HYDROmorphone  Injectable 0.5 milliGRAM(s) IV Push once  insulin regular Infusion 3 Unit(s)/Hr IV Continuous <Continuous>  norepinephrine Infusion 0.05 MICROgram(s)/kG/Min IV Continuous <Continuous>  oxymetazoline 0.05% Nasal Spray 1 Spray(s) Both Nostrils once  pantoprazole  Injectable 40 milliGRAM(s) IV Push every 12 hours  sodium chloride 0.9%. 1000 milliLiter(s) IV Continuous <Continuous>      Physical Exam:    Vitals:  Vital Signs Last 24 Hours  T(C): 36.3 (25 @ 12:00), Max: 36.5 (25 @ 20:00)  HR: 101 (25 @ 12:00) (100 - 133)  MAP: 70's  RR: 30 (25 @ 12:00) (5 - 33)  SpO2: 100% (25 @ 12:00) (81% - 100%)    Weight in k.4 ( @ 00:00)    I&O's Summary    2025 07:  -  2025 07:00  --------------------------------------------------------  IN: 3132.3 mL / OUT: 4025 mL / NET: -892.7 mL    2025 07:01  -  2025 12:17  --------------------------------------------------------  IN: 676.9 mL / OUT: 1300 mL / NET: -623.1 mL    Tele:     General: No distress. Comfortable.  HEENT: EOM intact.  Neck: CVP 9  Chest: Clear to auscultation bilaterally  CV: Normal S1 and S2. No murmurs, rub, or gallops. Radial pulses normal, warm peripherally  Abdomen: Soft, non-distended, non-tender  Skin: No rashes or skin breakdown  Extremities: No LE edema  Neurology: Alert to person   Psych: Affect normal    Labs:                        10.5   7.65  )-----------( 42       ( 2025 00:10 )             29.8         137  |  96  |  104[H]  ----------------------------<  95  4.2   |  25  |  4.82[H]    Ca    8.8      2025 00:10  Phos  4.5       Mg     3.2         TPro  4.9[L]  /  Alb  3.3  /  TBili  5.3[H]  /  DBili  x   /  AST  158[H]  /  ALT  225[H]  /  AlkPhos  83    PT/INR - ( 2025 05:12 )   PT: 17.1 sec;   INR: 1.51 ratio    PTT - ( 2025 08:09 )  PTT:33.7 sec  CARDIAC MARKERS ( 2025 19:08 )  x     / x     / x     / x     / 46.7 ng/mL  Lactate Dehydrogenase, Serum: 1129 U/L ( @ 00:10)  Lactate Dehydrogenase, Serum: 1293 U/L ( @ 12:11)  Lactate Dehydrogenase, Serum: 1363 U/L ( @ 00:05)  Lactate Dehydrogenase, Serum: 1311 U/L ( @ 18:45)

## 2025-07-05 NOTE — PROGRESS NOTE ADULT - SUBJECTIVE AND OBJECTIVE BOX
Patient seen and examined at the bedside.    Remains critically ill on continuous ICU monitoring, at risk for life threatening decompensation.  All Labs, data reviewed. Plan of care discussed in length during multi-disciplinary ICU rounds.       Brief Summary:  70 yo M presented 7/3/25 to [facility] with STEMI and cardiac arrest.  IABP -> Impella CP,  LAD stented.  Up[facility] to [facility] and transferred to [facility]     Recent events:  Amiodarone added for ectopy / a fib.  On Epinephrine.  Following commands when sedation is lightened.  On Bumex infusion.        Objective:  Vital Signs Last 24 Hrs  T(C): 36.3 (05 Jul 2025 08:00), Max: 36.5 (04 Jul 2025 20:00)  T(F): 97.3 (05 Jul 2025 08:00), Max: 97.7 (04 Jul 2025 20:00)  HR: 109 (05 Jul 2025 09:00) (102 - 133)  BP: --  BP(mean): --  RR: 16 (05 Jul 2025 09:00) (3 - 33)  SpO2: 99% (05 Jul 2025 09:00) (81% - 100%)    Parameters below as of 05 Jul 2025 09:04    O2 Flow (L/min): 40  O2 Concentration (%): 60    Mode: CPAP with PS  FiO2: 60  PEEP: 6  PS: 8  ITime: 1  MAP: 9  PIP: 17              Physical Exam:   General: Alert and interactive   Neurology: Oriented, following commands  Respiratory: Clear bilaterally   CV: Irregular  Abdominal: Soft, Nontender  Extremities: Warm, well-perfused  Yap  -------------------------------------------------------------------------------------------------------------------------------    Labs:                        10.5   7.65  )-----------( 42       ( 05 Jul 2025 00:10 )             29.8     07-05    137  |  96  |  104[H]  ----------------------------<  95  4.2   |  25  |  4.82[H]    Ca    8.8      05 Jul 2025 00:10  Phos  4.5     07-05  Mg     3.2     07-05    TPro  4.9[L]  /  Alb  3.3  /  TBili  5.3[H]  /  DBili  x   /  AST  158[H]  /  ALT  225[H]  /  AlkPhos  83  07-05    LIVER FUNCTIONS - ( 05 Jul 2025 00:10 )  Alb: 3.3 g/dL / Pro: 4.9 g/dL / ALK PHOS: 83 U/L / ALT: 225 U/L / AST: 158 U/L / GGT: x           PT/INR - ( 05 Jul 2025 05:12 )   PT: 17.1 sec;   INR: 1.51 ratio         PTT - ( 05 Jul 2025 08:09 )  PTT:33.7 sec  ABG - ( 05 Jul 2025 09:02 )  pH, Arterial: 7.51  pH, Blood: x     /  pCO2: 36    /  pO2: 125   / HCO3: 29    / Base Excess: 5.5   /  SaO2: 99.6      ALL MEDICATIONS   MEDICATIONS  (STANDING):  aMIOdarone Infusion 0.5 mG/Min (16.7 mL/Hr) IV Continuous <Continuous>  bumetanide Infusion 1 mG/Hr (5 mL/Hr) IV Continuous <Continuous>  cangrelor Infusion 0.6 MICROgram(s)/kG/Min (16.2 mL/Hr) IV Continuous <Continuous>  ceFAZolin   IVPB 2000 milliGRAM(s) IV Intermittent every 12 hours  chlorhexidine 2% Cloths 1 Application(s) Topical <User Schedule>  dexMEDEtomidine Infusion 0.5 MICROgram(s)/kG/Hr (11.3 mL/Hr) IV Continuous <Continuous>  dextrose 50% Injectable 50 milliLiter(s) IV Push every 15 minutes  dextrose 50% Injectable 25 milliLiter(s) IV Push every 15 minutes  heparin  Infusion 300 Unit(s)/Hr (3 mL/Hr) IV Continuous <Continuous>  heparin 50 Units/mL (IMPELLA VAD) Purge Solution  Unit(s) (10 mL/Hr) Ventricular Assist Device <Continuous>  insulin regular Infusion 3 Unit(s)/Hr (3 mL/Hr) IV Continuous <Continuous>  norepinephrine Infusion 0.05 MICROgram(s)/kG/Min (8.44 mL/Hr) IV Continuous <Continuous>  OLANZapine Disintegrating Tablet 5 milliGRAM(s) Oral once  pantoprazole  Injectable 40 milliGRAM(s) IV Push every 12 hours  potassium chloride  10 mEq/50 mL IVPB 10 milliEquivalent(s) IV Intermittent every 1 hour  sodium chloride 0.9%. 1000 milliLiter(s) (10 mL/Hr) IV Continuous <Continuous>    MEDICATIONS  (PRN):    ------------------------------------------------------------------------------------------------------------------------------  Assessment:  70 yo M presented 7/3/25 to [facility] with STEMI and cardiac arrest.  IABP -> Impella CP,  LAD stented.  Upgraded to [facility] ECMO and transferred to [facility]     STEMI  Cardiogenic shock  Acute respiratory failure  Thrombocytopenia  Hyperglycemia  Hypokalemia  VANDANA  Shock liver, Hyperbilirubinemia      Plan:   ***Neuro***  Sedation with Precedex as needed while intubated.  prns for discomfort.  Neuro exam is favorable.    ***Cardiovascular***  Epinephrine weaned  Titration of Norepinephrine maintain MAPs  Amiodarone for A fib and Ectopy.  Cangrelor for recent stent.  Heparin for anticoagulation.    ***Pulmonary***  Acute respiratory failure.  Wean ventilator as tolerated.    ***GI***  NPO for now.  Protonix for stress ulcer prophylaxis   Bowel regimen.  Trend LFTs, avoid hepatotoxins.    ***Renal***  VANDANA  - Trend Creatinine   Yap catheter for strict I/O measurements.  Bumex to achieve urine output.  Hypokalemia repleted.    ***ID***  Ancef for 48 hours fabby-cannulation.    ***Endocrine***  Hyperglycemia - Insulin infusion.    ***Hematology***  Thrombocytopenia - no current transfusion indication  Heparin for anticoagulation, trend PTT.  Trend CBC and coags and monitor for bleeding.      I, Connie Anderson MD, personally performed the services described in this documentation. all medical record entries made by the scribe were at my direction and in my presence. I have reviewed the chart and agree that the record reflects my personal performance and is accurate and complete  Electronically signed:   Connie Anderson MD  CT ICU attending     ICU time: ** mins     By signing my name below, I, Darin Chaudhry, attest that this documentation has been prepared under the direction and in the presence of Connie Anderson MD  Electronically signed: Darin Chaudhry, 07-05-25 @ 09:51             Patient seen and examined at the bedside.    Remains critically ill on continuous ICU monitoring, at risk for life threatening decompensation.  All Labs, data reviewed. Plan of care discussed in length during multi-disciplinary ICU rounds.       Brief Summary:  68 yo M presented 7/3/25 to [facility] with STEMI and cardiac arrest.  IABP -> Impella CP,  LAD stented.  Upgraded to [facility]O and transferred to [facility]     Recent events:  extubated in am on HFNC  Off Epinephrine.  On Bumex infusion, at 1 now  Repeat TTE  Start PT as able    Objective:  Vital Signs Last 24 Hrs  T(C): 36.3 (05 Jul 2025 08:00), Max: 36.5 (04 Jul 2025 20:00)  T(F): 97.3 (05 Jul 2025 08:00), Max: 97.7 (04 Jul 2025 20:00)  HR: 109 (05 Jul 2025 09:00) (102 - 133)  BP: --  BP(mean): --  RR: 16 (05 Jul 2025 09:00) (3 - 33)  SpO2: 99% (05 Jul 2025 09:00) (81% - 100%)    Parameters below as of 05 Jul 2025 09:04    O2 Flow (L/min): 40  O2 Concentration (%): 60    Mode: CPAP with PS  FiO2: 60  PEEP: 6  PS: 8  ITime: 1  MAP: 9  PIP: 17              Physical Exam:   General: Alert and interactive   Neurology: Oriented, following commands  Respiratory: Clear bilaterally   CV: Irregular  Abdominal: Soft, Nontender  Extremities: Warm, well-perfused  Yap  -------------------------------------------------------------------------------------------------------------------------------    Labs:                        10.5   7.65  )-----------( 42       ( 05 Jul 2025 00:10 )             29.8     07-05    137  |  96  |  104[H]  ----------------------------<  95  4.2   |  25  |  4.82[H]    Ca    8.8      05 Jul 2025 00:10  Phos  4.5     07-05  Mg     3.2     07-05    TPro  4.9[L]  /  Alb  3.3  /  TBili  5.3[H]  /  DBili  x   /  AST  158[H]  /  ALT  225[H]  /  AlkPhos  83  07-05    LIVER FUNCTIONS - ( 05 Jul 2025 00:10 )  Alb: 3.3 g/dL / Pro: 4.9 g/dL / ALK PHOS: 83 U/L / ALT: 225 U/L / AST: 158 U/L / GGT: x           PT/INR - ( 05 Jul 2025 05:12 )   PT: 17.1 sec;   INR: 1.51 ratio         PTT - ( 05 Jul 2025 08:09 )  PTT:33.7 sec  ABG - ( 05 Jul 2025 09:02 )  pH, Arterial: 7.51  pH, Blood: x     /  pCO2: 36    /  pO2: 125   / HCO3: 29    / Base Excess: 5.5   /  SaO2: 99.6      ALL MEDICATIONS   MEDICATIONS  (STANDING):  aMIOdarone Infusion 0.5 mG/Min (16.7 mL/Hr) IV Continuous <Continuous>  bumetanide Infusion 1 mG/Hr (5 mL/Hr) IV Continuous <Continuous>  cangrelor Infusion 0.6 MICROgram(s)/kG/Min (16.2 mL/Hr) IV Continuous <Continuous>  ceFAZolin   IVPB 2000 milliGRAM(s) IV Intermittent every 12 hours  chlorhexidine 2% Cloths 1 Application(s) Topical <User Schedule>  dexMEDEtomidine Infusion 0.5 MICROgram(s)/kG/Hr (11.3 mL/Hr) IV Continuous <Continuous>  dextrose 50% Injectable 50 milliLiter(s) IV Push every 15 minutes  dextrose 50% Injectable 25 milliLiter(s) IV Push every 15 minutes  heparin  Infusion 300 Unit(s)/Hr (3 mL/Hr) IV Continuous <Continuous>  heparin 50 Units/mL (IMPELLA VAD) Purge Solution  Unit(s) (10 mL/Hr) Ventricular Assist Device <Continuous>  insulin regular Infusion 3 Unit(s)/Hr (3 mL/Hr) IV Continuous <Continuous>  norepinephrine Infusion 0.05 MICROgram(s)/kG/Min (8.44 mL/Hr) IV Continuous <Continuous>  OLANZapine Disintegrating Tablet 5 milliGRAM(s) Oral once  pantoprazole  Injectable 40 milliGRAM(s) IV Push every 12 hours  potassium chloride  10 mEq/50 mL IVPB 10 milliEquivalent(s) IV Intermittent every 1 hour  sodium chloride 0.9%. 1000 milliLiter(s) (10 mL/Hr) IV Continuous <Continuous>    MEDICATIONS  (PRN):    ------------------------------------------------------------------------------------------------------------------------------  Assessment:  68 yo M presented 7/3/25 to [facility] with STEMI and cardiac arrest.  IABP -> Impella CP,  LAD stented.  Upgraded to [facility] ECMO and transferred to [facility]     STEMI  Cardiogenic shock  Acute respiratory failure  Thrombocytopenia  Hyperglycemia  Hypokalemia  VANDANA  Shock liver, Hyperbilirubinemia      Plan:   ***Neuro***  Started on Zyprexa for delerium  prns for discomfort.    ***Cardiovascular***  Cardiogenic shock, on VA ecmo, flow decreased to 3/L  Impella at P4, tolerates well  Remains on purge and systemic AC  CAD, s/p LAD stent, on cangrelol  Amiodarone for A fib and Ectopy, continue with PO  Off Epi gtt    ***Pulmonary***  Extubated on am , on HFNC  Wean as able  IS, chest PT, mobilization    ***GI***  Continue with TFs  Ok for bedsite dysphagia test when ready  Protonix for stress ulcer prophylaxis   Bowel regimen.  Trend LFTs, avoid hepatotoxins.    ***Renal***  VANDANA  - Trend Creatinine   Yap catheter for strict I/O measurements.  Bumext, at 1 , keep negative  Hypokalemia repleted  hypomagnesemia repleted    ***ID***  Ancef for 48 hours fabby-cannulation.    ***Endocrine***  Hyperglycemia - Insulin infusion.    ***Hematology***  Thrombocytopenia - no current transfusion indication  Heparin for anticoagulation, trend PTT, aX  Trend CBC and coags and monitor for bleeding.      I, Connie Anderson MD, personally performed the services described in this documentation. all medical record entries made by the scribe were at my direction and in my presence. I have reviewed the chart and agree that the record reflects my personal performance and is accurate and complete  Electronically signed:   Connie Anderson MD  CT ICU attending     ICU time: 65 mins     By signing my name below, I, Darin Chaudhry, attest that this documentation has been prepared under the direction and in the presence of Connie Anderson MD  Electronically signed: Darin Chaudhry, 07-05-25 @ 09:51

## 2025-07-05 NOTE — PROGRESS NOTE ADULT - SUBJECTIVE AND OBJECTIVE BOX
Patient seen and examined at the bedside.    Remained critically ill on continuous ICU monitoring.    OBJECTIVE:  Vital Signs Last 24 Hrs  T(C): 36.3 (05 Jul 2025 16:00), Max: 36.5 (05 Jul 2025 04:00)  T(F): 97.3 (05 Jul 2025 16:00), Max: 97.7 (05 Jul 2025 04:00)  HR: 96 (05 Jul 2025 19:00) (90 - 120)  BP: --  BP(mean): --  RR: 23 (05 Jul 2025 19:00) (9 - 38)  SpO2: 99% (05 Jul 2025 19:00) (81% - 100%)    Parameters below as of 05 Jul 2025 20:00  Patient On (Oxygen Delivery Method): nasal cannula, high flow  O2 Flow (L/min): 40  O2 Concentration (%): 40    REVIEW OF SYSTEMS:    Restless, mittens in place      Physical Exam:  General: intubated multiple lines gtt,  ECMO & Impella CP    Neurology: moves all 4    Eyes: bilaeral pupils equal and reactive   ENT/Neck:  Neck supple, trachea midline, No JVD   Respiratory: Rales noted bilaterally   CV:  [X] AF          Diastolic murmur at L sternal border                   Abdominal: Soft, NT, ND +BS,   Extremities: 1-2+ pedal edema noted, + peripheral pulses                     [x] L CFA Impella CP                     [X] L CFV venous cannulation                    [x] R CFA arterial cannulation w reperfusion sheath   Skin: No Rashes, Hematoma, Ecchymosis                           Assessment:  69 yr male 106 Pack yr smoking history / DM2 / HLD / HTN / CAD S/P PCI   AWMI c/w VF arrest S/P Impella CP >>[facility] ECMO >>LAD CURTIS   Cardiogenic shock complicated with MODS (resp cardiac renal metabolic hematological dysfunction) on MCS   Biventricular failure   AF   Post extubation ^secretion  Anemia, thrombocytopenia   Renal failure  Ischemic transaminitis  Fluid overload      suicidal ideation    24 hr events extubated , copious secretions requiring close respiratory monitoring, mood depressed and wants to die, multiple adjustment made to gtt based on hemodynamics data.  Bumex, Epi Dced  all cannulation sites inspected   TTE performed   Impella P level ^ , ECMO flow reduced     Plan:   ***Neuro***    [x] Nonfocal  [x] Sedated with  [x] Precedex       1:1 observation, mittens        Trazadone        Post operative neuro assessment     ***Cardiovascular***  Invasive hemodynamic monitoring, assess perfusion indices   AF 70-90  / CVP 2-4 / MAP 70-80 / SvO2 66 / Hct 29  / Lactate 1.5  [X] Levophed Dced  [x] Epi Dced    [x] Impella  P4 2.4 LPM [X] ECMO 3100 / 3.6   [x] monitor B/L groin for bleeding or ischemic complications to LE   [x] Heparin 300 units/hr Xa 0,30   [x] Cangrelor 0.6 mcg/kg/min    [x] Amiodarone 0.5 mg/min  [x] Bumex PRN goal CVP <10     Volume: [X] Albumin 250 cc    [X] ASA  [] Statin on hold >>^LFTs   [x] TTE EF 10%     ***Pulmonary***  [X] HFlO2 40% 40 LPM        Heavy chronic Tobacco use        COPD/ Bronchodilators / NT suction        Add Zosyn for ^ secretions       Titration of FiO2 and PEEP, follow SpO2, CXR, blood gases         ***GI***  [x] NPO        F/u LFTs        Protonix      ***Renal***  Acute Kidney Injury   eGFR 14 / hemodynamically mediated ATN   Continue to monitor I/Os, BUN/Creatinine.   Replete lytes PRN  Marely present     ***ID***  Zosyn started   Ancef Dced    ***Endocrine***  [x] Hyperglycemia  [x] DM2  : HbA1c 8.9_%               - [x] Insulin gtt               - Need tight glycemic control to prevent wound infection.      ALL MEDICATIONS (delete after use)  MEDICATIONS  (STANDING):  albuterol/ipratropium for Nebulization 3 milliLiter(s) Nebulizer every 6 hours  aMIOdarone    Tablet   Oral   aMIOdarone    Tablet 400 milliGRAM(s) Oral every 8 hours  cangrelor Infusion 0.6 MICROgram(s)/kG/Min (16.2 mL/Hr) IV Continuous <Continuous>  ceFAZolin   IVPB 2000 milliGRAM(s) IV Intermittent every 12 hours  chlorhexidine 2% Cloths 1 Application(s) Topical <User Schedule>  dexMEDEtomidine Infusion 0.3 MICROgram(s)/kG/Hr (6.75 mL/Hr) IV Continuous <Continuous>  dextrose 50% Injectable 50 milliLiter(s) IV Push every 15 minutes  dextrose 50% Injectable 25 milliLiter(s) IV Push every 15 minutes  heparin  Infusion 300 Unit(s)/Hr (3 mL/Hr) IV Continuous <Continuous>  heparin 50 Units/mL (IMPELLA VAD) Purge Solution  Unit(s) (10 mL/Hr) Ventricular Assist Device <Continuous>  insulin regular Infusion 3 Unit(s)/Hr (3 mL/Hr) IV Continuous <Continuous>  norepinephrine Infusion 0.05 MICROgram(s)/kG/Min (8.44 mL/Hr) IV Continuous <Continuous>  pantoprazole  Injectable 40 milliGRAM(s) IV Push every 12 hours  polyethylene glycol 3350 17 Gram(s) Oral daily  senna 2 Tablet(s) Oral at bedtime  sodium chloride 0.9%. 1000 milliLiter(s) (10 mL/Hr) IV Continuous <Continuous>  sodium chloride 3%  Inhalation 4 milliLiter(s) Inhalation every 12 hours  traZODone 50 milliGRAM(s) Oral at bedtime    MEDICATIONS  (PRN):      Patient requires continuous monitoring with bedside rhythm monitoring, pulse oximetry monitoring, and continuous central venous and arterial pressure monitoring; and intermittent blood gas analysis. Care plan discussed with the ICU care team.   Patient remained critical, at risk for life threatening decompensation.    I have spent 30 minutes providing critical care management to this patient.      I, Aspen Michelle, personally performed the services described in this documentation. all medical record entries made by the scribe were at my direction and in my presence. I have reviewed the chart and agree that the record reflects my personal performance and is accurate and complete  Electronically signed: Aspen Michelle MD  Patient seen and examined at the bedside.    Remained critically ill on continuous ICU monitoring.    OBJECTIVE:  Vital Signs Last 24 Hrs  T(C): 36.3 (05 Jul 2025 16:00), Max: 36.5 (05 Jul 2025 04:00)  T(F): 97.3 (05 Jul 2025 16:00), Max: 97.7 (05 Jul 2025 04:00)  HR: 96 (05 Jul 2025 19:00) (90 - 120)  BP: --  BP(mean): --  RR: 23 (05 Jul 2025 19:00) (9 - 38)  SpO2: 99% (05 Jul 2025 19:00) (81% - 100%)    Parameters below as of 05 Jul 2025 20:00  Patient On (Oxygen Delivery Method): nasal cannula, high flow  O2 Flow (L/min): 40  O2 Concentration (%): 40    REVIEW OF SYSTEMS:    Restless, mittens in place      Physical Exam:  General: intubated multiple lines gtt,  ECMO & Impella CP    Neurology: moves all 4    Eyes: bilaeral pupils equal and reactive   ENT/Neck:  Neck supple, trachea midline, No JVD   Respiratory: Rales noted bilaterally   CV:  [X] AF          Diastolic murmur at L sternal border                   Abdominal: Soft, NT, ND +BS,   Extremities: 1-2+ pedal edema noted, + peripheral pulses                     [x] L CFA Impella CP                     [X] L CFV venous cannulation                    [x] R CFA arterial cannulation w reperfusion sheath   Skin: No Rashes, Hematoma, Ecchymosis                           Assessment:  69 yr male 106 Pack yr smoking history / DM2 / HLD / HTN / CAD S/P PCI   AWMI c/w VF arrest S/P Impella CP >>VA ECMO >>LAD CURTIS   Cardiogenic shock complicated with MODS (resp cardiac renal metabolic hematological dysfunction) on MCS   Biventricular failure   AF   Post extubation ^secretion  Anemia, thrombocytopenia   Renal failure  Ischemic transaminitis  Fluid overload      suicidal ideation    24 hr events extubated , copious secretions requiring close respiratory monitoring, mood depressed and wants to die, multiple adjustment made to gtt based on hemodynamics data.  Bumex, Epi Dced  all cannulation sites inspected   TTE performed   Impella P level ^ , ECMO flow reduced     Plan:   ***Neuro***    [x] Nonfocal  [x] Sedated with  [x] Precedex       1:1 observation, mittens        Trazadone        Post operative neuro assessment     ***Cardiovascular***  Invasive hemodynamic monitoring, assess perfusion indices   AF 70-90  / CVP 2-4 / MAP 70-80 / SvO2 66 / Hct 29  / Lactate 1.5  [X] Levophed Dced  [x] Epi Dced    [x] Impella  P4 2.4 LPM [X] ECMO 3100 / 3.6   [x] monitor B/L groin for bleeding or ischemic complications to LE   [x] Heparin 300 units/hr Xa 0,30   [x] Cangrelor 0.6 mcg/kg/min    [x] Amiodarone 0.5 mg/min  [x] Bumex PRN goal CVP <10     Volume: [X] Albumin 250 cc    [X] ASA  [] Statin on hold >>^LFTs   [x] TTE EF 10%     ***Pulmonary***  [X] HFlO2 40% 40 LPM        Heavy chronic Tobacco use        COPD/ Bronchodilators / NT suction        Add Zosyn for ^ secretions       Titration of FiO2 and PEEP, follow SpO2, CXR, blood gases         ***GI***  [x] NPO        F/u LFTs        Protonix      ***Renal***  Acute Kidney Injury   eGFR 14 / hemodynamically mediated ATN   Continue to monitor I/Os, BUN/Creatinine.   Replete lytes PRN  Marely present     ***ID***  Zosyn started   Ancef Dced    ***Endocrine***  [x] Hyperglycemia  [x] DM2  : HbA1c 8.9_%               - [x] Insulin gtt               - Need tight glycemic control to prevent wound infection.      ALL MEDICATIONS (delete after use)  MEDICATIONS  (STANDING):  albuterol/ipratropium for Nebulization 3 milliLiter(s) Nebulizer every 6 hours  aMIOdarone    Tablet   Oral   aMIOdarone    Tablet 400 milliGRAM(s) Oral every 8 hours  cangrelor Infusion 0.6 MICROgram(s)/kG/Min (16.2 mL/Hr) IV Continuous <Continuous>  ceFAZolin   IVPB 2000 milliGRAM(s) IV Intermittent every 12 hours  chlorhexidine 2% Cloths 1 Application(s) Topical <User Schedule>  dexMEDEtomidine Infusion 0.3 MICROgram(s)/kG/Hr (6.75 mL/Hr) IV Continuous <Continuous>  dextrose 50% Injectable 50 milliLiter(s) IV Push every 15 minutes  dextrose 50% Injectable 25 milliLiter(s) IV Push every 15 minutes  heparin  Infusion 300 Unit(s)/Hr (3 mL/Hr) IV Continuous <Continuous>  heparin 50 Units/mL (IMPELLA VAD) Purge Solution  Unit(s) (10 mL/Hr) Ventricular Assist Device <Continuous>  insulin regular Infusion 3 Unit(s)/Hr (3 mL/Hr) IV Continuous <Continuous>  norepinephrine Infusion 0.05 MICROgram(s)/kG/Min (8.44 mL/Hr) IV Continuous <Continuous>  pantoprazole  Injectable 40 milliGRAM(s) IV Push every 12 hours  polyethylene glycol 3350 17 Gram(s) Oral daily  senna 2 Tablet(s) Oral at bedtime  sodium chloride 0.9%. 1000 milliLiter(s) (10 mL/Hr) IV Continuous <Continuous>  sodium chloride 3%  Inhalation 4 milliLiter(s) Inhalation every 12 hours  traZODone 50 milliGRAM(s) Oral at bedtime    MEDICATIONS  (PRN):      Patient requires continuous monitoring with bedside rhythm monitoring, pulse oximetry monitoring, and continuous central venous and arterial pressure monitoring; and intermittent blood gas analysis. Care plan discussed with the ICU care team.   Patient remained critical, at risk for life threatening decompensation.    I have spent 60 minutes providing critical care management to this patient.      I, Aspen Michelle, personally performed the services described in this documentation. all medical record entries made by the scribe were at my direction and in my presence. I have reviewed the chart and agree that the record reflects my personal performance and is accurate and complete  Electronically signed: Aspen Michelle MD

## 2025-07-06 NOTE — PROGRESS NOTE ADULT - NSPROGADDITIONALINFOA_GEN_ALL_CORE
Procedure: Impella CP VAD interrogation    Procedure Code: 84363  Interrogation of ventricular assist device (VAD), in person, with physician or other qualified health care professional analysis of device parameters (e.g., drivelines, alarms, power surges), review of device function (e.g., flow and volume status, septum status, recovery), with programming, if performed, and report    Performed by: Hansel Isaac MD    Procedure Details:  The Impella CP was interrogated at bedside.    Access Site:  The Impella sheath is angled appropriately.  The access site is clean, dry and intact.  There is no significant oozing or bleeding at the access site.    Device Alarms:  There were no suction alarms , positioning alarms or power surges noted.    Flow and Volume status:  Impella power level noted at P4 with flow rate of 1.7 L/min.  Examination of the LVEDP/CO trend showed LVEDP of 3 mmHg.  The total cardiac output was 5.8 L/min, with Impella 1.7 L/min and native 0.4 L/min.    Position and Septum Status:  On transthoracic echocardiography, the Impella CP inlet was 3.6 cm from the annulus of the aortic valve (appropriate position). The septum was noted to be midline. TTE showed LVEF 5-10 %.    Anticoagulation and Purge:  The patient was noted to have sub-therapeutic anti-Xa on systemic heparin.  D5W + 12,500 u of heparin solution was running on the Impella purge.
Procedure: Impella CP VAD interrogation    Procedure Code: 28782  Interrogation of ventricular assist device (VAD), in person, with physician or other qualified health care professional analysis of device parameters (e.g., drivelines, alarms, power surges), review of device function (e.g., flow and volume status, septum status, recovery), with programming, if performed, and report    Performed by: Hansel Isaac MD    Procedure Details:  The Impella CP was interrogated at bedside.    Access Site:  The Impella sheath is angled appropriately.  The access site is clean, dry and intact.  There is no significant oozing or bleeding at the access site.    Device Alarms:  There were no suction alarms , positioning alarms or power surges noted.    Flow and Volume status:  Impella power level noted at P4 with flow rate of 1.7 L/min.  Examination of the LVEDP/CO trend showed LVEDP of 0 mmHg.  The total cardiac output was 5.8 L/min, with Impella 1.7 L/min and native 0.4 L/min.    Position and Septum Status:  On transthoracic echocardiography, the Impella CP inlet was 3.6 cm from the annulus of the aortic valve (appropriate position). The septum was noted to be midline. TTE showed LVEF 5-10 %.    Anticoagulation and Purge:  The patient was noted to have sub-therapeutic anti-Xa on systemic heparin.  D5W + 12,500 u of heparin solution was running on the Impella purge.    Plan/Reprogramming:  Maintain Impella CP at P4.  Full dose heparin purge and systemic heparin for goal anti-Xa 0.2-0.3

## 2025-07-06 NOTE — PROGRESS NOTE ADULT - SUBJECTIVE AND OBJECTIVE BOX
----------------------------------------------------------------------------------------------------  ECMO Daily Management Note   ----------------------------------------------------------------------------------------------------  70 yo M s/p STEMI and cardiac arrest presented to [facility] in shock.  Stent to LAD, Impella CP + VA ECMO   VA ECMO  for:  Refractory cardiogenic shock  Date of initiation: 7/3/25  Cannulae:  25Fr L Fem Venous,  19Fr R Fem Arterial with 6Fr Distal reperfusion catheter   Cannula sites examined, well-secured.      Recent events:  Cannula sites ok  On cangrelol, Heparin Purge and systemic    Plan:  Flows decreased  Extubated, off Epi    =============================================================  Weight (kg): 90 (07-03-25)        Height (cm): 168 (07-03-25)   BSA (m2): 2 (07-03-25)        BMI (kg/m2): 31.9 (07-03-25)    ECMO  RPM:  3100 (06 Jul 2025 08:00)      Pump Flow (Lpm):  3.16 (06 Jul 2025 08:00)              Sweep  (L/min):   2 (06 Jul 2025 08:00)       FiO2 (%):  1 (06 Jul 2025 08:00)  Pre-membrane -  Pressure (mm/Hg):   150 (06 Jul 2025 08:00)      Post-membrane - Pressure (mm/Hg):  130 (06 Jul 2025 08:00)   ( 06 Jul 2025 07:15 )  pH: 7.50  /  pCO2: 49    / pO2: 487   /  HCO3: 38    /  Base Excess: 13.0  /  SaO2: 100.0          aMIOdarone    Tablet   Oral   aMIOdarone    Tablet 400 milliGRAM(s) Oral every 8 hours  norepinephrine Infusion 0.05 MICROgram(s)/kG/Min IV Continuous <Continuous>    Vent Mode: CPAP with PS      (06 Jul 2025 05:20) pH, Art: 7.52/pCO2: 46/pO2: 116/HCO3: 38/BE: 13.2/SaO2: 99.0/LAC: 1.1, --   (06 Jul 2025 01:50) pH, Art: 7.54/pCO2: 44/pO2: 131/HCO3: 38/BE: 13.6/SaO2: 99.4/LAC: 1.3, --   (06 Jul 2025 00:01) pH, Art: 7.50/pCO2: 43/pO2: 68/HCO3: 34/BE: 9.3/SaO2: 96.3/LAC: 1.0, --   (05 Jul 2025 20:05) pH, Art: 7.56/pCO2: 36/pO2: 92/HCO3: 32/BE: 9.4/SaO2: 99.3/LAC: 1.4, --   (05 Jul 2025 17:00) pH, Art: 7.51/pCO2: 40/pO2: 79/HCO3: 32/BE: 8.2/SaO2: 98.3/LAC: 1.5, --     (06 Jul 2025 00:01) pH, Hitesh: 7.47/pCO2: 51/pO2: 44 /HCO3: 37/BE: 11.8/MVO2: /SvO2: 64.9/LAC: 1.4,   (05 Jul 2025 20:05) pH, Hitesh: 7.52/pCO2: 49/pO2: 41 /HCO3: 40/BE: 15.2/MVO2: /SvO2: 61.7/LAC: 1.7,   (05 Jul 2025 14:55) pH, Hitesh: 7.45/pCO2: 52/pO2: 35 /HCO3: 36/BE: 10.6/MVO2: /SvO2: 66.1/LAC: 1.2,   (05 Jul 2025 12:07) pH, Hitesh: 7.46/pCO2: 47/pO2: 31 /HCO3: 33/BE: 8.5/MVO2: /SvO2: 56.5/LAC: 1.4,     ----------------------------------------------------------------------------------------------------  ANTICOAGULATION  cangrelor Infusion 0.4 MICROgram(s)/kG/Min IV Continuous <Continuous>  heparin  Infusion 300 Unit(s)/Hr IV Continuous <Continuous>  heparin 50 Units/mL (IMPELLA VAD) Purge Solution  Unit(s) Ventricular Assist Device <Continuous>    (06 Jul 2025 02:29)  aPTT: -----  Xa: -----  PT: -----  INR: -----   Fibrinogen: -----  Platelets: 53    (06 Jul 2025 00:19)  aPTT: 63.5  Xa: 0.25  PT: 16.0  INR: 1.40   Fibrinogen: 477   Platelets: 31    (05 Jul 2025 18:08)  aPTT: 66.2  Xa: 0.30  PT: -----  INR: -----   Fibrinogen: -----  Platelets: -----  (05 Jul 2025 12:16)  aPTT: 56.8  Xa: 0.23  PT: 17.7  INR: 1.55   Fibrinogen: -----  Platelets: 34      (06 Jul 2025 02:29)  Hemoglobin: 10.2    LDH: -----    Haptoglobin: -----   PFH:  -----  (06 Jul 2025 00:19)  Hemoglobin: 10.5    LDH: 1061    Haptoglobin: <20    PFH:  -----  (05 Jul 2025 12:17)  Hemoglobin: -----    LDH: -----    Haptoglobin: <20    PFH:  -----  (05 Jul 2025 12:16)  Hemoglobin: 10.5    LDH: 1071    Haptoglobin: -----   PFH:  -----    ----------------------------------------------------------------------------------------------------  Daily ECMO Checklist:   Progress report received from perfusionist   Circuit checked/inspected   Emergency equipment and supplies checked   Cannulation site inspection     I have reviewed all of the above information as well as pertinent labs/imaging/testing and care plan with the bedside nurse, perfusionist and care team members and provided my recommendations for support.   ECMO Management was separate from critical care billing time.     ----------------------------------------------------------------------------------------------------  ECMO Daily Management Note   ----------------------------------------------------------------------------------------------------  68 yo M s/p STEMI and cardiac arrest presented to [facility] in shock.  Stent to LAD, Impella CP + VA ECMO   VA ECMO  for:  Refractory cardiogenic shock  Date of initiation: 7/3/25  Cannulae:  25Fr L Fem Venous,  19Fr R Fem Arterial with 6Fr Distal reperfusion catheter   Cannula sites examined, well-secured.    Recent events:  On cangrelol, Heparin Purge and systemic  Extubated, on NC  refuses PT and PO intake  off diuretics, increased UO  Volume given back          =============================================================  Weight (kg): 90 (07-03-25)        Height (cm): 168 (07-03-25)   BSA (m2): 2 (07-03-25)        BMI (kg/m2): 31.9 (07-03-25)    ECMO  RPM:  3100 (06 Jul 2025 08:00)      Pump Flow (Lpm):  3.16 (06 Jul 2025 08:00)              Sweep  (L/min):   2 (06 Jul 2025 08:00)       FiO2 (%):  1 (06 Jul 2025 08:00)  Pre-membrane -  Pressure (mm/Hg):   150 (06 Jul 2025 08:00)      Post-membrane - Pressure (mm/Hg):  130 (06 Jul 2025 08:00)   ( 06 Jul 2025 07:15 )  pH: 7.50  /  pCO2: 49    / pO2: 487   /  HCO3: 38    /  Base Excess: 13.0  /  SaO2: 100.0          aMIOdarone    Tablet   Oral   aMIOdarone    Tablet 400 milliGRAM(s) Oral every 8 hours  norepinephrine Infusion 0.05 MICROgram(s)/kG/Min IV Continuous <Continuous>    Vent Mode: CPAP with PS      (06 Jul 2025 05:20) pH, Art: 7.52/pCO2: 46/pO2: 116/HCO3: 38/BE: 13.2/SaO2: 99.0/LAC: 1.1, --   (06 Jul 2025 01:50) pH, Art: 7.54/pCO2: 44/pO2: 131/HCO3: 38/BE: 13.6/SaO2: 99.4/LAC: 1.3, --   (06 Jul 2025 00:01) pH, Art: 7.50/pCO2: 43/pO2: 68/HCO3: 34/BE: 9.3/SaO2: 96.3/LAC: 1.0, --   (05 Jul 2025 20:05) pH, Art: 7.56/pCO2: 36/pO2: 92/HCO3: 32/BE: 9.4/SaO2: 99.3/LAC: 1.4, --   (05 Jul 2025 17:00) pH, Art: 7.51/pCO2: 40/pO2: 79/HCO3: 32/BE: 8.2/SaO2: 98.3/LAC: 1.5, --     (06 Jul 2025 00:01) pH, Hitesh: 7.47/pCO2: 51/pO2: 44 /HCO3: 37/BE: 11.8/MVO2: /SvO2: 64.9/LAC: 1.4,   (05 Jul 2025 20:05) pH, Hitesh: 7.52/pCO2: 49/pO2: 41 /HCO3: 40/BE: 15.2/MVO2: /SvO2: 61.7/LAC: 1.7,   (05 Jul 2025 14:55) pH, Hitesh: 7.45/pCO2: 52/pO2: 35 /HCO3: 36/BE: 10.6/MVO2: /SvO2: 66.1/LAC: 1.2,   (05 Jul 2025 12:07) pH, Hitesh: 7.46/pCO2: 47/pO2: 31 /HCO3: 33/BE: 8.5/MVO2: /SvO2: 56.5/LAC: 1.4,     ----------------------------------------------------------------------------------------------------  ANTICOAGULATION  cangrelor Infusion 0.4 MICROgram(s)/kG/Min IV Continuous <Continuous>  heparin  Infusion 300 Unit(s)/Hr IV Continuous <Continuous>  heparin 50 Units/mL (IMPELLA VAD) Purge Solution  Unit(s) Ventricular Assist Device <Continuous>    (06 Jul 2025 02:29)  aPTT: -----  Xa: -----  PT: -----  INR: -----   Fibrinogen: -----  Platelets: 53    (06 Jul 2025 00:19)  aPTT: 63.5  Xa: 0.25  PT: 16.0  INR: 1.40   Fibrinogen: 477   Platelets: 31    (05 Jul 2025 18:08)  aPTT: 66.2  Xa: 0.30  PT: -----  INR: -----   Fibrinogen: -----  Platelets: -----  (05 Jul 2025 12:16)  aPTT: 56.8  Xa: 0.23  PT: 17.7  INR: 1.55   Fibrinogen: -----  Platelets: 34      (06 Jul 2025 02:29)  Hemoglobin: 10.2    LDH: -----    Haptoglobin: -----   PFH:  -----  (06 Jul 2025 00:19)  Hemoglobin: 10.5    LDH: 1061    Haptoglobin: <20    PFH:  -----  (05 Jul 2025 12:17)  Hemoglobin: -----    LDH: -----    Haptoglobin: <20    PFH:  -----  (05 Jul 2025 12:16)  Hemoglobin: 10.5    LDH: 1071    Haptoglobin: -----   PFH:  -----    ----------------------------------------------------------------------------------------------------  Daily ECMO Checklist:   Progress report received from perfusionist   Circuit checked/inspected   Emergency equipment and supplies checked   Cannulation site inspection     I have reviewed all of the above information as well as pertinent labs/imaging/testing and care plan with the bedside nurse, perfusionist and care team members and provided my recommendations for support.   ECMO Management was separate from critical care billing time.

## 2025-07-06 NOTE — PROGRESS NOTE ADULT - CRITICAL CARE ATTENDING COMMENT
69M PMHx CAD s/p dLAD stent 2015, poorly-controlled DM2, HTN, HLD, and active smoker p/w SCAI-E AMI-CS +OHCA. Per report, was experiencing chest pain for 1 week, went to PCP's office, had VT arrest in the waiting room, ROSC achieved after 1 cycle CPR, then recurrent VT arrests at OSH ED. Brought to OSH cath lab, RHC performed revealing low output, IABP placed and subsequently upgraded to Impella CP. Underwent pLAD stent x1 with post ROSA 1 flow, cannulated to percutaneous VA-ECMO for refractory shock. Intubated prior during cardiac arrest. Has non-oliguric ATN and shock liver, now with post-ATN diuresis. Course c/b Afib/RVR.  Extubated 7/5, remains delirious.  LV with thinning at apex, borderline suitable for durable VAD. No recovery on echo 7/5.    Recommendations:  - VA-ECMO 3000rpm, 3.3lpm  - Impella CP P4  - match UOP with ins with post-ATN autodiuresis, goal CVP 8-10  - amio load PO for Afib  - defer [place] for now  - cangrelor/hep drip, goal anti-Xa 0.2-0.3  - once delirium resolves, will discuss with pt/wife about Impella 5.5 as bridge to decision about durable VAD  - obtain non-con gated CT with arms down for access site assessment
69M PMHx CAD s/p dLAD stent 2015, poorly-controlled DM2, HTN, HLD, and active smoker p/w SCAI-E AMI-CS +OHCA. Per report, was experiencing chest pain for 1 week, went to PCP's office, had VT arrest in the waiting room, ROSC achieved after 1 cycle CPR, then recurrent VT arrests at OSH ED. Brought to OSH cath lab, RHC performed revealing low output, IABP placed and subsequently upgraded to Impella CP. Underwent pLAD stent x1 with post ROSA 1 flow, cannulated to percutaneous VA-ECMO for refractory shock. Intubated prior during cardiac arrest. Has non-oliguric ATN and shock liver. Overnight went into Afib/RVR.  Extubated 7/5.    Recommendations:  - VA-ECMO 3000rpm, 3.3lpm  - increase Impella CP to P4  - wean epi off  - echo today to assess for recovery  - IVF if needed, goal CVP 8-10  - amio load to PO for Afib  - defer [place] for now  - cangrelor/hep drip, goal anti-Xa 0.2-0.3

## 2025-07-06 NOTE — PROGRESS NOTE ADULT - PROBLEM SELECTOR PLAN 1
VANDANA likely hemodynamically mediated iso of refractory cardiogenic shock/arrests and ALKA from CTAs + C s/p CURTIS. [facility] was 1.48 (6/29/25) and 1.14 (8/26/24). SCr on admission 4.47 (7/3) and now 4.1 (7/6) on VA ECMO (previously with IABP then Impella) and now off bumex gtt (7/6). U/A (7/4) cloudy, mod blood, trace LE, trace protein, 9 RBC. CTA (6/30) with complex L renal cyst and small R renal cysts. Raya catheter in place, was on bumex gtt  7/4-7/5 with improvement in UOP. CVPs now are 0-2 off gtt and UOP is -7.2L in 24 hours and net -3.8L. Hemodynamically unstable on VA ECMO, but improving.     - Can use bumex pushes PRN to remain net negative 1-2L or if CVP begins to rise.   - No current urgent indication for RRT.  - Maintain raya catheter   - Monitor labs and I/Os. Avoid nephrotoxins including, ACE/ARB, NSAIDs, contrast, etc.    If you have any questions, please feel free to contact me:  Lola Faustin MD PGY-5  Nephrology Chief Fellow  [employer] Teams (Preferred)/ Pager [phone]   (After 5pm or on weekends please page the on-call fellow).

## 2025-07-06 NOTE — PROGRESS NOTE ADULT - SUBJECTIVE AND OBJECTIVE BOX
Patient seen and examined at the bedside.    Remains critically ill on continuous ICU monitoring, at risk for life threatening decompensation.  All Labs, data reviewed. Plan of care discussed in length during multi-disciplinary ICU rounds.       Brief Summary:  70 yo M presented 7/3/25 to [facility] with STEMI and cardiac arrest.  IABP -> Impella CP,  LAD stented.  Upgraded to [facility] ECMO and transferred to [facility]     Recent events:  extubated in am on HFNC  Off Epinephrine.  On Bumex infusion, at 1 now  Repeat TTE  Start PT as able    Objective:  Vital Signs Last 24 Hrs  T(C): 36.2 (06 Jul 2025 08:00), Max: 36.6 (05 Jul 2025 20:00)  T(F): 97.2 (06 Jul 2025 08:00), Max: 97.9 (05 Jul 2025 20:00)  HR: 93 (06 Jul 2025 07:00) (90 - 115)  BP: --  BP(mean): --  RR: 16 (06 Jul 2025 07:00) (9 - 38)  SpO2: 100% (06 Jul 2025 07:00) (94% - 100%)    Parameters below as of 06 Jul 2025 08:00  Patient On (Oxygen Delivery Method): nasal cannula, high flow  O2 Flow (L/min): 50  O2 Concentration (%): 40             Physical Exam:   General: Alert and interactive   Neurology: Oriented, following commands  Respiratory: Clear bilaterally   CV: Irregular  Abdominal: Soft, Nontender  Extremities: Warm, well-perfused    -------------------------------------------------------------------------------------------------------------------------------    Labs:                        10.2   8.41  )-----------( 53       ( 06 Jul 2025 02:29 )             28.3     07-06    141  |  94[L]  |  99[H]  ----------------------------<  166[H]  4.1   |  28  |  4.16[H]    Ca    9.4      06 Jul 2025 00:19  Phos  4.3     07-06  Mg     2.6     07-06    TPro  5.5[L]  /  Alb  3.4  /  TBili  5.2[H]  /  DBili  x   /  AST  114[H]  /  ALT  91[H]  /  AlkPhos  109  07-06    LIVER FUNCTIONS - ( 06 Jul 2025 00:19 )  Alb: 3.4 g/dL / Pro: 5.5 g/dL / ALK PHOS: 109 U/L / ALT: 91 U/L / AST: 114 U/L / GGT: x           PT/INR - ( 06 Jul 2025 00:19 )   PT: 16.0 sec;   INR: 1.40 ratio         PTT - ( 06 Jul 2025 00:19 )  PTT:63.5 sec  ABG - ( 06 Jul 2025 05:20 )  pH, Arterial: 7.52  pH, Blood: x     /  pCO2: 46    /  pO2: 116   / HCO3: 38    / Base Excess: 13.2  /  SaO2: 99.0                  ALL MEDICATIONS   MEDICATIONS  (STANDING):  albuterol/ipratropium for Nebulization 3 milliLiter(s) Nebulizer every 6 hours  aMIOdarone    Tablet   Oral   aMIOdarone    Tablet 400 milliGRAM(s) Oral every 8 hours  budesonide    Inhalation Suspension 0.5 milliGRAM(s) Inhalation every 12 hours  cangrelor Infusion 0.4 MICROgram(s)/kG/Min (10.8 mL/Hr) IV Continuous <Continuous>  chlorhexidine 2% Cloths 1 Application(s) Topical <User Schedule>  dexMEDEtomidine Infusion 0.3 MICROgram(s)/kG/Hr (6.75 mL/Hr) IV Continuous <Continuous>  dextrose 50% Injectable 50 milliLiter(s) IV Push every 15 minutes  heparin  Infusion 300 Unit(s)/Hr (3 mL/Hr) IV Continuous <Continuous>  heparin 50 Units/mL (IMPELLA VAD) Purge Solution  Unit(s) (10 mL/Hr) Ventricular Assist Device <Continuous>  insulin regular Infusion 3 Unit(s)/Hr (3 mL/Hr) IV Continuous <Continuous>  norepinephrine Infusion 0.05 MICROgram(s)/kG/Min (8.44 mL/Hr) IV Continuous <Continuous>  OLANZapine Disintegrating Tablet 5 milliGRAM(s) Oral <User Schedule>  pantoprazole  Injectable 40 milliGRAM(s) IV Push every 12 hours  piperacillin/tazobactam IVPB.. 3.375 Gram(s) IV Intermittent every 12 hours  polyethylene glycol 3350 17 Gram(s) Oral daily  QUEtiapine 25 milliGRAM(s) Oral daily  senna 2 Tablet(s) Oral at bedtime  sodium chloride 0.9% for Nebulization 3 milliLiter(s) Nebulizer every 12 hours  sodium chloride 0.9%. 1000 milliLiter(s) (10 mL/Hr) IV Continuous <Continuous>  traZODone 50 milliGRAM(s) Oral at bedtime    MEDICATIONS  (PRN):  sodium chloride 0.65% Nasal 1 Spray(s) Both Nostrils two times a day PRN Nasal Congestion    ------------------------------------------------------------------------------------------------------------------------------  Assessment:  70 yo M presented 7/3/25 to [facility] with STEMI and cardiac arrest.  IABP -> Impella CP,  LAD stented.  Upgraded to VA ECMO and transferred to [facility]     STEMI  Cardiogenic shock  Acute respiratory failure  Thrombocytopenia  Hyperglycemia  Hypokalemia  VANDANA  Shock liver, Hyperbilirubinemia    ***Neuro***  Started on Zyprexa for delerium  prns for discomfort.    ***Cardiovascular***  Cardiogenic shock, on VA ecmo, flow decreased to 3/L  Impella at P4, tolerates well  Remains on purge and systemic AC  CAD, s/p LAD stent, on cangrelol  Amiodarone for A fib and Ectopy, continue with PO  Off Epi gtt    ***Pulmonary***  Extubated on am , on HFNC  Wean as able  IS, chest PT, mobilization    ***GI***  Continue with TFs  Ok for bedsite dysphagia test when ready  Protonix for stress ulcer prophylaxis   Bowel regimen.  Trend LFTs, avoid hepatotoxins.    ***Renal***  VANDANA  - Trend Creatinine   Yap catheter for strict I/O measurements.  Bumext, at 1 , keep negative  Hypokalemia repleted  hypomagnesemia repleted    ***ID***  Ancef for 48 hours fabby-cannulation.    ***Endocrine***  Hyperglycemia - Insulin infusion.    ***Hematology***  Thrombocytopenia - no current transfusion indication  Heparin for anticoagulation, trend PTT, aX  Trend CBC and coags and monitor for bleeding.      I, Connie Anderson MD, personally performed the services described in this documentation. All medical record entries made by the scribe were at my direction and in my presence. I have reviewed the chart and agree that the record reflects my personal performance and is accurate and complete  Electronically signed:   Connie Anderson MD  CT ICU attending     ICU time: ** mins     By signing my name below, I, Kraig Diaz, attest that this documentation has been prepared under the direction and in the presence of Connie Anderson MD  Electronically signed: Kraig Diaz, 07-06-25 @ 08:28             Patient seen and examined at the bedside.    Remains critically ill on continuous ICU monitoring, at risk for life threatening decompensation.  All Labs, data reviewed. Plan of care discussed in length during multi-disciplinary ICU rounds.     Brief Summary:  70 yo M presented 7/3/25 to [facility] with STEMI and cardiac arrest.  IABP -> Impella CP,  LAD stented.  Upgraded to VA ECMO and transferred to [facility]     Recent events:  Off HFNC   Off diuretics, extra volume given  refuses PT and taking anything PO  TTE repeat, unchanged     Objective:  Vital Signs Last 24 Hrs  T(C): 36.2 (06 Jul 2025 08:00), Max: 36.6 (05 Jul 2025 20:00)  T(F): 97.2 (06 Jul 2025 08:00), Max: 97.9 (05 Jul 2025 20:00)  HR: 93 (06 Jul 2025 07:00) (90 - 115)  BP: --  BP(mean): --  RR: 16 (06 Jul 2025 07:00) (9 - 38)  SpO2: 100% (06 Jul 2025 07:00) (94% - 100%)    Parameters below as of 06 Jul 2025 08:00  Patient On (Oxygen Delivery Method): nasal cannula, high flow  O2 Flow (L/min): 50  O2 Concentration (%): 40             Physical Exam:   General: NAD   Neurology: intact, refuses to participate, looks withdrawn   Respiratory: on NC, Bilateral sounds  CV: Irregular  Abdominal: Soft, Nontender  Extremities: Warm, well-perfused    -------------------------------------------------------------------------------------------------------------------------------    Labs:                        10.2   8.41  )-----------( 53       ( 06 Jul 2025 02:29 )             28.3     07-06    141  |  94[L]  |  99[H]  ----------------------------<  166[H]  4.1   |  28  |  4.16[H]    Ca    9.4      06 Jul 2025 00:19  Phos  4.3     07-06  Mg     2.6     07-06    TPro  5.5[L]  /  Alb  3.4  /  TBili  5.2[H]  /  DBili  x   /  AST  114[H]  /  ALT  91[H]  /  AlkPhos  109  07-06    LIVER FUNCTIONS - ( 06 Jul 2025 00:19 )  Alb: 3.4 g/dL / Pro: 5.5 g/dL / ALK PHOS: 109 U/L / ALT: 91 U/L / AST: 114 U/L / GGT: x           PT/INR - ( 06 Jul 2025 00:19 )   PT: 16.0 sec;   INR: 1.40 ratio         PTT - ( 06 Jul 2025 00:19 )  PTT:63.5 sec  ABG - ( 06 Jul 2025 05:20 )  pH, Arterial: 7.52  pH, Blood: x     /  pCO2: 46    /  pO2: 116   / HCO3: 38    / Base Excess: 13.2  /  SaO2: 99.0      ALL MEDICATIONS   MEDICATIONS  (STANDING):  albuterol/ipratropium for Nebulization 3 milliLiter(s) Nebulizer every 6 hours  aMIOdarone    Tablet   Oral   aMIOdarone    Tablet 400 milliGRAM(s) Oral every 8 hours  budesonide    Inhalation Suspension 0.5 milliGRAM(s) Inhalation every 12 hours  cangrelor Infusion 0.4 MICROgram(s)/kG/Min (10.8 mL/Hr) IV Continuous <Continuous>  chlorhexidine 2% Cloths 1 Application(s) Topical <User Schedule>  dexMEDEtomidine Infusion 0.3 MICROgram(s)/kG/Hr (6.75 mL/Hr) IV Continuous <Continuous>  dextrose 50% Injectable 50 milliLiter(s) IV Push every 15 minutes  heparin  Infusion 300 Unit(s)/Hr (3 mL/Hr) IV Continuous <Continuous>  heparin 50 Units/mL (IMPELLA VAD) Purge Solution  Unit(s) (10 mL/Hr) Ventricular Assist Device <Continuous>  insulin regular Infusion 3 Unit(s)/Hr (3 mL/Hr) IV Continuous <Continuous>  norepinephrine Infusion 0.05 MICROgram(s)/kG/Min (8.44 mL/Hr) IV Continuous <Continuous>  OLANZapine Disintegrating Tablet 5 milliGRAM(s) Oral <User Schedule>  pantoprazole  Injectable 40 milliGRAM(s) IV Push every 12 hours  piperacillin/tazobactam IVPB.. 3.375 Gram(s) IV Intermittent every 12 hours  polyethylene glycol 3350 17 Gram(s) Oral daily  QUEtiapine 25 milliGRAM(s) Oral daily  senna 2 Tablet(s) Oral at bedtime  sodium chloride 0.9% for Nebulization 3 milliLiter(s) Nebulizer every 12 hours  sodium chloride 0.9%. 1000 milliLiter(s) (10 mL/Hr) IV Continuous <Continuous>  traZODone 50 milliGRAM(s) Oral at bedtime    MEDICATIONS  (PRN):  sodium chloride 0.65% Nasal 1 Spray(s) Both Nostrils two times a day PRN Nasal Congestion    ------------------------------------------------------------------------------------------------------------------------------  Assessment:  70 yo M presented 7/3/25 to [facility] with STEMI and cardiac arrest.  IABP -> Impella CP,  LAD stented.  Upgraded to VA ECMO and transferred to [facility]     STEMI  Cardiogenic shock  Acute respiratory failure  Thrombocytopenia  Hyperglycemia  Hypokalemia  VANDANA  Shock liver, Hyperbilirubinemia    ***Neuro***  Started on Zyprexa , Seroquel for delirium   prns for discomfort.    ***Cardiovascular***  Cardiogenic shock, on VA ecmo, flow at 3/L/min  Impella at P4, tolerates well  Remains on purge and systemic AC  CAD, s/p LAD stent, on cangrelor  keep on low dose 0.1-0.2  Amiodarone for A fib and Ectopy, continue with PO  Electrolytes replacement  Antidiuresing required volume back    ***Pulmonary***  on NC  chronic smoker  Continue with nebs  IS, chest PT, mobilization if pt agrees    ***GI***  Continue with TFs  Protonix for stress ulcer prophylaxis   Bowel regimen.  Trend LFTs, avoid hepatotoxins.    ***Renal***  VANDANA  - UO incressed  Yap catheter for strict I/O measurements.  keep off diuretics , Albumin 25% x2  Hypokalemia repleted  hypomagnesemia repleted    ***ID***  Now on zosyn , keep for 48 h    ***Endocrine***  Hyperglycemia - Insulin infusion.    ***Hematology***  Thrombocytopenia   s/p 1 U platlets  Heparin for anticoagulation, trend PTT, aX  Trend CBC and coags and monitor for bleeding.      I, Connie Anderson MD, personally performed the services described in this documentation. All medical record entries made by the scribe were at my direction and in my presence. I have reviewed the chart and agree that the record reflects my personal performance and is accurate and complete  Electronically signed:   Connie Anderson MD  CT ICU attending     ICU time: 65 mins     By signing my name below, I, Kraig Diaz, attest that this documentation has been prepared under the direction and in the presence of Connie Anderson MD  Electronically signed: Kraig Diaz, 07-06-25 @ 08:28

## 2025-07-06 NOTE — PROGRESS NOTE ADULT - PROBLEM SELECTOR PLAN 1
Currently on VA-ECMO and Impella CP. Extubated 7/5. ?Impella 5.5 if deemed surgically feasible by CTSx   - Now off levo and Epinephrine  - Currently on Amiodarone  -PRN Diuresis for goal net negative balance  - Trend perfusion markers

## 2025-07-06 NOTE — OCCUPATIONAL THERAPY INITIAL EVALUATION ADULT - PERTINENT HX OF CURRENT PROBLEM, REHAB EVAL
68 y/o Male w/PMHx Hypertension, hyperlipidemia, diabetes, CAD with stents, current smoker (2-3 PPD) brought in to [facility] by EMS from PCP's office status post cardiac arrest, ROSC achieved in 5-10 min.  Patient was in the lobby of his PCP when he was not feeling well, family told patient to sit down, and when came back with a wheelchair patient lost pulse.  Police showed up on scene and did 1 round of CPR, no meds were given.  ROSC achieved.  Patient brought in by EMS, altered, moaning, awake, unresponsive to questions on nonrebreather saturating in the 80s.  As per family patient has been feeling chest pain the past few days. On arrival to the ed, he was agitated and fighting restraints. unclear mental status otherwise, was moving his leg at the end of first round of ACLS. In the ED, pt lost his pulse, ACLS started, regained a pulse after 2 min. Patient was given etomidate, versed, and intubated. Then pt lost a pulse again, regained a pulse after another round of CPR. Patient started on epi gtt. pt had runs of VT, was given total of 2 epi, lido x2, amio 150 mg. Was given bicarb x2. Patient brought to cath lab, underwent LHC which showed LAD occlusion. Left femoral Impella CP inserted for support, s/p CURTIS to the LAD. Upgraded to VA ECMO (25Fr left femoral venous cannula, 17Fr right femoral arterial cannula with 6Fr distal reperfusion cannula) for continued cardiogenic shock after stent placement. Patient was then transferred to [facility] from [facility] for further care.

## 2025-07-06 NOTE — OCCUPATIONAL THERAPY INITIAL EVALUATION ADULT - NS ASR FOLLOW COMMAND OT EVAL
Pt on some sedation as per RN due to agitation, speaking some words/pharases/50% of the time/25% of the time/able to follow single-step instructions

## 2025-07-06 NOTE — PROGRESS NOTE ADULT - ASSESSMENT
69-year-old male with PMHx of HTN, HLD, DM2, CAD with stents, current smoker presenting as transfer from [facility] s/p cardiac arrest x4 on VA ECMO in setting of LAD occlusion s/p DESx1. Nephrology consulted for non-oliguric VANDANA.

## 2025-07-06 NOTE — PATIENT PROFILE ADULT - FUNCTIONAL ASSESSMENT - BASIC MOBILITY 6.
1-calculated by average/Not able to assess (calculate score using [facility] averaging method)

## 2025-07-06 NOTE — PROGRESS NOTE ADULT - ATTENDING COMMENTS
AVNDANA- ATN  Excellent urine output   no dialysis needed  monitor K, Mg and replete as needed     rosita floyd  nephrology attending   please contact me on TEAMS   Office- [phone]

## 2025-07-06 NOTE — OCCUPATIONAL THERAPY INITIAL EVALUATION ADULT - GENERAL OBSERVATIONS, REHAB EVAL
Upon entry, patient semi-supine in bed, +ECMO B/L fem cannulas intact, +b/l mittens, +IVs +yocasta +tele +pulse ox +impella L fem intact +RIJ +NGT +02 via NC, patient agreeable to OT eval, cleared for OT evaluation as per RN.

## 2025-07-06 NOTE — PROGRESS NOTE ADULT - ASSESSMENT
69-year-old male with PMHx of HTN, HLD, DM2, CAD with stents, current smoker presenting as transfer from [facility] s/p cardiac arrest x4. At [facility] underwent LHC which showed LAD occlusion. Left femoral Impella CP inserted for support, s/p CURTIS to the LAD. Upgraded to VA ECMO (25Fr left femoral venous cannula, 17Fr right femoral arterial cannula with 6Fr distal reperfusion cannula) for continued cardiogenic shock after stent placement. Patient was then transferred to [facility] from [facility] for further care    Currently in the CTICU on VA ECMO and Impella CP, P3. Extubated 7/5. Today discussing with surgeons possibility of Impella 5.5

## 2025-07-06 NOTE — PROGRESS NOTE ADULT - SUBJECTIVE AND OBJECTIVE BOX
[facility] Division of Kidney Diseases & Hypertension  FOLLOW UP NOTE  [phone]--------------------------------------------------------------------------------  Chief Complaint: VANDANA    24 hour events/subjective: Pt. seen and examined at bedside earlier today. Extubated to HFNC, bumex gtt d/c'd ~2PM yesterday. Off levo and epi gtt still on VA ECMO.     PAST HISTORY  --------------------------------------------------------------------------------  No significant changes to PMH, PSH, FHx, SHx from H&P unless otherwise noted.    ALLERGIES & MEDICATIONS  --------------------------------------------------------------------------------  Allergies    No Known Allergies    Intolerances      Standing Inpatient Medications  albuterol/ipratropium for Nebulization 3 milliLiter(s) Nebulizer every 6 hours  aMIOdarone    Tablet   Oral   aMIOdarone    Tablet 400 milliGRAM(s) Oral every 8 hours  budesonide    Inhalation Suspension 0.5 milliGRAM(s) Inhalation every 12 hours  cangrelor Infusion 0.4 MICROgram(s)/kG/Min IV Continuous <Continuous>  chlorhexidine 2% Cloths 1 Application(s) Topical <User Schedule>  dexMEDEtomidine Infusion 0.3 MICROgram(s)/kG/Hr IV Continuous <Continuous>  dextrose 50% Injectable 50 milliLiter(s) IV Push every 15 minutes  heparin  Infusion 300 Unit(s)/Hr IV Continuous <Continuous>  heparin 50 Units/mL (IMPELLA VAD) Purge Solution  Unit(s) Ventricular Assist Device <Continuous>  insulin regular Infusion 3 Unit(s)/Hr IV Continuous <Continuous>  norepinephrine Infusion 0.05 MICROgram(s)/kG/Min IV Continuous <Continuous>  OLANZapine Disintegrating Tablet 5 milliGRAM(s) Oral <User Schedule>  pantoprazole  Injectable 40 milliGRAM(s) IV Push every 12 hours  piperacillin/tazobactam IVPB.. 3.375 Gram(s) IV Intermittent every 12 hours  polyethylene glycol 3350 17 Gram(s) Oral daily  QUEtiapine 25 milliGRAM(s) Oral daily  senna 2 Tablet(s) Oral at bedtime  sodium chloride 0.9% for Nebulization 3 milliLiter(s) Nebulizer every 12 hours  sodium chloride 0.9%. 1000 milliLiter(s) IV Continuous <Continuous>  traZODone 50 milliGRAM(s) Oral at bedtime    PRN Inpatient Medications  sodium chloride 0.65% Nasal 1 Spray(s) Both Nostrils two times a day PRN      REVIEW OF SYSTEMS  --------------------------------------------------------------------------------  Unable to obtain ROS due to current clinical status.     VITALS/PHYSICAL EXAM  --------------------------------------------------------------------------------  T(C): 36.2 (07-06-25 @ 08:00), Max: 36.6 (07-05-25 @ 20:00)  HR: 99 (07-06-25 @ 09:00) (90 - 110)  BP: --  RR: 19 (07-06-25 @ 09:00) (9 - 38)  SpO2: 100% (07-06-25 @ 09:00) (94% - 100%)  Wt(kg): --        07-05-25 @ 07:01  -  07-06-25 @ 07:00  --------------------------------------------------------  IN: 3405.7 mL / OUT: 7240 mL / NET: -3834.3 mL    07-06-25 @ 07:01  -  07-06-25 @ 09:08  --------------------------------------------------------  IN: 160.8 mL / OUT: 505 mL / NET: -344.2 mL        Physical Exam:  Gen: ill appearing.  Pulm: decreased, on HFNC.   CV: tachycardic  Abd: +BS, soft  : +raya with yellow urine  Extremities: no bilateral LE edema  Neuro: Sleeping, but awakens to voice.   Skin: Warm  Vascular access: VA ecmo cannulation     LABS/STUDIES  --------------------------------------------------------------------------------              10.2   8.41  >-----------<  53       [07-06-25 @ 02:29]              28.3     141  |  94  |  99  ----------------------------<  166      [07-06-25 @ 00:19]  4.1   |  28  |  4.16        Ca     9.4     [07-06-25 @ 00:19]      Mg     2.6     [07-06-25 @ 00:19]      Phos  4.3     [07-06-25 @ 00:19]    TPro  5.5  /  Alb  3.4  /  TBili  5.2  /  DBili  x   /  AST  114  /  ALT  91  /  AlkPhos  109  [07-06-25 @ 00:19]    PT/INR: PT 16.0 , INR 1.40       [07-06-25 @ 00:19]  PTT: 63.5       [07-06-25 @ 00:19]    LDH 1061      [07-06-25 @ 00:19]    Creatinine Trend:  SCr 4.16 [07-06 @ 00:19]  SCr 4.40 [07-05 @ 12:16]  SCr 4.82 [07-05 @ 00:10]  SCr 4.42 [07-04 @ 12:11]  SCr 4.11 [07-04 @ 00:05]    Urinalysis - [07-06-25 @ 00:19]      Color  / Appearance  / SG  / pH       Gluc 166 / Ketone   / Bili  / Urobili        Blood  / Protein  / Leuk Est  / Nitrite       RBC  / WBC  / Hyaline  / Gran  / Sq Epi  / Non Sq Epi  / Bacteria       TSH 2.73      [07-03-25 @ 19:21]  Lipid: chol 90, TG 96, HDL 31, LDL --      [07-03-25 @ 19:06]

## 2025-07-06 NOTE — OCCUPATIONAL THERAPY INITIAL EVALUATION ADULT - MD ORDER
Occupational therapy to evaluate and treat. Team requesting Pt to be seen but for bed level only, As per RN Pt agitated at times attempting to get OOB, pt now on some sedation.

## 2025-07-06 NOTE — PROGRESS NOTE ADULT - SUBJECTIVE AND OBJECTIVE BOX
Subjective:  - Extubated  - Remains on VA ECMO    Medications:  albuterol/ipratropium for Nebulization 3 milliLiter(s) Nebulizer every 6 hours  aMIOdarone    Tablet   Oral   aMIOdarone    Tablet 400 milliGRAM(s) Oral every 8 hours  budesonide    Inhalation Suspension 0.5 milliGRAM(s) Inhalation every 12 hours  cangrelor Infusion 0.4 MICROgram(s)/kG/Min IV Continuous <Continuous>  chlorhexidine 2% Cloths 1 Application(s) Topical <User Schedule>  dexMEDEtomidine Infusion 0.3 MICROgram(s)/kG/Hr IV Continuous <Continuous>  dextrose 50% Injectable 50 milliLiter(s) IV Push every 15 minutes  heparin  Infusion 300 Unit(s)/Hr IV Continuous <Continuous>  heparin 50 Units/mL (IMPELLA VAD) Purge Solution  Unit(s) Ventricular Assist Device <Continuous>  insulin regular Infusion 3 Unit(s)/Hr IV Continuous <Continuous>  norepinephrine Infusion 0.05 MICROgram(s)/kG/Min IV Continuous <Continuous>  OLANZapine Disintegrating Tablet 5 milliGRAM(s) Oral <User Schedule>  pantoprazole  Injectable 40 milliGRAM(s) IV Push every 12 hours  piperacillin/tazobactam IVPB.. 3.375 Gram(s) IV Intermittent every 12 hours  polyethylene glycol 3350 17 Gram(s) Oral daily  QUEtiapine 25 milliGRAM(s) Oral daily  senna 2 Tablet(s) Oral at bedtime  sodium chloride 0.65% Nasal 1 Spray(s) Both Nostrils two times a day PRN  sodium chloride 0.9% for Nebulization 3 milliLiter(s) Nebulizer every 12 hours  sodium chloride 0.9%. 1000 milliLiter(s) IV Continuous <Continuous>  traZODone 50 milliGRAM(s) Oral at bedtime      Physical Exam:    Vitals:  Vital Signs Last 24 Hours  T(C): 36.4 (25 @ 12:00), Max: 36.6 (25 @ 20:00)  HR: 104 (25 @ 12:00) (88 - 110)  MAP: 60-70's  RR: 23 (25 @ 12:00) (11 - 38)  SpO2: 99% (25 @ 12:00) (94% - 100%)    Weight in k.3 ( @ 00:00)    I&O's Summary    2025 07:01  -  2025 07:00  --------------------------------------------------------  IN: 3405.7 mL / OUT: 7240 mL / NET: -3834.3 mL    2025 07:01  -  2025 12:34  --------------------------------------------------------  IN: 633.1 mL / OUT: 1045 mL / NET: -411.9 mL    Tele: NSR     General: No distress. Comfortable.  HEENT: EOM intact.  Neck: CVP 4  Chest: Clear to auscultation bilaterally  CV: Normal S1 and S2. No murmurs, rub, or gallops. Radial pulses normal, warm peripherally  Abdomen: Soft, non-distended, non-tender  Skin: No rashes or skin breakdown  Extremities: No LE edema  Neurology: Alert and oriented times three. Sensation intact  Psych: Affect normal    Labs:                        9.9    8.52  )-----------( 42       ( 2025 12:05 )             28.2     07-06    141  |  94[L]  |  99[H]  ----------------------------<  166[H]  4.1   |  28  |  4.16[H]    Ca    9.4      2025 00:19  Phos  4.3     07-  Mg     2.6     07-06  TPro  5.5[L]  /  Alb  3.4  /  TBili  5.2[H]  /  DBili  x   /  AST  114[H]  /  ALT  91[H]  /  AlkPhos  109  07-06  PT/INR - ( 2025 00:19 )   PT: 16.0 sec;   INR: 1.40 ratio    PTT - ( 2025 00:19 )  PTT:63.5 sec  Lactate Dehydrogenase, Serum: 1061 U/L ( @ 00:19)  Lactate Dehydrogenase, Serum: 1071 U/L ( @ 12:16)  Lactate Dehydrogenase, Serum: 1129 U/L ( @ 00:10)  Lactate Dehydrogenase, Serum: 1293 U/L ( @ 12:11)  Lactate Dehydrogenase, Serum: 1363 U/L ( @ 00:05)  Lactate Dehydrogenase, Serum: 1311 U/L ( @ 18:45)

## 2025-07-06 NOTE — PROGRESS NOTE ADULT - SUBJECTIVE AND OBJECTIVE BOX
Patient seen and examined at the bedside.    Remained critically ill on continuous ICU monitoring.    OBJECTIVE:  Vital Signs Last 24 Hrs  T(C): 37.1 (06 Jul 2025 16:00), Max: 37.1 (06 Jul 2025 16:00)  T(F): 98.8 (06 Jul 2025 16:00), Max: 98.8 (06 Jul 2025 16:00)  HR: 102 (06 Jul 2025 17:00) (88 - 110)  BP: --  BP(mean): --  RR: 16 (06 Jul 2025 17:00) (11 - 38)  SpO2: 100% (06 Jul 2025 17:00) (94% - 100%)    Parameters below as of 06 Jul 2025 16:00  Patient On (Oxygen Delivery Method): nasal cannula  O2 Flow (L/min): 5    Physical Exam:  General:multiple lines gtt, ECMO & Impella CP    Neurology: moves all 4    Eyes: bilaeral pupils equal and reactive   ENT/Neck:  Neck supple, trachea midline, No JVD   Respiratory: Rales noted bilaterally   CV:  [X] AF          Diastolic murmur at L sternal border  Abdominal: Soft, NT, ND +BS,   Extremities: 1-2+ pedal edema noted, + peripheral pulses                     [x] L CFA Impella CP                     [X] L CFV venous cannulation                    [x] R CFA arterial cannulation w reperfusion sheath   Skin: No Rashes, Hematoma, Ecchymosis                           Assessment:  69 yr male 106 Pack yr smoking history / DM2 / HLD / HTN / CAD S/P PCI   AWMI c/w VF arrest S/P Impella CP >>VA ECMO >>LAD CURTIS   Cardiogenic shock complicated with MODS (resp cardiac renal metabolic hematological dysfunction) on MCS   Biventricular failure   AF   Post extubation ^secretion  Anemia, thrombocytopenia   Renal failure  Ischemic transaminitis  Fluid overload      suicidal ideation    24 hr events : yesterday extubated , copious secretions requiring close respiratory monitoring, mood depressed and wants to die, multiple adjustment made to gtt based on hemodynamics data.  Bumex, Epi Dced  all cannulation sites inspected   TTE performed   Impella P level ^ , ECMO flow reduced     Plan:   ***Neuro***  [x] Nonfocal  [x] Sedated with [x] Precedex       1:1 observation, mittens        Trazadone        Post operative neuro assessment     ***Cardiovascular***  Invasive hemodynamic monitoring, assess perfusion indices   AF  / CVP 2-5 / MAP  / SvO2 66 / Hct 28.2  / Lactate 1.5  [X] Levophed 0.,05 mcg/kg/min [x] Epi Dced    [x] Impella  P4 2.4 LPM [X] ECMO 3100 / 3.23, sweep of   [x] monitor B/L groin for bleeding or ischemic complications to LE   [x] Heparin 300 units/hr Xa 0,30   [x] Cangrelor 0.4 mcg/kg/min    [x] Amiodarone 0.5 mg/min -> PO for AF   [x] Bumex PRN goal CVP <10 - off   Volume: [X] Albumin 250 cc    [X] ASA  [x] Statin on hold >>^LFTs   [x] TTE EF 10%     ***Pulmonary***  [X] NC 5L        Heavy chronic Tobacco use        COPD/ Bronchodilators / NT suction        Add Zosyn for ^ secretions       Titration of FiO2 and PEEP, follow SpO2, CXR, blood gases       ***GI***  [x] NPO        F/u LFTs        Protonix    Bowel regimen with Miralax and Senna     ***Renal***  Acute Kidney Injury   eGFR 15 / hemodynamically mediated ATN   Continue to monitor I/Os, BUN/Creatinine.   Replete lytes PRN  Yap present     ***ID***  Zosyn for empiric dosing   Ancef Dced    ***Endocrine***  [x] Hyperglycemia  [x] DM2  : HbA1c 8.9_%               - [x] Insulin gtt               - Need tight glycemic control to prevent wound infection.          Patient requires continuous monitoring with bedside rhythm monitoring, pulse oximetry monitoring, and continuous central venous and arterial pressure monitoring; and intermittent blood gas analysis. Care plan discussed with the ICU care team.   Patient remained critical, at risk for life threatening decompensation.    I have spent 40 minutes providing critical care management to this patient.    By signing my name below, I, Nisreen Gallo, attest that this documentation has been prepared under the direction and in the presence of Aspen Michelle MD   Electronically signed: Darryn Benito, 07-06-25 @ 17:51    I, Aspen Michelle, personally performed the services described in this documentation. all medical record entries made by the consueloibmiguel were at my direction and in my presence. I have reviewed the chart and agree that the record reflects my personal performance and is accurate and complete  Electronically signed: Aspen Michelle MD  Patient seen and examined at the bedside.    Remained critically ill on continuous ICU monitoring.    OBJECTIVE:  Vital Signs Last 24 Hrs  T(C): 37.1 (06 Jul 2025 16:00), Max: 37.1 (06 Jul 2025 16:00)  T(F): 98.8 (06 Jul 2025 16:00), Max: 98.8 (06 Jul 2025 16:00)  HR: 102 (06 Jul 2025 17:00) (88 - 110)  BP: --  BP(mean): --  RR: 16 (06 Jul 2025 17:00) (11 - 38)  SpO2: 100% (06 Jul 2025 17:00) (94% - 100%)    Parameters below as of 06 Jul 2025 16:00  Patient On (Oxygen Delivery Method): nasal cannula  O2 Flow (L/min): 5    Physical Exam:  General: multiple lines gtt, ECMO & Impella CP    Neurology: moves all 4    Eyes: bilateral pupils equal and reactive   ENT/Neck:  Neck supple, trachea midline, No JVD   Respiratory: Rales noted bilaterally   CV:  [X] AF          Diastolic murmur at L sternal border  Abdominal: Soft, NT, ND +BS,   Extremities: 1-2+ pedal edema noted, + peripheral pulses                     [x] L CFA Impella CP                     [X] L CFV venous cannulation                    [x] R CFA arterial cannulation w reperfusion sheath   Skin: No Rashes, Hematoma, Ecchymosis                           Assessment:  69 yr male 106 Pack yr smoking history / DM2 / HLD / HTN / CAD S/P PCI   AWMI c/w VF arrest S/P Impella CP >>VA ECMO >>LAD CURTIS   Cardiogenic shock complicated with MODS (resp cardiac renal metabolic hematological dysfunction) on MCS   Biventricular failure   AF   Post extubation ^secretion  Anemia, thrombocytopenia   Renal failure  Ischemic transaminitis  Fluid overload      suicidal ideation    24 hr events : yesterday extubated , copious secretions requiring close respiratory monitoring, mood depressed and wants to die, multiple adjustment made to gtt based on hemodynamics data.  Bumex, Epi Dced  all cannulation sites inspected   TTE performed   Impella P level ^ , ECMO flow reduced     Plan:   ***Neuro***  [x] Nonfocal  [x] Sedated with [x] Precedex       1:1 observation, mittens        Trazadone d/c'ed        Seroquel and Zyprexa given today        Post operative neuro assessment     ***Cardiovascular***  Invasive hemodynamic monitoring, assess perfusion indices   AF  / CVP 2-5 / MAP  / SvO2 66 / Hct 28.2  / Lactate 1.5  [X] Levophed 0.,05 mcg/kg/min [x] Epi Dced    [x] Impella  P4 2.4 LPM [X] ECMO 3100 / 3.23, sweep of 2  [x] monitor B/L groin for bleeding or ischemic complications to LE   [x] Heparin 300 units/hr Xa 0,30   [x] Cangrelor 0.4 mcg/kg/min    [x] Amiodarone 0.5 mg/min -> PO for AF   [x] Bumex PRN goal CVP <10 - off   Volume: [X] Albumin 250 cc    [X] ASA  [x] Statin on hold >>^LFTs   [x] TTE EF 10%     ***Pulmonary***  [X] NC 5L        Heavy chronic Tobacco use        COPD/ Bronchodilators / NT suction        Add Zosyn for ^ secretions       Titration of FiO2 and PEEP, follow SpO2, CXR, blood gases       ***GI***  [x] NPO        F/u LFTs        Protonix    Bowel regimen with Miralax and Senna     ***Renal***  Acute Kidney Injury   eGFR 15 / hemodynamically mediated ATN   Continue to monitor I/Os, BUN/Creatinine.   Replete lytes PRN  Yap present     ***ID***  Zosyn for empiric dosing   Ancef Dced    ***Endocrine***  [x] Hyperglycemia  [x] DM2  : HbA1c 8.9_%               - [x] Insulin gtt               - Need tight glycemic control to prevent wound infection.          Patient requires continuous monitoring with bedside rhythm monitoring, pulse oximetry monitoring, and continuous central venous and arterial pressure monitoring; and intermittent blood gas analysis. Care plan discussed with the ICU care team.   Patient remained critical, at risk for life threatening decompensation.    I have spent 40 minutes providing critical care management to this patient.    By signing my name below, I, Nisreen Gallo, attest that this documentation has been prepared under the direction and in the presence of Aspen Michelle MD   Electronically signed: Darryn Benito, 07-06-25 @ 17:51    I, Aspen Michelle, personally performed the services described in this documentation. all medical record entries made by the scribe were at my direction and in my presence. I have reviewed the chart and agree that the record reflects my personal performance and is accurate and complete  Electronically signed: Aspen Michelle MD  Patient seen and examined at the bedside.    Remained critically ill on continuous ICU monitoring.    OBJECTIVE:  Vital Signs Last 24 Hrs  T(C): 37.1 (06 Jul 2025 16:00), Max: 37.1 (06 Jul 2025 16:00)  T(F): 98.8 (06 Jul 2025 16:00), Max: 98.8 (06 Jul 2025 16:00)  HR: 102 (06 Jul 2025 17:00) (88 - 110)  BP: --  BP(mean): --  RR: 16 (06 Jul 2025 17:00) (11 - 38)  SpO2: 100% (06 Jul 2025 17:00) (94% - 100%)    Parameters below as of 06 Jul 2025 16:00  Patient On (Oxygen Delivery Method): nasal cannula  O2 Flow (L/min): 5    Physical Exam:  General: multiple lines gtt, ECMO & Impella CP    Neurology: moves all 4    Eyes: bilateral pupils equal and reactive   ENT/Neck:  Neck supple, trachea midline, No JVD   Respiratory: Rales noted bilaterally   CV:  [X] AF          Diastolic murmur at L sternal border  Abdominal: Soft, NT, ND +BS,   Extremities: 1-2+ pedal edema noted, + peripheral pulses                     [x] L CFA Impella CP                     [X] L CFV venous cannulation                    [x] R CFA arterial cannulation w reperfusion sheath   Skin: No Rashes, Hematoma, Ecchymosis                           Assessment:  69 yr male 106 Pack yr smoking history / DM2 / HLD / HTN / CAD S/P PCI   AWMI c/w VF arrest S/P Impella CP >>VA ECMO >>LAD CURTIS   Cardiogenic shock complicated with MODS (resp cardiac renal metabolic hematological dysfunction) on MCS   Biventricular failure   AF   Post extubation ^secretion >Polymicrobial tracheobronchitis   Anemia, thrombocytopenia   Renal failure  Ischemic transaminitis  Diuresis induced hypovolemia     suicidal ideation    24 hr events remained with suicidal ideation, Labile BP >>massive urinary output >>hypovolemic requiring volume loading   P2Y12 7 cangrelor decrease to 0.4 mcg/kg/min   Platelet x1 given   All cannulation sites inspected     Plan:   ***Neuro***  [x] Nonfocal        1:1 observation.  mittens  Dced               Seroquel &  Zyprexa  started       Post operative neuro assessment     ***Cardiovascular***  Invasive hemodynamic monitoring, assess perfusion indices   AF  / CVP 2-5 / MAP  / SvO2 66 / Hct 28.2  / Lactate 1.5    [x] Impella  P4 2.4 LPM [X] ECMO 3100 / 3.23, sweep of 2  [x] monitor B/L groin for bleeding or ischemic complications to LE   [x] Heparin 400 units/hr Xa 0,2   [x] Cangrelor 0.4 mcg/kg/min    [x] Amiodarone load   [x] Bumex off   Volume: [X] Albumin 250 x 2  cc               [x]  cc       [x] TTE EF 10%       ***Pulmonary***  [X] NC 5L        Heavy chronic Tobacco use        COPD/ Bronchodilators / NT suction        Zosyn & Vanco for SA & Kleb       Titration of FiO2 and PEEP, follow SpO2, CXR, blood gases       ***GI***  [x] NPO        F/u LFTs        Protonix    Bowel regimen with Miralax and Senna     ***Renal***  Acute Kidney Injury   eGFR 15 / hemodynamically mediated ATN   Continue to monitor I/Os, BUN/Creatinine.   Replete lytes PRN  Yap present     ***ID***  Zosyn for empiric dosing   Ancef Dced    ***Endocrine***  [x] Hyperglycemia  [x] DM2  : HbA1c 8.9_%               - [x] Insulin gtt               - Need tight glycemic control to prevent wound infection.          Patient requires continuous monitoring with bedside rhythm monitoring, pulse oximetry monitoring, and continuous central venous and arterial pressure monitoring; and intermittent blood gas analysis. Care plan discussed with the ICU care team.   Patient remained critical, at risk for life threatening decompensation.    I have spent 40 minutes providing critical care management to this patient.    By signing my name below, I, Nisreen Gallo, attest that this documentation has been prepared under the direction and in the presence of Aspen Michelle MD   Electronically signed: Darryn Benito, 07-06-25 @ 17:51    I, Aspen Michelle, personally performed the services described in this documentation. all medical record entries made by the scribe were at my direction and in my presence. I have reviewed the chart and agree that the record reflects my personal performance and is accurate and complete  Electronically signed: Aspen Michelle MD

## 2025-07-07 NOTE — BH CONSULTATION LIAISON ASSESSMENT NOTE - NSBHATTESTCOMMENTATTENDFT_PSY_A_CORE
Pt is a 70 y/o   male with no past psych hx, here for complicated medical course following MI. pt allegedly reported he wanted to die, but reiterated that he was afraid of dying from his heart condition. he denies si/hi, and wife confirms that pt is not a danger to self or others. pt feels depressed due to recent MI, sleep fragmented with limited appetite. pt is not interested in taking any psychotropic medications at this time. agree with psych student's A/P above.

## 2025-07-07 NOTE — DIETITIAN INITIAL EVALUATION ADULT - WEIGHT (KG)
73.7
[FreeTextEntry1] : The patient is a 73-year-old male with a history of angina with nonobstructive coronary artery disease,  and sinus node dysfunction and iron deficiency anemia here because of chest pain.  s/p stent to LAD.  s/p hip surgery.  Now here with orthostatic hypotension.

## 2025-07-07 NOTE — PROGRESS NOTE ADULT - SUBJECTIVE AND OBJECTIVE BOX
Patient seen and examined at the bedside.    Remains critically ill on continuous ICU monitoring, at risk for life threatening decompensation.  All Labs, data reviewed. Plan of care discussed in length during multi-disciplinary ICU rounds.     Brief Summary:  70 yo M presented 7/3/25 to [facility] with STEMI and cardiac arrest.  IABP -> Impella CP, LAD stent  Upgraded to VA ECMO and transferred to [facility]     Recent events:  Mental status improving, more cooperative   Off diuretics, diuresing well  Pan CT scan today to evaluate axiliary vessels  possible OR for ecmo decannulation tomorrow    Objective:  Vital Signs Last 24 Hrs  T(C): 36.9 (07 Jul 2025 16:00), Max: 37.8 (06 Jul 2025 20:00)  T(F): 98.4 (07 Jul 2025 16:00), Max: 100 (06 Jul 2025 20:00)  HR: 107 (07 Jul 2025 18:00) (104 - 123)  BP: --  BP(mean): --  RR: 16 (07 Jul 2025 18:00) (14 - 53)  SpO2: 100% (07 Jul 2025 18:00) (91% - 100%)    Parameters below as of 07 Jul 2025 08:00  Patient On (Oxygen Delivery Method): nasal cannula  O2 Flow (L/min): 5          Physical Exam:   General: NAD   Neurology: intact, following commands  Respiratory: on NC, Bilateral sounds  CV: Irregular  Abdominal: Soft, Nontender  Extremities: Warm, well-perfused  VA ecmo site clean, no bleeding  -------------------------------------------------------------------------------------------------------------------------------    Labs:                        10.1   10.93 )-----------( 57       ( 07 Jul 2025 11:05 )             28.8     07-07    144  |  95[L]  |  106[H]  ----------------------------<  115[H]  3.8   |  31  |  4.69[H]    Ca    9.1      07 Jul 2025 00:44  Phos  3.3     07-07  Mg     2.5     07-07    TPro  5.8[L]  /  Alb  3.4  /  TBili  4.4[H]  /  DBili  x   /  AST  82[H]  /  ALT  20  /  AlkPhos  132[H]  07-07    LIVER FUNCTIONS - ( 07 Jul 2025 00:44 )  Alb: 3.4 g/dL / Pro: 5.8 g/dL / ALK PHOS: 132 U/L / ALT: 20 U/L / AST: 82 U/L / GGT: x           PT/INR - ( 07 Jul 2025 06:14 )   PT: 14.3 sec;   INR: 1.26 ratio     PTT - ( 07 Jul 2025 06:14 )  PTT:67.8 sec  ABG - ( 07 Jul 2025 17:00 )  pH, Arterial: 7.52  pH, Blood: x     /  pCO2: 50    /  pO2: 96    / HCO3: 41    / Base Excess: 15.9  /  SaO2: 99.3        ALL MEDICATIONS   MEDICATIONS  (STANDING):  albuterol/ipratropium for Nebulization 3 milliLiter(s) Nebulizer every 6 hours  aMIOdarone    Tablet   Oral   aMIOdarone    Tablet 400 milliGRAM(s) Oral every 8 hours  cangrelor Infusion 0.1 MICROgram(s)/kG/Min (2.7 mL/Hr) IV Continuous <Continuous>  cefepime   IVPB 500 milliGRAM(s) IV Intermittent every 12 hours  chlorhexidine 2% Cloths 1 Application(s) Topical <User Schedule>  dextrose 50% Injectable 50 milliLiter(s) IV Push every 15 minutes  heparin  Infusion 300 Unit(s)/Hr (4 mL/Hr) IV Continuous <Continuous>  heparin 50 Units/mL (IMPELLA VAD) Purge Solution  Unit(s) (10 mL/Hr) Ventricular Assist Device <Continuous>  insulin regular Infusion 3 Unit(s)/Hr (3 mL/Hr) IV Continuous <Continuous>  mupirocin 2% Nasal 1 Application(s) Both Nostrils every 12 hours  norepinephrine Infusion 0.05 MICROgram(s)/kG/Min (8.44 mL/Hr) IV Continuous <Continuous>  OLANZapine 2.5 milliGRAM(s) Oral at bedtime  pantoprazole  Injectable 40 milliGRAM(s) IV Push every 12 hours  polyethylene glycol 3350 17 Gram(s) Oral daily  potassium chloride  10 mEq/50 mL IVPB 10 milliEquivalent(s) IV Intermittent once  senna 2 Tablet(s) Oral at bedtime  sodium chloride 0.9% for Nebulization 3 milliLiter(s) Nebulizer every 12 hours  sodium chloride 0.9%. 1000 milliLiter(s) (10 mL/Hr) IV Continuous <Continuous>    MEDICATIONS  (PRN):  sodium chloride 0.65% Nasal 1 Spray(s) Both Nostrils two times a day PRN Nasal Congestion    ------------------------------------------------------------------------------------------------------------------------------  Assessment:  70 yo M presented 7/3/25 to [facility] with STEMI and cardiac arrest.  IABP -> Impella CP,  LAD stented.  Upgraded to VA ECMO and transferred to [facility]     STEMI  Cardiogenic shock  Acute respiratory failure  Thrombocytopenia  Hyperglycemia  Hypokalemia  VANDANA  Shock liver, Hyperbilirubinemia    ***Neuro***  Started on Zyprexa  Bid for delerium   prns for discomfort  Opti mise day and night cyckle  Pt /Amaris encoraged    ***Cardiovascular***  Cardiogenic shock,STEMI, s/p LAD stent  Impella CP at P4, flow 2.3 l/min  VA ecmo flowing at 2.8 L/min  Pulsatility improved  Possible decannulation tomorrow  Remains on purge and systemic AC  CAD, s/p LAD stent, on cangrelor  Amiodarone for A fib and Ectopy, continue with PO  Electrolytes replacement  Antidiuresing required volume back    ***Pulmonary***  on NC  chronic smoker  Continue with nebs  IS, chest PT, mobilization     ***GI***  Continue with TFs  Encourage PO intake  Protonix for stress ulcer prophylaxis   Bowel regimen.  Trend LFTs, avoid hepatotoxins.    ***Renal***  VANDANA  - UO increased  Yap catheter for strict I/O measurements.  keep off diuretics  Hypokalemia repleted  hypomagnesemia repleted    ***ID***  Change zosyn to cefipime  ***Endocrine***  Hyperglycemia - Insulin infusion.    ***Hematology***  Thrombocytopenia   s/p 1 U platlets  Heparin for anticoagulation, trend PTT, aX  Trend CBC and coags and monitor for bleeding.            Assessment:    At risk for delirium   At risk for hemodynamic decompensation  At high risk for cardiac arrythmias   At high risk for respiratory complications        Plan:   ***Neuro***  Maintain day/night cycle to prevenct ICU delerium   Postoperative acute pain control with Tylenol, Gabapentin, and prns.  Meds for sleep at bedtime     ***Cardiovascular***  At high risk for hemodynamic instability and cardiac arrhythmias.  Monitor for sign of hypoperfusion, trend lactate  *If on ECMO/LVAD/Impella/IABP* Mechanical circulatory support   Maintain MAPs > 65 mm Hg. Titrate *pressor name*    Titrate *inotrope name* Keep CVP<10.  *Add other cardiac meds*  Monitor chest tube output.      ***Pulmonary***  *Leave black if supplemental O2*  Postoperative acute respiratory insufficiency/failure  Wean ventilator as tolerated. Lung protective strategy  *If extubated* Deep breathing and coughing exercises, IS, Mobilization, nebs, Chest PT  Wean oxygen as able. *Remove if not on vent*      ***GI***  FEES Eval *Only if consulted*  Keep NPO for now.  Continue Tube feeds *if on Tfs*  Protonix/Pepcid for stress ulcer prophylaxis   Bowel regimen.   Trend LFTs    ***Renal***  VANDANA / CKD/ ESRD on HD ***Remove if not applicable***  Trend Creatinine/BUN  Yap catheter for strict I/O measurements. *remove if not present*  Replete electrolytes as indicated.  Diuresis *Specifiy med*      ***ID***  Leukocytosis *above 11*  Leukopenia *Below 3*  *If no abxs*  Monitor off antibiotics   *summarize abx/indication*  Secondary prophylaxis *Leave blank*     ***Endocrine***  Insulin per protocol for Hyperglycemia     ***Hematology***  Acute blood loss anemia and thrombocytopenia   no current transfusion indication *change if recieved products, or if no thrombocytopenia*  Heparin/Argatroban/Lovenox for anticoagulation/ DVT prophylaxis         I, Connie Anderson MD, personally performed the services described in this documentation. all medical record entries made by the scribe were at my direction and in my presence. I have reviewed the chart and agree that the record reflects my personal performance and is accurate and complete  Electronically signed:   Connie Anderson MD  CT ICU attending     ICU time: ** mins     By signing my name below, I, Nadeem Yanes, attest that this documentation has been prepared under the direction and in the presence of Connie Anderson MD  Electronically signed: Nadeem Yanes, 07-07-25 @ 18:12             Patient seen and examined at the bedside.    Remains critically ill on continuous ICU monitoring, at risk for life threatening decompensation.  All Labs, data reviewed. Plan of care discussed in length during multi-disciplinary ICU rounds.     Brief Summary:  68 yo M presented 7/3/25 to [facility] with STEMI and cardiac arrest.  IABP -> Impella CP, LAD stent  Upgraded to VA ECMO and transferred to [facility]     Recent events:  Mental status improving, more cooperative   Off diuretics, diuresing well  Pan CT scan today to evaluate axiliary vessels  possible OR for ecmo decannulation tomorrow    Objective:  Vital Signs Last 24 Hrs  T(C): 36.9 (07 Jul 2025 16:00), Max: 37.8 (06 Jul 2025 20:00)  T(F): 98.4 (07 Jul 2025 16:00), Max: 100 (06 Jul 2025 20:00)  HR: 107 (07 Jul 2025 18:00) (104 - 123)  BP: --  BP(mean): --  RR: 16 (07 Jul 2025 18:00) (14 - 53)  SpO2: 100% (07 Jul 2025 18:00) (91% - 100%)    Parameters below as of 07 Jul 2025 08:00  Patient On (Oxygen Delivery Method): nasal cannula  O2 Flow (L/min): 5          Physical Exam:   General: NAD   Neurology: intact, following commands  Respiratory: on NC, Bilateral sounds  CV: Irregular  Abdominal: Soft, Nontender  Extremities: Warm, well-perfused  VA ecmo site clean, no bleeding  -------------------------------------------------------------------------------------------------------------------------------    Labs:                        10.1   10.93 )-----------( 57       ( 07 Jul 2025 11:05 )             28.8     07-07    144  |  95[L]  |  106[H]  ----------------------------<  115[H]  3.8   |  31  |  4.69[H]    Ca    9.1      07 Jul 2025 00:44  Phos  3.3     07-07  Mg     2.5     07-07    TPro  5.8[L]  /  Alb  3.4  /  TBili  4.4[H]  /  DBili  x   /  AST  82[H]  /  ALT  20  /  AlkPhos  132[H]  07-07    LIVER FUNCTIONS - ( 07 Jul 2025 00:44 )  Alb: 3.4 g/dL / Pro: 5.8 g/dL / ALK PHOS: 132 U/L / ALT: 20 U/L / AST: 82 U/L / GGT: x           PT/INR - ( 07 Jul 2025 06:14 )   PT: 14.3 sec;   INR: 1.26 ratio     PTT - ( 07 Jul 2025 06:14 )  PTT:67.8 sec  ABG - ( 07 Jul 2025 17:00 )  pH, Arterial: 7.52  pH, Blood: x     /  pCO2: 50    /  pO2: 96    / HCO3: 41    / Base Excess: 15.9  /  SaO2: 99.3        ALL MEDICATIONS   MEDICATIONS  (STANDING):  albuterol/ipratropium for Nebulization 3 milliLiter(s) Nebulizer every 6 hours  aMIOdarone    Tablet   Oral   aMIOdarone    Tablet 400 milliGRAM(s) Oral every 8 hours  cangrelor Infusion 0.1 MICROgram(s)/kG/Min (2.7 mL/Hr) IV Continuous <Continuous>  cefepime   IVPB 500 milliGRAM(s) IV Intermittent every 12 hours  chlorhexidine 2% Cloths 1 Application(s) Topical <User Schedule>  dextrose 50% Injectable 50 milliLiter(s) IV Push every 15 minutes  heparin  Infusion 300 Unit(s)/Hr (4 mL/Hr) IV Continuous <Continuous>  heparin 50 Units/mL (IMPELLA VAD) Purge Solution  Unit(s) (10 mL/Hr) Ventricular Assist Device <Continuous>  insulin regular Infusion 3 Unit(s)/Hr (3 mL/Hr) IV Continuous <Continuous>  mupirocin 2% Nasal 1 Application(s) Both Nostrils every 12 hours  norepinephrine Infusion 0.05 MICROgram(s)/kG/Min (8.44 mL/Hr) IV Continuous <Continuous>  OLANZapine 2.5 milliGRAM(s) Oral at bedtime  pantoprazole  Injectable 40 milliGRAM(s) IV Push every 12 hours  polyethylene glycol 3350 17 Gram(s) Oral daily  potassium chloride  10 mEq/50 mL IVPB 10 milliEquivalent(s) IV Intermittent once  senna 2 Tablet(s) Oral at bedtime  sodium chloride 0.9% for Nebulization 3 milliLiter(s) Nebulizer every 12 hours  sodium chloride 0.9%. 1000 milliLiter(s) (10 mL/Hr) IV Continuous <Continuous>    MEDICATIONS  (PRN):  sodium chloride 0.65% Nasal 1 Spray(s) Both Nostrils two times a day PRN Nasal Congestion    ------------------------------------------------------------------------------------------------------------------------------  Assessment:  68 yo M presented 7/3/25 to [facility] with STEMI and cardiac arrest.  IABP -> Impella CP,  LAD stented.  Upgraded to VA ECMO and transferred to [facility]     STEMI  Cardiogenic shock  Acute respiratory failure  Thrombocytopenia  Hyperglycemia  Hypokalemia  VANDANA  Shock liver, Hyperbilirubinemia    ***Neuro***  Started on Zyprexa Bid for delirium   prns for discomfort  Opti mise day and night cycle  Pt /OT encouraged    ***Cardiovascular***  Cardiogenic shock, STEMI, s/p LAD stent  Impella CP at P4, flow 2.3 l/min  VA ecmo flowing at 2.8 L/min  Pulsatility improved  Possible decannulation tomorrow  Remains on purge and systemic AC  CAD, s/p LAD stent, on cangrelor  Amiodarone for A fib and Ectopy, continue with PO  Electrolytes replacement  Antidiuresing required volume back    ***Pulmonary***  on NC  chronic smoker  Continue with nebs  IS, chest PT, mobilization     ***GI***  Continue with TFs  Encourage PO intake  Protonix for stress ulcer prophylaxis   Bowel regimen.  Trend LFTs, avoid hepatotoxins.    ***Renal***  VANDANA  - UO increased  Yap catheter for strict I/O measurements.  keep off diuretics  Hypokalemia repleted  hypomagnesemia repleted    ***ID***  Change zosyn to cefepime  bronchial aspirate + for Klebs,+ staph auras     ***Endocrine***  Hyperglycemia - Insulin infusion.    ***Hematology***  Thrombocytopenia   s/p 1 U platlets  Heparin for anticoagulation, trend PTT, aX  Trend CBC and coags and monitor for bleeding.      I, Connie Anderson MD, personally performed the services described in this documentation. all medical record entries made by the scribe were at my direction and in my presence. I have reviewed the chart and agree that the record reflects my personal performance and is accurate and complete  Electronically signed:   Connie Anderson MD  CT ICU attending     ICU time: 60 mins

## 2025-07-07 NOTE — DIETITIAN INITIAL EVALUATION ADULT - NSFNSGIIOFT_GEN_A_CORE
Last BM 3 days ago per ti.    07-06-25 @ 07:01  -  07-07-25 @ 07:00  --------------------------------------------------------  OUT:  Total OUT: 0 mL    Total NET: 510 mL      07-07-25 @ 07:01  -  07-07-25 @ 09:31  --------------------------------------------------------  OUT:  Total OUT: 0 mL    Total NET: 40 mL

## 2025-07-07 NOTE — BH CONSULTATION LIAISON ASSESSMENT NOTE - DIFFERENTIAL
CT scan scheduled for 11 am tomorrow  Patient aware to come by [phone] to register  Also aware to get sample cup for c diff testing as well  adjustment disorder

## 2025-07-07 NOTE — BH CONSULTATION LIAISON ASSESSMENT NOTE - HPI (INCLUDE ILLNESS QUALITY, SEVERITY, DURATION, TIMING, CONTEXT, MODIFYING FACTORS, ASSOCIATED SIGNS AND SYMPTOMS)
69-year-old male with PMHx of HTN, HLD, DM2, CAD with stents, current smoker presenting as transfer from [facility] s/p cardiac arrest x4. At [facility] patient underwent LHC which showed LAD occlusion. Left femoral Impella CP inserted for support, s/p CURTIS to the LAD. Upgraded to VA ECMO (25Fr left femoral venous cannula, 17Fr right femoral arterial cannula with 6Fr distal reperfusion cannula) for continued cardiogenic shock after stent placement. Patient was then transferred to [facility] from [facility] for further care. Currently in the CTICU on VA ECMO and Impella CP, P3. Extubated . Primary team called a psychiatric consult after the patient expressed suicidal ideation yesterday.     On physical exam, patient is A&Ox3 and lethargic. Patient states that he has felt hopeless and is afraid he will die, however he denies current suicidal ideation or plan. Patient denies prior suicide attempts, psychiatric hospitalization or outpatient psychiatric treatment. Patient's wife present at bedside and reports that he has a history of depression which started after his brother  from cancer 7 months ago. She stated that he would ever do anything to harm himself and is not fearful for his safety at home.  Patient is originally from [place], and he currently resides in [place] with his wife and 13 year old daughter. He denies use of alochol or illicit drugs, but smoked 2 to 3 packs of cigarettes each day prior to his hospitalization. Patient is not interested in taking medication to treat the depression at this time.

## 2025-07-07 NOTE — DIETITIAN INITIAL EVALUATION ADULT - ADD RECOMMEND
Diet advancement/provision per team, recommend continuing full tube feeding support if pt unable/unwilling to take sufficient PO. RD remains available to obtain subjective information on follow up.

## 2025-07-07 NOTE — DIETITIAN INITIAL EVALUATION ADULT - REASON INDICATOR FOR ASSESSMENT
Assessment warranted for ICU length of stay.  Information obtained from interdisciplinary team rounds, medical record.

## 2025-07-07 NOTE — PROGRESS NOTE ADULT - ASSESSMENT
69-year-old male with PMHx of HTN, HLD, DM2, CAD with stents, current smoker presenting as transfer from [facility] s/p cardiac arrest x4. At [facility] underwent LHC which showed LAD occlusion. Left femoral Impella CP inserted for support, s/p CURTIS to the LAD. Upgraded to VA ECMO (25Fr left femoral venous cannula, 17Fr right femoral arterial cannula with 6Fr distal reperfusion cannula) for continued cardiogenic shock after stent placement. Patient was then transferred to [facility] from [facility] for further care    Currently in the CTICU on VA ECMO and Impella CP, P3. Extubated 7/5. Patient implying he doesn't further care though in current mental state may lack decision-making capcity; CTU to consult psychiatry. Will attempt GOC discussion w family.

## 2025-07-07 NOTE — PROGRESS NOTE ADULT - PROBLEM SELECTOR PLAN 1
VANDANA likely hemodynamically mediated iso of refractory cardiogenic shock/arrests and ALKA from CTAs + C s/p CURTIS. On review of [facility], SCr prior to [facility] admission was 1.48 (6/29/25) and 1.14 (8/26/24). SCr on admission 4.47 (7/3) and now 4.1 (7/6) on VA ECMO (previously with IABP then Impella) and now off bumex gtt (7/6). U/A (7/4) cloudy, mod blood, trace LE, trace protein, 9 RBC. CTA (6/30) with complex L renal cyst and small R renal cysts. Raya catheter in place, was on bumex gtt  7/4-7/5 with improvement in UOP. CVPs now are 0-2 off gtt and UOP is -7.2L in 24 hours and net -3.8L. Hemodynamically unstable on VA ECMO, but improving.     - Can use bumex pushes PRN to remain net negative 1-2L or if CVP begins to rise.   - No current urgent indication for RRT.  - Maintain raya catheter   - Monitor labs and I/Os. Avoid nephrotoxins including, ACE/ARB, NSAIDs, contrast, etc.    Note incomplete VANDANA/ATN likely hemodynamically mediated iso of refractory cardiogenic shock/arrests and ALKA from CTAs + C s/p CURTIS. On review of [facility], SCr prior to [facility] admission was 1.48 (6/29/25) and 1.14 (8/26/24). SCr on admission 4.47 (7/3) and now 4.69 (7/7) on VA ECMO (previously with IABP then Impella) and now off bumex gtt (7/6). U/A (7/4) cloudy, mod blood, trace LE, trace protein, 9 RBC. CTA (6/30) with complex L renal cyst and small R renal cysts. Raya catheter in place, was on bumex gtt  7/4-7/5 with improvement in UOP. CVPs now are 3-4 off gtt and UOP is 950mL in 24 hours and net -631mL. Hemodynamically unstable on VA ECMO.     - Can use bumex pushes PRN to remain net negative 1-2L or if CVP begins to rise.   - No current urgent indication for RRT.  - Maintain raya catheter , strict I/O  - Dose abx based on Crcl < 15ml/min    Feel free to contact me via TEAMS.     Susanne Bennett MD   Nephrology Fellow   After 5PM/Weekends/Holidays please contact the on call fellow.

## 2025-07-07 NOTE — PROGRESS NOTE ADULT - PROBLEM SELECTOR PLAN 1
- Currently on VA-ECMO and Impella CP   - off pressors  - cw PO amio load  - hold loop diuretics, auto-diuresing  - Trend perfusion markers  - hep gtt

## 2025-07-07 NOTE — BH CONSULTATION LIAISON ASSESSMENT NOTE - OTHER PAST PSYCHIATRIC HISTORY (INCLUDE DETAILS REGARDING ONSET, COURSE OF ILLNESS, INPATIENT/OUTPATIENT TREATMENT)
Patient's wife reports that he suffered from depression prior to his hospitalization after his brother  from cancer 7 months ago. Patient has never received inpatient or outpatient treatment.

## 2025-07-07 NOTE — PROGRESS NOTE ADULT - ATTENDING COMMENTS
68 YO M with a history of CAD s/p PCI, active tobacco use, DM2 (A1c 8.9%) who presented to [facility] 7/3 from his PMD’s office after witnessed cardiac arrest c/b VT storm and went for urgent LHC for delayed STEMI revealing occluded LAD s/p CURTIS and ultimately escalated to VA ECMO  for SCAI E AMI-CS. Of note he presented to the ED 3 days before the event with chest pain.    Recovery is unlikely given delayed presentation MI with thinned/akinetic LAD territory though he is reassuringly pulsatile on full support. He is not a transplant candidate due to age, critical illness, active tobacco use, poorly controlled DM2, and notably possibly metastases seen on chest CT. He is a suboptimal LVAD candidate due to small LV though perhaps may dilate out.    Importantly, he is not able to engage in conversations about next steps at his time and continues to make passive suicidal ideations - will need psychiatry evaluation for capacity. I have been unable to contact the HCP.       PLAN  -Continue VA ECMO, currently at 3.3 L/min with Impella CP at P4 for venting. Tentative plan for decannulation in next few days but will work to find out backup options after trying to ascertain wishes. Wean trial tomorrow   -On cangrelor for recent stent.  -Anticoagulation per CTU/CTS  -Non-oliguric severe VANDANA, BUN/Cr remain high but making adequate urine. May need to consider HD for clearance pending mental status trend.

## 2025-07-07 NOTE — BH CONSULTATION LIAISON ASSESSMENT NOTE - SUMMARY
69-year-old male with PMHx of HTN, HLD, DM2, CAD with stents, current smoker presenting as transfer from [facility] s/p cardiac arrest x4. Currently in the CTICU on VA ECMO and Impella CP, P3. Extubated . Primary team called a psychiatric consult after the patient expressed suicidal ideation yesterday. On physical exam, patient is A&Ox3 and lethargic. Patient states that he has felt hopeless and is afraid he will die, however he denies current suicidal ideation or plan. Patient's wife present at bedside and reports that he has a history of depression which started after his brother  from cancer 7 months ago. She stated that he would ever do anything to harm himself and is not fearful for his safety at home. Patient is not interested in taking medication to treat the depression at this time.

## 2025-07-07 NOTE — DIETITIAN INITIAL EVALUATION ADULT - ORAL INTAKE PTA/DIET HISTORY
Multiple attempts made to obtain background from pt but pt not participative in RD interview. Per H&P, pt taking vitamin D3 PTA. NKFA or intolerances noted.

## 2025-07-07 NOTE — DIETITIAN INITIAL EVALUATION ADULT - PATIENT MEETS CRITERIA FOR MALNUTRITION
Orders Placed This Encounter   Procedures    Comprehensive metabolic panel    Hemoglobin A1c    Lipid panel    Microalbumin/creatinine urine ratio       
Pt requesting order for diabetic labs.  Please advise.  Pt is aware that she needs to schedule an appt; waiting to see if she's ok with an appt in July.  
no

## 2025-07-07 NOTE — DIETITIAN INITIAL EVALUATION ADULT - ENERGY INTAKE
Per rounds, pt with very diminished appetite and disinterested in taking PO. +NGT. Vital 1.5 at 40mL/hr x 24hr running. Provides 960mL volume, 1440kcal, 65g protein, 733mL free water.

## 2025-07-07 NOTE — BH CONSULTATION LIAISON ASSESSMENT NOTE - NSICDXBHSECONDARYDX_PSY_ALL_CORE
Cardiogenic shock   R57.0  Ischemic cardiomyopathy   I25.5  VANDANA (acute kidney injury)   N17.9  Acute hypoxemic respiratory failure   J96.01  Atrial fibrillation   I48.91  Diabetes mellitus   E11.9  CAD (coronary artery disease)   I25.10  Positive culture findings in sputum   R84.5

## 2025-07-07 NOTE — DIETITIAN INITIAL EVALUATION ADULT - NS FNS DIET ORDER
Diet, NPO with Tube Feed:   Tube Feeding Modality: Nasogastric  Vital 1.5 Krishan (VITAL1.5RTH)  Total Volume for 24 Hours (mL): 960  Continuous  Starting Tube Feed Rate {mL per Hour}: 20  Until Goal Tube Feed Rate (mL per Hour): 40  Tube Feed Duration (in Hours): 24  Tube Feed Start Time: 16:00 (07-04-25 @ 16:16)

## 2025-07-07 NOTE — DIETITIAN INITIAL EVALUATION ADULT - OTHER INFO
- Cardiac arrest requiring [facility] ECMO, Impella CP   - Extubated 7/6. Received Precedex overnight  - Hx DM. HbA1c 8.9% on insulin drip   - VANDANA s/p Bumex gtt 7/4-7/5    Weight Hx:  - Dosing weight 90kg. Pt s/p diuretics, most recent/lowest wt 73.7kg. - Cardiac arrest requiring VA ECMO, Impella CP   - Extubated 7/6. Received Precedex overnight  - Hx DM. HbA1c 8.9% on insulin drip   - VANDANA s/p Bumex gtt 7/4-7/5  - Norepinephrine now off    Weight Hx:  - Dosing weight 90kg. Pt s/p diuretics, most recent/lowest wt 73.7kg. Per [facility] HIE: 90.7kg, stated (6/29/25), 80.3kg (2019). ?pt with potential weight loss, will continue to monitor/trend.

## 2025-07-07 NOTE — DIETITIAN INITIAL EVALUATION ADULT - PERTINENT MEDS FT
MEDICATIONS  (STANDING):  cangrelor Infusion 0.1 MICROgram(s)/kG/Min (2.7 mL/Hr) IV Continuous <Continuous>  dexMEDEtomidine Infusion 0.3 MICROgram(s)/kG/Hr (6.75 mL/Hr) IV Continuous <Continuous>  insulin regular Infusion 3 Unit(s)/Hr (3 mL/Hr) IV Continuous <Continuous>  norepinephrine Infusion 0.05 MICROgram(s)/kG/Min (8.44 mL/Hr) IV Continuous <Continuous>  OLANZapine 2.5 milliGRAM(s) Oral at bedtime  OLANZapine 5 milliGRAM(s) Oral daily  pantoprazole  Injectable 40 milliGRAM(s) IV Push every 12 hours  piperacillin/tazobactam IVPB.. 3.375 Gram(s) IV Intermittent every 12 hours  polyethylene glycol 3350 17 Gram(s) Oral daily  senna 2 Tablet(s) Oral at bedtime

## 2025-07-07 NOTE — PROGRESS NOTE ADULT - SUBJECTIVE AND OBJECTIVE BOX
Patient seen and examined at the bedside.    Remained critically ill on continuous ICU monitoring.    OBJECTIVE:  Vital Signs Last 24 Hrs  T(C): 37 (07 Jul 2025 20:00), Max: 37 (07 Jul 2025 00:00)  T(F): 98.6 (07 Jul 2025 20:00), Max: 98.6 (07 Jul 2025 00:00)  HR: 124 (07 Jul 2025 21:00) (104 - 126)  BP: --  BP(mean): --  RR: 24 (07 Jul 2025 21:00) (14 - 53)  SpO2: 100% (07 Jul 2025 21:00) (91% - 100%)    Parameters below as of 07 Jul 2025 20:15  Patient On (Oxygen Delivery Method): nasal cannula  O2 Flow (L/min): 6        Physical Exam:   General: NAD   Neurology: intact, following commands  Eyes: bilateral pupils equal and reactive   ENT/Neck: Neck supple, trachea midline, No JVD   Respiratory: Clear bilaterally   CV: S1S2, no murmurs        [x] AFib  Abdominal: Soft, NT, ND +BS   Extremities: 1-2+ pedal edema noted, + peripheral pulses   Skin: No Rashes, Hematoma, Ecchymosis                         Assessment:  68 yo M presented 7/3/25 to [facility] with STEMI and cardiac arrest.  IABP -> Impella CP,  LAD stented.  Upgraded to VA ECMO and transferred to [facility]     STEMI  Cardiogenic shock  Acute respiratory failure  Thrombocytopenia  Hyperglycemia  Hypokalemia  VANDANA  Shock liver, Hyperbilirubinemia    Plan:   ***Neuro***  [x] Nonfocal   Post operative neuro assessment   Zyprexa for delirium     ***Cardiovascular***  Invasive hemodynamic monitoring, assess perfusion indices   AF / CVP -2-3 / MAP  / Hct 28.8% / Lactate 1.2  [x] Levophed, currently off  [x] Impella CP at P4, flow 2.2 l/min  [x] VA ECMO 3100 RPMS, flowing at 3.1 L/min, sweep 3.0  [x] Amiodarone for A fib and Ectopy, continue with PO  [x] CAD, s/p LAD stent, on cangrelor  [x] Heparin for anticoagulation and impella purge  Serial EKG and cardiac enzymes     ***Pulmonary***  [x] NC 6L  Encourage incentive spirometry, continue pulse ox monitoring, follow ABGs     ***GI***  [x] Diet: Vital TF goal @40 cc/hr, NPO at midnight  [x] Protonix  Bowel regimen with Miralax and Senna    ***Renal***  [x] VANDANA  Continue to monitor I/Os, BUN/Creatinine.   Replete lytes PRN    ***ID***  Changed zosyn to cefepime  bronchial aspirate + for Klebs,+ staph auras     ***Endocrine***  [x] Stress Hyperglycemia : HbA1c 8.9%                - [x] Insulin gtt              - Need tight glycemic control to prevent wound infection.    Patient requires continuous monitoring with bedside rhythm monitoring, pulse oximetry monitoring, and continuous central venous and arterial pressure monitoring; and intermittent blood gas analysis. Care plan discussed with the ICU care team.   Patient remained critical, at risk for life threatening decompensation.    I have spent 45 minutes providing critical care management to this patient.    By signing my name below, I, Kraig Diaz, attest that this documentation has been prepared under the direction and in the presence of Nathan Magaña NP  Electronically signed: Jenny Francis, 07-07-25 @ 21:24    I, Nathan Magaña NP, personally performed the services described in this documentation. all medical record entries made by the jenny were at my direction and in my presence. I have reviewed the chart and agree that the record reflects my personal performance and is accurate and complete  Electronically signed: Nathan Magaña NP

## 2025-07-07 NOTE — PROGRESS NOTE ADULT - SUBJECTIVE AND OBJECTIVE BOX
Cardiology Progress Note  ------------------------------------------------------------------------------------------  SUBJECTIVE:   - Tele: NSR 110s  - No events overnight.  -------------------------------------------------------------------------------------------  VS:  T(F): 98.2 (07-07), Max: 100 (07-06)  HR: 116 (07-07) (98 - 120)  BP: --  RR: 15 (07-07)  SpO2: 100% (07-07)  I&O's Summary    06 Jul 2025 07:01  -  07 Jul 2025 07:00  --------------------------------------------------------  IN: 3812.1 mL / OUT: 4730 mL / NET: -917.9 mL    07 Jul 2025 07:01  -  07 Jul 2025 14:10  --------------------------------------------------------  IN: 303.1 mL / OUT: 935 mL / NET: -631.9 mL      PHYSICAL EXAM:  GENERAL: NAD though seems confused  ENT: EOMI  NECK: Supple, No JVD.  CHEST/LUNG: Unlabored respirations.  HEART: sinus tachycardia; No murmurs, rubs, or gallops.  EXTREMITIES: No edema    -------------------------------------------------------------------------------------------  LABS:                          10.1   10.93 )-----------( 57       ( 07 Jul 2025 11:05 )             28.8     07-07    144  |  95[L]  |  106[H]  ----------------------------<  115[H]  3.8   |  31  |  4.69[H]    Ca    9.1      07 Jul 2025 00:44  Phos  3.3     07-07  Mg     2.5     07-07    TPro  5.8[L]  /  Alb  3.4  /  TBili  4.4[H]  /  DBili  x   /  AST  82[H]  /  ALT  20  /  AlkPhos  132[H]  07-07    PT/INR - ( 07 Jul 2025 06:14 )   PT: 14.3 sec;   INR: 1.26 ratio         PTT - ( 07 Jul 2025 06:14 )  PTT:67.8 sec  CARDIAC MARKERS ( 07 Jul 2025 10:05 )  7283 ng/L / x     / x     / x     / x     / x      CARDIAC MARKERS ( 07 Jul 2025 01:58 )  7141 ng/L / x     / x     / x     / x     / x      CARDIAC MARKERS ( 06 Jul 2025 17:53 )  6398 ng/L / x     / x     / x     / x     / x      CARDIAC MARKERS ( 06 Jul 2025 10:09 )  5380 ng/L / x     / x     / x     / x     / x      CARDIAC MARKERS ( 03 Jul 2025 19:08 )  9630 ng/L / x     / x     / x     / x     / 46.7 ng/mL            -------------------------------------------------------------------------------------------  Meds:  albuterol/ipratropium for Nebulization 3 milliLiter(s) Nebulizer every 6 hours  aMIOdarone    Tablet   Oral   aMIOdarone    Tablet 400 milliGRAM(s) Oral every 8 hours  cangrelor Infusion 0.1 MICROgram(s)/kG/Min IV Continuous <Continuous>  cefepime   IVPB 500 milliGRAM(s) IV Intermittent every 12 hours  chlorhexidine 2% Cloths 1 Application(s) Topical <User Schedule>  dextrose 50% Injectable 50 milliLiter(s) IV Push every 15 minutes  heparin  Infusion 300 Unit(s)/Hr IV Continuous <Continuous>  heparin 50 Units/mL (IMPELLA VAD) Purge Solution  Unit(s) Ventricular Assist Device <Continuous>  insulin regular Infusion 3 Unit(s)/Hr IV Continuous <Continuous>  mupirocin 2% Nasal 1 Application(s) Both Nostrils every 12 hours  norepinephrine Infusion 0.05 MICROgram(s)/kG/Min IV Continuous <Continuous>  OLANZapine 2.5 milliGRAM(s) Oral at bedtime  pantoprazole  Injectable 40 milliGRAM(s) IV Push every 12 hours  polyethylene glycol 3350 17 Gram(s) Oral daily  senna 2 Tablet(s) Oral at bedtime  sodium chloride 0.65% Nasal 1 Spray(s) Both Nostrils two times a day PRN  sodium chloride 0.9% for Nebulization 3 milliLiter(s) Nebulizer every 12 hours  sodium chloride 0.9%. 1000 milliLiter(s) IV Continuous <Continuous>    -------------------------------------------------------------------------------------------  Cardiovascular Diagnostic Testing:    ECG:     Echo:     Stress Testing:    Cath:    -------------------------------------------------------------------------------------------

## 2025-07-07 NOTE — BH CONSULTATION LIAISON ASSESSMENT NOTE - CURRENT MEDICATION
MEDICATIONS  (STANDING):  albuterol/ipratropium for Nebulization 3 milliLiter(s) Nebulizer every 6 hours  aMIOdarone    Tablet   Oral   aMIOdarone    Tablet 400 milliGRAM(s) Oral every 8 hours  cangrelor Infusion 0.1 MICROgram(s)/kG/Min (2.7 mL/Hr) IV Continuous <Continuous>  cefepime   IVPB 500 milliGRAM(s) IV Intermittent every 12 hours  chlorhexidine 2% Cloths 1 Application(s) Topical <User Schedule>  dextrose 50% Injectable 50 milliLiter(s) IV Push every 15 minutes  heparin  Infusion 300 Unit(s)/Hr (4 mL/Hr) IV Continuous <Continuous>  heparin 50 Units/mL (IMPELLA VAD) Purge Solution  Unit(s) (10 mL/Hr) Ventricular Assist Device <Continuous>  insulin regular Infusion 3 Unit(s)/Hr (3 mL/Hr) IV Continuous <Continuous>  mupirocin 2% Nasal 1 Application(s) Both Nostrils every 12 hours  norepinephrine Infusion 0.05 MICROgram(s)/kG/Min (8.44 mL/Hr) IV Continuous <Continuous>  OLANZapine 2.5 milliGRAM(s) Oral at bedtime  pantoprazole  Injectable 40 milliGRAM(s) IV Push every 12 hours  polyethylene glycol 3350 17 Gram(s) Oral daily  senna 2 Tablet(s) Oral at bedtime  sodium chloride 0.9% for Nebulization 3 milliLiter(s) Nebulizer every 12 hours  sodium chloride 0.9%. 1000 milliLiter(s) (10 mL/Hr) IV Continuous <Continuous>    MEDICATIONS  (PRN):  sodium chloride 0.65% Nasal 1 Spray(s) Both Nostrils two times a day PRN Nasal Congestion

## 2025-07-07 NOTE — BH CONSULTATION LIAISON ASSESSMENT NOTE - NSBHCHARTREVIEWLAB_PSY_A_CORE FT
CBC:            10.1   10.93 )-----------( 57       ( 07-07-25 @ 11:05 )             28.8       Chem:         ( 07-07-25 @ 00:44 )    144  |  95[L]  |  106[H]  ----------------------------<  115[H]  3.8   |  31  |  4.69[H]      Liver Functions: ( 07-07-25 @ 00:44 )  Alb: 3.4 g/dL / Pro: 5.8 g/dL / ALK PHOS: 132 U/L / ALT: 20 U/L / AST: 82 U/L / GGT: x         Type & Screen: ( 07-07-25 @ 00:41 )  ABO/Rh/Anthony:  A Positive             Bilirubin: (07-07-25 @ 00:44)  Direct: x  / Indirect: x  / Total: 4.4    TSH: (07-03-25 @ 19:21)    Serum: 2.73    T4: (07-03-25 @ 19:21)    Serum: 1.0

## 2025-07-07 NOTE — BH CONSULTATION LIAISON ASSESSMENT NOTE - NSBHCHARTREVIEWVS_PSY_A_CORE FT
Vital Signs Last 24 Hrs  T(C): 36.8 (07 Jul 2025 12:00), Max: 37.8 (06 Jul 2025 20:00)  T(F): 98.2 (07 Jul 2025 12:00), Max: 100 (06 Jul 2025 20:00)  HR: 104 (07 Jul 2025 15:00) (98 - 123)  BP: --  BP(mean): --  RR: 16 (07 Jul 2025 15:00) (14 - 53)  SpO2: 100% (07 Jul 2025 15:00) (91% - 100%)    Parameters below as of 07 Jul 2025 08:00  Patient On (Oxygen Delivery Method): nasal cannula  O2 Flow (L/min): 5

## 2025-07-07 NOTE — PROGRESS NOTE ADULT - ASSESSMENT
69-year-old male with PMHx of HTN, HLD, DM2, CAD with stents, current smoker presenting as transfer from [facility] s/p cardiac arrest x4 on VA ECMO in setting of LAD occlusion s/p DESx1. Nephrology consulted for non-oliguric VANDANA.

## 2025-07-07 NOTE — PROGRESS NOTE ADULT - SUBJECTIVE AND OBJECTIVE BOX
[facility] Division of Kidney Diseases & Hypertension  FOLLOW UP NOTE  [phone]--------------------------------------------------------------------------------  Chief Complaint:    24 hour events/subjective:    PAST HISTORY  --------------------------------------------------------------------------------  No significant changes to PMH, PSH, FHx, SHx from H&P unless otherwise noted.    ALLERGIES & MEDICATIONS  --------------------------------------------------------------------------------  Allergies    No Known Allergies    Intolerances      Standing Inpatient Medications  albuterol/ipratropium for Nebulization 3 milliLiter(s) Nebulizer every 6 hours  aMIOdarone    Tablet   Oral   aMIOdarone    Tablet 400 milliGRAM(s) Oral every 8 hours  budesonide    Inhalation Suspension 0.5 milliGRAM(s) Inhalation every 12 hours  cangrelor Infusion 0.1 MICROgram(s)/kG/Min IV Continuous <Continuous>  chlorhexidine 2% Cloths 1 Application(s) Topical <User Schedule>  dexMEDEtomidine Infusion 0.3 MICROgram(s)/kG/Hr IV Continuous <Continuous>  dextrose 50% Injectable 50 milliLiter(s) IV Push every 15 minutes  heparin  Infusion 300 Unit(s)/Hr IV Continuous <Continuous>  heparin 50 Units/mL (IMPELLA VAD) Purge Solution  Unit(s) Ventricular Assist Device <Continuous>  insulin regular Infusion 3 Unit(s)/Hr IV Continuous <Continuous>  norepinephrine Infusion 0.05 MICROgram(s)/kG/Min IV Continuous <Continuous>  OLANZapine 2.5 milliGRAM(s) Oral at bedtime  OLANZapine 5 milliGRAM(s) Oral daily  pantoprazole  Injectable 40 milliGRAM(s) IV Push every 12 hours  piperacillin/tazobactam IVPB.. 3.375 Gram(s) IV Intermittent every 12 hours  polyethylene glycol 3350 17 Gram(s) Oral daily  senna 2 Tablet(s) Oral at bedtime  sodium chloride 0.9% for Nebulization 3 milliLiter(s) Nebulizer every 12 hours  sodium chloride 0.9%. 1000 milliLiter(s) IV Continuous <Continuous>    PRN Inpatient Medications  QUEtiapine 25 milliGRAM(s) Oral at bedtime PRN  sodium chloride 0.65% Nasal 1 Spray(s) Both Nostrils two times a day PRN      REVIEW OF SYSTEMS  --------------------------------------------------------------------------------  Gen: No fevers/chills  Head/Eyes/Ears: No HA  Respiratory: No dyspnea  CV: No chest pain  GI: No abdominal discomfort  : No dysuria  MSK: No edema    All other systems were reviewed and are negative, except as noted above.    VITALS/PHYSICAL EXAM  --------------------------------------------------------------------------------  T(C): 36.5 (07-07-25 @ 08:00), Max: 37.8 (07-06-25 @ 20:00)  HR: 113 (07-07-25 @ 08:00) (88 - 114)  BP: --  RR: 29 (07-07-25 @ 08:00) (12 - 53)  SpO2: 100% (07-07-25 @ 08:00) (96% - 100%)  Wt(kg): --        07-06-25 @ 07:01  -  07-07-25 @ 07:00  --------------------------------------------------------  IN: 3812.1 mL / OUT: 4730 mL / NET: -917.9 mL    07-07-25 @ 07:01  -  07-07-25 @ 08:22  --------------------------------------------------------  IN: 79 mL / OUT: 175 mL / NET: -96 mL      Physical Exam:  Gen: NAD  Pulm: CTA B/L  CV: RRR, S1S2;  Abd: +BS, soft  : No suprapubic tenderness  Extremities: no bilateral LE edema.  Neuro: Awake, alert  Skin: Warm  Vascular access:    LABS/STUDIES  --------------------------------------------------------------------------------              10.0   9.95  >-----------<  38       [07-07-25 @ 00:40]              28.8     144  |  95  |  106  ----------------------------<  115      [07-07-25 @ 00:44]  3.8   |  31  |  4.69        Ca     9.1     [07-07-25 @ 00:44]      Mg     2.5     [07-07-25 @ 00:44]      Phos  3.3     [07-07-25 @ 00:44]    TPro  5.8  /  Alb  3.4  /  TBili  4.4  /  DBili  x   /  AST  82  /  ALT  20  /  AlkPhos  132  [07-07-25 @ 00:44]    PT/INR: PT 14.3 , INR 1.26       [07-07-25 @ 06:14]  PTT: 67.8       [07-07-25 @ 06:14]          [07-07-25 @ 00:44]    Creatinine Trend:  SCr 4.69 [07-07 @ 00:44]  SCr 4.09 [07-06 @ 12:06]  SCr 4.16 [07-06 @ 00:19]  SCr 4.40 [07-05 @ 12:16]  SCr 4.82 [07-05 @ 00:10]    Urinalysis - [07-07-25 @ 00:44]      Color  / Appearance  / SG  / pH       Gluc 115 / Ketone   / Bili  / Urobili        Blood  / Protein  / Leuk Est  / Nitrite       RBC  / WBC  / Hyaline  / Gran  / Sq Epi  / Non Sq Epi  / Bacteria       TSH 2.73      [07-03-25 @ 19:21]  Lipid: chol 90, TG 96, HDL 31, LDL --      [07-03-25 @ 19:06]       [facility] Division of Kidney Diseases & Hypertension  FOLLOW UP NOTE  [phone]--------------------------------------------------------------------------------  Chief Complaint:    24 hour events/subjective:   Pt. seen and examined at bedside earlier today.    PAST HISTORY  --------------------------------------------------------------------------------  No significant changes to PMH, PSH, FHx, SHx from H&P unless otherwise noted.    ALLERGIES & MEDICATIONS  --------------------------------------------------------------------------------  Allergies    No Known Allergies    Intolerances      Standing Inpatient Medications  albuterol/ipratropium for Nebulization 3 milliLiter(s) Nebulizer every 6 hours  aMIOdarone    Tablet   Oral   aMIOdarone    Tablet 400 milliGRAM(s) Oral every 8 hours  budesonide    Inhalation Suspension 0.5 milliGRAM(s) Inhalation every 12 hours  cangrelor Infusion 0.1 MICROgram(s)/kG/Min IV Continuous <Continuous>  chlorhexidine 2% Cloths 1 Application(s) Topical <User Schedule>  dexMEDEtomidine Infusion 0.3 MICROgram(s)/kG/Hr IV Continuous <Continuous>  dextrose 50% Injectable 50 milliLiter(s) IV Push every 15 minutes  heparin  Infusion 300 Unit(s)/Hr IV Continuous <Continuous>  heparin 50 Units/mL (IMPELLA VAD) Purge Solution  Unit(s) Ventricular Assist Device <Continuous>  insulin regular Infusion 3 Unit(s)/Hr IV Continuous <Continuous>  norepinephrine Infusion 0.05 MICROgram(s)/kG/Min IV Continuous <Continuous>  OLANZapine 2.5 milliGRAM(s) Oral at bedtime  OLANZapine 5 milliGRAM(s) Oral daily  pantoprazole  Injectable 40 milliGRAM(s) IV Push every 12 hours  piperacillin/tazobactam IVPB.. 3.375 Gram(s) IV Intermittent every 12 hours  polyethylene glycol 3350 17 Gram(s) Oral daily  senna 2 Tablet(s) Oral at bedtime  sodium chloride 0.9% for Nebulization 3 milliLiter(s) Nebulizer every 12 hours  sodium chloride 0.9%. 1000 milliLiter(s) IV Continuous <Continuous>    PRN Inpatient Medications  QUEtiapine 25 milliGRAM(s) Oral at bedtime PRN  sodium chloride 0.65% Nasal 1 Spray(s) Both Nostrils two times a day PRN      REVIEW OF SYSTEMS  --------------------------------------------------------------------------------  Gen: No fevers/chills  Head/Eyes/Ears: No HA  Respiratory: No dyspnea  CV: No chest pain  GI: No abdominal discomfort  : No dysuria  MSK: No edema    All other systems were reviewed and are negative, except as noted above.    VITALS/PHYSICAL EXAM  --------------------------------------------------------------------------------  T(C): 36.5 (07-07-25 @ 08:00), Max: 37.8 (07-06-25 @ 20:00)  HR: 113 (07-07-25 @ 08:00) (88 - 114)  BP: --  RR: 29 (07-07-25 @ 08:00) (12 - 53)  SpO2: 100% (07-07-25 @ 08:00) (96% - 100%)  Wt(kg): --        07-06-25 @ 07:01  -  07-07-25 @ 07:00  --------------------------------------------------------  IN: 3812.1 mL / OUT: 4730 mL / NET: -917.9 mL    07-07-25 @ 07:01  -  07-07-25 @ 08:22  --------------------------------------------------------  IN: 79 mL / OUT: 175 mL / NET: -96 mL      Physical Exam:  Gen: NAD  Pulm: CTA B/L  CV: RRR, S1S2;  Abd: +BS, soft  : No suprapubic tenderness  Extremities: no bilateral LE edema.  Neuro: Awake, alert  Skin: Warm  Vascular access:    LABS/STUDIES  --------------------------------------------------------------------------------              10.0   9.95  >-----------<  38       [07-07-25 @ 00:40]              28.8     144  |  95  |  106  ----------------------------<  115      [07-07-25 @ 00:44]  3.8   |  31  |  4.69        Ca     9.1     [07-07-25 @ 00:44]      Mg     2.5     [07-07-25 @ 00:44]      Phos  3.3     [07-07-25 @ 00:44]    TPro  5.8  /  Alb  3.4  /  TBili  4.4  /  DBili  x   /  AST  82  /  ALT  20  /  AlkPhos  132  [07-07-25 @ 00:44]    PT/INR: PT 14.3 , INR 1.26       [07-07-25 @ 06:14]  PTT: 67.8       [07-07-25 @ 06:14]          [07-07-25 @ 00:44]    Creatinine Trend:  SCr 4.69 [07-07 @ 00:44]  SCr 4.09 [07-06 @ 12:06]  SCr 4.16 [07-06 @ 00:19]  SCr 4.40 [07-05 @ 12:16]  SCr 4.82 [07-05 @ 00:10]    Urinalysis - [07-07-25 @ 00:44]      Color  / Appearance  / SG  / pH       Gluc 115 / Ketone   / Bili  / Urobili        Blood  / Protein  / Leuk Est  / Nitrite       RBC  / WBC  / Hyaline  / Gran  / Sq Epi  / Non Sq Epi  / Bacteria       TSH 2.73      [07-03-25 @ 19:21]  Lipid: chol 90, TG 96, HDL 31, LDL --      [07-03-25 @ 19:06]

## 2025-07-07 NOTE — PROGRESS NOTE ADULT - SUBJECTIVE AND OBJECTIVE BOX
----------------------------------------------------------------------------------------------------  ECMO Daily Management Note   ----------------------------------------------------------------------------------------------------  INTERVAL EVENTS:   70 yo M s/p STEMI and cardiac arrest presented to [facility] in shock.  Stent to LAD, Impella CP + VA ECMO   VA ECMO  for:  Refractory cardiogenic shock  Date of initiation: 7/3/25  Cannulae:  25Fr L Fem Venous,  19Fr R Fem Arterial with 6Fr Distal reperfusion catheter   Cannula sites examined, well-secured.    Recent events:  meatal status improving  Pulsatility on VA ecmo improving  Possible decannulation tomorrow    =============================================================  Weight (kg): 90 (07-03-25)        Height (cm): 168 (07-03-25)   BSA (m2): 2 (07-03-25)        BMI (kg/m2): 31.9 (07-03-25)    ECMO  RPM:  3100 (07 Jul 2025 18:00)      Pump Flow (Lpm):  3.21 (07 Jul 2025 18:00)              Sweep  (L/min):   3 (07 Jul 2025 18:00)       FiO2 (%):  1 (07 Jul 2025 18:00)  Pre-membrane -  Pressure (mm/Hg):   155 (07 Jul 2025 18:00)      Post-membrane - Pressure (mm/Hg):  131 (07 Jul 2025 18:00)   ( 07 Jul 2025 05:58 )  pH: 7.45  /  pCO2: 56    / pO2: 435   /  HCO3: 39    /  Base Excess: 12.5  /  SaO2: 100.0          aMIOdarone    Tablet   Oral   aMIOdarone    Tablet 400 milliGRAM(s) Oral every 8 hours  norepinephrine Infusion 0.05 MICROgram(s)/kG/Min IV Continuous <Continuous>    Vent       (07 Jul 2025 18:05) pH, Art: 7.54/pCO2: 49/pO2: 111/HCO3: 42/BE: 17.3/SaO2: 99.8/LAC: 1.3, --   (07 Jul 2025 17:00) pH, Art: 7.52/pCO2: 50/pO2: 96/HCO3: 41/BE: 15.9/SaO2: 99.3/LAC: 1.5, --   (07 Jul 2025 09:58) pH, Art: 7.52/pCO2: 47/pO2: 79/HCO3: 38/BE: 13.9/SaO2: 98.3/LAC: 2.1, --   (07 Jul 2025 04:00) pH, Art: 7.51/pCO2: 44/pO2: 94/HCO3: 35/BE: 10.9/SaO2: 99.0/LAC: 1.1, --   (06 Jul 2025 23:56) pH, Art: 7.53/pCO2: 44/pO2: 87/HCO3: 37/BE: 12.7/SaO2: 99.3/LAC: 1.3, --     (06 Jul 2025 23:56) pH, Hitesh: 7.50/pCO2: 52/pO2: 35 /HCO3: 41/BE: 15.0/MVO2: /SvO2: 58.0/LAC: ,     ----------------------------------------------------------------------------------------------------  ANTICOAGULATION  cangrelor Infusion 0.1 MICROgram(s)/kG/Min IV Continuous <Continuous>  heparin  Infusion 300 Unit(s)/Hr IV Continuous <Continuous>  heparin 50 Units/mL (IMPELLA VAD) Purge Solution  Unit(s) Ventricular Assist Device <Continuous>    (07 Jul 2025 11:05)  aPTT: -----  Xa: -----  PT: -----  INR: -----   Fibrinogen: -----  Platelets: 57    (07 Jul 2025 06:14)  aPTT: 67.8  Xa: 0.27  PT: 14.3  INR: 1.26   Fibrinogen: -----  Platelets: -----  (07 Jul 2025 00:54)  aPTT: 61.9  Xa: 0.26  PT: 14.2  INR: 1.25   Fibrinogen: 531   Platelets: -----  (07 Jul 2025 00:40)  aPTT: -----  Xa: -----  PT: -----  INR: -----   Fibrinogen: -----  Platelets: 38      (07 Jul 2025 11:05)  Hemoglobin: 10.1    LDH: -----    Haptoglobin: -----   PFH:  -----  (07 Jul 2025 00:46)  Hemoglobin: -----    LDH: -----    Haptoglobin: <20    PFH:  -----  (07 Jul 2025 00:44)  Hemoglobin: -----    LDH: 872     Haptoglobin: -----   PFH:  -----  (07 Jul 2025 00:40)  Hemoglobin: 10.0    LDH: -----    Haptoglobin: -----   PFH:  -----    ----------------------------------------------------------------------------------------------------  Daily ECMO Checklist:   Progress report received from perfusionist   Circuit checked/inspected   Emergency equipment and supplies checked   Cannulation site inspection     I have reviewed all of the above information as well as pertinent labs/imaging/testing and care plan with the bedside nurse, perfusionist and care team members and provided my recommendations for support.   ECMO Management was separate from critical care billing time.

## 2025-07-07 NOTE — BH CONSULTATION LIAISON ASSESSMENT NOTE - DESCRIPTION
Patient is , employed, and resides with his wife and daughter (age 13). Patient and his wife are both originally from [place]. Patient denies use of alochol or illicit drugs. Prior to his hospitalization, patient smoked 2 to 3 packs of cigarettes a day.

## 2025-07-07 NOTE — DIETITIAN INITIAL EVALUATION ADULT - PERTINENT LABORATORY DATA
07-07    144  |  95[L]  |  106[H]  ----------------------------<  115[H]  3.8   |  31  |  4.69[H]    Ca    9.1      07 Jul 2025 00:44  Phos  3.3     07-07  Mg     2.5     07-07    TPro  5.8[L]  /  Alb  3.4  /  TBili  4.4[H]  /  DBili  x   /  AST  82[H]  /  ALT  20  /  AlkPhos  132[H]  07-07  POCT Blood Glucose.: 133 mg/dL (07-07-25 @ 09:09)  A1C with Estimated Average Glucose Result: 8.9 % (07-03-25 @ 19:06)

## 2025-07-07 NOTE — PROGRESS NOTE ADULT - ATTENDING COMMENTS
VANDANA- ATN  Excellent urine output   no dialysis needed  monitor K, Mg and replete as needed     rosita floyd  nephrology attending   please contact me on TEAMS   Office- [phone]

## 2025-07-07 NOTE — DIETITIAN INITIAL EVALUATION ADULT - ENTERAL
Consider change in feeding regimen to Glucerna 1.5 with goal rate of 55mL/hr x 24hr to provide 1320mL volume, 1980kcal, 109g protein, 1002mL free water. Meets 27kcal/kg, 1.5g/kg protein based on current weight 73.7kg. Defer additional free water to team.

## 2025-07-08 PROBLEM — E11.9 TYPE 2 DIABETES MELLITUS WITHOUT COMPLICATIONS: Chronic | Status: ACTIVE | Noted: 2025-07-03

## 2025-07-08 NOTE — PROGRESS NOTE ADULT - SUBJECTIVE AND OBJECTIVE BOX
Cardiac Surgery Pre-op Note:  CC: Patient is a 69y old  Male who presents with a chief complaint of Chest pain (08 Jul 2025 07:29)      Referring Physician:                                                                                             Surgeon:  Procedure: (Date) (Procedure)    Allergies    No Known Allergies    Intolerances      HPI:  68 y/o Male w/PMHx Hypertension, hyperlipidemia, diabetes, CAD with stents, current smoker (2-3 PPD) brought in to [facility] by EMS from PCP's office status post cardiac arrest, ROSC achieved in 5-10 min.  Patient was in the lobby of his PCP when he was not feeling well, family told patient to sit down, and when came back with a wheelchair patient lost pulse.  Police showed up on scene and did 1 round of CPR, no meds were given.  ROSC achieved.  Patient brought in by EMS, altered, moaning, awake, unresponsive to questions on nonrebreather saturating in the 80s.  As per family patient has been feeling chest pain the past few days. On arrival to the ed, he was agitated and fighting restraints. unclear mental status otherwise, was moving his leg at the end of first round of ACLS. In the ED, pt lost his pulse, ACLS started, regained a pulse after 2 min. Patient was given etomidate, versed, and intubated. Then pt lost a pulse again, regained a pulse after another round of CPR. Patient started on epi gtt. pt had runs of VT, was given total of 2 epi, lido x2, amio 150 mg. Was given bicarb x2. Patient brought to cath lab, underwent LHC which showed LAD occlusion. Left femoral Impella CP inserted for support, s/p CURTIS to the LAD. Upgraded to VA ECMO (25Fr left femoral venous cannula, 17Fr right femoral arterial cannula with 6Fr distal reperfusion cannula) for continued cardiogenic shock after stent placement. Patient was then transferred to [facility] from [facility] for further care. (03 Jul 2025 20:11)      PAST MEDICAL & SURGICAL HISTORY:  Hypertension      CAD (coronary artery disease)      Hyperlipemia      Diabetes      S/P cardiac catheterization  1999          MEDICATIONS  (STANDING):  albuterol/ipratropium for Nebulization 3 milliLiter(s) Nebulizer every 6 hours  aMIOdarone Infusion 1 mG/Min (33.3 mL/Hr) IV Continuous <Continuous>  cangrelor Infusion 0.1 MICROgram(s)/kG/Min (2.7 mL/Hr) IV Continuous <Continuous>  cefepime   IVPB 500 milliGRAM(s) IV Intermittent every 12 hours  chlorhexidine 2% Cloths 1 Application(s) Topical <User Schedule>  dextrose 50% Injectable 50 milliLiter(s) IV Push every 15 minutes  heparin  Infusion 300 Unit(s)/Hr (4 mL/Hr) IV Continuous <Continuous>  heparin 50 Units/mL (IMPELLA VAD) Purge Solution  Unit(s) (10 mL/Hr) Ventricular Assist Device <Continuous>  insulin regular Infusion 3 Unit(s)/Hr (3 mL/Hr) IV Continuous <Continuous>  mupirocin 2% Nasal 1 Application(s) Both Nostrils every 12 hours  norepinephrine Infusion 0.05 MICROgram(s)/kG/Min (8.44 mL/Hr) IV Continuous <Continuous>  OLANZapine 2.5 milliGRAM(s) Oral at bedtime  OLANZapine 2.5 milliGRAM(s) Oral <User Schedule>  pantoprazole  Injectable 40 milliGRAM(s) IV Push every 12 hours  polyethylene glycol 3350 17 Gram(s) Oral daily  senna 2 Tablet(s) Oral at bedtime  sodium chloride 0.9% for Nebulization 3 milliLiter(s) Nebulizer every 12 hours  sodium chloride 0.9%. 1000 milliLiter(s) (10 mL/Hr) IV Continuous <Continuous>    MEDICATIONS  (PRN):  sodium chloride 0.65% Nasal 1 Spray(s) Both Nostrils two times a day PRN Nasal Congestion      Labs:                        10.0   14.85 )-----------( 49       ( 08 Jul 2025 03:11 )             28.7     07-08    146[H]  |  96  |  114[H]  ----------------------------<  135[H]  3.9   |  34[H]  |  5.14[H]    Ca    8.9      08 Jul 2025 00:21  Phos  2.3     07-08  Mg     2.4     07-08    TPro  6.1  /  Alb  3.6  /  TBili  4.5[H]  /  DBili  x   /  AST  72[H]  /  ALT  13  /  AlkPhos  176[H]  07-08    PT/INR - ( 08 Jul 2025 03:11 )   PT: 14.0 sec;   INR: 1.22 ratio         PTT - ( 08 Jul 2025 03:11 )  PTT:62.0 sec    Blood Type: ABO Interpretation: A (07-07 @ 00:41)    HGB A1C:   Prealbumin:   Pro-BNP:   Thyroid Panel: 07-03 @ 19:21/2.73  1.0/5.7/59    MRSA: MRSA PCR Result.: NotDetec (07-06 @ 21:46)   / MSSA:   Urinalysis Basic - ( 08 Jul 2025 00:21 )    Color: x / Appearance: x / SG: x / pH: x  Gluc: 135 mg/dL / Ketone: x  / Bili: x / Urobili: x   Blood: x / Protein: x / Nitrite: x   Leuk Esterase: x / RBC: x / WBC x   Sq Epi: x / Non Sq Epi: x / Bacteria: x        CXR: Left IJ CVC with tip in the SVC. Inferior approach Impella device.   Enteric tube with tip in the stomach. Inferior approach ECMO cannula with   tip in the right atrium.  The heart is not accurately assessed in this projection.  No focal consolidation.  There is no pneumothorax or pleural effusion.  No acute bony abnormality.        EKG:  's      Echocardiogram:1. Limited TTE performed to assess biventricular systolic function.   2. Technically difficult image quality.   3. Left ventricular systolic function is severely decreased with an ejection fraction visually estimated at 10 - 15 %. There is poor visualization of the endocardial borders to determine the presence of wall motion abnormalities.   4. An Impella CP is visualized in the left ventricle. It measures 4.7 cm from the aortic valve annulus to Inlet, which is deep. The device is oriented towards the LV apex without appearing to significantly obstruct the mitral valve.   5. Borderline reduced right ventricular systolic function.   6. Findings were discussed with Dr. Hansel Isaac.    _____________________________________________    Cardiac catheterization 7/3 LAD stent    Gen: WN/WD NAD  Neuro: AAOx3, nonfocal  Pulm: CTA B/L  CV: RRR, S1S2 +systolic murmur  Abd: Soft, NT, ND +BS  Ext: +1 pedal  edema, + peripheral pulses      Pt has AICD/PPM [ ] Yes  [x ] No             Pre-op Beta Blocker ordered within 24 hrs of surgery (CABG ONLY)?  [x ] Yes  [ ] No  If not, Why?  Type & Cross  [x ] Yes  [ ] No  NPO after Midnight [x ] Yes  [ ] No  Pre-op ABX ordered, to be taped on chart:  [ x] Yes  [ ] No     Hibiclens/Peridex ordered [x ] Yes  [ ] No  Intraop on Hold: PRBCs, CXR, AMPARO [x ]   Consent obtained  [x ] Yes  [ ] No

## 2025-07-08 NOTE — PROGRESS NOTE ADULT - SUBJECTIVE AND OBJECTIVE BOX
[facility] Division of Kidney Diseases & Hypertension  FOLLOW UP NOTE  [phone]--------------------------------------------------------------------------------  Chief Complaint:    24 hour events/subjective:    PAST HISTORY  --------------------------------------------------------------------------------  No significant changes to PMH, PSH, FHx, SHx from H&P unless otherwise noted.    ALLERGIES & MEDICATIONS  --------------------------------------------------------------------------------  Allergies    No Known Allergies    Intolerances      Standing Inpatient Medications  albuterol/ipratropium for Nebulization 3 milliLiter(s) Nebulizer every 6 hours  aMIOdarone Infusion 1 mG/Min IV Continuous <Continuous>  cangrelor Infusion 0.1 MICROgram(s)/kG/Min IV Continuous <Continuous>  cefepime   IVPB 500 milliGRAM(s) IV Intermittent every 12 hours  chlorhexidine 2% Cloths 1 Application(s) Topical <User Schedule>  dextrose 50% Injectable 50 milliLiter(s) IV Push every 15 minutes  heparin  Infusion 300 Unit(s)/Hr IV Continuous <Continuous>  heparin 50 Units/mL (IMPELLA VAD) Purge Solution  Unit(s) Ventricular Assist Device <Continuous>  insulin regular Infusion 3 Unit(s)/Hr IV Continuous <Continuous>  mupirocin 2% Nasal 1 Application(s) Both Nostrils every 12 hours  norepinephrine Infusion 0.05 MICROgram(s)/kG/Min IV Continuous <Continuous>  OLANZapine 2.5 milliGRAM(s) Oral at bedtime  OLANZapine 2.5 milliGRAM(s) Oral <User Schedule>  pantoprazole  Injectable 40 milliGRAM(s) IV Push every 12 hours  polyethylene glycol 3350 17 Gram(s) Oral daily  senna 2 Tablet(s) Oral at bedtime  sodium chloride 0.9% for Nebulization 3 milliLiter(s) Nebulizer every 12 hours  sodium chloride 0.9%. 1000 milliLiter(s) IV Continuous <Continuous>    PRN Inpatient Medications  sodium chloride 0.65% Nasal 1 Spray(s) Both Nostrils two times a day PRN      REVIEW OF SYSTEMS  --------------------------------------------------------------------------------  Gen: No fevers/chills  Head/Eyes/Ears: No HA  Respiratory: No dyspnea  CV: No chest pain  GI: No abdominal discomfort  : No dysuria  MSK: No edema    All other systems were reviewed and are negative, except as noted above.    VITALS/PHYSICAL EXAM  --------------------------------------------------------------------------------  T(C): 36.5 (07-08-25 @ 05:34), Max: 37 (07-07-25 @ 20:00)  HR: 123 (07-08-25 @ 07:00) (104 - 126)  BP: 118/82 (07-08-25 @ 05:34) (118/82 - 118/82)  RR: 20 (07-08-25 @ 07:00) (15 - 53)  SpO2: 100% (07-08-25 @ 07:00) (91% - 100%)  Wt(kg): --  Height (cm): 168 (07-08-25 @ 05:34)  Weight (kg): 90 (07-08-25 @ 05:34)  BMI (kg/m2): 31.9 (07-08-25 @ 05:34)  BSA (m2): 2 (07-08-25 @ 05:34)      07-07-25 @ 07:01  -  07-08-25 @ 07:00  --------------------------------------------------------  IN: 1953.3 mL / OUT: 3930 mL / NET: -1976.7 mL      Physical Exam:  Gen: NAD  Pulm: CTA B/L  CV: RRR, S1S2;  Abd: +BS, soft  : No suprapubic tenderness  Extremities: no bilateral LE edema.  Neuro: Awake, alert  Skin: Warm  Vascular access:    LABS/STUDIES  --------------------------------------------------------------------------------              10.0   14.85 >-----------<  49       [07-08-25 @ 03:11]              28.7     146  |  96  |  114  ----------------------------<  135      [07-08-25 @ 00:21]  3.9   |  34  |  5.14        Ca     8.9     [07-08-25 @ 00:21]      Mg     2.4     [07-08-25 @ 00:21]      Phos  2.3     [07-08-25 @ 00:21]    TPro  6.1  /  Alb  3.6  /  TBili  4.5  /  DBili  x   /  AST  72  /  ALT  13  /  AlkPhos  176  [07-08-25 @ 00:21]    PT/INR: PT 14.0 , INR 1.22       [07-08-25 @ 03:11]  PTT: 62.0       [07-08-25 @ 03:11]          [07-08-25 @ 00:21]    Creatinine Trend:  SCr 5.14 [07-08 @ 00:21]  SCr 5.09 [07-07 @ 18:15]  SCr 4.69 [07-07 @ 00:44]  SCr 4.09 [07-06 @ 12:06]  SCr 4.16 [07-06 @ 00:19]    Urinalysis - [07-08-25 @ 00:21]      Color  / Appearance  / SG  / pH       Gluc 135 / Ketone   / Bili  / Urobili        Blood  / Protein  / Leuk Est  / Nitrite       RBC  / WBC  / Hyaline  / Gran  / Sq Epi  / Non Sq Epi  / Bacteria       TSH 2.73      [07-03-25 @ 19:21]  Lipid: chol 90, TG 96, HDL 31, LDL --      [07-03-25 @ 19:06]       [facility] Division of Kidney Diseases & Hypertension  FOLLOW UP NOTE  [phone]--------------------------------------------------------------------------------  Chief Complaint:    24 hour events/subjective:none    PAST HISTORY  --------------------------------------------------------------------------------  No significant changes to PMH, PSH, FHx, SHx from H&P unless otherwise noted.    ALLERGIES & MEDICATIONS  --------------------------------------------------------------------------------  Allergies    No Known Allergies    Intolerances      Standing Inpatient Medications  albuterol/ipratropium for Nebulization 3 milliLiter(s) Nebulizer every 6 hours  aMIOdarone Infusion 1 mG/Min IV Continuous <Continuous>  cangrelor Infusion 0.1 MICROgram(s)/kG/Min IV Continuous <Continuous>  cefepime   IVPB 500 milliGRAM(s) IV Intermittent every 12 hours  chlorhexidine 2% Cloths 1 Application(s) Topical <User Schedule>  dextrose 50% Injectable 50 milliLiter(s) IV Push every 15 minutes  heparin  Infusion 300 Unit(s)/Hr IV Continuous <Continuous>  heparin 50 Units/mL (IMPELLA VAD) Purge Solution  Unit(s) Ventricular Assist Device <Continuous>  insulin regular Infusion 3 Unit(s)/Hr IV Continuous <Continuous>  mupirocin 2% Nasal 1 Application(s) Both Nostrils every 12 hours  norepinephrine Infusion 0.05 MICROgram(s)/kG/Min IV Continuous <Continuous>  OLANZapine 2.5 milliGRAM(s) Oral at bedtime  OLANZapine 2.5 milliGRAM(s) Oral <User Schedule>  pantoprazole  Injectable 40 milliGRAM(s) IV Push every 12 hours  polyethylene glycol 3350 17 Gram(s) Oral daily  senna 2 Tablet(s) Oral at bedtime  sodium chloride 0.9% for Nebulization 3 milliLiter(s) Nebulizer every 12 hours  sodium chloride 0.9%. 1000 milliLiter(s) IV Continuous <Continuous>    PRN Inpatient Medications  sodium chloride 0.65% Nasal 1 Spray(s) Both Nostrils two times a day PRN      REVIEW OF SYSTEMS  --------------------------------------------------------------------------------  Gen: No fevers/chills  Head/Eyes/Ears: No HA  Respiratory: No dyspnea  CV: No chest pain  GI: No abdominal discomfort  : No dysuria  MSK: No edema    All other systems were reviewed and are negative, except as noted above.    VITALS/PHYSICAL EXAM  --------------------------------------------------------------------------------  T(C): 36.5 (07-08-25 @ 05:34), Max: 37 (07-07-25 @ 20:00)  HR: 123 (07-08-25 @ 07:00) (104 - 126)  BP: 118/82 (07-08-25 @ 05:34) (118/82 - 118/82)  RR: 20 (07-08-25 @ 07:00) (15 - 53)  SpO2: 100% (07-08-25 @ 07:00) (91% - 100%)  Wt(kg): --  Height (cm): 168 (07-08-25 @ 05:34)  Weight (kg): 90 (07-08-25 @ 05:34)  BMI (kg/m2): 31.9 (07-08-25 @ 05:34)  BSA (m2): 2 (07-08-25 @ 05:34)      07-07-25 @ 07:01  -  07-08-25 @ 07:00  --------------------------------------------------------  IN: 1953.3 mL / OUT: 3930 mL / NET: -1976.7 mL      Physical Exam:  Gen: NAD  Pulm: CTA B/L  CV: RRR, S1S2;  Abd: +BS, soft  : No suprapubic tenderness  Extremities: no bilateral LE edema.  Neuro: Awake, alert  Skin: Warm  Vascular access: none for dialysis     LABS/STUDIES  --------------------------------------------------------------------------------              10.0   14.85 >-----------<  49       [07-08-25 @ 03:11]              28.7     146  |  96  |  114  ----------------------------<  135      [07-08-25 @ 00:21]  3.9   |  34  |  5.14        Ca     8.9     [07-08-25 @ 00:21]      Mg     2.4     [07-08-25 @ 00:21]      Phos  2.3     [07-08-25 @ 00:21]    TPro  6.1  /  Alb  3.6  /  TBili  4.5  /  DBili  x   /  AST  72  /  ALT  13  /  AlkPhos  176  [07-08-25 @ 00:21]    PT/INR: PT 14.0 , INR 1.22       [07-08-25 @ 03:11]  PTT: 62.0       [07-08-25 @ 03:11]          [07-08-25 @ 00:21]    Creatinine Trend:  SCr 5.14 [07-08 @ 00:21]  SCr 5.09 [07-07 @ 18:15]  SCr 4.69 [07-07 @ 00:44]  SCr 4.09 [07-06 @ 12:06]  SCr 4.16 [07-06 @ 00:19]    Urinalysis - [07-08-25 @ 00:21]      Color  / Appearance  / SG  / pH       Gluc 135 / Ketone   / Bili  / Urobili        Blood  / Protein  / Leuk Est  / Nitrite       RBC  / WBC  / Hyaline  / Gran  / Sq Epi  / Non Sq Epi  / Bacteria       TSH 2.73      [07-03-25 @ 19:21]  Lipid: chol 90, TG 96, HDL 31, LDL --      [07-03-25 @ 19:06]

## 2025-07-08 NOTE — BRIEF OPERATIVE NOTE - NSICDXBRIEFPOSTOP_GEN_ALL_CORE_FT
POST-OP DIAGNOSIS:  Cardiogenic shock 08-Jul-2025 15:07:14  Jean-Paul Perez  
POST-OP DIAGNOSIS:  Cardiogenic shock 08-Jul-2025 15:07:14  Jean-Paul Perez

## 2025-07-08 NOTE — CONSULT NOTE ADULT - ASSESSMENT
69 year old with many problems  CAd  CHF  DM  admitted after cardiac arrest requiring resuscitation  transferred and had ECMO place and left femoral impella   had right and left fem artery catheterization   currently in OR in attempt to get ECMO and impella out  pt has colonization of sputum with MSSA and sensitive  klebsiella    day 2 cefepime 500 mg bid   bilateral infiltrates compatible with pneumonia with broken ribs  discussed with team    one dose of vanco to be given too

## 2025-07-08 NOTE — BRIEF OPERATIVE NOTE - NSICDXBRIEFPREOP_GEN_ALL_CORE_FT
PRE-OP DIAGNOSIS:  Cardiogenic shock 08-Jul-2025 15:07:06  Jean-Paul Perez  
PRE-OP DIAGNOSIS:  Cardiogenic shock 08-Jul-2025 15:07:06  Jean-Paul Perez

## 2025-07-08 NOTE — PRE-ANESTHESIA EVALUATION ADULT - NSANTHPEFT_GEN_ALL_CORE
GENERAL: NAD  HEAD:  Atraumatic, Normocephalic  CHEST/LUNG: Clear to auscultation bilaterally  HEART: Normal S1/S2  PSYCH: awake, follows some commands.   NEUROLOGY: non-focal  SKIN: No obvious rashes or lesions

## 2025-07-08 NOTE — PROGRESS NOTE ADULT - SUBJECTIVE AND OBJECTIVE BOX
----------------------------------------------------------------------------------------------------  ECMO Daily Management Note   ----------------------------------------------------------------------------------------------------  INTERVAL EVENTS:   70 yo M s/p STEMI and cardiac arrest presented to [facility] in shock.  Stent to LAD, Impella CP + VA ECMO   VA ECMO  for:  Refractory cardiogenic shock  Date of initiation: 7/3/25  Cannulae:  25Fr L Fem Venous,  19Fr R Fem Arterial with 6Fr Distal reperfusion catheter   Cannula sites examined, well-secured.    Recent events:  meatal status improving  Pulsatility on VA ecmo improving  Possible decannulation tomorrow    =============================================================  Weight (kg): 90 (07-08-25)        Height (cm): 168 (07-08-25)   BSA (m2): 2 (07-08-25)        BMI (kg/m2): 31.9 (07-08-25)    ECMO  RPM:  2600 (08 Jul 2025 11:00)      Pump Flow (Lpm):  2.27 (08 Jul 2025 11:00)              Sweep  (L/min):   3 (08 Jul 2025 11:00)       FiO2 (%):  1 (08 Jul 2025 11:00)  Pre-membrane -  Pressure (mm/Hg):   98 (08 Jul 2025 11:00)      Post-membrane - Pressure (mm/Hg):  85 (08 Jul 2025 11:00)   ( 08 Jul 2025 06:11 )  pH: 7.50  /  pCO2: 48    / pO2: 397   /  HCO3: 37    /  Base Excess: 12.4  /  SaO2: 100.0          aMIOdarone Infusion 1 mG/Min IV Continuous <Continuous>  norepinephrine Infusion 0.05 MICROgram(s)/kG/Min IV Continuous <Continuous>    Vent       (08 Jul 2025 12:56) pH, Art: 7.61/pCO2: 35/pO2: 254/HCO3: 35/BE: 12.9/SaO2: 100.0/LAC: 1.5, --   (08 Jul 2025 06:42) pH, Art: 7.54/pCO2: 47/pO2: 115/HCO3: 40/BE: 15.9/SaO2: 99.5/LAC: 1.3, --   (08 Jul 2025 04:06) pH, Art: 7.51/pCO2: 47/pO2: 95/HCO3: 38/BE: 12.9/SaO2: 98.9/LAC: 1.2, --   (07 Jul 2025 23:56) pH, Art: 7.51/pCO2: 50/pO2: 75/HCO3: 40/BE: 15.0/SaO2: 97.6/LAC: 1.1, --   (07 Jul 2025 19:55) pH, Art: 7.56/pCO2: 48/pO2: 74/HCO3: 43/BE: 18.6/SaO2: 97.1/LAC: 1.2, --     (08 Jul 2025 04:06) pH, Hitesh: 7.49/pCO2: 54/pO2: 40 /HCO3: 41/BE: 15.8/MVO2: /SvO2: 66.1/LAC: ,   (07 Jul 2025 23:56) pH, Hitesh: 7.48/pCO2: 55/pO2: 42 /HCO3: 41/BE: 14.9/MVO2: /SvO2: 70.0/LAC: ,   (07 Jul 2025 19:55) pH, Hitesh: 7.51/pCO2: 54/pO2: 40 /HCO3: 43/BE: 17.2/MVO2: /SvO2: 69.7/LAC: ,     ----------------------------------------------------------------------------------------------------  ANTICOAGULATION  cangrelor Infusion 0.1 MICROgram(s)/kG/Min IV Continuous <Continuous>  heparin  Infusion 300 Unit(s)/Hr IV Continuous <Continuous>  heparin 50 Units/mL (IMPELLA VAD) Purge Solution  Unit(s) Ventricular Assist Device <Continuous>    (08 Jul 2025 03:11)  aPTT: 62.0  Xa: 0.27  PT: 14.0  INR: 1.22   Fibrinogen: 686   Platelets: 49    (08 Jul 2025 00:22)  aPTT: 63.5  Xa: 0.28  PT: 14.4  INR: 1.25   Fibrinogen: 686   Platelets: -----  (08 Jul 2025 00:21)  aPTT: -----  Xa: -----  PT: -----  INR: -----   Fibrinogen: -----  Platelets: 50    (07 Jul 2025 11:05)  aPTT: -----  Xa: -----  PT: -----  INR: -----   Fibrinogen: -----  Platelets: 57      (08 Jul 2025 03:11)  Hemoglobin: 10.0    LDH: -----    Haptoglobin: -----   PFH:  -----  (08 Jul 2025 00:21)  Hemoglobin: 10.2    LDH: 850     Haptoglobin: <20    PFH:  -----  (07 Jul 2025 11:05)  Hemoglobin: 10.1    LDH: -----    Haptoglobin: -----   PFH:  -----  (07 Jul 2025 00:46)  Hemoglobin: -----    LDH: -----    Haptoglobin: <20    PFH:  -----    ----------------------------------------------------------------------------------------------------  Daily ECMO Checklist:   Progress report received from perfusionist   Circuit checked/inspected   Emergency equipment and supplies checked   Cannulation site inspection     I have reviewed all of the above information as well as pertinent labs/imaging/testing and care plan with the bedside nurse, perfusionist and care team members and provided my recommendations for support.   ECMO Management was separate from critical care billing time.

## 2025-07-08 NOTE — PROCEDURE NOTE - NSTIMEOUT_GEN_A_CORE
Patient's first and last name, , procedure, and correct site confirmed prior to the start of procedure. 70.1

## 2025-07-08 NOTE — BRIEF OPERATIVE NOTE - NSICDXBRIEFPROCEDURE_GEN_ALL_CORE_FT
PROCEDURES:  Open removal of extracorporeal membrane oxygenation (ECMO) cannula in patient 6 years of age or older 08-Jul-2025 15:06:56  Jean-Paul Perez   PROCEDURES:  Open removal of extracorporeal membrane oxygenation (ECMO) cannula in patient 6 years of age or older 08-Jul-2025 15:06:56 with primary repair of the right femoral artery Jean-Paul Perez

## 2025-07-08 NOTE — PROGRESS NOTE ADULT - SUBJECTIVE AND OBJECTIVE BOX
Patient seen and examined at the bedside.    Remained critically ill on continuous ICU monitoring.    OBJECTIVE:  Vital Signs Last 24 Hrs  T(C): 36.3 (08 Jul 2025 16:00), Max: 37 (07 Jul 2025 20:00)  T(F): 97.3 (08 Jul 2025 16:00), Max: 98.6 (07 Jul 2025 20:00)  HR: 122 (08 Jul 2025 19:00) (112 - 126)  BP: 118/82 (08 Jul 2025 11:13) (118/82 - 118/82)  BP(mean): --  RR: 18 (08 Jul 2025 19:00) (8 - 32)  SpO2: 98% (08 Jul 2025 19:00) (92% - 100%)    Parameters below as of 08 Jul 2025 16:00  Patient On (Oxygen Delivery Method): ventilator    O2 Concentration (%): 40    Physical Exam:  General: Intubated  Neurology: Sedated  Eyes: bilateral pupils equal and reactive   ENT/Neck: Neck supple, trachea midline, No JVD   Respiratory: Clear bilaterally   CV: S1S2, no murmurs        [x] Sinus Tachycardia  Abdominal: Soft, NT, ND +BS   Extremities: 1-2+ pedal edema noted, + peripheral pulses   Skin: No Rashes, Hematoma, Ecchymosis                         Assessment:  70 yo M presented 7/3/25 to [facility] with STEMI and cardiac arrest.  IABP -> Impella CP,  LAD stented.  Upgraded to VA ECMO and transferred to [facility]     STEMI  Cardiogenic shock  Acute respiratory failure  Thrombocytopenia  Hyperglycemia  Hypokalemia  VANDANA  Shock liver, Hyperbilirubinemia    Plan:   ***Neuro***  [x] Sedated with Precedex  Post operative neuro assessment   Zyprexa for delirium     ***Cardiovascular***  Invasive hemodynamic monitoring, assess perfusion indices  SR (112 - 126) / CVP (-3 - 65) / MAP (57 - 101) / PAP (22 - 29) / CI (2.2 - 2.2) / Hct 24.5% / Lactate 1.6  [x] Amiodarone 0.5 mg/min - for A fib and Ectopy, continue with PO  [x] Levophed 0.05 mcg/kg/min  [x] Impella CP at P5, flow 2.8 l/min  s/p VA ECMO decann today 7/8  Volume: [x] 2u Plts, [x] 2u Cryo in OR               [x] 1u PRBC in CTU  [x] CAD, s/p LAD stent, on Cangrelor  [x] Heparin for impella purge  Serial EKG and cardiac enzymes     ***Pulmonary***  Post op vent management  Titration of FiO2 and PEEP, follow SpO2, CXR, blood gasses, continue nebulizers    Mode: AC/ CMV (Assist Control/ Continuous Mandatory Ventilation)  RR (machine): 18  TV (machine): 500  FiO2: 40  PEEP: 6  ITime: 1  MAP: 12  PIP: 29    ***GI***  [x] Diet: Vital TF goal @40 cc/hr  [x] Protonix  Bowel regimen with Miralax and Senna    ***Renal***  [x] VANDANA  Continue to monitor I/Os, BUN/Creatinine.   Replete lytes PRN  Yap present    ***ID***  Cefepime for prophylaxis  Bronchial aspirate + for Klebs,+ staph auras     ***Endocrine***  [x] Stress Hyperglycemia : HbA1c 8.9%                - [x] Insulin gtt              - Need tight glycemic control to prevent wound infection.        Patient requires continuous monitoring with bedside rhythm monitoring, pulse oximetry monitoring, and continuous central venous and arterial pressure monitoring; and intermittent blood gas analysis. Care plan discussed with the ICU care team.  Patient remained critical, at risk for life threatening decompensation.    I have spent 60 minutes providing critical care management to this patient.    By signing my name below, IDustin, attest that this documentation has been prepared under the direction and in the presence of Nathan Magaña NP.  Electronically signed: Darryn Hodges, 07-08-25 @ 19:29    I, Nathan Magaña, personally performed the services described in this documentation. All medical record entries made by the consueloibe were at my direction and in my presence. I have reviewed the chart and agree that the record reflects my personal performance and is accurate and complete.  Electronically signed: Nathan Magaña NP.

## 2025-07-08 NOTE — PROGRESS NOTE ADULT - SUBJECTIVE AND OBJECTIVE BOX
Patient seen and examined at the bedside.    Remained critically ill on continuous ICU monitoring.      Brief Summary:  68 yo M presented 7/3/25 to [facility] with STEMI and cardiac arrest.  IABP -> Impella CP, LAD stent  Upgraded to VA ECMO and transferred to [facility]       24 Hour events:  no acute events overnight  plans for possible ECMO decannulation today      OBJECTIVE:  Vital Signs Last 24 Hrs  T(C): 36.5 (08 Jul 2025 11:13), Max: 37 (07 Jul 2025 20:00)  T(F): 97.7 (08 Jul 2025 08:00), Max: 98.6 (07 Jul 2025 20:00)  HR: 118 (08 Jul 2025 11:13) (104 - 126)  BP: 118/82 (08 Jul 2025 11:13) (118/82 - 118/82)  BP(mean): --  RR: 20 (08 Jul 2025 11:13) (14 - 32)  SpO2: 100% (08 Jul 2025 11:13) (100% - 100%)    Parameters below as of 08 Jul 2025 11:13    O2 Flow (L/min): 6  O2 Concentration (%): 40                Physical Exam:   General: NAD   Neurology: intact, following commands  Respiratory: on NC, Bilateral sounds  CV: Irregular  Abdominal: Soft, Nontender  Extremities: Warm, well-perfused  VA ecmo site clean, no bleeding      -------------------------------------------------------------------------------------------------------------------------------    Labs:                        10.0   14.85 )-----------( 49       ( 08 Jul 2025 03:11 )             28.7     07-08    146[H]  |  96  |  114[H]  ----------------------------<  135[H]  3.9   |  34[H]  |  5.14[H]    Ca    8.9      08 Jul 2025 00:21  Phos  2.3     07-08  Mg     2.4     07-08    TPro  6.1  /  Alb  3.6  /  TBili  4.5[H]  /  DBili  x   /  AST  72[H]  /  ALT  13  /  AlkPhos  176[H]  07-08    LIVER FUNCTIONS - ( 08 Jul 2025 00:21 )  Alb: 3.6 g/dL / Pro: 6.1 g/dL / ALK PHOS: 176 U/L / ALT: 13 U/L / AST: 72 U/L / GGT: x           PT/INR - ( 08 Jul 2025 03:11 )   PT: 14.0 sec;   INR: 1.22 ratio         PTT - ( 08 Jul 2025 03:11 )  PTT:62.0 sec  ABG - ( 08 Jul 2025 12:56 )  pH, Arterial: 7.61  pH, Blood: x     /  pCO2: 35    /  pO2: 254   / HCO3: 35    / Base Excess: 12.9  /  SaO2: 100.0             ------------------------------------------------------------------------------------------------------------------------------  Assessment:  68 yo M presented 7/3/25 to [facility] with STEMI and cardiac arrest.  IABP -> Impella CP,  LAD stented.  Upgraded to VA ECMO and transferred to [facility]     STEMI  Cardiogenic shock  Acute respiratory failure  Thrombocytopenia  Hyperglycemia  Hypokalemia  VANDANA  Shock liver, Hyperbilirubinemia    ***Neuro***  Started on Zyprexa Bid for delirium   prns for discomfort  Optimise day and night cycle  Pt /OT encouraged    ***Cardiovascular***  Cardiogenic shock, STEMI, s/p LAD stent  Impella CP at P4, flow 2.3 l/min  VA ecmo flowing at 2.8 L/min  Pulsatility improved  Possible decannulation today  Remains on purge and systemic AC  CAD, s/p LAD stent, on cangrelor - hold for OR  Amiodarone for A fib and Ectopy, continue with PO  Electrolytes replacement  Antidiuresing required volume back    ***Pulmonary***  on NC  chronic smoker  Continue with nebs  IS, chest PT, mobilization     ***GI***  Continue with TFs  Encourage PO intake  Protonix for stress ulcer prophylaxis   Bowel regimen.  Trend LFTs, avoid hepatotoxins.    ***Renal***  VANDANA  - UO increased  Yap catheter for strict I/O measurements.  keep off diuretics  Hypokalemia repleted  hypomagnesemia repleted    ***ID***  Change zosyn to cefepime  bronchial aspirate + for Klebs,+ staph auras     ***Endocrine***  Hyperglycemia - Insulin infusion.    ***Hematology***  Thrombocytopenia   s/p 1 U platlets  Heparin for anticoagulation, trend PTT, aX  Trend CBC and coags and monitor for bleeding      Patient requires continuous monitoring with bedside rhythm monitoring, pulse oximetry monitoring, and continuous central venous and arterial pressure monitoring; and intermittent blood gas analysis. Care plan discussed with the ICU care team.     Patient remained critical, at risk for life threatening decompensation.    I have spent 45 minutes providing critical care management to this patient.    Disposition: CTU    I, Macho Rodriguez MD, personally performed the services described in this documentation. I have reviewed the chart and agree that the record reflects my personal performance and is accurate and complete.

## 2025-07-08 NOTE — PRE-ANESTHESIA EVALUATION ADULT - NSANTHPMHFT_GEN_ALL_CORE
69-year-old male with PMHx of HTN, HLD, DM2, CAD with stents, current smoker presenting as transfer from [facility] s/p cardiac arrest x4 on VA ECMO with Imeplla CP in setting of LAD occlusion s/p DESx1.    currently extubated, on no hemodynamic support.

## 2025-07-08 NOTE — CONSULT NOTE ADULT - SUBJECTIVE AND OBJECTIVE BOX
Patient is a 69y old  Male who presents with a chief complaint of Chest pain (08 Jul 2025 09:55)    HPI:  68 y/o Male w/PMHx Hypertension, hyperlipidemia, diabetes, CAD with stents, current smoker (2-3 PPD) brought in to [facility] by EMS from PCP's office status post cardiac arrest, ROSC achieved in 5-10 min.  Patient was in the lobby of his PCP when he was not feeling well, family told patient to sit down, and when came back with a wheelchair patient lost pulse.  Police showed up on scene and did 1 round of CPR, no meds were given.  ROSC achieved.  Patient brought in by EMS, altered, moaning, awake, unresponsive to questions on nonrebreather saturating in the 80s.  As per family patient has been feeling chest pain the past few days. On arrival to the ed, he was agitated and fighting restraints. unclear mental status otherwise, was moving his leg at the end of first round of ACLS. In the ED, pt lost his pulse, ACLS started, regained a pulse after 2 min. Patient was given etomidate, versed, and intubated. Then pt lost a pulse again, regained a pulse after another round of CPR. Patient started on epi gtt. pt had runs of VT, was given total of 2 epi, lido x2, amio 150 mg. Was given bicarb x2. Patient brought to cath lab, underwent LHC which showed LAD occlusion. Left femoral Impella CP inserted for support, s/p CURTIS to the LAD. Upgraded to VA ECMO (25Fr left femoral venous cannula, 17Fr right femoral arterial cannula with 6Fr distal reperfusion cannula) for continued cardiogenic shock after stent placement. Patient was then transferred to [facility] from [facility] for further care. (03 Jul 2025 20:11)      PAST MEDICAL & SURGICAL HISTORY:  Hypertension      CAD (coronary artery disease)      Hyperlipemia      Diabetes      S/P cardiac catheterization  1999          Social history:       Allergic/Immunologic:	No hives or rash   Allergies    No Known Allergies    Intolerances        Antimicrobials:    cefepime   IVPB 500 milliGRAM(s) IV Intermittent every 12 hours        Vital Signs Last 24 Hrs  T(C): 36.5 (08 Jul 2025 11:13), Max: 37 (07 Jul 2025 20:00)  T(F): 97.7 (08 Jul 2025 08:00), Max: 98.6 (07 Jul 2025 20:00)  HR: 118 (08 Jul 2025 11:13) (104 - 126)  BP: 118/82 (08 Jul 2025 11:13) (118/82 - 118/82)  BP(mean): --  RR: 20 (08 Jul 2025 11:13) (14 - 32)  SpO2: 100% (08 Jul 2025 11:13) (91% - 100%)    Parameters below as of 08 Jul 2025 11:13    O2 Flow (L/min): 6  O2 Concentration (%): 40                                             10.0   14.85 )-----------( 49       ( 08 Jul 2025 03:11 )             28.7         07-08    146[H]  |  96  |  114[H]  ----------------------------<  135[H]  3.9   |  34[H]  |  5.14[H]    Ca    8.9      08 Jul 2025 00:21  Phos  2.3     07-08  Mg     2.4     07-08    TPro  6.1  /  Alb  3.6  /  TBili  4.5[H]  /  DBili  x   /  AST  72[H]  /  ALT  13  /  AlkPhos  176[H]  07-08      RECENT CULTURES:  07-05 @ 18:34  Trach Asp Tracheal Aspirate  Staphylococcus aureus  Klebsiella aerogenes (Previously Enterobacter)  Staphylococcus aureus  FAWAD    Moderate Staphylococcus aureus  Moderate Klebsiella aerogenes (Previously Enterobacter)  Commensal km consistent with body site  --  07-04 @ 10:37  Blood Blood-Peripheral  --  --  --    No growth at 72 Hours  --      MICROBIOLOGY:  Culture Results:   Moderate Staphylococcus aureus  Moderate Klebsiella aerogenes (Previously Enterobacter)  Commensal km consistent with body site (07-05 @ 18:34)  Culture Results:   No growth at 72 Hours (07-04 @ 10:37)  Culture Results:   No growth at 72 Hours (07-04 @ 10:37)          Radiology:      Assessment:        Recommendations and Plan:    Pager [phone]  After 5 pm/weekends or if no response :[phone]

## 2025-07-08 NOTE — PROGRESS NOTE ADULT - ASSESSMENT
69-year-old male with PMHx of HTN, HLD, DM2, CAD with stents, current smoker presenting as transfer from [facility] s/p cardiac arrest x4 on VA ECMO in setting of LAD occlusion s/p DESx1. Nephrology consulted for non-oliguric VANDANA.  69-year-old male with PMHx of HTN, HLD, DM2, CAD with stents, current smoker presenting as transfer from [facility] s/p cardiac arrest x4 on VA ECMO in setting of LAD occlusion s/p DESx1. Nephrology consulted for non-oliguric VANDANA.     VANDANA/ATN likely hemodynamically mediated iso of refractory cardiogenic shock/arrests and ALKA from CTAs + LHC s/p CURTIS. [facility] admission was 1.48 (6/29/25) and 1.14 (8/26/24). SCr on admission 4.47 (7/3) and now 4.69 (7/7) on VA ECMO (previously with IABP then Impella) and now off bumex gtt (7/6). U/A (7/4) cloudy, mod blood, trace LE, trace protein, 9 RBC. CTA (6/30) with complex L renal cyst and small R renal cysts. Yap catheter in place, was on bumex gtt  7/4-7/5 with improvement in UOP.     CVP 1-3   Urinating ~ 4 L /day   use prn Bumex only. might need some volume back   check plasma free hemoglobin     rosita floyd  nephrology attending   please contact me on TEAMS   Office- [phone]

## 2025-07-08 NOTE — BRIEF OPERATIVE NOTE - OPERATION/FINDINGS
Removal of VA ECMO  R femoral cutdown    R femoral arterial catheter and reperfusion sheath removed   R venous 7F sheath removed   L femoral venous canula removed   2 platelets, 1 cryo  Removal of VA ECMO  R femoral cutdown with primary repair of the right femoral artery   R femoral arterial catheter and distal reperfusion sheath removed   R venous 7F sheath removed   L femoral venous canula removed

## 2025-07-08 NOTE — PRE-ANESTHESIA EVALUATION ADULT - NSRADCARDRESULTSFT_GEN_ALL_CORE
< from: TTE Limited W or WO Ultrasound Enhancing Agent (07.05.25 @ 10:52) >    CONCLUSIONS:      1. Limited TTE performed to assess biventricular systolic function.   2. Technically difficult image quality.   3. Left ventricular systolic function is severely decreased with an ejection fraction visually estimated at 10 - 15 %. There is poor visualization of the endocardial borders to determine the presence of wall motion abnormalities.   4. An Impella CP is visualized in the left ventricle. It measures 4.7 cm from the aortic valve annulus to Inlet, which is deep. The device is oriented towards the LV apex without appearing to significantly obstruct the mitral valve.   5. Borderline reduced right ventricular systolic function.   6. Findings were discussed with Dr. Hansel Isaac.    ________________________________________________________________________________________  FINDINGS:     Left Ventricle:  Left ventricular systolic function is severely decreased with an ejection fraction visually estimated at 10 - 15%. There is poor visualization of the endocardial borders to determine the presence of wall motion abnormalities.  ____________________________________________________________________  CHF / Hemodynamics / Heart Transplant / MAD:  There is an Impella CP device and a VA ECMO device present.  An Impella CP is visualized in the left ventricle. It measures 4.7 cm from the aortic valve annulus to Inlet, which is deep. The device is oriented towards the LV apex without appearing to significantly obstruct the mitral valve.     Right Ventricle:  The right ventricular cavity is normal in size and right ventricular systolic function is borderline reduced.     Aortic Valve:  The aortic valve was not well visualized. Color Doppler is suggestive of significant aortic regurgitation, difficult to grade on this study.     Tricuspid Valve:  The tricuspid valve appears tethered. There is severe tricuspid regurgitation.     Pericardium:  There is a trace pericardial effusion.  _____________________________________    < end of copied text >

## 2025-07-08 NOTE — PROGRESS NOTE ADULT - PROBLEM SELECTOR PLAN 1
VANDANA/ATN likely hemodynamically mediated iso of refractory cardiogenic shock/arrests and ALKA from CTAs + C s/p CURTIS. On review of [facility], SCr prior to [facility] admission was 1.48 (6/29/25) and 1.14 (8/26/24). SCr on admission 4.47 (7/3) and now 4.69 (7/7) on VA ECMO (previously with IABP then Impella) and now off bumex gtt (7/6). U/A (7/4) cloudy, mod blood, trace LE, trace protein, 9 RBC. CTA (6/30) with complex L renal cyst and small R renal cysts. Raya catheter in place, was on bumex gtt  7/4-7/5 with improvement in UOP. CVPs now are 3-4 off gtt and UOP is 950mL in 24 hours and net -631mL. Hemodynamically unstable on VA ECMO.     - Can use bumex pushes PRN to remain net negative 1-2L or if CVP begins to rise.   - No current urgent indication for RRT.  - Maintain raya catheter , strict I/O  - Dose abx based on Crcl < 15ml/min    Feel free to contact me via TEAMS.     Susanne Bennett MD   Nephrology Fellow   After 5PM/Weekends/Holidays please contact the on call fellow.    Note incomplete

## 2025-07-09 NOTE — BH CONSULTATION LIAISON PROGRESS NOTE - NSBHCHARTREVIEWVS_PSY_A_CORE FT
Vital Signs Last 24 Hrs  T(C): 37.3 (09 Jul 2025 12:00), Max: 38.3 (09 Jul 2025 00:00)  T(F): 99.1 (09 Jul 2025 12:00), Max: 100.9 (09 Jul 2025 00:00)  HR: 111 (09 Jul 2025 12:30) (110 - 126)  BP: --  BP(mean): --  RR: 24 (09 Jul 2025 12:30) (8 - 27)  SpO2: 99% (09 Jul 2025 12:30) (92% - 100%)    Parameters below as of 09 Jul 2025 09:10    O2 Flow (L/min): 10  O2 Concentration (%): 30

## 2025-07-09 NOTE — PROGRESS NOTE ADULT - ASSESSMENT
69 year old with many problems  CAd  CHF  DM  admitted after cardiac arrest requiring resuscitation  transferred and had ECMO place and left femoral impella   had right and left fem artery catheterization   currently in OR in attempt to get ECMO and impella out  pt has colonization of sputum with MSSA and sensitive  klebsiella    day 3 cefepime 500 mg bid   bilateral infiltrates compatible with pneumonia with broken ribs  s/p removal of all lines except femoral impella   to continue cefepime alone for now  reculture and dose with vanco for any change   discussed with team

## 2025-07-09 NOTE — PROGRESS NOTE ADULT - ASSESSMENT
69-year-old male with PMHx of HTN, HLD, DM2, CAD with stents, current smoker presenting as transfer from [facility] s/p cardiac arrest x4. At [facility] underwent LHC which showed LAD occlusion. Left femoral Impella CP inserted for support, s/p CURTIS to the LAD. Upgraded to VA ECMO (25Fr left femoral venous cannula, 17Fr right femoral arterial cannula with 6Fr distal reperfusion cannula) for continued cardiogenic shock after stent placement. Patient was then transferred to [facility] from [facility] for further care    Currently in the CTICU on Impella CP, P4 - VA-ECMO decannulated 7/9. Extubated 7/5. Shiley placed for low UOP but had good response to bumex. Patient implying he doesn't further care though in current mental state may lack decision-making capcity - psychiatry consulted to help assess capacity; Will attempt GOC discussion w family. Attempting impella wean today.

## 2025-07-09 NOTE — BH CONSULTATION LIAISON PROGRESS NOTE - NSBHCHARTREVIEWLAB_PSY_A_CORE FT
CBC:            8.8    17.95 )-----------( 106      ( 07-09-25 @ 00:32 )             26.7       Chem:         ( 07-09-25 @ 00:32 )    143  |  95[L]  |  108[H]  ----------------------------<  130[H]  4.6   |  26  |  5.62[H]      Liver Functions: ( 07-09-25 @ 00:32 )  Alb: 3.4 g/dL / Pro: 5.6 g/dL / ALK PHOS: 142 U/L / ALT: 12 U/L / AST: 46 U/L / GGT: x         Type & Screen: ( 07-07-25 @ 00:41 )  ABO/Rh/Anthony:  A Positive             Bilirubin: (07-09-25 @ 00:32)  Direct: x  / Indirect: x  / Total: 3.2

## 2025-07-09 NOTE — BH CONSULTATION LIAISON PROGRESS NOTE - CURRENT MEDICATION
MEDICATIONS  (STANDING):  acetaminophen   IVPB .. 1000 milliGRAM(s) IV Intermittent once  albuterol/ipratropium for Nebulization 3 milliLiter(s) Nebulizer every 6 hours  aMIOdarone Infusion 0.5 mG/Min (16.7 mL/Hr) IV Continuous <Continuous>  bumetanide Infusion 4 mG/Hr (20 mL/Hr) IV Continuous <Continuous>  cangrelor Infusion 0.25 MICROgram(s)/kG/Min (6.75 mL/Hr) IV Continuous <Continuous>  cefepime   IVPB 500 milliGRAM(s) IV Intermittent every 12 hours  chlorhexidine 2% Cloths 1 Application(s) Topical <User Schedule>  chlorhexidine 4% Liquid 1 Application(s) Topical <User Schedule>  dexMEDEtomidine Infusion 1 MICROgram(s)/kG/Hr (22.5 mL/Hr) IV Continuous <Continuous>  dextrose 50% Injectable 50 milliLiter(s) IV Push every 15 minutes  heparin  Infusion 300 Unit(s)/Hr (3 mL/Hr) IV Continuous <Continuous>  heparin 50 Units/mL (IMPELLA VAD) Purge Solution  Unit(s) (10 mL/Hr) Ventricular Assist Device <Continuous>  insulin regular Infusion 3 Unit(s)/Hr (3 mL/Hr) IV Continuous <Continuous>  mupirocin 2% Nasal 1 Application(s) Both Nostrils every 12 hours  norepinephrine Infusion 0.05 MICROgram(s)/kG/Min (8.44 mL/Hr) IV Continuous <Continuous>  OLANZapine 2.5 milliGRAM(s) Oral at bedtime  OLANZapine 2.5 milliGRAM(s) Oral <User Schedule>  pantoprazole  Injectable 40 milliGRAM(s) IV Push every 12 hours  polyethylene glycol 3350 17 Gram(s) Oral daily  senna 2 Tablet(s) Oral at bedtime  sodium chloride 0.9% for Nebulization 3 milliLiter(s) Nebulizer every 12 hours  sodium chloride 0.9%. 1000 milliLiter(s) (10 mL/Hr) IV Continuous <Continuous>    MEDICATIONS  (PRN):  sodium chloride 0.65% Nasal 1 Spray(s) Both Nostrils two times a day PRN Nasal Congestion  sodium chloride 0.9% lock flush 10 milliLiter(s) IV Push every 1 hour PRN Pre/post blood products, medications, blood draw, and to maintain line patency

## 2025-07-09 NOTE — BH CONSULTATION LIAISON PROGRESS NOTE - NSBHATTESTCOMMENTATTENDFT_PSY_A_CORE
Pt is a 70 y/o   male with no past psych hx, here for complicated medical course following MI. pt allegedly reported he wanted to die, but reiterated that he was afraid of dying from his heart condition. he denies si/hi, and wife confirmed monday that pt is not a danger to self or others. pt feels depressed due to recent MI, sleep fragmented with limited appetite. pt is not interested in taking any psychotropic medications at this time. agree with psych student's A/P above.

## 2025-07-09 NOTE — PROGRESS NOTE ADULT - SUBJECTIVE AND OBJECTIVE BOX
Cardiology Progress Note  ------------------------------------------------------------------------------------------  SUBJECTIVE:   - Tele: 120s aflutter  - No events overnight    -------------------------------------------------------------------------------------------  VS:  T(F): 99.1 (07-09), Max: 100.9 (07-09)  HR: 113 (07-09) (110 - 126)  BP: --  RR: 18 (07-09)  SpO2: 98% (07-09)  I&O's Summary    08 Jul 2025 07:01  -  09 Jul 2025 07:00  --------------------------------------------------------  IN: 2471.1 mL / OUT: 1605 mL / NET: 866.1 mL    09 Jul 2025 07:01  -  09 Jul 2025 14:44  --------------------------------------------------------  IN: 683.3 mL / OUT: 945 mL / NET: -261.7 mL      PHYSICAL EXAM:  GENERAL: NAD though seems confused  ENT: EOMI  CHEST/LUNG: Unlabored respirations.  HEART: sinus tachycardia; No murmurs, rubs, or gallops.  EXTREMITIES: No edema    -------------------------------------------------------------------------------------------  LABS:                          8.8    17.95 )-----------( 106      ( 09 Jul 2025 00:32 )             26.7     07-09    143  |  95[L]  |  108[H]  ----------------------------<  130[H]  4.6   |  26  |  5.62[H]    Ca    9.5      09 Jul 2025 00:32  Phos  4.2     07-09  Mg     2.2     07-09    TPro  5.6[L]  /  Alb  3.4  /  TBili  3.2[H]  /  DBili  x   /  AST  46[H]  /  ALT  12  /  AlkPhos  142[H]  07-09    PT/INR - ( 09 Jul 2025 10:22 )   PT: 15.6 sec;   INR: 1.36 ratio         PTT - ( 09 Jul 2025 10:22 )  PTT:28.9 sec  CARDIAC MARKERS ( 07 Jul 2025 18:15 )  7417 ng/L / x     / x     / x     / x     / x      CARDIAC MARKERS ( 07 Jul 2025 10:05 )  7283 ng/L / x     / x     / x     / x     / x      CARDIAC MARKERS ( 07 Jul 2025 01:58 )  7141 ng/L / x     / x     / x     / x     / x      CARDIAC MARKERS ( 06 Jul 2025 17:53 )  6398 ng/L / x     / x     / x     / x     / x      CARDIAC MARKERS ( 06 Jul 2025 10:09 )  5380 ng/L / x     / x     / x     / x     / x                -------------------------------------------------------------------------------------------  Meds:  albuterol/ipratropium for Nebulization 3 milliLiter(s) Nebulizer every 6 hours  aMIOdarone Infusion 0.5 mG/Min IV Continuous <Continuous>  bumetanide Infusion 4 mG/Hr IV Continuous <Continuous>  cangrelor Infusion 0.25 MICROgram(s)/kG/Min IV Continuous <Continuous>  cefepime   IVPB 500 milliGRAM(s) IV Intermittent every 12 hours  chlorhexidine 2% Cloths 1 Application(s) Topical <User Schedule>  chlorhexidine 4% Liquid 1 Application(s) Topical <User Schedule>  dexMEDEtomidine Infusion 1 MICROgram(s)/kG/Hr IV Continuous <Continuous>  dextrose 50% Injectable 50 milliLiter(s) IV Push every 15 minutes  heparin  Infusion 300 Unit(s)/Hr IV Continuous <Continuous>  heparin 50 Units/mL (IMPELLA VAD) Purge Solution  Unit(s) Ventricular Assist Device <Continuous>  insulin regular Infusion 3 Unit(s)/Hr IV Continuous <Continuous>  mupirocin 2% Nasal 1 Application(s) Both Nostrils every 12 hours  norepinephrine Infusion 0.05 MICROgram(s)/kG/Min IV Continuous <Continuous>  OLANZapine 2.5 milliGRAM(s) Oral at bedtime  OLANZapine 2.5 milliGRAM(s) Oral <User Schedule>  pantoprazole  Injectable 40 milliGRAM(s) IV Push every 12 hours  polyethylene glycol 3350 17 Gram(s) Oral daily  senna 2 Tablet(s) Oral at bedtime  sodium chloride 0.65% Nasal 1 Spray(s) Both Nostrils two times a day PRN  sodium chloride 0.9% for Nebulization 3 milliLiter(s) Nebulizer every 12 hours  sodium chloride 0.9% lock flush 10 milliLiter(s) IV Push every 1 hour PRN  sodium chloride 0.9%. 1000 milliLiter(s) IV Continuous <Continuous>    -------------------------------------------------------------------------------------------  Cardiovascular Diagnostic Testing:    ECG:     Echo:     Stress Testing:    Cath:    -------------------------------------------------------------------------------------------

## 2025-07-09 NOTE — PROGRESS NOTE ADULT - PROBLEM SELECTOR PLAN 1
VANDANA/ATN 2/2 cardiogenic shock/arrests and ALKA from CTAs + [facility] s/p CURTIS. [facility] SCr prior to [facility] admission was 1.48 (6/29/25) and 1.14 (8/26/24). U/A (7/4) cloudy, mod blood, trace LE, trace protein, 9 RBC. CTA (6/30) with complex L renal cyst and small R renal cysts. SCr on admission 4.47 (7/3) and now 4.69 (7/7) was  on VA ECMO (previously with IABP then Impella) removed on 7/8 and now off bumex gtt (7/6) and pressors- vasopressin and NE.   Raya catheter in place, 24 hr UOP 1L, patient had a drop in UOP for 2 hrs post ECMO removal was restarted on Bumex drip and UOP improved. No electrolyte abnormalities.  CVPs now are 6-7 around goal.   - Can continue with bumex drip remain net negative 1-2L or if CVP begins to rise.   - No current urgent indication for CRRT now, consent Obtained  and in chart.   - Maintain raya catheter , strict I/O  - Dose abx based on Crcl < 15ml/min    Feel free to contact me via TEAMS.     Susanne Bennett MD   Nephrology Fellow   After 5PM/Weekends/Holidays please contact the on call fellow.

## 2025-07-09 NOTE — PROGRESS NOTE ADULT - SUBJECTIVE AND OBJECTIVE BOX
Patient seen and examined at the bedside.    Remained critically ill on continuous ICU monitoring.      Brief Summary:  70 yo M presented 7/3/25 to [facility] with STEMI and cardiac arrest.  IABP -> Impella CP, LAD stent  Upgraded to VA ECMO and transferred to [facility]       24 Hour events:  ECMO decannulated yesterday.  Vent weaned, patient extubated this morning.      Objective:  ICU Vital Signs Last 24 Hrs  T(C): 37.6 (09 Jul 2025 08:00), Max: 38.3 (09 Jul 2025 00:00)  T(F): 99.7 (09 Jul 2025 08:00), Max: 100.9 (09 Jul 2025 00:00)  HR: 117 (09 Jul 2025 09:10) (112 - 126)  BP: 118/82 (08 Jul 2025 11:13) (118/82 - 118/82)  BP(mean): --  ABP: 109/66 (09 Jul 2025 09:00) (74/50 - 121/75)  ABP(mean): 78 (09 Jul 2025 09:00) (57 - 87)  RR: 14 (09 Jul 2025 09:00) (8 - 24)  SpO2: 100% (09 Jul 2025 09:10) (92% - 100%)    O2 Parameters below as of 09 Jul 2025 09:10    O2 Flow (L/min): 10  O2 Concentration (%): 30              Physical Exam:   General: Sleepy but will interact  Neurology: Following commands  Respiratory: Bilateral breath sounds.  CV: Regular, tachycardic  Abdominal: Soft, Nontender  Extremities: Warm, well-perfused  Yap  -------------------------------------------------------------------------------------------------------------------------------    Labs:                                   8.8    17.95 )-----------( 106      ( 09 Jul 2025 00:32 )             26.7     PT/INR - ( 08 Jul 2025 15:42 )   PT: 13.6 sec;   INR: 1.20 ratio       PTT - ( 08 Jul 2025 15:42 )  PTT:26.9 sec, PTT - ( 08 Jul 2025 03:11 )  PTT:62.0 sec, PTT - ( 08 Jul 2025 00:22 )  PTT:63.5 sec    143    |  95     |  108    ----------------------------<  130        ( 09 Jul 2025 00:32 )  4.6     |  26     |  5.62     Ca    9.5        ( 09 Jul 2025 00:32 )  Phos  4.2       ( 09 Jul 2025 00:32 )  Mg     2.2       ( 09 Jul 2025 00:32 )    TPro  5.6    /  Alb  3.4    /  TBili  3.2    /  DBili  x      /  AST  46     /  ALT  12     /  AlkPhos  142    ( 09 Jul 2025 00:32 )    LIVER FUNCTIONS - ( 09 Jul 2025 00:32 )  Alb: 3.4 g/dL / Pro: 5.6 g/dL / ALK PHOS: 142 U/L / ALT: 12 U/L / AST: 46 U/L / GGT: x           ABG - ( 09 Jul 2025 08:15 )  pH, Arterial: 7.46  pH, Blood: x     /  pCO2: 42    /  pO2: 118   / HCO3: 30    / Base Excess: 5.5   /  SaO2: 99.7        ------------------------------------------------------------------------------------------------------------------------------  Assessment:  70 yo M presented 7/3/25 to [facility] with STEMI and cardiac arrest.  IABP -> Impella CP,  LAD stented.  Upgraded to VA ECMO and transferred to [facility]     STEMI  Cardiogenic shock  Acute respiratory failure  Thrombocytopenia  Hyperglycemia  Hypokalemia  VANDANA  Shock liver, Hyperbilirubinemia    ***Neuro***  Started on Zyprexa Bid for delirium   prns for discomfort  Optimise day and night cycle  Pt /OT encouraged    ***Cardiovascular***  Cardiogenic shock, STEMI, s/p LAD stent  Impella CP at P4, flow 2.3 l/min  VA ecmo flowing at 2.8 L/min  Pulsatility improved  Possible decannulation today  Remains on purge and systemic AC  CAD, s/p LAD stent, on cangrelor - hold for OR  Amiodarone for A fib and Ectopy, continue with PO  Electrolytes replacement  Antidiuresing required volume back    ***Pulmonary***  on NC  chronic smoker  Continue with nebs  IS, chest PT, mobilization     ***GI***  Continue with TFs  Encourage PO intake  Protonix for stress ulcer prophylaxis   Bowel regimen.  Trend LFTs, avoid hepatotoxins.    ***Renal***  VANDANA  - UO increased  Yap catheter for strict I/O measurements.  keep off diuretics  Hypokalemia repleted  hypomagnesemia repleted    ***ID***  Change zosyn to cefepime  bronchial aspirate + for Klebs,+ staph auras     ***Endocrine***  Hyperglycemia - Insulin infusion.    ***Hematology***  Thrombocytopenia   s/p 1 U platlets  Heparin for anticoagulation, trend PTT, aX  Trend CBC and coags and monitor for bleeding      Patient requires continuous monitoring with bedside rhythm monitoring, pulse oximetry monitoring, and continuous central venous and arterial pressure monitoring; and intermittent blood gas analysis. Care plan discussed with the ICU care team.     Patient remained critical, at risk for life threatening decompensation.    I have spent 45 minutes providing critical care management to this patient.    Disposition: CTU    I, Macho Rodriguez MD, personally performed the services described in this documentation. I have reviewed the chart and agree that the record reflects my personal performance and is accurate and complete.     Patient seen and examined at the bedside.    Remained critically ill on continuous ICU monitoring.      Brief Summary:  68 yo M presented 7/3/25 to [facility] with STEMI and cardiac arrest.  IABP -> Impella CP, LAD stent  Upgraded to VA ECMO and transferred to [facility]       24 Hour events:  ECMO decannulated yesterday.  Vent weaned, patient extubated this morning.      Objective:  ICU Vital Signs Last 24 Hrs  T(C): 37.6 (09 Jul 2025 08:00), Max: 38.3 (09 Jul 2025 00:00)  T(F): 99.7 (09 Jul 2025 08:00), Max: 100.9 (09 Jul 2025 00:00)  HR: 117 (09 Jul 2025 09:10) (112 - 126)  BP: 118/82 (08 Jul 2025 11:13) (118/82 - 118/82)  BP(mean): --  ABP: 109/66 (09 Jul 2025 09:00) (74/50 - 121/75)  ABP(mean): 78 (09 Jul 2025 09:00) (57 - 87)  RR: 14 (09 Jul 2025 09:00) (8 - 24)  SpO2: 100% (09 Jul 2025 09:10) (92% - 100%)    O2 Parameters below as of 09 Jul 2025 09:10    O2 Flow (L/min): 10  O2 Concentration (%): 30              Physical Exam:   General: Sleepy but will interact  Neurology: Following commands  Respiratory: Bilateral breath sounds.  CV: Regular, tachycardic  Abdominal: Soft, Nontender  Extremities: Warm, well-perfused  Yap  -------------------------------------------------------------------------------------------------------------------------------    Labs:                                   8.8    17.95 )-----------( 106      ( 09 Jul 2025 00:32 )             26.7     PT/INR - ( 08 Jul 2025 15:42 )   PT: 13.6 sec;   INR: 1.20 ratio       PTT - ( 08 Jul 2025 15:42 )  PTT:26.9 sec, PTT - ( 08 Jul 2025 03:11 )  PTT:62.0 sec, PTT - ( 08 Jul 2025 00:22 )  PTT:63.5 sec    143    |  95     |  108    ----------------------------<  130        ( 09 Jul 2025 00:32 )  4.6     |  26     |  5.62     Ca    9.5        ( 09 Jul 2025 00:32 )  Phos  4.2       ( 09 Jul 2025 00:32 )  Mg     2.2       ( 09 Jul 2025 00:32 )    TPro  5.6    /  Alb  3.4    /  TBili  3.2    /  DBili  x      /  AST  46     /  ALT  12     /  AlkPhos  142    ( 09 Jul 2025 00:32 )    LIVER FUNCTIONS - ( 09 Jul 2025 00:32 )  Alb: 3.4 g/dL / Pro: 5.6 g/dL / ALK PHOS: 142 U/L / ALT: 12 U/L / AST: 46 U/L / GGT: x           ABG - ( 09 Jul 2025 08:15 )  pH, Arterial: 7.46  pH, Blood: x     /  pCO2: 42    /  pO2: 118   / HCO3: 30    / Base Excess: 5.5   /  SaO2: 99.7        ------------------------------------------------------------------------------------------------------------------------------  Assessment:  68 yo M presented 7/3/25 to [facility] with STEMI and cardiac arrest.  IABP -> Impella CP,  LAD stented.  Upgraded to VA ECMO and transferred to [facility]     STEMI  Cardiogenic shock  Postop acute respiratory insufficiency  Thrombocytopenia  Hyperglycemia  VANDANA  Shock liver, Hyperbilirubinemia    ***Neuro***  Zyprexa nightly - will stop am dose  prns for discomfort  Optimize day / night cycle    ***Cardiovascular***  Cardiogenic shock, STEMI, s/p LAD stent  Impella CP at P6 - decreased to P4.  Remains on purge and will add systemic Heparin.  Amiodarone for A fib/flutter and Ectopy, continue with PO    ***Pulmonary***  Vent weaned, patient extubated.  Deep breathing and coughing exercises.  Wean oxygen as able.    ***GI***  Dysphagia screen post-extubation.  Likely would benefit from ongoing tube feeds given recent poor PO intake.  Protonix for stress ulcer prophylaxis   Bowel regimen.  Trend LFTs, avoid hepatotoxins.    ***Renal***  VANDANA    Yap catheter for strict I/O measurements.  Bumex infusion - now making urine.  Trend creatinine and electrolytes.  No indication for RRT currently.    ***ID***  Change zosyn to cefepime  bronchial aspirate + for Klebs,+ staph auras     ***Endocrine***  Hyperglycemia - Insulin infusion.    ***Hematology***  Acute blood loss anemia and Thrombocytopenia   No transfusion indication currently  Purge and systemic Heparin for anticoagulation, trend PTT and anti-Xa  Trend CBC and coags and monitor for bleeding      Care plan discussed with the ICU care team.     Patient remained critical, at risk for life threatening decompensation.    I have spent 52 minutes providing critical care management to this patient.    I, Tammie Scales MD, personally performed the services described in this documentation. I have reviewed the chart and agree that the record reflects my personal performance and is accurate and complete.

## 2025-07-09 NOTE — PROGRESS NOTE ADULT - ATTENDING COMMENTS
68 YO M with a history of CAD s/p PCI, active tobacco use, DM2 (A1c 8.9%) who presented to [facility] 7/3 from his PMD’s office after witnessed cardiac arrest c/b VT storm and went for urgent LHC for delayed STEMI revealing occluded LAD s/p CURTIS and ultimately escalated to VA ECMO  for SCAI E AMI-CS. Of note he presented to the ED 3 days before the event with chest pain.    He was decannulated from VA ECMO on 7/8 and remains on Impella CP    Recovery given delayed presentation MI with thinned/akinetic LAD territory though he is reassuringly pulsatile on full support. He is not a transplant candidate due to age, critical illness, active tobacco use, poorly controlled DM2, and notably possibly metastases seen on chest CT. He is a suboptimal LVAD candidate due to small LV though perhaps may dilate out.    Importantly, he is not able to engage in conversations about next steps at his time and continues to make passive suicidal ideations - will need psychiatry evaluation for capacity. I have been unable to contact the HCP.       PLAN  -Continue VA ECMO, currently at 3.3 L/min with Impella CP at P4 for venting. Tentative plan for decannulation in next few days but will work to find out backup options after trying to ascertain wishes. Wean trial tomorrow   -On cangrelor for recent stent.  -Anticoagulation per CTU/CTS  -Non-oliguric severe VANDANA, BUN/Cr remain high but making adequate urine. May need to consider HD for clearance pending mental status trend.  -Atrial flutter: 68 YO M with a history of CAD s/p PCI, active tobacco use, DM2 (A1c 8.9%) who presented to [facility] 7/3 from his PMD’s office after witnessed cardiac arrest c/b VT storm and went for urgent LHC for delayed STEMI revealing occluded LAD s/p CURTIS and ultimately escalated to VA ECMO  for SCAI E AMI-CS. Of note he presented to the ED 3 days before the event with chest pain.    He was decannulated from VA ECMO on 7/8 and remains on Impella CP. Recovery likelihood is unclear given delayed presentation MI with thinned/akinetic LAD territory with severe LV dysfunction. Reassuringly he has normal filling pressures and cardiac output on support but soft BP's on low dose pressors.    Importantly, he has made it clear that he does not want any further interventions though it is quite difficult to engage as he pushes away medical providers. Will plan for family meeting when wife is available as he is not an appropriate candidate for support escalation. \    PLAN  - Impella CP weaned to P4, flowing 2.2 L/min. Defer further weaning given subtherapeutic anticoagulation. LDH downtrending.   - Defer inotropes for now given normal cardiac output and rapid atrial flutter. Will continue to reassess  - Diurese PRN, goal CVP 6-10. Marked oliguric VANDANA with very high BUN/Cr. Nephrology following. Consented for HD but a poor candidate.  - In atrial flutter 120s. Unable to cardiovert due to IAN thrombus. Maintain systemic a/c. Favor stopping amiodarone given presence of IAN thrombus to avoid risk of chemical cardioversion.   - On cangrelor for recent stent, to switch to plavix after Impella removal.   - Prognosis guarded. Family meeting when wife returns.

## 2025-07-09 NOTE — BH CONSULTATION LIAISON PROGRESS NOTE - NSBHASSESSMENTFT_PSY_ALL_CORE
69-year-old male with PMHx of HTN, HLD, DM2, CAD with stents, current smoker presenting as transfer from [facility] s/p cardiac arrest x4. Patient admitted to CTICU on VA ECMO and Impella CP, P3. Extubated 7/5. Primary team called a psychiatric consult after the patient expressed suicidal ideation. On initial consult, patient states that he has felt hopeless due to his heart condition, however he denied current suicidal ideation or plan. Patient was not interested in taking medication to treat the depression.  On physical exam, patient is A&Ox2, irritated, and lethargic but arousable to tactile stimuli. Patient was would not answer verbally, but shook his head to answer yes/no questions. Patient confirmed feelings of depression. Patient denies suicidal ideation, suicidal plan, anxiety, or homicidal ideation. Patient was confused on exam likely due to Precedex.

## 2025-07-09 NOTE — PROGRESS NOTE ADULT - ASSESSMENT
69-year-old male with PMHx of HTN, HLD, DM2, CAD with stents, current smoker presenting as transfer from [facility] s/p cardiac arrest x4 on VA ECMO in setting of LAD occlusion s/p DESx1. Nephrology consulted for non-oliguric VANDANA.

## 2025-07-09 NOTE — PROGRESS NOTE ADULT - SUBJECTIVE AND OBJECTIVE BOX
[facility] Division of Kidney Diseases & Hypertension  FOLLOW UP NOTE  [phone]--------------------------------------------------------------------------------  Chief Complaint:    24 hour events/subjective:    PAST HISTORY  --------------------------------------------------------------------------------  No significant changes to PMH, PSH, FHx, SHx from H&P unless otherwise noted.    ALLERGIES & MEDICATIONS  --------------------------------------------------------------------------------  Allergies    No Known Allergies    Intolerances      Standing Inpatient Medications  acetaminophen   IVPB .. 1000 milliGRAM(s) IV Intermittent once  albuterol/ipratropium for Nebulization 3 milliLiter(s) Nebulizer every 6 hours  aMIOdarone Infusion 0.5 mG/Min IV Continuous <Continuous>  bumetanide Infusion 4 mG/Hr IV Continuous <Continuous>  cangrelor Infusion 0.25 MICROgram(s)/kG/Min IV Continuous <Continuous>  cefepime   IVPB 500 milliGRAM(s) IV Intermittent every 12 hours  chlorhexidine 2% Cloths 1 Application(s) Topical <User Schedule>  chlorhexidine 4% Liquid 1 Application(s) Topical <User Schedule>  dexMEDEtomidine Infusion 1 MICROgram(s)/kG/Hr IV Continuous <Continuous>  dextrose 50% Injectable 50 milliLiter(s) IV Push every 15 minutes  heparin  Infusion 300 Unit(s)/Hr IV Continuous <Continuous>  heparin 50 Units/mL (IMPELLA VAD) Purge Solution  Unit(s) Ventricular Assist Device <Continuous>  insulin regular Infusion 3 Unit(s)/Hr IV Continuous <Continuous>  mupirocin 2% Nasal 1 Application(s) Both Nostrils every 12 hours  norepinephrine Infusion 0.05 MICROgram(s)/kG/Min IV Continuous <Continuous>  OLANZapine 2.5 milliGRAM(s) Oral at bedtime  OLANZapine 2.5 milliGRAM(s) Oral <User Schedule>  pantoprazole  Injectable 40 milliGRAM(s) IV Push every 12 hours  polyethylene glycol 3350 17 Gram(s) Oral daily  senna 2 Tablet(s) Oral at bedtime  sodium chloride 0.9% for Nebulization 3 milliLiter(s) Nebulizer every 12 hours  sodium chloride 0.9%. 1000 milliLiter(s) IV Continuous <Continuous>    PRN Inpatient Medications  sodium chloride 0.65% Nasal 1 Spray(s) Both Nostrils two times a day PRN  sodium chloride 0.9% lock flush 10 milliLiter(s) IV Push every 1 hour PRN      REVIEW OF SYSTEMS  --------------------------------------------------------------------------------  Gen: No fevers/chills  Head/Eyes/Ears: No HA  Respiratory: No dyspnea  CV: No chest pain  GI: No abdominal discomfort  : No dysuria  MSK: No edema    All other systems were reviewed and are negative, except as noted above.    VITALS/PHYSICAL EXAM  --------------------------------------------------------------------------------  T(C): 37.6 (07-09-25 @ 08:00), Max: 38.3 (07-09-25 @ 00:00)  HR: 114 (07-09-25 @ 11:15) (114 - 126)  BP: --  RR: 18 (07-09-25 @ 11:15) (8 - 24)  SpO2: 97% (07-09-25 @ 11:15) (92% - 100%)  Wt(kg): --  Height (cm): 168 (07-08-25 @ 11:13)  Weight (kg): 90 (07-08-25 @ 11:13)  BMI (kg/m2): 31.9 (07-08-25 @ 11:13)  BSA (m2): 2 (07-08-25 @ 11:13)      07-08-25 @ 07:01  -  07-09-25 @ 07:00  --------------------------------------------------------  IN: 2471.1 mL / OUT: 1605 mL / NET: 866.1 mL    07-09-25 @ 07:01  -  07-09-25 @ 11:41  --------------------------------------------------------  IN: 447 mL / OUT: 605 mL / NET: -158 mL      Physical Exam:  Gen: NAD  Pulm: CTA B/L  CV: RRR, S1S2;  Abd: +BS, soft  : No suprapubic tenderness  Extremities: no bilateral LE edema.  Neuro: Awake, alert  Skin: Warm  Vascular access:    LABS/STUDIES  --------------------------------------------------------------------------------              8.8    17.95 >-----------<  106      [07-09-25 @ 00:32]              26.7     143  |  95  |  108  ----------------------------<  130      [07-09-25 @ 00:32]  4.6   |  26  |  5.62        Ca     9.5     [07-09-25 @ 00:32]      Mg     2.2     [07-09-25 @ 00:32]      Phos  4.2     [07-09-25 @ 00:32]    TPro  5.6  /  Alb  3.4  /  TBili  3.2  /  DBili  x   /  AST  46  /  ALT  12  /  AlkPhos  142  [07-09-25 @ 00:32]    PT/INR: PT 15.6 , INR 1.36       [07-09-25 @ 10:22]  PTT: 28.9       [07-09-25 @ 10:22]          [07-09-25 @ 00:32]    Creatinine Trend:  SCr 5.62 [07-09 @ 00:32]  SCr 5.22 [07-08 @ 15:42]  SCr 5.14 [07-08 @ 00:21]  SCr 5.09 [07-07 @ 18:15]  SCr 4.69 [07-07 @ 00:44]    Urinalysis - [07-09-25 @ 00:32]      Color  / Appearance  / SG  / pH       Gluc 130 / Ketone   / Bili  / Urobili        Blood  / Protein  / Leuk Est  / Nitrite       RBC  / WBC  / Hyaline  / Gran  / Sq Epi  / Non Sq Epi  / Bacteria       TSH 2.73      [07-03-25 @ 19:21]  Lipid: chol 90, TG 96, HDL 31, LDL --      [07-03-25 @ 19:06]

## 2025-07-09 NOTE — PROGRESS NOTE ADULT - SUBJECTIVE AND OBJECTIVE BOX
infectious diseases progress note:    Patient is a 69y old  Male who presents with a chief complaint of Chest pain (08 Jul 2025 19:29)        Cardiogenic shock           s    Allergies    No Known Allergies    Intolerances        ANTIBIOTICS/RELEVANT:  antimicrobials  cefepime   IVPB 500 milliGRAM(s) IV Intermittent every 12 hours    immunologic:    OTHER:  albuterol/ipratropium for Nebulization 3 milliLiter(s) Nebulizer every 6 hours  aMIOdarone Infusion 0.5 mG/Min IV Continuous <Continuous>  bumetanide Infusion 4 mG/Hr IV Continuous <Continuous>  cangrelor Infusion 0.25 MICROgram(s)/kG/Min IV Continuous <Continuous>  chlorhexidine 2% Cloths 1 Application(s) Topical <User Schedule>  chlorhexidine 4% Liquid 1 Application(s) Topical <User Schedule>  dexMEDEtomidine Infusion 1 MICROgram(s)/kG/Hr IV Continuous <Continuous>  dextrose 50% Injectable 50 milliLiter(s) IV Push every 15 minutes  heparin 50 Units/mL (IMPELLA VAD) Purge Solution  Unit(s) Ventricular Assist Device <Continuous>  insulin regular Infusion 3 Unit(s)/Hr IV Continuous <Continuous>  mupirocin 2% Nasal 1 Application(s) Both Nostrils every 12 hours  norepinephrine Infusion 0.05 MICROgram(s)/kG/Min IV Continuous <Continuous>  OLANZapine 2.5 milliGRAM(s) Oral <User Schedule>  OLANZapine 2.5 milliGRAM(s) Oral at bedtime  pantoprazole  Injectable 40 milliGRAM(s) IV Push every 12 hours  polyethylene glycol 3350 17 Gram(s) Oral daily  senna 2 Tablet(s) Oral at bedtime  sodium chloride 0.65% Nasal 1 Spray(s) Both Nostrils two times a day PRN  sodium chloride 0.9% for Nebulization 3 milliLiter(s) Nebulizer every 12 hours  sodium chloride 0.9% lock flush 10 milliLiter(s) IV Push every 1 hour PRN  sodium chloride 0.9%. 1000 milliLiter(s) IV Continuous <Continuous>      Objective:  Vital Signs Last 24 Hrs  T(C): 37.6 (09 Jul 2025 08:00), Max: 38.3 (09 Jul 2025 00:00)  T(F): 99.7 (09 Jul 2025 08:00), Max: 100.9 (09 Jul 2025 00:00)  HR: 114 (09 Jul 2025 09:00) (112 - 126)  BP: 118/82 (08 Jul 2025 11:13) (118/82 - 118/82)  BP(mean): --  RR: 14 (09 Jul 2025 09:00) (8 - 24)  SpO2: 100% (09 Jul 2025 09:00) (92% - 100%)    Parameters below as of 09 Jul 2025 05:29  Patient On (Oxygen Delivery Method): ventilator         Ear/Nose/Throat: no oral lesion, no sinus tenderness on percussion	  Neck:no JVD, no lymphadenopathy, supple  Respiratory: CTA jeannie  Cardiovascular: S1S2 RRR, no murmurs  Gastrointestinal:soft, (+) BS, no HSM  Extremities:no e/e/c        LABS:                        8.8    17.95 )-----------( 106      ( 09 Jul 2025 00:32 )             26.7     07-09    143  |  95[L]  |  108[H]  ----------------------------<  130[H]  4.6   |  26  |  5.62[H]    Ca    9.5      09 Jul 2025 00:32  Phos  4.2     07-09  Mg     2.2     07-09    TPro  5.6[L]  /  Alb  3.4  /  TBili  3.2[H]  /  DBili  x   /  AST  46[H]  /  ALT  12  /  AlkPhos  142[H]  07-09    PT/INR - ( 08 Jul 2025 15:42 )   PT: 13.6 sec;   INR: 1.20 ratio         PTT - ( 08 Jul 2025 15:42 )  PTT:26.9 sec  Urinalysis Basic - ( 09 Jul 2025 00:32 )    Color: x / Appearance: x / SG: x / pH: x  Gluc: 130 mg/dL / Ketone: x  / Bili: x / Urobili: x   Blood: x / Protein: x / Nitrite: x   Leuk Esterase: x / RBC: x / WBC x   Sq Epi: x / Non Sq Epi: x / Bacteria: x          MICROBIOLOGY:    RECENT CULTURES:  07-05 @ 18:34 Trach Asp Tracheal Aspirate   FAWAD    Rare polymorphonuclear leukocytes per low power field  Rare Squamous epithelial cells per low power field  Rare Gram Negative Rods seen per oil power field    Staphylococcus aureus  Klebsiella aerogenes (Previously Enterobacter)  Staphylococcus aureus     Moderate Staphylococcus aureus  Moderate Klebsiella aerogenes (Previously Enterobacter)  Commensal km consistent with body site    07-04 @ 10:37 Blood Blood-Peripheral                No growth at 4 days          RESPIRATORY CULTURES:              RADIOLOGY & ADDITIONAL STUDIES:        Pager [phone]  After 5 pm/weekends or if no response :[phone]

## 2025-07-09 NOTE — PROGRESS NOTE ADULT - PROBLEM SELECTOR PLAN 1
- attempting Impella CP wean  - VA ECMO decannulated 7/8  - off pressors  - cw amio gtt  - cw Bumex gtt  - Trend perfusion markers  - hep gtt

## 2025-07-09 NOTE — BH CONSULTATION LIAISON PROGRESS NOTE - NSBHFUPINTERVALHXFT_PSY_A_CORE
69-year-old male with PMHx of HTN, HLD, DM2, CAD with stents, current smoker presenting as transfer from [facility] s/p cardiac arrest x4. Currently in the CTICU on VA ECMO and Impella CP, P3. Extubated 7/5. Patient remains critical and at risk of life threatening decompensation.  69-year-old male with PMHx of HTN, HLD, DM2, CAD with stents, current smoker presenting as transfer from [facility] s/p cardiac arrest x4. Currently in the CTICU on VA ECMO and Impella CP, P3. Extubated 7/5. Patient remains critical and at risk of life threatening decompensation. On physical exam, patient is A&Ox2, irritated, and lethargic but arousable to tactile stimuli. Patient was would not answer verbally, but shook his head to answer yes/no questions. Patient confirmed feelings of depression. Patient denies suicidal ideation, suicidal plan, anxiety, or homicidal ideation. Patient was confused on exam likely due to Precedex.

## 2025-07-09 NOTE — PROGRESS NOTE ADULT - SUBJECTIVE AND OBJECTIVE BOX
Patient seen and examined at the bedside.    Remained critically ill on continuous ICU monitoring.    OBJECTIVE:  Vital Signs Last 24 Hrs  T(C): 37.6 (09 Jul 2025 20:00), Max: 38.3 (09 Jul 2025 00:00)  T(F): 99.7 (09 Jul 2025 20:00), Max: 100.9 (09 Jul 2025 00:00)  HR: 109 (09 Jul 2025 21:00) (109 - 126)  BP: --  BP(mean): --  RR: 23 (09 Jul 2025 21:00) (6 - 30)  SpO2: 98% (09 Jul 2025 21:00) (89% - 100%)    Parameters below as of 09 Jul 2025 20:00  Patient On (Oxygen Delivery Method): nasal cannula  O2 Flow (L/min): 4    Physical Exam:  General: NAD  Neurology: Nonfocal  Eyes: bilateral pupils equal and reactive   ENT/Neck: Neck supple, trachea midline, No JVD   Respiratory: Clear bilaterally   CV: S1S2, no murmurs        [x] A flutter  Abdominal: Soft, NT, ND +BS   Extremities: 1-2+ pedal edema noted, + peripheral pulses   Skin: No Rashes, Hematoma, Ecchymosis                         Assessment:  68 yo M presented 7/3/25 to [facility] with STEMI and cardiac arrest.  IABP -> Impella CP,  LAD stented.  Upgraded to VA ECMO and transferred to [facility]     STEMI  Cardiogenic shock  Acute respiratory failure  Thrombocytopenia  Hyperglycemia  Hypokalemia  VANDANA  Shock liver, Hyperbilirubinemia    Plan:  ***Neuro***  [x] Nonfocal  Post operative neuro assessment   Zyprexa for delirium     ***Cardiovascular***  Invasive hemodynamic monitoring, assess perfusion indices  A flutter (109 - 126) / CVP (3 - 40) / MAP (60 - 87) / PAP (17 - 274) / CI (2.6 - 4.1) / Hct 26.7% / Lactate 1.3   [x] Amiodarone 0.5 mg/min - for A fib and Ectopy, continue with PO  [x] Levophed 0.05 mcg/kg/min  [x] Impella CP at P4, flow 2.4 l/min  s/p VA ECMO decann 7/8  [x] CAD, s/p LAD stent, on Cangrelor  [x] Heparin for impella purge and systemic AC  Serial EKG and cardiac enzymes     ***Pulmonary***  [x] NC 4 L  Encourage incentive spirometry, continue pulse ox monitoring, follow ABGs, continue nebulizers    ***GI***  [x] Diet: Vital TF goal @40 cc/hr  Bowel regimen with Miralax and Senna    ***Renal***  [x] VANDANA  Continue to monitor I/Os, BUN/Creatinine.   Replete lytes PRN  Yap present  Diuresis with Bumex gtt    ***ID***  Cefepime for prophylaxis  Bronchial aspirate + for Klebs,+ staph auras     ***Endocrine***  [x] Stress Hyperglycemia : HbA1c 8.9%                - [x] Insulin gtt              - Need tight glycemic control to prevent wound infection.        Patient requires continuous monitoring with bedside rhythm monitoring, pulse oximetry monitoring, and continuous central venous and arterial pressure monitoring; and intermittent blood gas analysis. Care plan discussed with the ICU care team.  Patient remained critical, at risk for life threatening decompensation.    I have spent 53 minutes providing critical care management to this patient.    By signing my name below, I, Dustin Bourgeois, attest that this documentation has been prepared under the direction and in the presence of Nathan Magaña NP.  Electronically signed: Darryn Hodges, 07-09-25 @ 21:28    I, Nathan Magaña, personally performed the services described in this documentation. All medical record entries made by the scribe were at my direction and in my presence. I have reviewed the chart and agree that the record reflects my personal performance and is accurate and complete.  Electronically signed: Nathan Magaña NP.

## 2025-07-09 NOTE — AIRWAY REMOVAL NOTE  ADULT & PEDS - ARTIFICAL AIRWAY REMOVAL COMMENTS
Written order for extubation verified, patient was identified by full name and birth date compared to the identification band. Present during the procedure was ... Kala Edouard (RN)
Written order for extubation verified. The patient was identified by full name and birth date compared to the identification band. Present during the procedure was KENROY Schwarz.

## 2025-07-09 NOTE — AIRWAY REMOVAL NOTE  ADULT & PEDS - BREATH SOUNDS ALL FIELDS
HOME HEALTH FACE TO FACE DOCUMENTATION AND CERTIFICATION  CMS Rules: Certifying Patients for the Medicare Home Health Benefit    Home Health Eligibility Summary    Name: Leonard Sharif  : 1937  MRN: [id number]    Service to Home Care order content:  SERVICE TO HOME CARE   Ordered at: 18 1405     Patient Preference:    [facility]      Nursing needs:    Comprehensive nursing assessment/medication management      Physical therapy needs:    Eval and Treat     Other      Other physical therapy needs:    Pt will require a walker for ambulation assistance      Select home health services needed:    Nursing     Physical therapy      Home care service needed:    Home Health      Physician to follow patients home care:    ULI STATON      Face to Face encounter completed on:    3/5/2018      Reason for home bound status:    Weakness which limits endurance and increases risk for falls        I certify this patient is under my care and  I, or a nurse practitioner, clinical nurse specialist or physician assistant working with me, had a face-to-face encounter with this patient on the date listed.    Medical Condition Related to Home Health Services  The encounter with the patient was in whole, or in part, for the following medical condition(s), which are the reasons for home health care:     ICD-10-CM   1. 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    Homebound Status and Living Situation  I certify my clinical findings support this patient is homebound due to the homebound reason(s) listed in the Service to Home Care order content link above.  If reason for homebound status is not specified, this document does not certify the patient as homebound.    I have authorized these skilled home health 
services and the \"Physician to follow\" listed above will accept and assume care for this patient and sign the Home Health Plan of Care.    Date of completion of form: 3/5/2018        
coarse/Anterior:/Lateral:
clear

## 2025-07-10 NOTE — PROGRESS NOTE ADULT - ASSESSMENT
69-year-old male with PMHx of HTN, HLD, DM2, CAD with stents, current smoker presenting as transfer from [facility] s/p cardiac arrest x4 on VA ECMO in setting of LAD occlusion s/p DESx1. Nephrology consulted for non-oliguric VANDANA.

## 2025-07-10 NOTE — PROGRESS NOTE ADULT - SUBJECTIVE AND OBJECTIVE BOX
infectious diseases progress note:    Patient is a 69y old  Male who presents with a chief complaint of Chest pain (09 Jul 2025 21:27)        Cardiogenic shock           Allergies    No Known Allergies    Intolerances        ANTIBIOTICS/RELEVANT:  antimicrobials  cefepime   IVPB 500 milliGRAM(s) IV Intermittent every 12 hours    immunologic:    OTHER:  albuterol/ipratropium for Nebulization 3 milliLiter(s) Nebulizer every 6 hours  aMIOdarone Infusion 0.5 mG/Min IV Continuous <Continuous>  bumetanide Infusion 4 mG/Hr IV Continuous <Continuous>  cangrelor Infusion 0.25 MICROgram(s)/kG/Min IV Continuous <Continuous>  chlorhexidine 2% Cloths 1 Application(s) Topical <User Schedule>  chlorhexidine 4% Liquid 1 Application(s) Topical <User Schedule>  dexMEDEtomidine Infusion 0.3 MICROgram(s)/kG/Hr IV Continuous <Continuous>  dextrose 50% Injectable 50 milliLiter(s) IV Push every 15 minutes  heparin  Infusion 300 Unit(s)/Hr IV Continuous <Continuous>  heparin 50 Units/mL (IMPELLA VAD) Purge Solution  Unit(s) Ventricular Assist Device <Continuous>  insulin regular Infusion 3 Unit(s)/Hr IV Continuous <Continuous>  mupirocin 2% Nasal 1 Application(s) Both Nostrils every 12 hours  norepinephrine Infusion 0.05 MICROgram(s)/kG/Min IV Continuous <Continuous>  OLANZapine 2.5 milliGRAM(s) Oral at bedtime  OLANZapine 2.5 milliGRAM(s) Oral <User Schedule>  pantoprazole  Injectable 40 milliGRAM(s) IV Push daily  polyethylene glycol 3350 17 Gram(s) Oral daily  senna 2 Tablet(s) Oral at bedtime  sodium chloride 0.65% Nasal 1 Spray(s) Both Nostrils two times a day PRN  sodium chloride 0.9% lock flush 10 milliLiter(s) IV Push every 1 hour PRN  sodium chloride 0.9%. 1000 milliLiter(s) IV Continuous <Continuous>      Objective:  Vital Signs Last 24 Hrs  T(C): 37.4 (10 Jul 2025 04:00), Max: 37.6 (09 Jul 2025 20:00)  T(F): 99.3 (10 Jul 2025 04:00), Max: 99.7 (09 Jul 2025 20:00)  HR: 120 (10 Jul 2025 09:00) (109 - 127)  BP: --  BP(mean): --  RR: 24 (10 Jul 2025 09:00) (6 - 39)  SpO2: 94% (10 Jul 2025 09:00) (89% - 100%)    Parameters below as of 10 Jul 2025 08:00    O2 Flow (L/min): 4     enderness on percussion	  Neck:no JVD, no lymphadenopathy, supple  Respiratory: CTA jeannie  Cardiovascular: S1S2 RRR, no murmurs  Gastrointestinal:soft, (+) BS, no HSM  Extremities:no e/e/c        LABS:                        8.9    20.20 )-----------( 92       ( 10 Jul 2025 00:53 )             25.5     07-10    144  |  95[L]  |  121[H]  ----------------------------<  93  3.5   |  29  |  6.45[H]    Ca    8.4      10 Jul 2025 00:53  Phos  4.2     07-09  Mg     2.2     07-09    TPro  5.9[L]  /  Alb  3.6  /  TBili  2.4[H]  /  DBili  x   /  AST  39  /  ALT  10  /  AlkPhos  144[H]  07-10    PT/INR - ( 10 Jul 2025 08:28 )   PT: 16.6 sec;   INR: 1.46 ratio         PTT - ( 10 Jul 2025 08:28 )  PTT:40.6 sec  Urinalysis Basic - ( 10 Jul 2025 00:53 )    Color: x / Appearance: x / SG: x / pH: x  Gluc: 93 mg/dL / Ketone: x  / Bili: x / Urobili: x   Blood: x / Protein: x / Nitrite: x   Leuk Esterase: x / RBC: x / WBC x   Sq Epi: x / Non Sq Epi: x / Bacteria: x          MICROBIOLOGY:    RECENT CULTURES:  07-05 @ 18:34 Trach Asp Tracheal Aspirate   FAWAD    Rare polymorphonuclear leukocytes per low power field  Rare Squamous epithelial cells per low power field  Rare Gram Negative Rods seen per oil power field    Staphylococcus aureus  Klebsiella aerogenes (Previously Enterobacter)  Staphylococcus aureus     Moderate Staphylococcus aureus  Moderate Klebsiella aerogenes (Previously Enterobacter)  Commensal km consistent with body site    07-04 @ 10:37 Blood Blood-Peripheral                No growth at 5 days          RESPIRATORY CULTURES:              RADIOLOGY & ADDITIONAL STUDIES:        Pager [phone]  After 5 pm/weekends or if no response :[phone]

## 2025-07-10 NOTE — CONSULT NOTE ADULT - ATTENDING COMMENTS
Problem: Breastfeeding  Goal: Successful breastfeeding, as evidenced by proper suck/swallow, <10% weight loss, adequate void/stool.  Outcome: Outcome Met, Complete Goal  Goal: Parent / caregiver verbalizes understanding of benefits of exclusive breastfeeding and breastfeeding techniques  Description: Document on Patient Education Activity  Outcome: Outcome Met, Complete Goal     
VANDANA-ATN in the setting of cardiac arrest, now on ECMO   Urinating well on Bumex gtt- 100 cc/hr   CVP- 8   Does not need renal replacement therapy from either a clearance or volume standpoint. Creatinine may go up ( peaks before it gets better) but if electrolytes/ volume status remain okay, there would be no indication to dialyze.    rosita floyd  nephrology attending   please contact me on TEAMS   Office- [phone]
lost of pulses after Impella removal  was able to obtain signals after  no signs of ALI  L groin  hematoma  duplex reviewed, no signs of PSA

## 2025-07-10 NOTE — PROGRESS NOTE ADULT - SUBJECTIVE AND OBJECTIVE BOX
Cardiology Progress Note  ------------------------------------------------------------------------------------------  SUBJECTIVE:   - Tele: aflutter 110s-120s    -------------------------------------------------------------------------------------------  VS:  T(F): 99.7 (07-10), Max: 99.7 (07-09)  HR: 114 (07-10) (109 - 127)  BP: --  RR: 25 (07-10)  SpO2: 96% (07-10)  I&O's Summary    09 Jul 2025 07:01  -  10 Jul 2025 07:00  --------------------------------------------------------  IN: 2611.1 mL / OUT: 4175 mL / NET: -1563.9 mL    10 Jul 2025 07:01  -  10 Jul 2025 16:32  --------------------------------------------------------  IN: 830.9 mL / OUT: 1475 mL / NET: -644.1 mL      PHYSICAL EXAM:  GENERAL: NAD though seems poorly oriented  ENT: EOMI  CHEST/LUNG: Unlabored respirations.  HEART: sinus tachycardia; No murmurs, rubs, or gallops.  EXTREMITIES: No edema    -------------------------------------------------------------------------------------------  LABS:                          9.0    19.29 )-----------( 90       ( 10 Jul 2025 14:15 )             25.9     07-10    145  |  96  |  138[H]  ----------------------------<  130[H]  3.4[L]   |  29  |  6.75[H]    Ca    8.1[L]      10 Jul 2025 12:55  Phos  4.2     07-09  Mg     2.2     07-09    TPro  5.8[L]  /  Alb  3.3  /  TBili  2.1[H]  /  DBili  x   /  AST  54[H]  /  ALT  13  /  AlkPhos  170[H]  07-10    PT/INR - ( 10 Jul 2025 08:28 )   PT: 16.6 sec;   INR: 1.46 ratio         PTT - ( 10 Jul 2025 08:28 )  PTT:40.6 sec  CARDIAC MARKERS ( 07 Jul 2025 18:15 )  7417 ng/L / x     / x     / x     / x     / x      CARDIAC MARKERS ( 07 Jul 2025 10:05 )  7283 ng/L / x     / x     / x     / x     / x      CARDIAC MARKERS ( 07 Jul 2025 01:58 )  7141 ng/L / x     / x     / x     / x     / x      CARDIAC MARKERS ( 06 Jul 2025 17:53 )  6398 ng/L / x     / x     / x     / x     / x      CARDIAC MARKERS ( 06 Jul 2025 10:09 )  5380 ng/L / x     / x     / x     / x     / x                -------------------------------------------------------------------------------------------  Meds:  acetaminophen   IVPB .. 1000 milliGRAM(s) IV Intermittent once  albuterol/ipratropium for Nebulization 3 milliLiter(s) Nebulizer every 6 hours  bumetanide Infusion 2 mG/Hr IV Continuous <Continuous>  cangrelor Infusion 0.25 MICROgram(s)/kG/Min IV Continuous <Continuous>  cefepime   IVPB 500 milliGRAM(s) IV Intermittent every 12 hours  chlorhexidine 2% Cloths 1 Application(s) Topical <User Schedule>  chlorhexidine 4% Liquid 1 Application(s) Topical <User Schedule>  dexMEDEtomidine Infusion 0.3 MICROgram(s)/kG/Hr IV Continuous <Continuous>  dextrose 50% Injectable 50 milliLiter(s) IV Push every 15 minutes  heparin  Infusion 300 Unit(s)/Hr IV Continuous <Continuous>  heparin 50 Units/mL (IMPELLA VAD) Purge Solution  Unit(s) Ventricular Assist Device <Continuous>  insulin regular Infusion 3 Unit(s)/Hr IV Continuous <Continuous>  mupirocin 2% Nasal 1 Application(s) Both Nostrils every 12 hours  norepinephrine Infusion 0.05 MICROgram(s)/kG/Min IV Continuous <Continuous>  OLANZapine 2.5 milliGRAM(s) Oral at bedtime  OLANZapine 2.5 milliGRAM(s) Oral <User Schedule>  pantoprazole  Injectable 40 milliGRAM(s) IV Push daily  polyethylene glycol 3350 17 Gram(s) Oral daily  senna 2 Tablet(s) Oral at bedtime  sodium chloride 0.65% Nasal 1 Spray(s) Both Nostrils two times a day PRN  sodium chloride 0.9% lock flush 10 milliLiter(s) IV Push every 1 hour PRN  sodium chloride 0.9%. 1000 milliLiter(s) IV Continuous <Continuous>    -------------------------------------------------------------------------------------------  Cardiovascular Diagnostic Testing:    ECG:     Echo:     Stress Testing:    Cath:    -------------------------------------------------------------------------------------------

## 2025-07-10 NOTE — CONSULT NOTE ADULT - SUBJECTIVE AND OBJECTIVE BOX
HPI:  68 y/o Male w/PMHx Hypertension, hyperlipidemia, diabetes, CAD with stents, current smoker (2-3 PPD) brought in to [facility] by EMS from PCP's office status post cardiac arrest, ROSC achieved in 5-10 min.  Patient was in the lobby of his PCP when he was not feeling well, family told patient to sit down, and when came back with a wheelchair patient lost pulse.  Police showed up on scene and did 1 round of CPR, no meds were given.  ROSC achieved.  Patient brought in by EMS, altered, moaning, awake, unresponsive to questions on nonrebreather saturating in the 80s.  As per family patient has been feeling chest pain the past few days. On arrival to the ed, he was agitated and fighting restraints. unclear mental status otherwise, was moving his leg at the end of first round of ACLS. In the ED, pt lost his pulse, ACLS started, regained a pulse after 2 min. Patient was given etomidate, versed, and intubated. Then pt lost a pulse again, regained a pulse after another round of CPR. Patient started on epi gtt. pt had runs of VT, was given total of 2 epi, lido x2, amio 150 mg. Was given bicarb x2. Patient brought to cath lab, underwent LHC which showed LAD occlusion. Left femoral Impella CP inserted for support, s/p CURTIS to the LAD. Upgraded to VA ECMO (25Fr left femoral venous cannula, 17Fr right femoral arterial cannula with 6Fr distal reperfusion cannula) for continued cardiogenic shock after stent placement. Patient was then transferred to [facility] from [facility] for further care. (03 Jul 2025 20:11)    Vascular surgery consulted in setting of loss of LLE pedal signals  s/p impella removal. Patient sedated at time of exam, on 0.04 levo.     PAST MEDICAL & SURGICAL HISTORY:  Hypertension      CAD (coronary artery disease)      Hyperlipemia      Diabetes      S/P cardiac catheterization  1999          MEDICATIONS  (STANDING):  albuterol/ipratropium for Nebulization 3 milliLiter(s) Nebulizer every 6 hours  bumetanide Infusion 2 mG/Hr (10 mL/Hr) IV Continuous <Continuous>  cangrelor Infusion 0.25 MICROgram(s)/kG/Min (6.75 mL/Hr) IV Continuous <Continuous>  cefepime   IVPB 500 milliGRAM(s) IV Intermittent every 12 hours  chlorhexidine 2% Cloths 1 Application(s) Topical <User Schedule>  chlorhexidine 4% Liquid 1 Application(s) Topical <User Schedule>  dexMEDEtomidine Infusion 0.3 MICROgram(s)/kG/Hr (6.75 mL/Hr) IV Continuous <Continuous>  dextrose 50% Injectable 50 milliLiter(s) IV Push every 15 minutes  heparin  Infusion 300 Unit(s)/Hr (6 mL/Hr) IV Continuous <Continuous>  heparin 50 Units/mL (IMPELLA VAD) Purge Solution  Unit(s) (10 mL/Hr) Ventricular Assist Device <Continuous>  insulin regular Infusion 3 Unit(s)/Hr (3 mL/Hr) IV Continuous <Continuous>  mupirocin 2% Nasal 1 Application(s) Both Nostrils every 12 hours  norepinephrine Infusion 0.05 MICROgram(s)/kG/Min (8.44 mL/Hr) IV Continuous <Continuous>  OLANZapine 2.5 milliGRAM(s) Oral at bedtime  OLANZapine 2.5 milliGRAM(s) Oral <User Schedule>  pantoprazole  Injectable 40 milliGRAM(s) IV Push daily  polyethylene glycol 3350 17 Gram(s) Oral daily  senna 2 Tablet(s) Oral at bedtime  sodium chloride 0.9%. 1000 milliLiter(s) (10 mL/Hr) IV Continuous <Continuous>    MEDICATIONS  (PRN):  sodium chloride 0.65% Nasal 1 Spray(s) Both Nostrils two times a day PRN Nasal Congestion  sodium chloride 0.9% lock flush 10 milliLiter(s) IV Push every 1 hour PRN Pre/post blood products, medications, blood draw, and to maintain line patency      Allergies    No Known Allergies    Intolerances          Physical Exam:  General: NAD, resting comfortably, sedated  HEENT: NC/AT  Pulmonary: normal resp effort  Cardiovascular: tachycardia  Abdominal: soft, ND/NT  Extremities: RLE WWP, palpable pedal pulses. LLE with +popliteal signal, no pedal signals, cool to touch, no ischemic skin changes         Vital Signs Last 24 Hrs  T(C): 37.6 (10 Jul 2025 16:00), Max: 37.6 (09 Jul 2025 20:00)  T(F): 99.7 (10 Jul 2025 16:00), Max: 99.7 (09 Jul 2025 20:00)  HR: 115 (10 Jul 2025 17:53) (109 - 127)  BP: --  BP(mean): --  RR: 18 (10 Jul 2025 17:45) (16 - 39)  SpO2: 100% (10 Jul 2025 17:53) (92% - 100%)    Parameters below as of 10 Jul 2025 17:53  Patient On (Oxygen Delivery Method): nasal cannula        I&O's Summary    09 Jul 2025 07:01  -  10 Jul 2025 07:00  --------------------------------------------------------  IN: 2611.1 mL / OUT: 4175 mL / NET: -1563.9 mL    10 Jul 2025 07:01  -  10 Jul 2025 18:35  --------------------------------------------------------  IN: 862.9 mL / OUT: 1595 mL / NET: -732.1 mL            LABS:                        9.0    19.29 )-----------( 90       ( 10 Jul 2025 14:15 )             25.9     07-10    145  |  96  |  138[H]  ----------------------------<  130[H]  3.4[L]   |  29  |  6.75[H]    Ca    8.1[L]      10 Jul 2025 12:55  Phos  4.2     07-09  Mg     2.2     07-09    TPro  5.8[L]  /  Alb  3.3  /  TBili  2.1[H]  /  DBili  x   /  AST  54[H]  /  ALT  13  /  AlkPhos  170[H]  07-10    PT/INR - ( 10 Jul 2025 08:28 )   PT: 16.6 sec;   INR: 1.46 ratio         PTT - ( 10 Jul 2025 08:28 )  PTT:40.6 sec  Urinalysis Basic - ( 10 Jul 2025 12:55 )    Color: x / Appearance: x / SG: x / pH: x  Gluc: 130 mg/dL / Ketone: x  / Bili: x / Urobili: x   Blood: x / Protein: x / Nitrite: x   Leuk Esterase: x / RBC: x / WBC x   Sq Epi: x / Non Sq Epi: x / Bacteria: x      CAPILLARY BLOOD GLUCOSE  196 (10 Jul 2025 18:00)  151 (10 Jul 2025 17:00)  98 (10 Jul 2025 15:00)  166 (10 Jul 2025 14:00)  143 (10 Jul 2025 13:00)  143 (10 Jul 2025 12:00)  131 (10 Jul 2025 11:00)  149 (10 Jul 2025 10:00)  196 (10 Jul 2025 09:00)  180 (10 Jul 2025 08:00)  243 (10 Jul 2025 06:00)  138 (10 Jul 2025 04:00)  99 (10 Jul 2025 03:00)  104 (10 Jul 2025 02:00)  103 (10 Jul 2025 01:00)  102 (10 Jul 2025 00:00)  108 (09 Jul 2025 23:00)  140 (09 Jul 2025 22:00)  149 (09 Jul 2025 21:00)  169 (09 Jul 2025 20:00)      POCT Blood Glucose.: 191 mg/dL (10 Jul 2025 18:01)  POCT Blood Glucose.: 151 mg/dL (10 Jul 2025 17:12)  POCT Blood Glucose.: 98 mg/dL (10 Jul 2025 15:20)  POCT Blood Glucose.: 166 mg/dL (10 Jul 2025 14:05)  POCT Blood Glucose.: 143 mg/dL (10 Jul 2025 12:59)  POCT Blood Glucose.: 143 mg/dL (10 Jul 2025 11:54)  POCT Blood Glucose.: 131 mg/dL (10 Jul 2025 10:53)  POCT Blood Glucose.: 149 mg/dL (10 Jul 2025 09:59)  POCT Blood Glucose.: 196 mg/dL (10 Jul 2025 08:55)  POCT Blood Glucose.: 180 mg/dL (10 Jul 2025 08:13)  POCT Blood Glucose.: 205 mg/dL (10 Jul 2025 06:50)  POCT Blood Glucose.: 243 mg/dL (10 Jul 2025 05:58)  POCT Blood Glucose.: 237 mg/dL (10 Jul 2025 05:55)  POCT Blood Glucose.: 138 mg/dL (10 Jul 2025 04:00)  POCT Blood Glucose.: 99 mg/dL (10 Jul 2025 02:52)  POCT Blood Glucose.: 104 mg/dL (10 Jul 2025 01:48)  POCT Blood Glucose.: 103 mg/dL (10 Jul 2025 00:54)  POCT Blood Glucose.: 102 mg/dL (10 Jul 2025 00:00)  POCT Blood Glucose.: 108 mg/dL (09 Jul 2025 22:51)  POCT Blood Glucose.: 140 mg/dL (09 Jul 2025 22:01)  POCT Blood Glucose.: 149 mg/dL (09 Jul 2025 20:55)  POCT Blood Glucose.: 169 mg/dL (09 Jul 2025 19:56)    LIVER FUNCTIONS - ( 10 Jul 2025 12:55 )  Alb: 3.3 g/dL / Pro: 5.8 g/dL / ALK PHOS: 170 U/L / ALT: 13 U/L / AST: 54 U/L / GGT: x             Cultures:                 HPI:  70 y/o Male w/PMHx Hypertension, hyperlipidemia, diabetes, CAD with stents, current smoker (2-3 PPD) brought in to [facility] by EMS from PCP's office status post cardiac arrest, ROSC achieved in 5-10 min.  Patient was in the lobby of his PCP when he was not feeling well, family told patient to sit down, and when came back with a wheelchair patient lost pulse.  Police showed up on scene and did 1 round of CPR, no meds were given.  ROSC achieved.  Patient brought in by EMS, altered, moaning, awake, unresponsive to questions on nonrebreather saturating in the 80s.  As per family patient has been feeling chest pain the past few days. On arrival to the ed, he was agitated and fighting restraints. unclear mental status otherwise, was moving his leg at the end of first round of ACLS. In the ED, pt lost his pulse, ACLS started, regained a pulse after 2 min. Patient was given etomidate, versed, and intubated. Then pt lost a pulse again, regained a pulse after another round of CPR. Patient started on epi gtt. pt had runs of VT, was given total of 2 epi, lido x2, amio 150 mg. Was given bicarb x2. Patient brought to cath lab, underwent LHC which showed LAD occlusion. Left femoral Impella CP inserted for support, s/p CURTIS to the LAD. Upgraded to VA ECMO (25Fr left femoral venous cannula, 17Fr right femoral arterial cannula with 6Fr distal reperfusion cannula) for continued cardiogenic shock after stent placement. Patient was then transferred to [facility] from [facility] for further care. (03 Jul 2025 20:11)    Vascular surgery consulted in setting of loss of LLE pedal signals s/p impella removal. Patient sedated at time of exam, on 0.04 levo.     PAST MEDICAL & SURGICAL HISTORY:  Hypertension      CAD (coronary artery disease)      Hyperlipemia      Diabetes      S/P cardiac catheterization  1999          MEDICATIONS  (STANDING):  albuterol/ipratropium for Nebulization 3 milliLiter(s) Nebulizer every 6 hours  bumetanide Infusion 2 mG/Hr (10 mL/Hr) IV Continuous <Continuous>  cangrelor Infusion 0.25 MICROgram(s)/kG/Min (6.75 mL/Hr) IV Continuous <Continuous>  cefepime   IVPB 500 milliGRAM(s) IV Intermittent every 12 hours  chlorhexidine 2% Cloths 1 Application(s) Topical <User Schedule>  chlorhexidine 4% Liquid 1 Application(s) Topical <User Schedule>  dexMEDEtomidine Infusion 0.3 MICROgram(s)/kG/Hr (6.75 mL/Hr) IV Continuous <Continuous>  dextrose 50% Injectable 50 milliLiter(s) IV Push every 15 minutes  heparin  Infusion 300 Unit(s)/Hr (6 mL/Hr) IV Continuous <Continuous>  heparin 50 Units/mL (IMPELLA VAD) Purge Solution  Unit(s) (10 mL/Hr) Ventricular Assist Device <Continuous>  insulin regular Infusion 3 Unit(s)/Hr (3 mL/Hr) IV Continuous <Continuous>  mupirocin 2% Nasal 1 Application(s) Both Nostrils every 12 hours  norepinephrine Infusion 0.05 MICROgram(s)/kG/Min (8.44 mL/Hr) IV Continuous <Continuous>  OLANZapine 2.5 milliGRAM(s) Oral at bedtime  OLANZapine 2.5 milliGRAM(s) Oral <User Schedule>  pantoprazole  Injectable 40 milliGRAM(s) IV Push daily  polyethylene glycol 3350 17 Gram(s) Oral daily  senna 2 Tablet(s) Oral at bedtime  sodium chloride 0.9%. 1000 milliLiter(s) (10 mL/Hr) IV Continuous <Continuous>    MEDICATIONS  (PRN):  sodium chloride 0.65% Nasal 1 Spray(s) Both Nostrils two times a day PRN Nasal Congestion  sodium chloride 0.9% lock flush 10 milliLiter(s) IV Push every 1 hour PRN Pre/post blood products, medications, blood draw, and to maintain line patency      Allergies    No Known Allergies    Intolerances          Physical Exam:  General: NAD, resting comfortably, sedated  HEENT: NC/AT  Pulmonary: normal resp effort  Cardiovascular: tachycardia  Abdominal: soft, ND/NT  Extremities: RLE WWP, palpable pedal pulses. LLE with +popliteal signal, no pedal signals, cool to touch, no ischemic skin changes         Vital Signs Last 24 Hrs  T(C): 37.6 (10 Jul 2025 16:00), Max: 37.6 (09 Jul 2025 20:00)  T(F): 99.7 (10 Jul 2025 16:00), Max: 99.7 (09 Jul 2025 20:00)  HR: 115 (10 Jul 2025 17:53) (109 - 127)  BP: --  BP(mean): --  RR: 18 (10 Jul 2025 17:45) (16 - 39)  SpO2: 100% (10 Jul 2025 17:53) (92% - 100%)    Parameters below as of 10 Jul 2025 17:53  Patient On (Oxygen Delivery Method): nasal cannula        I&O's Summary    09 Jul 2025 07:01  -  10 Jul 2025 07:00  --------------------------------------------------------  IN: 2611.1 mL / OUT: 4175 mL / NET: -1563.9 mL    10 Jul 2025 07:01  -  10 Jul 2025 18:35  --------------------------------------------------------  IN: 862.9 mL / OUT: 1595 mL / NET: -732.1 mL            LABS:                        9.0    19.29 )-----------( 90       ( 10 Jul 2025 14:15 )             25.9     07-10    145  |  96  |  138[H]  ----------------------------<  130[H]  3.4[L]   |  29  |  6.75[H]    Ca    8.1[L]      10 Jul 2025 12:55  Phos  4.2     07-09  Mg     2.2     07-09    TPro  5.8[L]  /  Alb  3.3  /  TBili  2.1[H]  /  DBili  x   /  AST  54[H]  /  ALT  13  /  AlkPhos  170[H]  07-10    PT/INR - ( 10 Jul 2025 08:28 )   PT: 16.6 sec;   INR: 1.46 ratio         PTT - ( 10 Jul 2025 08:28 )  PTT:40.6 sec  Urinalysis Basic - ( 10 Jul 2025 12:55 )    Color: x / Appearance: x / SG: x / pH: x  Gluc: 130 mg/dL / Ketone: x  / Bili: x / Urobili: x   Blood: x / Protein: x / Nitrite: x   Leuk Esterase: x / RBC: x / WBC x   Sq Epi: x / Non Sq Epi: x / Bacteria: x      CAPILLARY BLOOD GLUCOSE  196 (10 Jul 2025 18:00)  151 (10 Jul 2025 17:00)  98 (10 Jul 2025 15:00)  166 (10 Jul 2025 14:00)  143 (10 Jul 2025 13:00)  143 (10 Jul 2025 12:00)  131 (10 Jul 2025 11:00)  149 (10 Jul 2025 10:00)  196 (10 Jul 2025 09:00)  180 (10 Jul 2025 08:00)  243 (10 Jul 2025 06:00)  138 (10 Jul 2025 04:00)  99 (10 Jul 2025 03:00)  104 (10 Jul 2025 02:00)  103 (10 Jul 2025 01:00)  102 (10 Jul 2025 00:00)  108 (09 Jul 2025 23:00)  140 (09 Jul 2025 22:00)  149 (09 Jul 2025 21:00)  169 (09 Jul 2025 20:00)      POCT Blood Glucose.: 191 mg/dL (10 Jul 2025 18:01)  POCT Blood Glucose.: 151 mg/dL (10 Jul 2025 17:12)  POCT Blood Glucose.: 98 mg/dL (10 Jul 2025 15:20)  POCT Blood Glucose.: 166 mg/dL (10 Jul 2025 14:05)  POCT Blood Glucose.: 143 mg/dL (10 Jul 2025 12:59)  POCT Blood Glucose.: 143 mg/dL (10 Jul 2025 11:54)  POCT Blood Glucose.: 131 mg/dL (10 Jul 2025 10:53)  POCT Blood Glucose.: 149 mg/dL (10 Jul 2025 09:59)  POCT Blood Glucose.: 196 mg/dL (10 Jul 2025 08:55)  POCT Blood Glucose.: 180 mg/dL (10 Jul 2025 08:13)  POCT Blood Glucose.: 205 mg/dL (10 Jul 2025 06:50)  POCT Blood Glucose.: 243 mg/dL (10 Jul 2025 05:58)  POCT Blood Glucose.: 237 mg/dL (10 Jul 2025 05:55)  POCT Blood Glucose.: 138 mg/dL (10 Jul 2025 04:00)  POCT Blood Glucose.: 99 mg/dL (10 Jul 2025 02:52)  POCT Blood Glucose.: 104 mg/dL (10 Jul 2025 01:48)  POCT Blood Glucose.: 103 mg/dL (10 Jul 2025 00:54)  POCT Blood Glucose.: 102 mg/dL (10 Jul 2025 00:00)  POCT Blood Glucose.: 108 mg/dL (09 Jul 2025 22:51)  POCT Blood Glucose.: 140 mg/dL (09 Jul 2025 22:01)  POCT Blood Glucose.: 149 mg/dL (09 Jul 2025 20:55)  POCT Blood Glucose.: 169 mg/dL (09 Jul 2025 19:56)    LIVER FUNCTIONS - ( 10 Jul 2025 12:55 )  Alb: 3.3 g/dL / Pro: 5.8 g/dL / ALK PHOS: 170 U/L / ALT: 13 U/L / AST: 54 U/L / GGT: x             Cultures:                 HPI:  70 y/o Male w/PMHx Hypertension, hyperlipidemia, diabetes, CAD with stents, current smoker (2-3 PPD) brought in to [facility] by EMS from PCP's office status post cardiac arrest, ROSC achieved in 5-10 min.  Patient was in the lobby of his PCP when he was not feeling well, family told patient to sit down, and when came back with a wheelchair patient lost pulse.  Police showed up on scene and did 1 round of CPR, no meds were given.  ROSC achieved.  Patient brought in by EMS, altered, moaning, awake, unresponsive to questions on nonrebreather saturating in the 80s.  As per family patient has been feeling chest pain the past few days. On arrival to the ed, he was agitated and fighting restraints. unclear mental status otherwise, was moving his leg at the end of first round of ACLS. In the ED, pt lost his pulse, ACLS started, regained a pulse after 2 min. Patient was given etomidate, versed, and intubated. Then pt lost a pulse again, regained a pulse after another round of CPR. Patient started on epi gtt. pt had runs of VT, was given total of 2 epi, lido x2, amio 150 mg. Was given bicarb x2. Patient brought to cath lab, underwent LHC which showed LAD occlusion. Left femoral Impella CP inserted for support, s/p CURTIS to the LAD. Upgraded to VA ECMO (25Fr left femoral venous cannula, 17Fr right femoral arterial cannula with 6Fr distal reperfusion cannula) for continued cardiogenic shock after stent placement. Patient was then transferred to [facility] from [facility] for further care. (03 Jul 2025 20:11)    Vascular surgery consulted in setting of loss of LLE pedal signals s/p impella removal. Patient sedated at time of exam, on 0.04 levo.     PAST MEDICAL & SURGICAL HISTORY:  Hypertension      CAD (coronary artery disease)      Hyperlipemia      Diabetes      S/P cardiac catheterization  1999      MEDICATIONS  (STANDING):  albuterol/ipratropium for Nebulization 3 milliLiter(s) Nebulizer every 6 hours  bumetanide Infusion 2 mG/Hr (10 mL/Hr) IV Continuous <Continuous>  cangrelor Infusion 0.25 MICROgram(s)/kG/Min (6.75 mL/Hr) IV Continuous <Continuous>  cefepime   IVPB 500 milliGRAM(s) IV Intermittent every 12 hours  chlorhexidine 2% Cloths 1 Application(s) Topical <User Schedule>  chlorhexidine 4% Liquid 1 Application(s) Topical <User Schedule>  dexMEDEtomidine Infusion 0.3 MICROgram(s)/kG/Hr (6.75 mL/Hr) IV Continuous <Continuous>  dextrose 50% Injectable 50 milliLiter(s) IV Push every 15 minutes  heparin  Infusion 300 Unit(s)/Hr (6 mL/Hr) IV Continuous <Continuous>  heparin 50 Units/mL (IMPELLA VAD) Purge Solution  Unit(s) (10 mL/Hr) Ventricular Assist Device <Continuous>  insulin regular Infusion 3 Unit(s)/Hr (3 mL/Hr) IV Continuous <Continuous>  mupirocin 2% Nasal 1 Application(s) Both Nostrils every 12 hours  norepinephrine Infusion 0.05 MICROgram(s)/kG/Min (8.44 mL/Hr) IV Continuous <Continuous>  OLANZapine 2.5 milliGRAM(s) Oral at bedtime  OLANZapine 2.5 milliGRAM(s) Oral <User Schedule>  pantoprazole  Injectable 40 milliGRAM(s) IV Push daily  polyethylene glycol 3350 17 Gram(s) Oral daily  senna 2 Tablet(s) Oral at bedtime  sodium chloride 0.9%. 1000 milliLiter(s) (10 mL/Hr) IV Continuous <Continuous>    MEDICATIONS  (PRN):  sodium chloride 0.65% Nasal 1 Spray(s) Both Nostrils two times a day PRN Nasal Congestion  sodium chloride 0.9% lock flush 10 milliLiter(s) IV Push every 1 hour PRN Pre/post blood products, medications, blood draw, and to maintain line patency      Allergies    No Known Allergies    Intolerances          Physical Exam:  General: NAD, resting comfortably, sedated  HEENT: NC/AT  Pulmonary: normal resp effort  Cardiovascular: tachycardia  Abdominal: soft, ND/NT  Extremities: RLE WWP, palpable pedal pulses. LLE with +popliteal signal, no pedal signals, cool to touch, no ischemic skin changes. L groin hematoma.         Vital Signs Last 24 Hrs  T(C): 37.6 (10 Jul 2025 16:00), Max: 37.6 (09 Jul 2025 20:00)  T(F): 99.7 (10 Jul 2025 16:00), Max: 99.7 (09 Jul 2025 20:00)  HR: 115 (10 Jul 2025 17:53) (109 - 127)  BP: --  BP(mean): --  RR: 18 (10 Jul 2025 17:45) (16 - 39)  SpO2: 100% (10 Jul 2025 17:53) (92% - 100%)    Parameters below as of 10 Jul 2025 17:53  Patient On (Oxygen Delivery Method): nasal cannula        I&O's Summary    09 Jul 2025 07:01  -  10 Jul 2025 07:00  --------------------------------------------------------  IN: 2611.1 mL / OUT: 4175 mL / NET: -1563.9 mL    10 Jul 2025 07:01  -  10 Jul 2025 18:35  --------------------------------------------------------  IN: 862.9 mL / OUT: 1595 mL / NET: -732.1 mL            LABS:                        9.0    19.29 )-----------( 90       ( 10 Jul 2025 14:15 )             25.9     07-10    145  |  96  |  138[H]  ----------------------------<  130[H]  3.4[L]   |  29  |  6.75[H]    Ca    8.1[L]      10 Jul 2025 12:55  Phos  4.2     07-09  Mg     2.2     07-09    TPro  5.8[L]  /  Alb  3.3  /  TBili  2.1[H]  /  DBili  x   /  AST  54[H]  /  ALT  13  /  AlkPhos  170[H]  07-10    PT/INR - ( 10 Jul 2025 08:28 )   PT: 16.6 sec;   INR: 1.46 ratio         PTT - ( 10 Jul 2025 08:28 )  PTT:40.6 sec  Urinalysis Basic - ( 10 Jul 2025 12:55 )    Color: x / Appearance: x / SG: x / pH: x  Gluc: 130 mg/dL / Ketone: x  / Bili: x / Urobili: x   Blood: x / Protein: x / Nitrite: x   Leuk Esterase: x / RBC: x / WBC x   Sq Epi: x / Non Sq Epi: x / Bacteria: x      CAPILLARY BLOOD GLUCOSE  196 (10 Jul 2025 18:00)  151 (10 Jul 2025 17:00)  98 (10 Jul 2025 15:00)  166 (10 Jul 2025 14:00)  143 (10 Jul 2025 13:00)  143 (10 Jul 2025 12:00)  131 (10 Jul 2025 11:00)  149 (10 Jul 2025 10:00)  196 (10 Jul 2025 09:00)  180 (10 Jul 2025 08:00)  243 (10 Jul 2025 06:00)  138 (10 Jul 2025 04:00)  99 (10 Jul 2025 03:00)  104 (10 Jul 2025 02:00)  103 (10 Jul 2025 01:00)  102 (10 Jul 2025 00:00)  108 (09 Jul 2025 23:00)  140 (09 Jul 2025 22:00)  149 (09 Jul 2025 21:00)  169 (09 Jul 2025 20:00)      POCT Blood Glucose.: 191 mg/dL (10 Jul 2025 18:01)  POCT Blood Glucose.: 151 mg/dL (10 Jul 2025 17:12)  POCT Blood Glucose.: 98 mg/dL (10 Jul 2025 15:20)  POCT Blood Glucose.: 166 mg/dL (10 Jul 2025 14:05)  POCT Blood Glucose.: 143 mg/dL (10 Jul 2025 12:59)  POCT Blood Glucose.: 143 mg/dL (10 Jul 2025 11:54)  POCT Blood Glucose.: 131 mg/dL (10 Jul 2025 10:53)  POCT Blood Glucose.: 149 mg/dL (10 Jul 2025 09:59)  POCT Blood Glucose.: 196 mg/dL (10 Jul 2025 08:55)  POCT Blood Glucose.: 180 mg/dL (10 Jul 2025 08:13)  POCT Blood Glucose.: 205 mg/dL (10 Jul 2025 06:50)  POCT Blood Glucose.: 243 mg/dL (10 Jul 2025 05:58)  POCT Blood Glucose.: 237 mg/dL (10 Jul 2025 05:55)  POCT Blood Glucose.: 138 mg/dL (10 Jul 2025 04:00)  POCT Blood Glucose.: 99 mg/dL (10 Jul 2025 02:52)  POCT Blood Glucose.: 104 mg/dL (10 Jul 2025 01:48)  POCT Blood Glucose.: 103 mg/dL (10 Jul 2025 00:54)  POCT Blood Glucose.: 102 mg/dL (10 Jul 2025 00:00)  POCT Blood Glucose.: 108 mg/dL (09 Jul 2025 22:51)  POCT Blood Glucose.: 140 mg/dL (09 Jul 2025 22:01)  POCT Blood Glucose.: 149 mg/dL (09 Jul 2025 20:55)  POCT Blood Glucose.: 169 mg/dL (09 Jul 2025 19:56)    LIVER FUNCTIONS - ( 10 Jul 2025 12:55 )  Alb: 3.3 g/dL / Pro: 5.8 g/dL / ALK PHOS: 170 U/L / ALT: 13 U/L / AST: 54 U/L / GGT: x             Cultures:

## 2025-07-10 NOTE — CONSULT NOTE ADULT - ASSESSMENT
69M PMH HTN, HLD, CAD s/p stents s/p cardiac arrest and L femoral impella CP s/p CURTIS to LAD, VA ECMO, now s/p impella removal. Vascular surgery consulted in setting of loss of LLE pedal signals.    Recommendations:  - No acute vascular surgery intervention  - Heparin gtt, wean pressors as tolerated    Vascular Surgery  [id number] 69M PMH HTN, HLD, CAD s/p stents s/p cardiac arrest and L femoral impella CP s/p CURTIS to LAD, VA ECMO, now s/p impella removal. Vascular surgery consulted in setting of loss of LLE pedal signals.    Recommendations:  - No acute vascular surgery intervention  - Heparin gtt, wean pressors as tolerated    D/w fellow on behalf of attending.     Vascular Surgery  [id number] 69M PMH HTN, HLD, CAD s/p stents s/p cardiac arrest and L femoral impella CP s/p CURTIS to LAD, VA ECMO, now s/p impella removal. Vascular surgery consulted in setting of loss of LLE pedal signals.    Recommendations:  - No acute vascular surgery intervention  - Obtain arterial duplex of LLE  - Obtain L groin duplex in setting of hematoma  - Heparin gtt, wean pressors as tolerated    D/w fellow on behalf of attending.     Vascular Surgery  [id number] 69M PMH HTN, HLD, CAD s/p stents s/p cardiac arrest and L femoral impella CP s/p CURTIS to LAD, VA ECMO, now s/p impella removal. Vascular surgery consulted in setting of loss of LLE pedal signals.    Recommendations:  - No acute vascular surgery intervention  - Obtain arterial duplex of LLE  - Obtain L groin duplex in setting of hematoma  - Heparin gtt, wean pressors as tolerated    D/w fellow on behalf of attending.     Vascular Surgery  [id number]    Addendum 7PM: patient now with L DP signal

## 2025-07-10 NOTE — PROGRESS NOTE ADULT - SUBJECTIVE AND OBJECTIVE BOX
Patient seen and examined at the bedside.    Remained critically ill on continuous ICU monitoring.      Brief Summary:  68 yo M presented 7/3/25 to [facility] with STEMI and cardiac arrest.  IABP -> Impella CP, LAD stent  [facility] to [facility] and transferred to [facility]       24 Hour events:  Diuresing with Bumex     OBJECTIVE:  Vital Signs Last 24 Hrs  T(C): 37.4 (10 Jul 2025 04:00), Max: 37.6 (09 Jul 2025 20:00)  T(F): 99.3 (10 Jul 2025 04:00), Max: 99.7 (09 Jul 2025 20:00)  HR: 121 (10 Jul 2025 12:00) (109 - 127)  BP: --  BP(mean): --  RR: 22 (10 Jul 2025 12:00) (6 - 39)  SpO2: 99% (10 Jul 2025 12:00) (89% - 100%)    Parameters below as of 10 Jul 2025 10:00  Patient On (Oxygen Delivery Method): nasal cannula  O2 Flow (L/min): 4                  Physical Exam:   General: Sleepy but will interact  Neurology: Following commands  Respiratory: Bilateral breath sounds.  CV:  tachycardic  Abdominal: Soft, Nontender  Extremities: Warm, well-perfused  Yap  -------------------------------------------------------------------------------------------------------------------------------    Labs:                        8.9    20.20 )-----------( 92       ( 10 Jul 2025 00:53 )             25.5     07-10    144  |  95[L]  |  121[H]  ----------------------------<  93  3.5   |  29  |  6.45[H]    Ca    8.4      10 Jul 2025 00:53  Phos  4.2     07-09  Mg     2.2     07-09    TPro  5.9[L]  /  Alb  3.6  /  TBili  2.4[H]  /  DBili  x   /  AST  39  /  ALT  10  /  AlkPhos  144[H]  07-10    LIVER FUNCTIONS - ( 10 Jul 2025 00:53 )  Alb: 3.6 g/dL / Pro: 5.9 g/dL / ALK PHOS: 144 U/L / ALT: 10 U/L / AST: 39 U/L / GGT: x           PT/INR - ( 10 Jul 2025 08:28 )   PT: 16.6 sec;   INR: 1.46 ratio         PTT - ( 10 Jul 2025 08:28 )  PTT:40.6 sec  ABG - ( 10 Jul 2025 11:11 )  pH, Arterial: 7.51  pH, Blood: x     /  pCO2: 37    /  pO2: 99    / HCO3: 30    / Base Excess: 6.1   /  SaO2: 99.3              ------------------------------------------------------------------------------------------------------------------------------  Assessment:  68 yo M presented 7/3/25 to [facility] with STEMI and cardiac arrest.  IABP -> Impella CP,  LAD stented.  Upgraded to [facility] ECMO and transferred to [facility]     STEMI  Cardiogenic shock  Postop acute respiratory insufficiency  Thrombocytopenia  Hyperglycemia  VANDANA  Shock liver, Hyperbilirubinemia    ***Neuro***  Zyprexa nightly - will stop am dose  prns for discomfort  Optimize day / night cycle    ***Cardiovascular***  Cardiogenic shock, STEMI, s/p LAD stent  Tolerating Impella wean - likely removal today   Remains on purge and systemic Heparin.  Amiodarone for A fib/flutter and Ectopy, continue with PO    ***Pulmonary***  Acute post operative respiratory insufficiency   Deep breathing and coughing exercises.  Wean oxygen as able.    ***GI***  Dysphagia screen post-extubation.  Likely would benefit from ongoing tube feeds given recent poor PO intake.  Protonix for stress ulcer prophylaxis   Bowel regimen.  Trend LFTs, avoid hepatotoxins.    ***Renal***  VANDANA    Yap catheter for strict I/O measurements.  Bumex infusion - now making urine.  Trend creatinine and electrolytes.  No indication for RRT currently.  Nephrology Consulted     ***ID***  Change zosyn to cefepime  bronchial aspirate + for Klebs,+ staph auras     ***Endocrine***  Hyperglycemia - Insulin infusion.    ***Hematology***  Acute blood loss anemia and Thrombocytopenia   No transfusion indication currently  Purge and systemic Heparin for anticoagulation, trend PTT and anti-Xa  Trend CBC and coags and monitor for bleeding      Care plan discussed with the ICU care team.     Patient remained critical, at risk for life threatening decompensation.    I have spent 55 minutes providing critical care management to this patient.    I, Macho Rodriguez MD, personally performed the services described in this documentation. I have reviewed the chart and agree that the record reflects my personal performance and is accurate and complete.

## 2025-07-10 NOTE — PROGRESS NOTE ADULT - PROBLEM SELECTOR PLAN 1
- attempting Impella CP wean, trend mvO2 and perfusion markers  - [facility] ECMO decannulated 7/8  - on low dose pressor support  - holding bumex gtt

## 2025-07-10 NOTE — PROGRESS NOTE ADULT - ATTENDING COMMENTS
VANDANA-ATN  Excellent urine output   On Bumex 4/hr with a urine output of 4 L per 24 hours   CVP- 3/ BUN/Creatinine higher.   Would stop Bumex and evaluate.     rosita floyd  nephrology attending   please contact me on TEAMS   Office- [phone]

## 2025-07-10 NOTE — PROGRESS NOTE ADULT - ASSESSMENT
69 year old with many problems  CAd  CHF  DM  admitted after cardiac arrest requiring resuscitation  transferred and had ECMO place and left femoral impella   had right and left fem artery catheterization   currently in OR in attempt to get ECMO and impella out  pt has colonization of sputum with MSSA and sensitive  klebsiella    day 4 cefepime 500 mg bid   bilateral infiltrates compatible with pneumonia with broken ribs  s/p removal of all lines except femoral impella   to continue cefepime alone for now  reculture and dose with vanco for any change   wbc styill elevated but otherwise no change   discussed with team

## 2025-07-10 NOTE — PROGRESS NOTE ADULT - SUBJECTIVE AND OBJECTIVE BOX
[facility] Division of Kidney Diseases & Hypertension  FOLLOW UP NOTE  [phone]--------------------------------------------------------------------------------  Chief Complaint:    24 hour events/subjective:    PAST HISTORY  --------------------------------------------------------------------------------  No significant changes to PMH, PSH, FHx, SHx from H&P unless otherwise noted.    ALLERGIES & MEDICATIONS  --------------------------------------------------------------------------------  Allergies    No Known Allergies    Intolerances      Standing Inpatient Medications  albuterol/ipratropium for Nebulization 3 milliLiter(s) Nebulizer every 6 hours  bumetanide Infusion 2 mG/Hr IV Continuous <Continuous>  cangrelor Infusion 0.25 MICROgram(s)/kG/Min IV Continuous <Continuous>  cefepime   IVPB 500 milliGRAM(s) IV Intermittent every 12 hours  chlorhexidine 2% Cloths 1 Application(s) Topical <User Schedule>  chlorhexidine 4% Liquid 1 Application(s) Topical <User Schedule>  dexMEDEtomidine Infusion 0.3 MICROgram(s)/kG/Hr IV Continuous <Continuous>  dextrose 50% Injectable 50 milliLiter(s) IV Push every 15 minutes  heparin  Infusion 300 Unit(s)/Hr IV Continuous <Continuous>  heparin 50 Units/mL (IMPELLA VAD) Purge Solution  Unit(s) Ventricular Assist Device <Continuous>  insulin regular Infusion 3 Unit(s)/Hr IV Continuous <Continuous>  mupirocin 2% Nasal 1 Application(s) Both Nostrils every 12 hours  norepinephrine Infusion 0.05 MICROgram(s)/kG/Min IV Continuous <Continuous>  OLANZapine 2.5 milliGRAM(s) Oral at bedtime  OLANZapine 2.5 milliGRAM(s) Oral <User Schedule>  pantoprazole  Injectable 40 milliGRAM(s) IV Push daily  polyethylene glycol 3350 17 Gram(s) Oral daily  senna 2 Tablet(s) Oral at bedtime  sodium chloride 0.9%. 1000 milliLiter(s) IV Continuous <Continuous>    PRN Inpatient Medications  sodium chloride 0.65% Nasal 1 Spray(s) Both Nostrils two times a day PRN  sodium chloride 0.9% lock flush 10 milliLiter(s) IV Push every 1 hour PRN      REVIEW OF SYSTEMS  --------------------------------------------------------------------------------  Gen: No fevers/chills  Head/Eyes/Ears: No HA  Respiratory: No dyspnea  CV: No chest pain  GI: No abdominal discomfort  : No dysuria  MSK: No edema    All other systems were reviewed and are negative, except as noted above.    VITALS/PHYSICAL EXAM  --------------------------------------------------------------------------------  T(C): 37.4 (07-10-25 @ 04:00), Max: 37.6 (07-09-25 @ 20:00)  HR: 122 (07-10-25 @ 13:00) (109 - 127)  BP: --  RR: 21 (07-10-25 @ 13:00) (6 - 39)  SpO2: 97% (07-10-25 @ 13:00) (92% - 100%)  Wt(kg): --        07-09-25 @ 07:01  -  07-10-25 @ 07:00  --------------------------------------------------------  IN: 2611.1 mL / OUT: 4175 mL / NET: -1563.9 mL    07-10-25 @ 07:01  -  07-10-25 @ 13:56  --------------------------------------------------------  IN: 698.7 mL / OUT: 960 mL / NET: -261.3 mL      Physical Exam:  Gen: NAD  Pulm: CTA B/L  CV: RRR, S1S2;  Abd: +BS, soft  : No suprapubic tenderness  Extremities: no bilateral LE edema.  Neuro: Awake, alert  Skin: Warm  Vascular access:    LABS/STUDIES  --------------------------------------------------------------------------------              8.9    20.20 >-----------<  92       [07-10-25 @ 00:53]              25.5     145  |  96  |  138  ----------------------------<  130      [07-10-25 @ 12:55]  3.4   |  29  |  6.75        Ca     8.1     [07-10-25 @ 12:55]      Mg     2.2     [07-09-25 @ 00:32]      Phos  4.2     [07-09-25 @ 00:32]    TPro  5.8  /  Alb  3.3  /  TBili  2.1  /  DBili  x   /  AST  54  /  ALT  13  /  AlkPhos  170  [07-10-25 @ 12:55]    PT/INR: PT 16.6 , INR 1.46       [07-10-25 @ 08:28]  PTT: 40.6       [07-10-25 @ 08:28]          [07-10-25 @ 00:53]    Creatinine Trend:  SCr 6.75 [07-10 @ 12:55]  SCr 6.45 [07-10 @ 00:53]  SCr 5.62 [07-09 @ 00:32]  SCr 5.22 [07-08 @ 15:42]  SCr 5.14 [07-08 @ 00:21]    Urinalysis - [07-10-25 @ 12:55]      Color  / Appearance  / SG  / pH       Gluc 130 / Ketone   / Bili  / Urobili        Blood  / Protein  / Leuk Est  / Nitrite       RBC  / WBC  / Hyaline  / Gran  / Sq Epi  / Non Sq Epi  / Bacteria       TSH 2.73      [07-03-25 @ 19:21]  Lipid: chol 90, TG 96, HDL 31, LDL --      [07-03-25 @ 19:06]

## 2025-07-10 NOTE — PROGRESS NOTE ADULT - ASSESSMENT
69-year-old male with PMHx of HTN, HLD, DM2, CAD with stents, current smoker presenting as transfer from [facility] s/p cardiac arrest x4. At [facility] underwent LHC which showed LAD occlusion. Left femoral Impella CP inserted for support, s/p CURTIS to the LAD. Upgraded to [facility] ECMO (25Fr left femoral venous cannula, 17Fr right femoral arterial cannula with 6Fr distal reperfusion cannula) for continued cardiogenic shock after stent placement. Patient was then transferred to [facility] from [facility] for further care    Impella CP pulled today. Tolerating wean thus far with stable lactate though mvO2 downtrending on last check - will continue to trend. CVP low, stopping bumex gtt. Stopping amiodarone as had IAN thrombus on imaging. VA-ECMO decannulated 7/9. Extubated 7/5.    Patient previously implying he doesn't want further care though in current mental state may lack decision-making capcity - psychiatry consulted to help assess capacity; Will attempt GOC discussion w family.      Multidisciplinary rounds: conducted today at 9am. Members of the team include Transplant Surgeon, Transplant Cardiologist, ICU team (or step down team or floor team), Transplant ACP, Transplant Pharmacist, Registered Dietician,  and RN’s. Discussion included review of pt history, systematic review of interval events and plan of care. Disciplines not in attendance were notified of the plan of care.  All questions and concerns were addressed.

## 2025-07-10 NOTE — PROGRESS NOTE ADULT - SUBJECTIVE AND OBJECTIVE BOX
Patient seen and examined at the bedside.    Remained critically ill on continuous ICU monitoring.    OBJECTIVE:  Vital Signs Last 24 Hrs  T(C): 37.4 (10 Jul 2025 20:00), Max: 37.6 (10 Jul 2025 16:00)  T(F): 99.3 (10 Jul 2025 20:00), Max: 99.7 (10 Jul 2025 16:00)  HR: 119 (10 Jul 2025 21:15) (111 - 127)  BP: --  BP(mean): --  RR: 22 (10 Jul 2025 21:15) (16 - 39)  SpO2: 99% (10 Jul 2025 21:15) (92% - 100%)    Parameters below as of 10 Jul 2025 20:00  Patient On (Oxygen Delivery Method): nasal cannula  O2 Flow (L/min): 4        Physical Exam:   General: NAD  Neurology: Nonfocal  Eyes: bilateral pupils equal and reactive   ENT/Neck: Neck supple, trachea midline, No JVD   Respiratory: Clear bilaterally   CV: S1S2, no murmurs        [x] Aflutter  Abdominal: Soft, NT, ND +BS   Extremities: 1-2+ pedal edema noted, + peripheral pulses   Skin: No Rashes, Hematoma, Ecchymosis                           Assessment:  70 yo M presented 7/3/25 to [facility] with STEMI and cardiac arrest.  IABP -> Impella CP,  LAD stented.  Upgraded to [facility] ECMO and transferred to [facility]     STEMI  Cardiogenic shock  Postop acute respiratory insufficiency  Thrombocytopenia  Hyperglycemia  VANDANA  Shock liver, Hyperbilirubinemia      Plan:   ***Neuro***  [x] Sedated with Precedex as needed  Post operative neuro assessment   Zyprexa for delirium     ***Cardiovascular***  Invasive hemodynamic monitoring, assess perfusion indices  A flutter (116-124) / CVP (2-7) / MAP () / PAP (14-17) / CI 2.1 / Hct 24.8% / Lactate 1.5  [x] Amiodarone d/c'ed  [x] Vasopressin off  [x] Levophed off  [x] Impella CP d/c'ed in AM  s/p VA ECMO decann 7/8  Serial EKG and cardiac enzymes     ***Pulmonary***  [x] NC 4 L  Encourage incentive spirometry, continue pulse ox monitoring, follow ABGs, continue nebulizers    ***GI***  [x] Diet: Vital TF goal @40 cc/hr  [x] Protonix for stress ulcer prophylaxis  Bowel regimen with Miralax and Senna    ***Renal***  [x] VANDANA  Continue to monitor I/Os, BUN/Creatinine.   Replete lytes PRN  Yap present  Diuresis with Bumex gtt    ***ID***  Cefepime for prophylaxis  Bronchial aspirate + for Klebs,+ staph auras     ***Endocrine***  [x] Stress Hyperglycemia : HbA1c 8.9%                - [x] Insulin gtt              - Need tight glycemic control to prevent wound infection.    ***Hematology***  Acute blood loss anemia and Thrombocytopenia   No transfusion indication currently  Purge and systemic Heparin for anticoagulation, trend PTT and anti-Xa  CAD, s/p LAD stent, on Cangrelor  Trend CBC and coags and monitor for bleeding      Patient requires continuous monitoring with bedside rhythm monitoring, pulse oximetry monitoring, and continuous central venous and arterial pressure monitoring; and intermittent blood gas analysis. Care plan discussed with the ICU care team.   Patient remained critical, at risk for life threatening decompensation.    I have spent 50 minutes providing critical care management to this patient.    By signing my name below, I, Kraig Diaz, attest that this documentation has been prepared under the direction and in the presence of ROBERT Gloria  Electronically signed: Jenny Francis, 07-10-25 @ 21:52    I, ROBERT Gloria, personally performed the services described in this documentation. all medical record entries made by the jenny were at my direction and in my presence. I have reviewed the chart and agree that the record reflects my personal performance and is accurate and complete  Electronically signed: ROBERT Gloria Patient seen and examined at the bedside.    Remained critically ill on continuous ICU monitoring.    24 HOUR EVENTS:  Impella removed today 3:30pm  EP following for aflutter - amio dced d/t LA thrombus   Not currently on any inotropes post impella removal, monitoring CI  BUN/Cr rising, bumex gtt DCed     OBJECTIVE:  Vital Signs Last 24 Hrs  T(C): 37.4 (10 Jul 2025 20:00), Max: 37.6 (10 Jul 2025 16:00)  T(F): 99.3 (10 Jul 2025 20:00), Max: 99.7 (10 Jul 2025 16:00)  HR: 119 (10 Jul 2025 21:15) (111 - 127)  BP: --  BP(mean): --  RR: 22 (10 Jul 2025 21:15) (16 - 39)  SpO2: 99% (10 Jul 2025 21:15) (92% - 100%)    Parameters below as of 10 Jul 2025 20:00  Patient On (Oxygen Delivery Method): nasal cannula  O2 Flow (L/min): 4        Physical Exam:   General: NAD  Neurology: Nonfocal, moves all extremities   Eyes: bilateral pupils equal and reactive   ENT/Neck: Neck supple, trachea midline, No JVD   Respiratory: rhonchi bilaterally   CV: S1S2, no murmurs        [x] Aflutter  Abdominal: Soft, NT, ND +BS   Extremities: 1+ pedal edema noted, + peripheral pulses (L side doppler, R side palpable), L fem impella site with ecchymosis - marked, hematoma - marked   Skin: No Rashes, Hematoma                      Assessment:  70 yo M presented 7/3/25 to [facility] with STEMI and cardiac arrest.  IABP -> Impella CP,  LAD stented.  Upgraded to [facility] ECMO and transferred to [facility]     STEMI  Cardiogenic shock  Postop acute respiratory insufficiency  Thrombocytopenia  Hyperglycemia  VANDANA  Shock liver, Hyperbilirubinemia      Plan:   ***Neuro***  [x] Sedated with Precedex as needed  Post operative neuro assessment   Zyprexa for delirium     ***Cardiovascular***  Invasive hemodynamic monitoring, assess perfusion indices  A flutter (116-124) / CVP (2-7) / MAP () / PAP (14-17) / CI 2.1 / Hct 24.8% / Lactate 1.5  [x] Amiodarone d/c'ed  [x] Impella CP d/c'ed  s/p VA ECMO decann 7/8  Serial EKG and cardiac enzymes     ***Pulmonary***  [x] NC 4 L  Encourage incentive spirometry, continue pulse ox monitoring, follow ABGs, continue nebulizers    ***GI***  [x] Diet: Vital TF goal @40 cc/hr  [x] Protonix for stress ulcer prophylaxis  Bowel regimen with Miralax and Senna    ***Renal***  [x] VANDANA  Continue to monitor I/Os, BUN/Creatinine.   Replete lytes PRN  Marely present  Diuresis with Bumex gtt    ***ID***  Cefepime for prophylaxis  Bronchial aspirate + for Klebs,+ staph auras     ***Endocrine***  [x] Stress Hyperglycemia : HbA1c 8.9%                - [x] Insulin gtt              - Need tight glycemic control to prevent wound infection.    ***Hematology***  Acute blood loss anemia and Thrombocytopenia   No transfusion indication currently  Purge and systemic Heparin for anticoagulation, trend PTT and anti-Xa  CAD, s/p LAD stent, on Cangrelor  Trend CBC and coags and monitor for bleeding      Patient requires continuous monitoring with bedside rhythm monitoring, pulse oximetry monitoring, and continuous central venous and arterial pressure monitoring; and intermittent blood gas analysis. Care plan discussed with the ICU care team.   Patient remained critical, at risk for life threatening decompensation.    I have spent 50 minutes providing critical care management to this patient.    By signing my name below, I, Kraig Diaz, attest that this documentation has been prepared under the direction and in the presence of ROBERT Gloria  Electronically signed: Jenny Francis, 07-10-25 @ 21:52    I, ROBERT Gloria, personally performed the services described in this documentation. all medical record entries made by the jenny were at my direction and in my presence. I have reviewed the chart and agree that the record reflects my personal performance and is accurate and complete  Electronically signed: ROBERT Gloria

## 2025-07-10 NOTE — PROGRESS NOTE ADULT - ATTENDING COMMENTS
68 YO M with a history of CAD s/p PCI, active tobacco use, DM2 (A1c 8.9%) who presented to [facility] 7/3 from his PMD’s office after witnessed cardiac arrest c/b VT storm and went for urgent LHC for delayed STEMI revealing occluded LAD s/p CURTIS and ultimately escalated to [facility] ECMO  for SCAI E AMI-CS. Of note he presented to the ED 3 days before the event with chest pain.    He was decannulated from [facility] ECMO on 7/8 and remains on Impella CP. Recovery likelihood is unclear given delayed presentation MI with thinned/akinetic LAD territory with severe LV dysfunction. Reassuringly he has normal filling pressures and cardiac output on support but low BP's on low dose pressors.    Importantly, he has made it clear that he does not want any further interventions though it is quite difficult to engage as he pushes away medical providers. We have attempted to contact the wife and hold a family meeting to discuss goals of care but have had difficulties in reaching her. Progonsis guarded.    PLAN  - Impella CP weaned to P2 today and CI remains > 2.2 with normal PA pressures though on low dose levo. Given this is not a bridge to anything and reasonable hemodynamics will remove.   - Defer inotropes for now given normal cardiac output and rapid atrial flutter. Will continue to reassess  - Diurese PRN, goal CVP 6-10. Marked oliguric VANDANA with very high BUN/Cr. Nephrology following. Consented for HD if needed  - In atrial flutter 120s. Unable to cardiovert due to IAN thrombus. Maintain systemic a/c. Stop amiodarone given presence of IAN thrombus to avoid risk of chemical cardioversion.   - On cangrelor for recent stent, to switch to plavix after Impella removal.   - Prognosis guarded. Family meeting when wife at bedside

## 2025-07-11 NOTE — PROGRESS NOTE ADULT - SUBJECTIVE AND OBJECTIVE BOX
[facility] Division of Kidney Diseases & Hypertension  FOLLOW UP NOTE  [phone]--------------------------------------------------------------------------------  Chief Complaint:    24 hour events/subjective:    PAST HISTORY  --------------------------------------------------------------------------------  No significant changes to PMH, PSH, FHx, SHx from H&P unless otherwise noted.    ALLERGIES & MEDICATIONS  --------------------------------------------------------------------------------  Allergies    No Known Allergies    Intolerances      Standing Inpatient Medications  cangrelor Infusion 0.25 MICROgram(s)/kG/Min IV Continuous <Continuous>  cefepime   IVPB 500 milliGRAM(s) IV Intermittent every 12 hours  chlorhexidine 2% Cloths 1 Application(s) Topical <User Schedule>  chlorhexidine 4% Liquid 1 Application(s) Topical <User Schedule>  clopidogrel Tablet 75 milliGRAM(s) Oral daily  dexMEDEtomidine Infusion 0.5 MICROgram(s)/kG/Hr IV Continuous <Continuous>  dextrose 50% Injectable 50 milliLiter(s) IV Push every 15 minutes  heparin  Infusion 300 Unit(s)/Hr IV Continuous <Continuous>  insulin regular Infusion 3 Unit(s)/Hr IV Continuous <Continuous>  mupirocin 2% Nasal 1 Application(s) Both Nostrils every 12 hours  norepinephrine Infusion 0.05 MICROgram(s)/kG/Min IV Continuous <Continuous>  OLANZapine 2.5 milliGRAM(s) Oral <User Schedule>  OLANZapine 2.5 milliGRAM(s) Oral at bedtime  pantoprazole  Injectable 40 milliGRAM(s) IV Push daily  polyethylene glycol 3350 17 Gram(s) Oral daily  senna 2 Tablet(s) Oral at bedtime  sodium chloride 0.9%. 1000 milliLiter(s) IV Continuous <Continuous>    PRN Inpatient Medications  sodium chloride 0.65% Nasal 1 Spray(s) Both Nostrils two times a day PRN  sodium chloride 0.9% lock flush 10 milliLiter(s) IV Push every 1 hour PRN      REVIEW OF SYSTEMS  --------------------------------------------------------------------------------  Gen: No fevers/chills  Head/Eyes/Ears: No HA  Respiratory: No dyspnea  CV: No chest pain  GI: No abdominal discomfort  : No dysuria  MSK: No edema    All other systems were reviewed and are negative, except as noted above.    VITALS/PHYSICAL EXAM  --------------------------------------------------------------------------------  T(C): 37.8 (07-11-25 @ 12:00), Max: 37.8 (07-11-25 @ 12:00)  HR: 127 (07-11-25 @ 12:00) (114 - 130)  BP: --  RR: 29 (07-11-25 @ 12:00) (16 - 44)  SpO2: 99% (07-11-25 @ 12:00) (94% - 100%)  Wt(kg): --        07-10-25 @ 07:01  -  07-11-25 @ 07:00  --------------------------------------------------------  IN: 2311.9 mL / OUT: 3215 mL / NET: -903.1 mL    07-11-25 @ 07:01  -  07-11-25 @ 12:41  --------------------------------------------------------  IN: 1292.4 mL / OUT: 850 mL / NET: 442.4 mL      Physical Exam:  Gen: NAD  Pulm: CTA B/L  CV: RRR, S1S2;  Abd: +BS, soft  : No suprapubic tenderness  Extremities: no bilateral LE edema.  Neuro: Awake, alert  Skin: Warm  Vascular access:    LABS/STUDIES  --------------------------------------------------------------------------------              8.4    20.79 >-----------<  101      [07-11-25 @ 00:20]              25.5     147  |  98  |  144  ----------------------------<  93      [07-11-25 @ 00:19]  3.8   |  29  |  7.20        Ca     8.8     [07-11-25 @ 00:19]      Mg     3.1     [07-11-25 @ 00:19]      Phos  5.1     [07-11-25 @ 00:19]    TPro  6.0  /  Alb  3.4  /  TBili  1.9  /  DBili  x   /  AST  52  /  ALT  16  /  AlkPhos  178  [07-11-25 @ 00:19]    PT/INR: PT 14.3 , INR 1.26       [07-11-25 @ 00:20]  PTT: 31.5       [07-11-25 @ 05:40]          [07-11-25 @ 00:19]    Creatinine Trend:  SCr 7.20 [07-11 @ 00:19]  SCr 6.75 [07-10 @ 12:55]  SCr 6.45 [07-10 @ 00:53]  SCr 5.62 [07-09 @ 00:32]  SCr 5.22 [07-08 @ 15:42]    Urinalysis - [07-11-25 @ 00:19]      Color  / Appearance  / SG  / pH       Gluc 93 / Ketone   / Bili  / Urobili        Blood  / Protein  / Leuk Est  / Nitrite       RBC  / WBC  / Hyaline  / Gran  / Sq Epi  / Non Sq Epi  / Bacteria            [facility] Division of Kidney Diseases & Hypertension  FOLLOW UP NOTE  [phone]--------------------------------------------------------------------------------  Chief Complaint:    24 hour events/subjective: Pt. seen and examined at bedside earlier today. No fever, CP, SOB, LE edema, HA or dizziness during rounds today.      PAST HISTORY  --------------------------------------------------------------------------------  No significant changes to PMH, PSH, FHx, SHx from H&P unless otherwise noted.    ALLERGIES & MEDICATIONS  --------------------------------------------------------------------------------  Allergies    No Known Allergies    Intolerances      Standing Inpatient Medications  cangrelor Infusion 0.25 MICROgram(s)/kG/Min IV Continuous <Continuous>  cefepime   IVPB 500 milliGRAM(s) IV Intermittent every 12 hours  chlorhexidine 2% Cloths 1 Application(s) Topical <User Schedule>  chlorhexidine 4% Liquid 1 Application(s) Topical <User Schedule>  clopidogrel Tablet 75 milliGRAM(s) Oral daily  dexMEDEtomidine Infusion 0.5 MICROgram(s)/kG/Hr IV Continuous <Continuous>  dextrose 50% Injectable 50 milliLiter(s) IV Push every 15 minutes  heparin  Infusion 300 Unit(s)/Hr IV Continuous <Continuous>  insulin regular Infusion 3 Unit(s)/Hr IV Continuous <Continuous>  mupirocin 2% Nasal 1 Application(s) Both Nostrils every 12 hours  norepinephrine Infusion 0.05 MICROgram(s)/kG/Min IV Continuous <Continuous>  OLANZapine 2.5 milliGRAM(s) Oral <User Schedule>  OLANZapine 2.5 milliGRAM(s) Oral at bedtime  pantoprazole  Injectable 40 milliGRAM(s) IV Push daily  polyethylene glycol 3350 17 Gram(s) Oral daily  senna 2 Tablet(s) Oral at bedtime  sodium chloride 0.9%. 1000 milliLiter(s) IV Continuous <Continuous>    PRN Inpatient Medications  sodium chloride 0.65% Nasal 1 Spray(s) Both Nostrils two times a day PRN  sodium chloride 0.9% lock flush 10 milliLiter(s) IV Push every 1 hour PRN      REVIEW OF SYSTEMS  --------------------------------------------------------------------------------  Gen: No fevers/chills  Head/Eyes/Ears: No HA  Respiratory: No dyspnea  CV: No chest pain  GI: No abdominal discomfort  : No dysuria  MSK: No edema    All other systems were reviewed and are negative, except as noted above.    VITALS/PHYSICAL EXAM  --------------------------------------------------------------------------------  T(C): 37.8 (07-11-25 @ 12:00), Max: 37.8 (07-11-25 @ 12:00)  HR: 127 (07-11-25 @ 12:00) (114 - 130)  BP: --  RR: 29 (07-11-25 @ 12:00) (16 - 44)  SpO2: 99% (07-11-25 @ 12:00) (94% - 100%)  Wt(kg): --        07-10-25 @ 07:01  -  07-11-25 @ 07:00  --------------------------------------------------------  IN: 2311.9 mL / OUT: 3215 mL / NET: -903.1 mL    07-11-25 @ 07:01  -  07-11-25 @ 12:41  --------------------------------------------------------  IN: 1292.4 mL / OUT: 850 mL / NET: 442.4 mL      Physical Exam:  Gen: NAD  Pulm: CTA B/L  CV: RRR, S1S2;  Abd: +BS, soft  : No suprapubic tenderness  Extremities: no bilateral LE edema.  Neuro: Awake, alert  Skin: Warm  Vascular access:    LABS/STUDIES  --------------------------------------------------------------------------------              8.4    20.79 >-----------<  101      [07-11-25 @ 00:20]              25.5     147  |  98  |  144  ----------------------------<  93      [07-11-25 @ 00:19]  3.8   |  29  |  7.20        Ca     8.8     [07-11-25 @ 00:19]      Mg     3.1     [07-11-25 @ 00:19]      Phos  5.1     [07-11-25 @ 00:19]    TPro  6.0  /  Alb  3.4  /  TBili  1.9  /  DBili  x   /  AST  52  /  ALT  16  /  AlkPhos  178  [07-11-25 @ 00:19]    PT/INR: PT 14.3 , INR 1.26       [07-11-25 @ 00:20]  PTT: 31.5       [07-11-25 @ 05:40]          [07-11-25 @ 00:19]    Creatinine Trend:  SCr 7.20 [07-11 @ 00:19]  SCr 6.75 [07-10 @ 12:55]  SCr 6.45 [07-10 @ 00:53]  SCr 5.62 [07-09 @ 00:32]  SCr 5.22 [07-08 @ 15:42]    Urinalysis - [07-11-25 @ 00:19]      Color  / Appearance  / SG  / pH       Gluc 93 / Ketone   / Bili  / Urobili        Blood  / Protein  / Leuk Est  / Nitrite       RBC  / WBC  / Hyaline  / Gran  / Sq Epi  / Non Sq Epi  / Bacteria

## 2025-07-11 NOTE — PROVIDER CONTACT NOTE (CHANGE IN STATUS NOTIFICATION) - ACTION/TREATMENT ORDERED:
Vascular consulted
Prior to arrest patients potassium and magnesium supplemented per MD Michelle. Compressions initiated, 100mg lido IV push administered, amio @ 1 mg/min  and lido @ 2mg/min gtt initiated and infusing at present per MD Michelle. ROSC achieved at 20:20.

## 2025-07-11 NOTE — PROGRESS NOTE ADULT - PROBLEM SELECTOR PLAN 1
VANDANA/ATN 2/2 cardiogenic shock/arrests and ALKA from CTAs + C s/p CURTIS. On review of [facility] HIE/[facility], SCr prior to [facility] admission was 1.48 (6/29/25) and 1.14 (8/26/24). U/A (7/4) cloudy, mod blood, trace LE, trace protein, 9 RBC. CTA (6/30) with complex L renal cyst and small R renal cysts. SCr on admission 4.47 (7/3) and now 4.69 (7/7) was  on [facility] ECMO (previously with IABP then Impella) removed on 7/8 and now off bumex gtt (7/6) and pressors- vasopressin and NE.   -  patient had a drop in UOP for 2 hrs post ECMO removal was restarted on Bumex drip and UOP improved. 24 hr UOP 4 L, No electrolyte abnormalities.  CVPs now are 5-6 around goal. Patient appears dry on exam, no pedal edema. Recommend holding bumex drip for now,   - No current urgent indication for CRRT now, consent Obtained  and in chart.   - Maintain raya catheter , strict I/O  - Dose abx based on Crcl < 15ml/min  - Hypokalemia 2/2 diuresis , replete as needed     Feel free to contact me via TEAMS.     Susanne Bennett MD   Nephrology Fellow   After 5PM/Weekends/Holidays please contact the on call fellow. VANDANA/ATN 2/2 cardiogenic shock/arrests and ALKA from CTAs + [facility] s/p CURTIS. [facility] prior to [facility] admission was 1.48 (6/29/25) and 1.14 (8/26/24). U/A (7/4) cloudy, mod blood, trace LE, trace protein, 9 RBC. CTA (6/30) with complex L renal cyst and small R renal cysts. SCr on admission 4.47 (7/3) and now 4.69 (7/7) was  on [facility] ECMO (previously with IABP then Impella) removed on 7/8 and now off bumex gtt (7/6) and pressors- vasopressin and NE.   -  patient had a drop in UOP for 2 hrs post ECMO removal was restarted on Bumex drip and UOP improved. 24 hr UOP 3.25 L, No electrolyte abnormalities.  CVPs now are 2 around goal. Patient appears dry on exam, no pedal edema. Bumex drip discontinued on 7/10. Continue with  holding bumex drip for now. S cr 7.20 7/11, BUN - 144 ( likely 2/2 overdiuresis ). Will monitor for now. Can give IV fluids.   - No current urgent indication for CRRT now, consent Obtained  and in chart.   - Maintain raya catheter , strict I/O  - Dose abx based on Crcl < 15ml/min  - Hypokalemia 2/2 diuresis , replete as needed   - hypernatremia likely 2/2 overdiuresis. Can give 1/2 NS at 50cc/hr.     Feel free to contact me via TEAMS.     Susanne Bennett MD   Nephrology Fellow   After 5PM/Weekends/Holidays please contact the on call fellow.

## 2025-07-11 NOTE — PROGRESS NOTE ADULT - SUBJECTIVE AND OBJECTIVE BOX
infectious diseases progress note:    Patient is a 69y old  Male who presents with a chief complaint of Chest pain (10 Jul 2025 21:52)        Cardiogenic shock          ROS:  CONSTITUTIONAL:  Negative fever or chills, feels well, good appetite  EYES:  Negative  blurry vision or double vision  CARDIOVASCULAR:  Negative for chest pain or palpitations  RESPIRATORY:  Negative for cough, wheezing, or SOB   GASTROINTESTINAL:  Negative for nausea, vomiting, diarrhea, constipation, or abdominal pain  GENITOURINARY:  Negative frequency, urgency or dysuria  NEUROLOGIC:  No headache, confusion, dizziness, lightheadedness    Allergies    No Known Allergies    Intolerances        ANTIBIOTICS/RELEVANT:  antimicrobials  cefepime   IVPB 500 milliGRAM(s) IV Intermittent every 12 hours    immunologic:    OTHER:  cangrelor Infusion 0.25 MICROgram(s)/kG/Min IV Continuous <Continuous>  chlorhexidine 2% Cloths 1 Application(s) Topical <User Schedule>  chlorhexidine 4% Liquid 1 Application(s) Topical <User Schedule>  dexMEDEtomidine Infusion 0.5 MICROgram(s)/kG/Hr IV Continuous <Continuous>  dextrose 50% Injectable 50 milliLiter(s) IV Push every 15 minutes  heparin  Infusion 300 Unit(s)/Hr IV Continuous <Continuous>  insulin regular Infusion 3 Unit(s)/Hr IV Continuous <Continuous>  mupirocin 2% Nasal 1 Application(s) Both Nostrils every 12 hours  norepinephrine Infusion 0.05 MICROgram(s)/kG/Min IV Continuous <Continuous>  OLANZapine 2.5 milliGRAM(s) Oral <User Schedule>  OLANZapine 2.5 milliGRAM(s) Oral at bedtime  pantoprazole  Injectable 40 milliGRAM(s) IV Push daily  polyethylene glycol 3350 17 Gram(s) Oral daily  senna 2 Tablet(s) Oral at bedtime  sodium chloride 0.65% Nasal 1 Spray(s) Both Nostrils two times a day PRN  sodium chloride 0.9% lock flush 10 milliLiter(s) IV Push every 1 hour PRN  sodium chloride 0.9%. 1000 milliLiter(s) IV Continuous <Continuous>      Objective:  Vital Signs Last 24 Hrs  T(C): 37.7 (11 Jul 2025 08:00), Max: 37.7 (11 Jul 2025 08:00)  T(F): 99.9 (11 Jul 2025 08:00), Max: 99.9 (11 Jul 2025 08:00)  HR: 128 (11 Jul 2025 09:00) (113 - 128)  BP: --  BP(mean): --  RR: 19 (11 Jul 2025 09:00) (16 - 43)  SpO2: 100% (11 Jul 2025 09:00) (94% - 100%)    Parameters below as of 11 Jul 2025 08:00  Patient On (Oxygen Delivery Method): nasal cannula  O2 Flow (L/min): 4      PHYSICAL EXAM:  Constitutional:Well-developed, well nourished--no acute distress  Eyes:GAYLE, EOMI  Ear/Nose/Throat: no oral lesion, no sinus tenderness on percussion	  Neck:no JVD, no lymphadenopathy, supple  Respiratory: CTA jeannie  Cardiovascular: S1S2 RRR, no murmurs  Gastrointestinal:soft, (+) BS, no HSM  Extremities:no e/e/c        LABS:                        8.4    20.79 )-----------( 101      ( 11 Jul 2025 00:20 )             25.5     07-11    147[H]  |  98  |  144[H]  ----------------------------<  93  3.8   |  29  |  7.20[H]    Ca    8.8      11 Jul 2025 00:19  Phos  5.1     07-11  Mg     3.1     07-11    TPro  6.0  /  Alb  3.4  /  TBili  1.9[H]  /  DBili  x   /  AST  52[H]  /  ALT  16  /  AlkPhos  178[H]  07-11    PT/INR - ( 11 Jul 2025 00:20 )   PT: 14.3 sec;   INR: 1.26 ratio         PTT - ( 11 Jul 2025 05:40 )  PTT:31.5 sec  Urinalysis Basic - ( 11 Jul 2025 00:19 )    Color: x / Appearance: x / SG: x / pH: x  Gluc: 93 mg/dL / Ketone: x  / Bili: x / Urobili: x   Blood: x / Protein: x / Nitrite: x   Leuk Esterase: x / RBC: x / WBC x   Sq Epi: x / Non Sq Epi: x / Bacteria: x          MICROBIOLOGY:    RECENT CULTURES:  07-05 @ 18:34 Trach Asp Tracheal Aspirate   FAWAD    Rare polymorphonuclear leukocytes per low power field  Rare Squamous epithelial cells per low power field  Rare Gram Negative Rods seen per oil power field    Staphylococcus aureus  Klebsiella aerogenes (Previously Enterobacter)  Staphylococcus aureus     Moderate Staphylococcus aureus  Moderate Klebsiella aerogenes (Previously Enterobacter)  Commensal km consistent with body site    07-04 @ 10:37 Blood Blood-Peripheral                No growth at 5 days          RESPIRATORY CULTURES:              RADIOLOGY & ADDITIONAL STUDIES:        Pager [phone]  After 5 pm/weekends or if no response :[phone]

## 2025-07-11 NOTE — PROGRESS NOTE ADULT - ASSESSMENT
69M PMH HTN, HLD, CAD s/p stents s/p cardiac arrest and L femoral impella CP s/p CURTIS to LAD, VA ECMO, now s/p impella removal. Vascular surgery consulted in setting of loss of LLE pedal signals. Now with L. DP doppler signal on exam.     Recommendations:  - No acute vascular surgery intervention  - given left groin hematoma, please obtain Left groin arterial duplex to r/o PSA vs Hematoma  - Heparin gtt, wean pressors as tolerated    Vascular Surgery  [id number]

## 2025-07-11 NOTE — PROGRESS NOTE ADULT - SUBJECTIVE AND OBJECTIVE BOX
INTERVAL EVENTS:   - patient with palpable right DP pulse and Left DP signal on exam.   - PD arterial duplex to rule out Left groin PSA vs Hematoma    SUBJECTIVE: Patient seen and examined at bedside with surgical team    OBJECTIVE:    Vital Signs Last 24 Hrs  T(C): 37.7 (11 Jul 2025 08:00), Max: 37.7 (11 Jul 2025 08:00)  T(F): 99.9 (11 Jul 2025 08:00), Max: 99.9 (11 Jul 2025 08:00)  HR: 128 (11 Jul 2025 09:00) (113 - 128)  BP: --  BP(mean): --  RR: 19 (11 Jul 2025 09:00) (16 - 43)  SpO2: 100% (11 Jul 2025 09:00) (94% - 100%)    Parameters below as of 11 Jul 2025 08:00  Patient On (Oxygen Delivery Method): nasal cannula  O2 Flow (L/min): 4  I&O's Detail    10 Jul 2025 07:01  -  11 Jul 2025 07:00  --------------------------------------------------------  IN:    Amiodarone: 33.4 mL    Bumetanide: 30 mL    Bumetanide: 40 mL    Cangrelor Infusion: 136 mL    Dexmedetomidine: 115.5 mL    Dexmedetomidine: 101.7 mL    Enteral Tube Flush: 60 mL    Heparin: 36 mL    Insulin: 86 mL    IV PiggyBack: 250 mL    IV PiggyBack: 115.3 mL    Norepinephrine: 228 mL    sodium chloride 0.9%: 240 mL    Vital1.5: 840 mL  Total IN: 2311.9 mL    OUT:    Indwelling Catheter - Urethral (mL): 3215 mL  Total OUT: 3215 mL    Total NET: -903.1 mL      11 Jul 2025 07:01  -  11 Jul 2025 09:27  --------------------------------------------------------  IN:    Cangrelor Infusion: 13.6 mL    Dexmedetomidine: 22.6 mL    Enteral Tube Flush: 50 mL    Insulin: 16 mL    Norepinephrine: 20.2 mL    PRBCs (Packed Red Blood Cells): 300 mL    sodium chloride 0.9%: 20 mL    Vital1.5: 40 mL  Total IN: 482.4 mL    OUT:    Heparin: 0 mL    Indwelling Catheter - Urethral (mL): 350 mL  Total OUT: 350 mL    Total NET: 132.4 mL      MEDICATIONS  (STANDING):  cangrelor Infusion 0.25 MICROgram(s)/kG/Min (6.75 mL/Hr) IV Continuous <Continuous>  cefepime   IVPB 500 milliGRAM(s) IV Intermittent every 12 hours  chlorhexidine 2% Cloths 1 Application(s) Topical <User Schedule>  chlorhexidine 4% Liquid 1 Application(s) Topical <User Schedule>  dexMEDEtomidine Infusion 0.5 MICROgram(s)/kG/Hr (11.3 mL/Hr) IV Continuous <Continuous>  dextrose 50% Injectable 50 milliLiter(s) IV Push every 15 minutes  heparin  Infusion 300 Unit(s)/Hr (7 mL/Hr) IV Continuous <Continuous>  insulin regular Infusion 3 Unit(s)/Hr (3 mL/Hr) IV Continuous <Continuous>  mupirocin 2% Nasal 1 Application(s) Both Nostrils every 12 hours  norepinephrine Infusion 0.05 MICROgram(s)/kG/Min (8.44 mL/Hr) IV Continuous <Continuous>  OLANZapine 2.5 milliGRAM(s) Oral <User Schedule>  OLANZapine 2.5 milliGRAM(s) Oral at bedtime  pantoprazole  Injectable 40 milliGRAM(s) IV Push daily  polyethylene glycol 3350 17 Gram(s) Oral daily  senna 2 Tablet(s) Oral at bedtime  sodium chloride 0.9%. 1000 milliLiter(s) (10 mL/Hr) IV Continuous <Continuous>    MEDICATIONS  (PRN):  sodium chloride 0.65% Nasal 1 Spray(s) Both Nostrils two times a day PRN Nasal Congestion  sodium chloride 0.9% lock flush 10 milliLiter(s) IV Push every 1 hour PRN Pre/post blood products, medications, blood draw, and to maintain line patency      PHYSICAL EXAM:  General: NAD, resting comfortably, sedated  HEENT: NC/AT  Pulmonary: normal resp effort  Cardiovascular: tachycardia  Abdominal: soft, ND/NT  Extremities: RLE WWP, palpable pedal pulses. LLE with +popliteal signal, no pedal signals, cool to touch, no ischemic skin changes. L groin hematoma.     LABS:                        8.4    20.79 )-----------( 101      ( 11 Jul 2025 00:20 )             25.5     07-11    147[H]  |  98  |  144[H]  ----------------------------<  93  3.8   |  29  |  7.20[H]    Ca    8.8      11 Jul 2025 00:19  Phos  5.1     07-11  Mg     3.1     07-11    TPro  6.0  /  Alb  3.4  /  TBili  1.9[H]  /  DBili  x   /  AST  52[H]  /  ALT  16  /  AlkPhos  178[H]  07-11    PT/INR - ( 11 Jul 2025 00:20 )   PT: 14.3 sec;   INR: 1.26 ratio         PTT - ( 11 Jul 2025 05:40 )  PTT:31.5 sec  LIVER FUNCTIONS - ( 11 Jul 2025 00:19 )  Alb: 3.4 g/dL / Pro: 6.0 g/dL / ALK PHOS: 178 U/L / ALT: 16 U/L / AST: 52 U/L / GGT: x           Urinalysis Basic - ( 11 Jul 2025 00:19 )    Color: x / Appearance: x / SG: x / pH: x  Gluc: 93 mg/dL / Ketone: x  / Bili: x / Urobili: x   Blood: x / Protein: x / Nitrite: x   Leuk Esterase: x / RBC: x / WBC x   Sq Epi: x / Non Sq Epi: x / Bacteria: x      ABO Interpretation: A (07-11-25 @ 06:41)

## 2025-07-11 NOTE — PROGRESS NOTE ADULT - SUBJECTIVE AND OBJECTIVE BOX
Patient seen and examined at the bedside.    Remained critically ill on continuous ICU monitoring.      24 HOUR EVENTS:  Impella removed yesterday   Not currently on any inotropes post impella removal, monitoring CI    OBJECTIVE:  Vital Signs Last 24 Hrs  T(C): 37.2 (11 Jul 2025 20:00), Max: 37.8 (11 Jul 2025 12:00)  T(F): 99 (11 Jul 2025 20:00), Max: 100 (11 Jul 2025 12:00)  HR: 125 (11 Jul 2025 20:00) (117 - 131)  BP: --  BP(mean): --  RR: 23 (11 Jul 2025 20:00) (18 - 44)  SpO2: 95% (11 Jul 2025 20:00) (92% - 100%)    Parameters below as of 11 Jul 2025 20:00  Patient On (Oxygen Delivery Method): nasal cannula  O2 Flow (L/min): 4        Physical Exam:   General: NAD  Neurology: Nonfocal, moves all extremities   Eyes: bilateral pupils equal and reactive   ENT/Neck: Neck supple, trachea midline, No JVD   Respiratory: rhonchi bilaterally   CV: S1S2, no murmurs        [x] Aflutter  Abdominal: Soft, NT, ND +BS   Extremities: 1+ pedal edema noted, + peripheral pulses (L side doppler, R side palpable), L fem impella site with ecchymosis - marked, hematoma - marked   Skin: No Rashes, Hematoma                      Assessment:  70 yo M presented 7/3/25 to [facility] with STEMI and cardiac arrest.  IABP -> Impella CP,  LAD stented.  Upgraded to VA ECMO and transferred to [facility]     STEMI  Cardiogenic shock  Postop acute respiratory insufficiency  Thrombocytopenia  Hyperglycemia  VANDANA  Shock liver, Hyperbilirubinemia      Plan:   ***Neuro***  [x] Sedated with Precedex as needed  Post operative neuro assessment   Zyprexa for delirium     ***Cardiovascular***  Invasive hemodynamic monitoring, assess perfusion indices  Afib/ CVP 12/ MAP 76 / Hct 26.1% / Lactate 2.2  [x] Amiodarone d/c'ed  [x] Impella CP d/c'ed  [x] Plavix   s/p VA ECMO decann 7/8  Serial EKG and cardiac enzymes     ***Pulmonary***  [x] NC 4 L  Encourage incentive spirometry, continue pulse ox monitoring, follow ABGs, continue nebulizers    ***GI***  [x] Diet: Vital TF goal @40 cc/hr  [x] Protonix for stress ulcer prophylaxis  Bowel regimen with Miralax and Senna    ***Renal***  [x] VANDANA  Continue to monitor I/Os, BUN/Creatinine.   Replete lytes PRN  Yap present    ***ID***  Cefepime for prophylaxis  Bronchial aspirate + for Klebs,+ staph auras     ***Endocrine***  [x] Stress Hyperglycemia : HbA1c 8.9%                - [x] Insulin gtt              - Need tight glycemic control to prevent wound infection.    ***Hematology***  Acute blood loss anemia and Thrombocytopenia   No transfusion indication currently  Purge and systemic Heparin for anticoagulation, trend PTT and anti-Xa  CAD, s/p LAD stent, on Cangrelor  Trend CBC and coags and monitor for bleeding      Patient requires continuous monitoring with bedside rhythm monitoring, pulse oximetry monitoring, and continuous central venous and arterial pressure monitoring; and intermittent blood gas analysis. Care plan discussed with the ICU care team.   Patient remained critical, at risk for life threatening decompensation.    I have spent 40 minutes providing critical care management to this patient.    By signing my name below, I, Nisreen Gallo, attest that this documentation has been prepared under the direction and in the presence of ROBERT Gloria.  Electronically signed: Darryn Benito, 07-11-25 @ 20:30    I, Jhon Almanzar , personally performed the services described in this documentation. all medical record entries made by the consueloibmiguel were at my direction and in my presence. I have reviewed the chart and agree that the record reflects my personal performance and is accurate and complete  Electronically signed: ROBERT Gloria. Patient seen and examined at the bedside.    Remained critically ill on continuous ICU monitoring.      24 HOUR EVENTS:  Working with PT today  Volume repletion per nephrology - with improvement in BUN/Cr, holding diuretics     VT arrest ovn lasting approx 1 minute - CPR initiated, no shock administered, with ROSC   Started Amio and lido drips   Electrolyte repletion with K/Mg   Not a candidate for advanced therapies    OBJECTIVE:  Vital Signs Last 24 Hrs  T(C): 37.2 (11 Jul 2025 20:00), Max: 37.8 (11 Jul 2025 12:00)  T(F): 99 (11 Jul 2025 20:00), Max: 100 (11 Jul 2025 12:00)  HR: 125 (11 Jul 2025 20:00) (117 - 131)  BP: --  BP(mean): --  RR: 23 (11 Jul 2025 20:00) (18 - 44)  SpO2: 95% (11 Jul 2025 20:00) (92% - 100%)    Parameters below as of 11 Jul 2025 20:00  Patient On (Oxygen Delivery Method): nasal cannula  O2 Flow (L/min): 4      Physical Exam:   General: NAD  Neurology: Nonfocal, moves all extremities, nods yes/no to questions   Eyes: bilateral pupils equal and reactive   ENT/Neck: Neck supple, trachea midline, RIJ intro, LIJ HD cath   Respiratory: rhonchi bilaterally  CV: S1S2, no murmurs        [x] Aflutter  Abdominal: Soft, NT, ND +BS   Extremities: 1+ pedal edema noted, + peripheral pulses, L fem impella site stable   Skin: No Rashes                    Assessment:  70 yo M presented 7/3/25 to [facility] with STEMI and cardiac arrest.  IABP -> Impella CP,  LAD stented.  Upgraded to VA ECMO and transferred to [facility]     STEMI  Cardiogenic shock  Postop acute respiratory insufficiency  Thrombocytopenia  Hyperglycemia  VANDANA  Shock liver, Hyperbilirubinemia      Plan:   ***Neuro***  [x] Sedated with Precedex as needed  Post operative neuro assessment   Zyprexa for delirium - held as patient is NPO     ***Cardiovascular***  Invasive hemodynamic monitoring, assess perfusion indices  Afib/ CVP 12/ MAP 76 / Hct 26.1% / Lactate 2.2  [x] Amiodarone and lidocaine gtts initiated s/p arrest   [x] Plavix   s/p VA ECMO decann 7/8  Serial EKG and cardiac enzymes     ***Pulmonary***  [x] NC 4 L  Encourage incentive spirometry, continue pulse ox monitoring, follow ABGs, continue nebulizers    ***GI***  [x] Diet: Vital TF goal @40 cc/hr --> NPO   [x] Protonix for stress ulcer prophylaxis  Bowel regimen with Miralax and Senna    ***Renal***  [x] VANDANA  Continue to monitor I/Os, BUN/Creatinine.   Replete lytes PRN  Yap present    ***ID***  Cefepime for prophylaxis  Bronchial aspirate + for Klebs,+ staph auras     ***Endocrine***  [x] Stress Hyperglycemia : HbA1c 8.9%                - [x] Insulin gtt              - Need tight glycemic control to prevent wound infection.    ***Hematology***  Systemic Heparin for anticoagulation (LA thrombus), trend PTT and anti-Xa  CAD, s/p LAD stent, on Plavix   Trend CBC and coags and monitor for bleeding      Patient requires continuous monitoring with bedside rhythm monitoring, pulse oximetry monitoring, and continuous central venous and arterial pressure monitoring; and intermittent blood gas analysis. Care plan discussed with the ICU care team.   Patient remained critical, at risk for life threatening decompensation.    I have spent 40 minutes providing critical care management to this patient.    By signing my name below, I, Nisreen Gallo, attest that this documentation has been prepared under the direction and in the presence of ROBERT Gloria.  Electronically signed: Jenny Benito, 07-11-25 @ 20:30    I, Jhon Almanzar , personally performed the services described in this documentation. all medical record entries made by the jenny were at my direction and in my presence. I have reviewed the chart and agree that the record reflects my personal performance and is accurate and complete  Electronically signed: ROBERT Gloria.

## 2025-07-11 NOTE — PROGRESS NOTE ADULT - ATTENDING COMMENTS
VANDANA-ATN in the setting of cardiac arrest/ ECMO  Was On Bumex drip until this am   CVPs have been 2-3   O/E- No edema, no pericardial rub. has been lethargic since admission ( no change)     would give albumin/ LR back since he appears volume down   Continue to hold diuretics   no emergent need to dialyze at this time     rosita floyd  nephrology attending   please contact me on TEAMS   Office- [phone]

## 2025-07-11 NOTE — PROGRESS NOTE ADULT - SUBJECTIVE AND OBJECTIVE BOX
Cardiology Progress Note  ------------------------------------------------------------------------------------------  SUBJECTIVE:   - No events overnight. Denies CP, SOB or Palpitations.     -------------------------------------------------------------------------------------------  VS:  T(F): 100 (07-11), Max: 100 (07-11)  HR: 128 (07-11) (114 - 131)  BP: --  RR: 18 (07-11)  SpO2: 96% (07-11)  I&O's Summary    10 Jul 2025 07:01  -  11 Jul 2025 07:00  --------------------------------------------------------  IN: 2311.9 mL / OUT: 3215 mL / NET: -903.1 mL    11 Jul 2025 07:01  -  11 Jul 2025 15:14  --------------------------------------------------------  IN: 1507.1 mL / OUT: 1125 mL / NET: 382.1 mL      PHYSICAL EXAM:  GENERAL: NAD, oriented to self and year  ENT: EOMI  CHEST/LUNG: Unlabored respirations.  HEART: sinus tachycardia; No murmurs, rubs, or gallops.  EXTREMITIES: No edema     -------------------------------------------------------------------------------------------  LABS:                          8.9    16.53 )-----------( 93       ( 11 Jul 2025 12:58 )             26.1     07-11    149[H]  |  100  |  126[H]  ----------------------------<  106[H]  4.0   |  28  |  6.97[H]    Ca    8.3[L]      11 Jul 2025 12:58  Phos  5.1     07-11  Mg     3.1     07-11    TPro  6.0  /  Alb  3.4  /  TBili  1.9[H]  /  DBili  x   /  AST  52[H]  /  ALT  16  /  AlkPhos  178[H]  07-11    PT/INR - ( 11 Jul 2025 00:20 )   PT: 14.3 sec;   INR: 1.26 ratio         PTT - ( 11 Jul 2025 12:58 )  PTT:34.5 sec  CARDIAC MARKERS ( 07 Jul 2025 18:15 )  7417 ng/L / x     / x     / x     / x     / x      CARDIAC MARKERS ( 07 Jul 2025 10:05 )  7283 ng/L / x     / x     / x     / x     / x      CARDIAC MARKERS ( 07 Jul 2025 01:58 )  7141 ng/L / x     / x     / x     / x     / x      CARDIAC MARKERS ( 06 Jul 2025 17:53 )  6398 ng/L / x     / x     / x     / x     / x      CARDIAC MARKERS ( 06 Jul 2025 10:09 )  5380 ng/L / x     / x     / x     / x     / x                -------------------------------------------------------------------------------------------  Meds:  albumin human  5% IVPB 250 milliLiter(s) IV Intermittent every 30 minutes  cangrelor Infusion 0.25 MICROgram(s)/kG/Min IV Continuous <Continuous>  cefepime   IVPB 500 milliGRAM(s) IV Intermittent every 12 hours  chlorhexidine 2% Cloths 1 Application(s) Topical <User Schedule>  chlorhexidine 4% Liquid 1 Application(s) Topical <User Schedule>  clopidogrel Tablet 75 milliGRAM(s) Oral daily  dexMEDEtomidine Infusion 0.5 MICROgram(s)/kG/Hr IV Continuous <Continuous>  dextrose 50% Injectable 50 milliLiter(s) IV Push every 15 minutes  heparin  Infusion 300 Unit(s)/Hr IV Continuous <Continuous>  insulin regular Infusion 3 Unit(s)/Hr IV Continuous <Continuous>  mupirocin 2% Nasal 1 Application(s) Both Nostrils every 12 hours  norepinephrine Infusion 0.05 MICROgram(s)/kG/Min IV Continuous <Continuous>  OLANZapine 2.5 milliGRAM(s) Oral <User Schedule>  OLANZapine 5 milliGRAM(s) Oral at bedtime  pantoprazole  Injectable 40 milliGRAM(s) IV Push daily  polyethylene glycol 3350 17 Gram(s) Oral daily  senna 2 Tablet(s) Oral at bedtime  sodium chloride 0.65% Nasal 1 Spray(s) Both Nostrils two times a day PRN  sodium chloride 0.9% lock flush 10 milliLiter(s) IV Push every 1 hour PRN  sodium chloride 0.9%. 1000 milliLiter(s) IV Continuous <Continuous>    -------------------------------------------------------------------------------------------  Cardiovascular Diagnostic Testing:    ECG:     Echo:     Stress Testing:    Cath:    -------------------------------------------------------------------------------------------

## 2025-07-11 NOTE — PROGRESS NOTE ADULT - ASSESSMENT
69-year-old male with PMHx of HTN, HLD, DM2, CAD with stents, current smoker presenting as transfer from [facility] s/p cardiac arrest x4. At [facility] underwent LHC which showed LAD occlusion. Left femoral Impella CP inserted for support, s/p CURTIS to the LAD. Upgraded to VA ECMO (25Fr left femoral venous cannula, 17Fr right femoral arterial cannula with 6Fr distal reperfusion cannula) for continued cardiogenic shock after stent placement. Patient was then transferred to [facility] from [facility] for further care    Extubated 7/5. VA-ECMO decannulated 7/9. Impella CP removed 7/11. Holding amiodarone for IAN thrombus.    Tolerating Impella removal. Patient previously implying he doesn't want further care though lacks capacity on exam; Will attempt GOC discussion w family.

## 2025-07-11 NOTE — PROGRESS NOTE ADULT - ASSESSMENT
69 year old with many problems  CAd  CHF  DM  admitted after cardiac arrest requiring resuscitation  transferred and had ECMO place and left femoral impella   had right and left fem artery catheterization   currently in OR in attempt to get ECMO and impella out  pt has  f sputum with MSSA and sensitive  klebsiella    day 5 cefepime 500 mg bid  for pna   bilateral infiltrates compatible with pneumonia with broken ribs  s/p removal of all lines  in groins   to continue cefepime alone for now  reculture and dose with vanco for any change   wbc still elevated but otherwise no change        discussed with team

## 2025-07-11 NOTE — PROGRESS NOTE ADULT - PROBLEM SELECTOR PLAN 1
- Impella removed 7/11  - VA ECMO decannulated 7/8  - on low dose pressor support, will seek to wean  - holding bumex gtt

## 2025-07-11 NOTE — PROVIDER CONTACT NOTE (CHANGE IN STATUS NOTIFICATION) - BACKGROUND
Health Maintenance Due   Topic Date Due    Colonoscopy  03/30/1997    Zoster Vaccine  03/30/2007    Influenza Vaccine  08/01/2018       
s/p impella removal
Patient previously a fluter 120's to 130's with ectopy noted. MD Michelle aware.

## 2025-07-11 NOTE — PROGRESS NOTE ADULT - SUBJECTIVE AND OBJECTIVE BOX
Patient seen and examined at the bedside.    Remained critically ill on continuous ICU monitoring.      Brief Summary:  68 yo M presented 7/3/25 to [facility] with STEMI and cardiac arrest.  IABP -> Impella CP, LAD stent  Upgraded to VA ECMO and transferred to [facility]         24 Hour events:  impella CP removed   off bumex infusion  worsening renal function but making urine  1u prbc  started heparin   remains on norepinephrine       OBJECTIVE:  Vital Signs Last 24 Hrs  T(C): 37.7 (11 Jul 2025 08:00), Max: 37.7 (11 Jul 2025 08:00)  T(F): 99.9 (11 Jul 2025 08:00), Max: 99.9 (11 Jul 2025 08:00)  HR: 128 (11 Jul 2025 09:00) (113 - 128)  BP: --  BP(mean): --  RR: 19 (11 Jul 2025 09:00) (16 - 43)  SpO2: 100% (11 Jul 2025 09:00) (94% - 100%)    Parameters below as of 11 Jul 2025 08:00  Patient On (Oxygen Delivery Method): nasal cannula  O2 Flow (L/min): 4                  Physical Exam:   General: Sleepy but will interact  Neurology: Following commands  Respiratory: Bilateral breath sounds.  CV:  tachycardic  Abdominal: Soft, Nontender  Extremities: Warm, well-perfused  Yap  -------------------------------------------------------------------------------------------------------------------------------    Labs:                        8.4    20.79 )-----------( 101      ( 11 Jul 2025 00:20 )             25.5     07-11    147[H]  |  98  |  144[H]  ----------------------------<  93  3.8   |  29  |  7.20[H]    Ca    8.8      11 Jul 2025 00:19  Phos  5.1     07-11  Mg     3.1     07-11    TPro  6.0  /  Alb  3.4  /  TBili  1.9[H]  /  DBili  x   /  AST  52[H]  /  ALT  16  /  AlkPhos  178[H]  07-11    LIVER FUNCTIONS - ( 11 Jul 2025 00:19 )  Alb: 3.4 g/dL / Pro: 6.0 g/dL / ALK PHOS: 178 U/L / ALT: 16 U/L / AST: 52 U/L / GGT: x           PT/INR - ( 11 Jul 2025 00:20 )   PT: 14.3 sec;   INR: 1.26 ratio         PTT - ( 11 Jul 2025 05:40 )  PTT:31.5 sec  ABG - ( 11 Jul 2025 02:00 )  pH, Arterial: 7.50  pH, Blood: x     /  pCO2: 41    /  pO2: 120   / HCO3: 32    / Base Excess: 8.1   /  SaO2: 98.7              ------------------------------------------------------------------------------------------------------------------------------  Assessment:  68 yo M presented 7/3/25 to [facility] with STEMI and cardiac arrest.  IABP -> Impella CP,  LAD stented.  Upgraded to VA ECMO and transferred to [facility]     STEMI  Cardiogenic shock  Postop acute respiratory insufficiency  Thrombocytopenia  Hyperglycemia  VANDANA  Shock liver, Hyperbilirubinemia    ***Neuro***  Zyprexa nightly   prns for discomfort  Optimize day / night cycle    ***Cardiovascular***  Cardiogenic shock, STEMI, s/p LAD stent  s/p impella CP removal 7/10   Amiodarone for A fib/flutter and Ectopy, continue with PO  Vasopressors for MAP > 65  off inotropic support     ***Pulmonary***  Acute post operative respiratory insufficiency   Deep breathing and coughing exercises.  Wean oxygen as able.    ***GI***  Will need SLP   Likely would benefit from ongoing tube feeds given recent poor PO intake.  Protonix for stress ulcer prophylaxis   Bowel regimen.  Trend LFTs, avoid hepatotoxins.    ***Renal***  VANDANA    Yap catheter for strict I/O measurements.  Bumex infusion - now making urine.  Trend creatinine and electrolytes.  No indication for RRT currently.  Nephrology Consulted     ***ID***  Change zosyn to cefepime  bronchial aspirate + for Klebs,+ staph auras     ***Endocrine***  Hyperglycemia - Insulin infusion.    ***Hematology***  Acute blood loss anemia and Thrombocytopenia   No transfusion indication currently  Start Plavix, stop cangrelor   Heparin gtt   Trend CBC and coags and monitor for bleeding      Care plan discussed with the ICU care team.     Patient remained critical, at risk for life threatening decompensation.    I have spent 65 minutes providing critical care management to this patient.    I, Macho Rodriguez MD, personally performed the services described in this documentation. I have reviewed the chart and agree that the record reflects my personal performance and is accurate and complete.

## 2025-07-11 NOTE — PROGRESS NOTE ADULT - ATTENDING COMMENTS
70 YO M with a history of CAD s/p PCI, active tobacco use, DM2 (A1c 8.9%) who presented to [facility] 7/3 from his PMD’s office after witnessed cardiac arrest c/b VT storm and went for urgent C for delayed STEMI revealing occluded LAD s/p CURTIS and ultimately escalated to VA ECMO for SCAI E AMI-CS. Of note he presented to the ED 3 days before the event with chest pain.    He was decannulated from VA ECMO on 7/8 and Impella CP removed 7/10. He is euvolemic on very low dose levophed with normal cardiac output off inotropes. He has marked non-oliguric ATN with high BUN/Cr.    Recovery likelihood is unclear given delayed presentation MI with thinned/akinetic LAD territory with severe LV dysfunction.     Importantly, he has made it clear that he does not want any aggressive interventions though it is quite difficult to engage as he pushes away medical providers.     PLAN  - Continue off MCS, not a candidate for re-escalation   - Wean low dose levophed as tolerates   - Defer inotropes for now given normal cardiac output and rapid atrial flutter. Will continue to reassess  - GDMT when appropriate   - Diurese PRN, goal CVP 6-10. Marked oliguric VANDANA with very high BUN/Cr. Nephrology following. Consented for HD if needed. Currently improving with albumin, would perform HD for clearance if mental status remains borderline and BUN high.   - In atrial flutter 120s. Unable to cardiovert due to IAN thrombus. Maintain systemic a/c. Stopped amiodarone given presence of IAN thrombus to avoid risk of chemical cardioversion.   - Switch cangrelor to plavix   - Prognosis guarded, discussed today with wife at bedside. She would like for him to remain full code at this time.

## 2025-07-11 NOTE — PROGRESS NOTE ADULT - ASSESSMENT
69-year-old male with PMHx of HTN, HLD, DM2, CAD with stents, current smoker presenting as transfer from [facility] s/p cardiac arrest x4 on VA ECMO in setting of LAD occlusion s/p DESx1. Nephrology consulted for non-oliguric VANDANA.

## 2025-07-12 NOTE — PROGRESS NOTE ADULT - ASSESSMENT
70 YO M with a history of CAD s/p PCI, active tobacco use, DM2 (A1c 8.9%) who presented to [facility] 7/3 from his PMD’s office after witnessed cardiac arrest c/b VT storm and went for urgent LHC for delayed STEMI revealing occluded LAD s/p CURTIS and ultimately escalated to VA ECMO for SCAI E AMI-CS. Of note he presented to the ED 3 days before the event with chest pain.    He was decannulated from VA ECMO on 7/8 and Impella CP removed 7/10. He is euvolemic on very low dose levophed with normal cardiac output off inotropes. He has marked non-oliguric ATN with high BUN/Cr.    Recovery likelihood is unclear given delayed presentation MI with thinned/akinetic LAD territory with severe LV dysfunction.     Importantly, he has made it clear that he does not want any aggressive interventions though it is quite difficult to engage as he pushes away medical providers. GOC conversation held with wife 7/11 and she elected to maintain full code status. He had a VT arrest 7/11 and since has been electrically quiet.     PLAN  - Continue off MCS, not a candidate for re-escalation   - Wean low dose vaso as tolerates   - Continue amiodarone drip for VT, wean lidocaine to off. EP consult. Defer repeat ischemic evaluation for now, EKG post arrest with infarct pattern only and suspect VT is scar mediated from late presentation MI.   - Defer inotropes for now given normal cardiac output, atrial flutter, and recent VT  - GDMT when appropriate   - Nephrology consulted for CVVH today for clearance and volume removal   - In atrial flutter 120s. Unable to cardiovert due to IAN thrombus. Maintain systemic a/c.   - Continue plavix and start ASA. Start statin   - Prognosis guarded, discussed today with wife 7/11 at bedside. She would like for him to remain full code at this time.  Continue to address

## 2025-07-12 NOTE — PROGRESS NOTE ADULT - SUBJECTIVE AND OBJECTIVE BOX
Subjective:  VT arrest overnight requiring ACLS, shocks, now on antiarrhythmics  Today reports feeling tired  Awaiting HD     Medications:  aMIOdarone Infusion 1 mG/Min IV Continuous <Continuous>  aspirin  chewable 81 milliGRAM(s) Oral daily  calcium gluconate IVPB 2 Gram(s) IV Intermittent once  chlorhexidine 2% Cloths 1 Application(s) Topical <User Schedule>  chlorhexidine 4% Liquid 1 Application(s) Topical <User Schedule>  clopidogrel Tablet 75 milliGRAM(s) Oral daily  CRRT Treatment    <Continuous>  dexMEDEtomidine Infusion 0.5 MICROgram(s)/kG/Hr IV Continuous <Continuous>  dextrose 50% Injectable 50 milliLiter(s) IV Push every 15 minutes  heparin  Infusion 1000 Unit(s)/Hr IV Continuous <Continuous>  HYDROmorphone  Injectable 0.5 milliGRAM(s) IV Push every 6 hours PRN  insulin regular Infusion 3 Unit(s)/Hr IV Continuous <Continuous>  lidocaine   4% Patch 2 Patch Transdermal daily  lidocaine   Infusion 2 mG/Min IV Continuous <Continuous>  midodrine 10 milliGRAM(s) Oral every 8 hours  norepinephrine Infusion 0.05 MICROgram(s)/kG/Min IV Continuous <Continuous>  OLANZapine 5 milliGRAM(s) Oral at bedtime  OLANZapine 2.5 milliGRAM(s) Oral <User Schedule>  pantoprazole  Injectable 40 milliGRAM(s) IV Push daily  polyethylene glycol 3350 17 Gram(s) Oral daily  PrismaSATE Dialysate BGK 4 / 2.5 5000 milliLiter(s) CRRT <Continuous>  PrismaSOL Filtration BGK 4 / 2.5 5000 milliLiter(s) CRRT <Continuous>  PrismaSOL Filtration BGK 4 / 2.5 5000 milliLiter(s) CRRT <Continuous>  senna 2 Tablet(s) Oral at bedtime  sodium chloride 0.65% Nasal 1 Spray(s) Both Nostrils two times a day PRN  sodium chloride 0.9% for Nebulization 3 milliLiter(s) Nebulizer two times a day  sodium chloride 0.9% lock flush 10 milliLiter(s) IV Push every 1 hour PRN  sodium chloride 0.9%. 1000 milliLiter(s) IV Continuous <Continuous>  vasopressin Infusion 0.02 Unit(s)/Min IV Continuous <Continuous>    Vitals:  T(C): 36.2 (25 @ 08:00), Max: 37.8 (25 @ 12:00)  HR: 107 (25 @ 10:30) (102 - 257)  BP: --  BP(mean): --  RR: 18 (25 @ 10:30) (11 - 39)  SpO2: 100% (25 @ 10:30) (91% - 100%)    Daily     Daily Weight in k.9 (2025 00:00)        I&O's Summary    2025 07:01  -  2025 07:00  --------------------------------------------------------  IN: 4333.5 mL / OUT: 2360 mL / NET: 1973.5 mL    2025 07:01  -  2025 11:44  --------------------------------------------------------  IN: 165.2 mL / OUT: 30 mL / NET: 135.2 mL      PHYSICAL EXAM:  GENERAL: NAD, oriented to self and year  ENT: EOMI  CHEST/LUNG: Unlabored respirations.  HEART: sinus tachycardia; No murmurs, rubs, or gallops.  EXTREMITIES: No edema       Labs:                        8.4    15.64 )-----------( 98       ( 2025 00:44 )             25.0     12    143  |  100  |  141[H]  ----------------------------<  167[H]  4.4   |  23  |  7.28[H]    Ca    8.1[L]      2025 00:44  Phos  7.0       Mg     3.9         TPro  6.0  /  Alb  3.6  /  TBili  1.9[H]  /  DBili  x   /  AST  58[H]  /  ALT  21  /  AlkPhos  179[H]  07-12      PT/INR - ( 2025 03:58 )   PT: 13.3 sec;   INR: 1.16 ratio         PTT - ( 2025 10:49 )  PTT:38.7 sec        Oxygen Saturation, Mixed: 56.4 ( @ 02:02)  Oxygen Saturation, Mixed: 58.9 ( @ 00:00)  Oxygen Saturation, Mixed: 56.0 (07-10 @ 20:00)  Oxygen Saturation, Mixed: 58.1 (07-10 @ 18:08)  Oxygen Saturation, Mixed: 51.2 (07-10 @ 15:54)  Oxygen Saturation, Mixed: 57.9 (07-10 @ 11:11)  Oxygen Saturation, Mixed: 56.5 (07-10 @ 08:17)  Oxygen Saturation, Mixed: 63.2 (07-10 @ 00:16)  Oxygen Saturation, Mixed: 59.9 ( @ 14:19)    Lactate Dehydrogenase, Serum: 699 U/L ( @ 00:19)  Lactate Dehydrogenase, Serum: 691 U/L (07-10 @ 00:53)

## 2025-07-12 NOTE — PROGRESS NOTE ADULT - PROBLEM SELECTOR PLAN 1
VANDANA/ATN 2/2 cardiogenic shock/arrests and ALKA from CTAs + C s/p CURTIS. [facility] admission was 1.48 (6/29/25) and 1.14 (8/26/24). U/A (7/4) cloudy, mod blood, trace LE, trace protein, 9 RBC. CTA (6/30) with complex L renal cyst and small R renal cysts. SCr on admission 4.47 (7/3) and now 4.69 (7/7) was  on VA ECMO (previously with IABP then Impella) removed on 7/8 and now off bumex gtt (7/6) and pressors- vasopressin and NE.   -  patient had a drop in UOP for 2 hrs post ECMO removal was restarted on Bumex drip and UOP improved. 24 hr UOP 2.3 L. Bumex drip discontinued on 7/10. Received alubmin 7/11 and diuretics were held.     -Although non-oliguric, but renal fx continue to worsen, also had VT arrest (7/12). CVPs have now increased to 15-17. Will start CRRT (7/12) for volume optimization and also possible uremic encephalopathy with BUN >100 (unable to assess currently as pt was started on mild sedation for agitation). Consent on chart      Leighton Mcgraw  Nephrology Fellow  Feel free to contact me on TEAMS  After 5 pm and on weekends please contact the on-call Fellow.

## 2025-07-12 NOTE — PROGRESS NOTE ADULT - ASSESSMENT
69-year-old male with PMHx of HTN, HLD, DM2, CAD with stents, current smoker presenting as transfer from [facility] s/p cardiac arrest x4 on VA ECMO in setting of LAD occlusion s/p DESx1. Nephrology consulted for non-oliguric VANDANA.

## 2025-07-12 NOTE — PROGRESS NOTE ADULT - ATTENDING COMMENTS
Pt. with non oliguric VANDANA in the setting of cardiogenic shock, now with hypervolemia and worsening BUN/Scr level. Per CTU team, pt. was altered prior to being sedated, with ?uremia and noted to have CVP up to 16 today, requiring highflow oxygen. Pt. s/p LIJ non tunneled dialysis catheter placement by CTU team. Plan to initiate CRRT (pt. with low BP, currently on vasopressin) for continued volume removal and ?uremia. Monitor BMP. Strict I/Os. Avoid nephrotoxins. Dose meds per eGFR.  Plan discussed with CTU and advanced HF team (Dr. Valente)

## 2025-07-12 NOTE — PROGRESS NOTE ADULT - TIME BILLING
clinical exam, review of labs, review of recent imaging and discussion of assesment and plan with other team members including HF team and ICU team.

## 2025-07-12 NOTE — PROGRESS NOTE ADULT - SUBJECTIVE AND OBJECTIVE BOX
Patient seen and examined at the bedside.    Remained critically ill on continuous ICU monitoring.    OBJECTIVE:  Vital Signs Last 24 Hrs  T(C): 38.1 (12 Jul 2025 16:00), Max: 38.4 (12 Jul 2025 12:00)  T(F): 100.6 (12 Jul 2025 16:00), Max: 101.1 (12 Jul 2025 12:00)  HR: 102 (12 Jul 2025 19:30) (95 - 257)  BP: --  BP(mean): --  RR: 18 (12 Jul 2025 19:30) (11 - 38)  SpO2: 98% (12 Jul 2025 19:30) (84% - 100%)    Parameters below as of 12 Jul 2025 18:07  Patient On (Oxygen Delivery Method): nasal cannula, high flow      Physical Exam:   General: NAD  Neurology: Nonfocal, moves all extremities, nods yes/no to questions   Eyes: bilateral pupils equal and reactive   ENT/Neck: Neck supple, trachea midline, RIJ intro, LIJ HD cath   Respiratory: rhonchi bilaterally  CV: S1S2, no murmurs        [x] Aflutter  Abdominal: Soft, NT, ND +BS   Extremities: 1+ pedal edema noted, + peripheral pulses, L fem impella site stable   Skin: No Rashes                      Assessment:  68 yo M presented 7/3/25 to [facility] with STEMI and cardiac arrest.  IABP -> Impella CP,  LAD stented.  Upgraded to VA ECMO and transferred to [facility]     STEMI  Cardiogenic shock  Postop acute respiratory insufficiency  Thrombocytopenia  Hyperglycemia  VANDANA  Shock liver, Hyperbilirubinemia      Plan:   ***Neuro***  [x] Sedated with Precedex as needed  Post operative neuro assessment   Pain management with Tylenol, Lido patches, and prns  Zyprexa for delirium    ***Cardiovascular***  Invasive hemodynamic monitoring, assess perfusion indices  Afib / CVP 15-22 / MAP 63-75 / Hct 25.0% / Lactate 1.5  [x] Amiodarone 1 mg/min  [x] Lidocaine off  [x] Levophed off  [x] Vasopressin 0.1 unit/min  [x] Midodrine for blood pressure management  [x] ASA [x] Plavix   [x] Systemic Heparin for anticoagulation (LA thrombus), trend PTT and anti-Xa  s/p VA ECMO decann 7/8  Serial EKG and cardiac enzymes     ***Pulmonary***  [x] HFNC 40L/50%  Encourage incentive spirometry, continue pulse ox monitoring, follow ABGs, continue nebulizers          ***GI***  [x] Diet: Nepro TF goal @40 cc/hr  [x] Protonix for stress ulcer prophylaxis  Bowel regimen with Miralax and Senna    ***Renal***  [x] VANDANA on CVVHD  Continue to monitor I/Os, BUN/Creatinine.   Replete lytes PRN  Yap present    ***ID***  Cefepime for prophylaxis  IV Vanco  Bronchial aspirate + for Klebs,+ staph auras     ***Endocrine***  [x] Stress Hyperglycemia : HbA1c 8.9%                - [x] Insulin gtt              - Need tight glycemic control to prevent wound infection.    Patient requires continuous monitoring with bedside rhythm monitoring, pulse oximetry monitoring, and continuous central venous and arterial pressure monitoring; and intermittent blood gas analysis. Care plan discussed with the ICU care team.   Patient remained critical, at risk for life threatening decompensation.    I have spent 40 minutes providing critical care management to this patient.    By signing my name below, I, Kraig Diaz, attest that this documentation has been prepared under the direction and in the presence of ROBERT Caicedo  Electronically signed: Darryn Francis, 07-12-25 @ 19:54    I, ROBERT Caicedo, personally performed the services described in this documentation. all medical record entries made by the scribe were at my direction and in my presence. I have reviewed the chart and agree that the record reflects my personal performance and is accurate and complete  Electronically signed: ROBERT Caicedo Patient seen and examined at the bedside.    Remained critically ill on continuous ICU monitoring.    OBJECTIVE:  Vital Signs Last 24 Hrs  T(C): 38.1 (12 Jul 2025 16:00), Max: 38.4 (12 Jul 2025 12:00)  T(F): 100.6 (12 Jul 2025 16:00), Max: 101.1 (12 Jul 2025 12:00)  HR: 102 (12 Jul 2025 19:30) (95 - 257)  BP: --  BP(mean): --  RR: 18 (12 Jul 2025 19:30) (11 - 38)  SpO2: 98% (12 Jul 2025 19:30) (84% - 100%)    Parameters below as of 12 Jul 2025 18:07  Patient On (Oxygen Delivery Method): nasal cannula, high flow      Physical Exam:   General: NAD  Neurology: Nonfocal, moves all extremities, nods yes/no to questions   Eyes: bilateral pupils equal and reactive   ENT/Neck: Neck supple, trachea midline, RIJ intro, LIJ HD cath   Respiratory: rhonchi bilaterally  CV: S1S2, no murmurs        [x] Aflutter  Abdominal: Soft, NT, ND +BS   Extremities: 1+ pedal edema noted, + peripheral pulses, L fem impella site stable   Skin: No Rashes                      Assessment:  68 yo M presented 7/3/25 to [facility] with STEMI and cardiac arrest.  IABP -> Impella CP,  LAD stented.  Upgraded to VA ECMO and transferred to [facility]     STEMI  Cardiogenic shock  Postop acute respiratory insufficiency  Thrombocytopenia  Hyperglycemia  VANDANA  Shock liver, Hyperbilirubinemia      Plan:   ***Neuro***  [x] Sedated with Precedex as needed  Post operative neuro assessment   Pain management with Tylenol, Lido patches, and prns  Zyprexa for delirium    ***Cardiovascular***  Invasive hemodynamic monitoring, assess perfusion indices  Afib / CVP 15-22 / MAP 63-75 / Hct 25.0% / Lactate 1.5  [x] Amiodarone 1 mg/min  [x] Lidocaine off  [x] Levophed off  [x] Vasopressin 0.1 unit/min  [x] Midodrine for blood pressure management  [x] ASA [x] Plavix   [x] Systemic Heparin for anticoagulation (LA thrombus), trend PTT and anti-Xa  s/p VA ECMO decann 7/8  Serial EKG and cardiac enzymes     ***Pulmonary***  [x] HFNC 40L/50%  Encourage incentive spirometry, continue pulse ox monitoring, follow ABGs, continue nebulizers          ***GI***  [x] Diet: Nepro TF goal @40 cc/hr  [x] Protonix for stress ulcer prophylaxis  Bowel regimen with Miralax and Senna    ***Renal***  [x] VANDANA on CVVHD  Continue to monitor I/Os, BUN/Creatinine.   Replete lytes PRN  Yap present    ***ID***  IV Vanco / Cefepime  Bronchial aspirate + for Klebs,+ staph auras     ***Endocrine***  [x] Stress Hyperglycemia : HbA1c 8.9%                - [x] Insulin gtt              - Need tight glycemic control to prevent wound infection.    Patient requires continuous monitoring with bedside rhythm monitoring, pulse oximetry monitoring, and continuous central venous and arterial pressure monitoring; and intermittent blood gas analysis. Care plan discussed with the ICU care team.   Patient remained critical, at risk for life threatening decompensation.    I have spent 40 minutes providing critical care management to this patient.    By signing my name below, I, Kraig Diaz, attest that this documentation has been prepared under the direction and in the presence of ROBERT Caicedo  Electronically signed: Darryn Francis, 07-12-25 @ 19:54    I, ROBERT Caicedo, personally performed the services described in this documentation. all medical record entries made by the consueloibe were at my direction and in my presence. I have reviewed the chart and agree that the record reflects my personal performance and is accurate and complete  Electronically signed: ROBERT Caicedo Patient seen and examined at the bedside.    Remained critically ill on continuous ICU monitoring.    OBJECTIVE:  Vital Signs Last 24 Hrs  T(C): 38.1 (12 Jul 2025 16:00), Max: 38.4 (12 Jul 2025 12:00)  T(F): 100.6 (12 Jul 2025 16:00), Max: 101.1 (12 Jul 2025 12:00)  HR: 102 (12 Jul 2025 19:30) (95 - 257)  BP: --  BP(mean): --  RR: 18 (12 Jul 2025 19:30) (11 - 38)  SpO2: 98% (12 Jul 2025 19:30) (84% - 100%)    Parameters below as of 12 Jul 2025 18:07  Patient On (Oxygen Delivery Method): nasal cannula, high flow      Physical Exam:   General: NAD  Neurology: sedated, moves all extremities, nods yes/no to questions   Eyes: bilateral pupils equal and reactive   ENT/Neck: Neck supple, trachea midline, RIJ intro, LIJ HD cath   Respiratory: rhonchi bilaterally  CV: S1S2, no murmurs        [x] Aflutter  Abdominal: Soft, NT, ND +BS   Extremities: 1+ pedal edema noted, + peripheral pulses, L fem impella site stable   Skin: No Rashes                      Assessment:  68 yo M presented 7/3/25 to [facility] with STEMI and cardiac arrest.  IABP -> Impella CP,  LAD stented.  Upgraded to VA ECMO and transferred to [facility]     STEMI  Cardiogenic shock  Postop acute respiratory insufficiency  Thrombocytopenia  Hyperglycemia  VANDANA  Shock liver, Hyperbilirubinemia      Plan:   ***Neuro***  [x] Sedated with Precedex as needed  Post operative neuro assessment   Pain management with Tylenol, Lido patches, and prns  Zyprexa for delirium    ***Cardiovascular***  Invasive hemodynamic monitoring, assess perfusion indices  Afib / CVP 15-22 / MAP 63-75 / Hct 25.0% / Lactate 1.5  [x] Amiodarone 1 mg/min  [x] Lidocaine off  [x] Levophed off  [x] Vasopressin 0.1 unit/min  [x] Midodrine for blood pressure management  [x] ASA [x] Plavix   [x] Systemic Heparin for anticoagulation (LA thrombus), trend PTT and anti-Xa  s/p VA ECMO decann 7/8  Serial EKG and cardiac enzymes     ***Pulmonary***  [x] HFNC 40L/50%  Encourage incentive spirometry, continue pulse ox monitoring, follow ABGs, continue nebulizers          ***GI***  [x] Diet: Nepro TF goal @40 cc/hr  [x] Protonix for stress ulcer prophylaxis  Bowel regimen with Miralax and Senna    ***Renal***  [x] VANDANA on CVVHD  Continue to monitor I/Os, BUN/Creatinine.   Replete lytes PRN  Yap present    ***ID***  IV Vanco / Cefepime  Bronchial aspirate + for Klebs,+ staph auras     ***Endocrine***  [x] Stress Hyperglycemia : HbA1c 8.9%                - [x] Insulin gtt              - Need tight glycemic control to prevent wound infection.    Patient requires continuous monitoring with bedside rhythm monitoring, pulse oximetry monitoring, and continuous central venous and arterial pressure monitoring; and intermittent blood gas analysis. Care plan discussed with the ICU care team.   Patient remained critical, at risk for life threatening decompensation.    I have spent 40 minutes providing critical care management to this patient.    By signing my name below, I, Kraig Diaz, attest that this documentation has been prepared under the direction and in the presence of ROBRET Caicedo  Electronically signed: Darryn Francis, 07-12-25 @ 19:54    I, ROBERT Caicedo, personally performed the services described in this documentation. all medical record entries made by the consueloibe were at my direction and in my presence. I have reviewed the chart and agree that the record reflects my personal performance and is accurate and complete  Electronically signed: ROBERT Caicedo Patient seen and examined at the bedside.    Remained critically ill on continuous ICU monitoring.    OBJECTIVE:  Vital Signs Last 24 Hrs  T(C): 38.1 (12 Jul 2025 16:00), Max: 38.4 (12 Jul 2025 12:00)  T(F): 100.6 (12 Jul 2025 16:00), Max: 101.1 (12 Jul 2025 12:00)  HR: 102 (12 Jul 2025 19:30) (95 - 257)  BP(mean): 68  RR: 18 (12 Jul 2025 19:30) (11 - 38)  SpO2: 98% (12 Jul 2025 19:30) (84% - 100%)    Parameters below as of 12 Jul 2025 18:07  Patient On (Oxygen Delivery Method): nasal cannula, high flow    Physical Exam:   General: NAD  Neurology: sedated, moves all extremities, nods yes/no to questions   Eyes: bilateral pupils equal and reactive   ENT/Neck: Neck supple, trachea midline, RIJ intro, LIJ HD cath   Respiratory: rhonchi bilaterally  CV: S1S2, no murmurs        [x] Aflutter  Abdominal: Soft, NT, ND +BS   Extremities: 1+ pedal edema noted, + peripheral pulses, L fem impella site stable   Skin: No Rashes                    Assessment:  70 yo M presented 7/3/25 to [facility] with STEMI and cardiac arrest.  IABP -> Impella CP,  LAD stented.  Upgraded to VA ECMO and transferred to [facility]     STEMI  Cardiogenic shock  Postop acute respiratory insufficiency  Thrombocytopenia  Hyperglycemia  VANDANA  Shock liver, Hyperbilirubinemia    Plan:   ***Neuro***  [x] Sedated with Precedex as needed  Post operative neuro assessment   Pain management with Tylenol, Lido patches, and prns  Zyprexa for delirium    ***Cardiovascular***  Invasive hemodynamic monitoring, assess perfusion indices  Afib / CVP 15-22 / MAP 63-75 / Hct 25.0% / Lactate 1.5  [x] Amiodarone 1 mg/min  [x] Lidocaine gtt off  [x] Levophed 0.05mcg/kg/min, Vasopressin 0.1 unit/min  [x] Midodrine for blood pressure management  [x] ASA [x] Plavix   [x] Systemic Heparin for anticoagulation (LA thrombus), trend PTT and anti-Xa  s/p VA ECMO decann 7/8  Serial EKG and cardiac enzymes     ***Pulmonary***  [x] HFNC 40L/50%  Encourage incentive spirometry, continue pulse ox monitoring, follow ABGs, continue nebulizers          ***GI***  [x] Diet: Nepro TF goal @40 cc/hr  [x] Protonix for stress ulcer prophylaxis  Bowel regimen with Miralax and Senna    ***Renal***  [x] VANDANA on CVVHD  Continue to monitor I/Os, BUN/Creatinine.   Replete lytes PRN  Yap present    ***ID***  IV Vanco / Cefepime  Bronchial aspirate + for Klebs,+ staph auras     ***Endocrine***  [x] Stress Hyperglycemia : HbA1c 8.9%                - [x] Insulin gtt              - Need tight glycemic control to prevent wound infection.    Patient requires continuous monitoring with bedside rhythm monitoring, pulse oximetry monitoring, and continuous central venous and arterial pressure monitoring; and intermittent blood gas analysis. Care plan discussed with the ICU care team.   Patient remained critical, at risk for life threatening decompensation.    I have spent 40 minutes providing critical care management to this patient.    By signing my name below, I, Kraig Diaz, attest that this documentation has been prepared under the direction and in the presence of ROBERT Caicedo  Electronically signed: Darryn Francis, 07-12-25 @ 19:54    I, ROBERT Caicedo, personally performed the services described in this documentation. all medical record entries made by the consueloibe were at my direction and in my presence. I have reviewed the chart and agree that the record reflects my personal performance and is accurate and complete  Electronically signed: ROBERT Caicedo Patient seen and examined at the bedside.    Remained critically ill on continuous ICU monitoring.    OBJECTIVE:  Vital Signs Last 24 Hrs  T(C): 38.1 (12 Jul 2025 16:00), Max: 38.4 (12 Jul 2025 12:00)  T(F): 100.6 (12 Jul 2025 16:00), Max: 101.1 (12 Jul 2025 12:00)  HR: 102 (12 Jul 2025 19:30) (95 - 257)  BP(mean): 68  RR: 18 (12 Jul 2025 19:30) (11 - 38)  SpO2: 98% (12 Jul 2025 19:30) (84% - 100%)    Parameters below as of 12 Jul 2025 18:07  Patient On (Oxygen Delivery Method): nasal cannula, high flow    Physical Exam:   General: NAD  Neurology: sedated, moves all extremities, nods yes/no to questions   Eyes: bilateral pupils equal and reactive   ENT/Neck: Neck supple, trachea midline, RIJ intro, LIJ HD cath   Respiratory: rhonchi bilaterally  CV: S1S2, no murmurs        [x] Aflutter  Abdominal: Soft, NT, ND +BS   Extremities: 1+ pedal edema noted, + peripheral pulses, L fem impella site stable   Skin: No Rashes                    Assessment:  70 yo M presented 7/3/25 to [facility] with STEMI and cardiac arrest.  IABP -> Impella CP,  LAD stented.  Upgraded to VA ECMO and transferred to [facility]     STEMI  Cardiogenic shock  Postop acute respiratory insufficiency  Thrombocytopenia  Hyperglycemia  VANDANA  Shock liver, Hyperbilirubinemia    Plan:   ***DNR/DNI***    ***Neuro***  [x] Sedated with Precedex as needed, currently 1.5mcg/kg/min  Post operative neuro assessment   Pain management with Tylenol, Lido patches, and prns  Zyprexa for delirium    ***Cardiovascular***  Invasive hemodynamic monitoring, assess perfusion indices  Afib / CVP 15-22 / MAP 63-75 / Hct 25.0% / Lactate 1.5  [x] Amiodarone 1 mg/min  [x] Lidocaine gtt off  [x] Levophed 0.05mcg/kg/min, Vasopressin 0.1 unit/min  [x] Midodrine for blood pressure management  [x] ASA [x] Plavix   [x] Systemic Heparin for anticoagulation (LA thrombus), trend PTT and anti-Xa, currently at 1000 units/hr  s/p VA ECMO decann 7/8    ***Pulmonary***  [x] HFNC 40L/50%  Encourage incentive spirometry, continue pulse ox monitoring, follow ABGs, continue nebulizers          ***GI***  [x] Diet: Nepro TF goal @40 cc/hr  [x] Protonix for stress ulcer prophylaxis  Bowel regimen with Miralax and Senna    ***Renal***  [x] VANDANA on CVVHD 50cc/hr removal  Continue to monitor I/Os, BUN/Creatinine.   Replete lytes PRN  Yap present    ***ID***  Febrile --> pancultured  IV Vanco / Cefepime  Bronchial aspirate + for Klebs,+ staph aureus     ***Endocrine***  [x] Stress Hyperglycemia : HbA1c 8.9%                - [x] Insulin gtt              - Need tight glycemic control to prevent wound infection.    Patient requires continuous monitoring with bedside rhythm monitoring, pulse oximetry monitoring, and continuous central venous and arterial pressure monitoring; and intermittent blood gas analysis. Care plan discussed with the ICU care team.   Patient remained critical, at risk for life threatening decompensation.    I have spent 40 minutes providing critical care management to this patient.    By signing my name below, I, Kraig Diaz, attest that this documentation has been prepared under the direction and in the presence of ROBERT Caicedo  Electronically signed: Darryn Francis, 07-12-25 @ 19:54    I, ROBERT Caicedo, personally performed the services described in this documentation. all medical record entries made by the consueloibe were at my direction and in my presence. I have reviewed the chart and agree that the record reflects my personal performance and is accurate and complete  Electronically signed: ROBERT Caicedo

## 2025-07-12 NOTE — PROGRESS NOTE ADULT - SUBJECTIVE AND OBJECTIVE BOX
[facility] DIVISION OF KIDNEY DISEASES AND HYPERTENSION   FOLLOW UP NOTE    --------------------------------------------------------------------------------  Chief Complaint:    24 hour events/subjective: Pt. was seen and examined today. Sedated      PAST HISTORY  --------------------------------------------------------------------------------  No significant changes to PMH, PSH, FHx, SHx, unless otherwise noted    ALLERGIES & MEDICATIONS  --------------------------------------------------------------------------------  Allergies    No Known Allergies    Intolerances      Standing Inpatient Medications  aMIOdarone Infusion 1 mG/Min IV Continuous <Continuous>  aspirin  chewable 81 milliGRAM(s) Oral daily  calcium gluconate IVPB 2 Gram(s) IV Intermittent once  chlorhexidine 2% Cloths 1 Application(s) Topical <User Schedule>  chlorhexidine 4% Liquid 1 Application(s) Topical <User Schedule>  clopidogrel Tablet 75 milliGRAM(s) Oral daily  CRRT Treatment    <Continuous>  dexMEDEtomidine Infusion 0.5 MICROgram(s)/kG/Hr IV Continuous <Continuous>  dextrose 50% Injectable 50 milliLiter(s) IV Push every 15 minutes  heparin  Infusion 1000 Unit(s)/Hr IV Continuous <Continuous>  insulin regular Infusion 3 Unit(s)/Hr IV Continuous <Continuous>  lidocaine   4% Patch 2 Patch Transdermal daily  lidocaine   Infusion 2 mG/Min IV Continuous <Continuous>  midodrine 10 milliGRAM(s) Oral every 8 hours  norepinephrine Infusion 0.05 MICROgram(s)/kG/Min IV Continuous <Continuous>  OLANZapine 5 milliGRAM(s) Oral at bedtime  OLANZapine 2.5 milliGRAM(s) Oral <User Schedule>  pantoprazole  Injectable 40 milliGRAM(s) IV Push daily  polyethylene glycol 3350 17 Gram(s) Oral daily  PrismaSATE Dialysate BGK 4 / 2.5 5000 milliLiter(s) CRRT <Continuous>  PrismaSOL Filtration BGK 4 / 2.5 5000 milliLiter(s) CRRT <Continuous>  PrismaSOL Filtration BGK 4 / 2.5 5000 milliLiter(s) CRRT <Continuous>  senna 2 Tablet(s) Oral at bedtime  sodium chloride 0.9% for Nebulization 3 milliLiter(s) Nebulizer two times a day  sodium chloride 0.9%. 1000 milliLiter(s) IV Continuous <Continuous>  vasopressin Infusion 0.02 Unit(s)/Min IV Continuous <Continuous>    PRN Inpatient Medications  HYDROmorphone  Injectable 0.5 milliGRAM(s) IV Push every 6 hours PRN  sodium chloride 0.65% Nasal 1 Spray(s) Both Nostrils two times a day PRN  sodium chloride 0.9% lock flush 10 milliLiter(s) IV Push every 1 hour PRN      REVIEW OF SYSTEMS  --------------------------------------------------------------------------------  unable to obtain due to clinic status    VITALS/PHYSICAL EXAM  --------------------------------------------------------------------------------  T(C): 36.2 (07-12-25 @ 08:00), Max: 37.8 (07-11-25 @ 12:00)  HR: 107 (07-12-25 @ 10:30) (102 - 257)  BP: --  RR: 18 (07-12-25 @ 10:30) (11 - 39)  SpO2: 100% (07-12-25 @ 10:30) (91% - 100%)  Wt(kg): --        07-11-25 @ 07:01  -  07-12-25 @ 07:00  --------------------------------------------------------  IN: 4333.5 mL / OUT: 2360 mL / NET: 1973.5 mL    07-12-25 @ 07:01  -  07-12-25 @ 11:02  --------------------------------------------------------  IN: 165.2 mL / OUT: 30 mL / NET: 135.2 mL        Physical Exam:  	Gen: NAD  	HEENT: on HFNC   	Pulm: CTA b/l  	CV: S1S2+  	Abd: Soft, +BS   	Ext: No LE edema B/L  	Neuro: Drowsy           :Yap cath+ with clear urine  	Skin: Warm and dry  	Dialysis access: [facility]      LABS/STUDIES  --------------------------------------------------------------------------------              8.4    15.64 >-----------<  98       [07-12-25 @ 00:44]              25.0     143  |  100  |  141  ----------------------------<  167      [07-12-25 @ 00:44]  4.4   |  23  |  7.28        Ca     8.1     [07-12-25 @ 00:44]      Mg     3.9     [07-12-25 @ 00:44]      Phos  7.0     [07-12-25 @ 00:44]    TPro  6.0  /  Alb  3.6  /  TBili  1.9  /  DBili  x   /  AST  58  /  ALT  21  /  AlkPhos  179  [07-12-25 @ 00:44]    PT/INR: PT 13.3 , INR 1.16       [07-12-25 @ 03:58]  PTT: 44.7       [07-12-25 @ 03:58]          [07-11-25 @ 00:19]    Creatinine Trend:  SCr 7.28 [07-12 @ 00:44]  SCr 6.97 [07-11 @ 12:58]  SCr 7.20 [07-11 @ 00:19]  SCr 6.75 [07-10 @ 12:55]  SCr 6.45 [07-10 @ 00:53]    Urinalysis - [07-12-25 @ 00:44]      Color  / Appearance  / SG  / pH       Gluc 167 / Ketone   / Bili  / Urobili        Blood  / Protein  / Leuk Est  / Nitrite       RBC  / WBC  / Hyaline  / Gran  / Sq Epi  / Non Sq Epi  / Bacteria           Tacrolimus  Cyclosporine  Sirolimus  Mycophenolate  BK PCR  CMV PCR  Parvo PCR  EBV PCR

## 2025-07-12 NOTE — PROGRESS NOTE ADULT - SUBJECTIVE AND OBJECTIVE BOX
Patient seen and examined at the bedside.    Remains critically ill on continuous ICU monitoring.      Brief Summary:  70 yo M presented 7/3/25 to [facility] with STEMI and cardiac arrest.  IABP -> Impella CP, LAD stent  [facility] and [facility] to [facility]         24 Hour events:  impella CP removed   off bumex infusion  worsening renal function but making urine  1u prbc  started heparin   remains on norepinephrine         Objective:  Vital Signs Last 24 Hrs  T(C): 36.2 (12 Jul 2025 08:00), Max: 37.8 (11 Jul 2025 12:00)  T(F): 97.2 (12 Jul 2025 08:00), Max: 100 (11 Jul 2025 12:00)  HR: 106 (12 Jul 2025 09:00) (102 - 257)  BP: --  BP(mean): --  RR: 15 (12 Jul 2025 09:00) (11 - 44)  SpO2: 100% (12 Jul 2025 09:00) (91% - 100%)    Parameters below as of 12 Jul 2025 08:42  Patient On (Oxygen Delivery Method): nasal cannula, high flow  O2 Flow (L/min): 40  O2 Concentration (%): 50                Physical Exam:   General: Sleepy but will interact  Neurology: Following commands  Respiratory: Bilateral breath sounds.  CV:  tachycardic  Abdominal: Soft, Nontender  Extremities: Warm, well-perfused  Yap  -------------------------------------------------------------------------------------------------------------------------------    Labs:                        8.4    15.64 )-----------( 98       ( 12 Jul 2025 00:44 )             25.0     07-12    143  |  100  |  141[H]  ----------------------------<  167[H]  4.4   |  23  |  7.28[H]    Ca    8.1[L]      12 Jul 2025 00:44  Phos  7.0     07-12  Mg     3.9     07-12    TPro  6.0  /  Alb  3.6  /  TBili  1.9[H]  /  DBili  x   /  AST  58[H]  /  ALT  21  /  AlkPhos  179[H]  07-12    LIVER FUNCTIONS - ( 12 Jul 2025 00:44 )  Alb: 3.6 g/dL / Pro: 6.0 g/dL / ALK PHOS: 179 U/L / ALT: 21 U/L / AST: 58 U/L / GGT: x           PT/INR - ( 12 Jul 2025 03:58 )   PT: 13.3 sec;   INR: 1.16 ratio         PTT - ( 12 Jul 2025 03:58 )  PTT:44.7 sec  ABG - ( 12 Jul 2025 00:35 )  pH, Arterial: 7.44  pH, Blood: x     /  pCO2: 41    /  pO2: 132   / HCO3: 28    / Base Excess: 3.3   /  SaO2: 100.0   ------------------------------------------------------------------------------------------------------------------------------  Assessment:  70 yo M presented 7/3/25 to [facility] with STEMI and cardiac arrest.  IABP -> Impella CP,  LAD stented.  [facility] to [facility] and transferred to [facility]     STEMI  Cardiogenic shock  Postop acute respiratory insufficiency  Thrombocytopenia  Hyperglycemia  VANDANA  Shock liver, Hyperbilirubinemia    ***Neuro***  Zyprexa nightly   prns for discomfort  Optimize day / night cycle    ***Cardiovascular***  Cardiogenic shock, STEMI, s/p LAD stent  s/p impella CP removal 7/10   Amiodarone for A fib/flutter and Ectopy, continue with PO  Vasopressors for MAP > 65  off inotropic support     ***Pulmonary***  Acute post operative respiratory insufficiency   Deep breathing and coughing exercises.  Wean oxygen as able.    ***GI***  Will need SLP   Likely would benefit from ongoing tube feeds given recent poor PO intake.  Protonix for stress ulcer prophylaxis   Bowel regimen.  Trend LFTs, avoid hepatotoxins.    ***Renal***  VANDANA    Yap catheter for strict I/O measurements.  Bumex infusion - now making urine.  Trend creatinine and electrolytes.  No indication for RRT currently.  Nephrology Consulted     ***ID***  Change zosyn to cefepime- Course completed   bronchial aspirate + for Klebs,+ staph auras     ***Endocrine***  Hyperglycemia - Insulin infusion.    ***Hematology***  Acute blood loss anemia and Thrombocytopenia   No transfusion indication currently  Start Plavix, stop cangrelor   Heparin gtt   Trend CBC and coags and monitor for bleeding      Care plan discussed with the ICU care team.   Patient remains critical, at risk for life threatening decompensation.    I have spent 30 minutes providing critical care management to this patient.    By signing my name below, I, Darin Chaudhry, attest that this documentation has been prepared under the direction and in the presence of Macho Rodriguez MD.  Electronically signed: Darin Chaudhry, 07-12-25 @ 09:03    I, Macho Rodriguez MD,  personally performed the services described in this documentation. all medical record entries made by the scribe were at my direction and in my presence. I have reviewed the chart and agree that the record reflects my personal performance and is accurate and complete  Electronically signed: Macho Rodriguez MD. Patient seen and examined at the bedside.    Remains critically ill on continuous ICU monitoring.      Brief Summary:  70 yo M presented 7/3/25 to [facility] with STEMI and cardiac arrest.  IABP -> Impella CP, LAD stent  [facility] to [facility] and transferred to [facility]         24 Hour events:  VT arrest this morning - CPR for 1min without shock and RoSC  Amio and lidocaine infusion started   1u PRBC       Objective:  Vital Signs Last 24 Hrs  T(C): 36.2 (12 Jul 2025 08:00), Max: 37.8 (11 Jul 2025 12:00)  T(F): 97.2 (12 Jul 2025 08:00), Max: 100 (11 Jul 2025 12:00)  HR: 106 (12 Jul 2025 09:00) (102 - 257)  BP: --  BP(mean): --  RR: 15 (12 Jul 2025 09:00) (11 - 44)  SpO2: 100% (12 Jul 2025 09:00) (91% - 100%)    Parameters below as of 12 Jul 2025 08:42  Patient On (Oxygen Delivery Method): nasal cannula, high flow  O2 Flow (L/min): 40  O2 Concentration (%): 50                Physical Exam:   General: Sleepy but will interact  Neurology: Following commands  Respiratory: Bilateral breath sounds.  CV:  tachycardic  Abdominal: Soft, Nontender  Extremities: Warm, well-perfused  Yap  -------------------------------------------------------------------------------------------------------------------------------    Labs:                        8.4    15.64 )-----------( 98       ( 12 Jul 2025 00:44 )             25.0     07-12    143  |  100  |  141[H]  ----------------------------<  167[H]  4.4   |  23  |  7.28[H]    Ca    8.1[L]      12 Jul 2025 00:44  Phos  7.0     07-12  Mg     3.9     07-12    TPro  6.0  /  Alb  3.6  /  TBili  1.9[H]  /  DBili  x   /  AST  58[H]  /  ALT  21  /  AlkPhos  179[H]  07-12    LIVER FUNCTIONS - ( 12 Jul 2025 00:44 )  Alb: 3.6 g/dL / Pro: 6.0 g/dL / ALK PHOS: 179 U/L / ALT: 21 U/L / AST: 58 U/L / GGT: x           PT/INR - ( 12 Jul 2025 03:58 )   PT: 13.3 sec;   INR: 1.16 ratio         PTT - ( 12 Jul 2025 03:58 )  PTT:44.7 sec  ABG - ( 12 Jul 2025 00:35 )  pH, Arterial: 7.44  pH, Blood: x     /  pCO2: 41    /  pO2: 132   / HCO3: 28    / Base Excess: 3.3   /  SaO2: 100.0   ------------------------------------------------------------------------------------------------------------------------------  Assessment:  70 yo M presented 7/3/25 to [facility] with STEMI and cardiac arrest.  IABP -> Impella CP,  LAD stented.  [facility] to [facility] and transferred to [facility]     STEMI  Cardiogenic shock  Postop acute respiratory insufficiency  Thrombocytopenia  Hyperglycemia  VANDANA  Shock liver, Hyperbilirubinemia    ***Neuro***  Zyprexa nightly   prns for discomfort  Optimize day / night cycle    ***Cardiovascular***  Cardiogenic shock, STEMI, s/p LAD stent  s/p impella CP removal 7/10   Amiodarone for A fib/flutter and Ectopy, continue with PO  VT arrest on 7/11 - Amio and lidocaine infusions, can discontinue lidocaine and continue amio for 10gm load   Vasopressors for MAP > 65  off inotropic support     ***Pulmonary***  Acute post operative respiratory insufficiency   Deep breathing and coughing exercises.  Wean oxygen as able.    ***GI***  Will need SLP   Likely would benefit from ongoing tube feeds given recent poor PO intake.  Protonix for stress ulcer prophylaxis   Bowel regimen.  Trend LFTs, avoid hepatotoxins.    ***Renal***  VANDANA    Yap catheter for strict I/O measurements.  Bumex infusion - now making urine.  Trend creatinine and electrolytes.  Nephrology Consulted     ***ID***  Change zosyn to cefepime- Course completed   bronchial aspirate + for Klebs,+ staph auras     ***Endocrine***  Hyperglycemia - Insulin infusion.    ***Hematology***  Acute blood loss anemia and Thrombocytopenia   No transfusion indication currently  Start Plavix, stop cangrelor   Heparin gtt   Trend CBC and coags and monitor for bleeding      Care plan discussed with the ICU care team.   Patient remains critical, at risk for life threatening decompensation.    I have spent 65 minutes providing critical care management to this patient.    By signing my name below, I, Darin Chaudhry, attest that this documentation has been prepared under the direction and in the presence of Macho Rodriguez MD.  Electronically signed: Darin Chaudhry, 07-12-25 @ 09:03    I, Macho Rodriguez MD,  personally performed the services described in this documentation. all medical record entries made by the scribe were at my direction and in my presence. I have reviewed the chart and agree that the record reflects my personal performance and is accurate and complete  Electronically signed: Macho Rodriguez MD.

## 2025-07-13 NOTE — PROGRESS NOTE ADULT - SUBJECTIVE AND OBJECTIVE BOX
Patient seen and examined at the bedside.    Remained critically ill on continuous ICU monitoring.      Brief Summary:  68 yo M presented 7/3/25 to [facility] with STEMI and cardiac arrest.  IABP -> Impella CP, LAD stent  [facility] to [facility] and transferred to [facility] on 7/3/25  S/p [facility] ECMO decannulation & R Femoral artery repair on 7/8  VT arrest - CPR for 1min without shock and RoSC on 7/11    24 Hour events:  Remains on Amio infusion   On CRRT      OBJECTIVE:  Vital Signs Last 24 Hrs  T(C): 37.4 (13 Jul 2025 04:00), Max: 38.4 (12 Jul 2025 12:00)  T(F): 99.3 (13 Jul 2025 04:00), Max: 101.1 (12 Jul 2025 12:00)  HR: 92 (13 Jul 2025 07:00) (92 - 113)  BP: --  BP(mean): --  RR: 13 (13 Jul 2025 07:00) (12 - 38)  SpO2: 97% (13 Jul 2025 07:00) (84% - 100%)    Parameters below as of 13 Jul 2025 05:18  Patient On (Oxygen Delivery Method): nasal cannula, high flow  O2 Flow (L/min): 40  O2 Concentration (%): 40              Physical Exam:   General: Sleepy but will interact  Neurology: Following commands  Respiratory: Bilateral breath sounds.  CV:  Afib  Abdominal: Soft, Nontender  Extremities: Warm, well-perfused  Yap       -------------------------------------------------------------------------------------------------------------------------------    Labs:                        9.9    24.86 )-----------( 126      ( 13 Jul 2025 01:23 )             30.1     07-13    138  |  97  |  92[H]  ----------------------------<  154[H]  4.4   |  20[L]  |  4.45[H]    Ca    8.6      13 Jul 2025 01:23  Phos  5.8     07-13  Mg     3.0     07-13    TPro  6.5  /  Alb  3.7  /  TBili  2.7[H]  /  DBili  x   /  AST  47[H]  /  ALT  23  /  AlkPhos  186[H]  07-13    LIVER FUNCTIONS - ( 13 Jul 2025 01:23 )  Alb: 3.7 g/dL / Pro: 6.5 g/dL / ALK PHOS: 186 U/L / ALT: 23 U/L / AST: 47 U/L / GGT: x           PT/INR - ( 12 Jul 2025 03:58 )   PT: 13.3 sec;   INR: 1.16 ratio         PTT - ( 13 Jul 2025 01:23 )  PTT:41.7 sec  ABG - ( 13 Jul 2025 00:55 )  pH, Arterial: 7.36  pH, Blood: x     /  pCO2: 36    /  pO2: 147   / HCO3: 20    / Base Excess: -4.6  /  SaO2: 99.8      ------------------------------------------------------------------------------------------------------------------------------  Assessment:  68 yo M presented 7/3/25 to [facility] with STEMI and cardiac arrest.  IABP -> Impella CP,  LAD stented.  Upgraded to [facility]O and transferred to [facility]     STEMI  Cardiogenic shock  Postop acute respiratory insufficiency  Thrombocytopenia  Hyperglycemia  VANDANA  Shock liver, Hyperbilirubinemia  Leukocytosis    ***Neuro***  Zyprexa nightly   Sedation with Precedex for comfort  Dilaudid & prns for discomfort  Optimize day / night cycle    ***Cardiovascular***  Cardiogenic shock, STEMI, s/p LAD stent  s/p impella CP removal 7/10   Amiodarone for A fib/flutter and Ectopy  VT arrest on 7/11 - Amio and lidocaine infusions, Lido d/kiersten, can continue amio for 10gm load as able  Vasopressin & Levophed for MAP > 65  Midodrine 10q8 to wean off pressors  off inotropic support   ASA daily    ***Pulmonary***  Acute post operative respiratory insufficiency   Deep breathing and coughing exercises.  Wean oxygen as able.    ***GI***  Tolerating tube feeds  Protonix for stress ulcer prophylaxis   Bowel regimen.  Trend LFTs, avoid hepatotoxins.    ***Renal***  VANDANA  - on CRRT  Yap catheter for strict I/O measurements.  Off diuretics  Trend creatinine and electrolytes.  Nephrology Consulted     ***ID***  IV Vanco empirically  Cefepime for post MI  bronchial aspirate + for Klebs,+ staph auras  Monitor WBC & fevers    ***Endocrine***  Hyperglycemia - Insulin infusion.    ***Hematology***  Acute blood loss anemia and Thrombocytopenia   No transfusion indication currently  Cangrelor switched to Plavix  Heparin gtt   Trend CBC and coags and monitor for bleeding      ***Skin/Musculoskeletal***  Out of bed in chair  PT/OT  Walking ICU as able         Care plan discussed with the ICU care team.   Patient remains critical, at risk for life threatening decompensation.    I have spent 30 minutes providing critical care management to this patient.    By signing my name below, I, Charan Martines, attest that this documentation has been prepared under the direction and in the presence of Macho Rodriguez MD.  Electronically signed: Charan Martines.    I, Macho Rodriguez MD,  personally performed the services described in this documentation. all medical record entries made by the scribe were at my direction and in my presence. I have reviewed the chart and agree that the record reflects my personal performance and is accurate and complete  Electronically signed: Macho Rodriguez MD.   Patient seen and examined at the bedside.    Remained critically ill on continuous ICU monitoring.      Brief Summary:  70 yo M presented 7/3/25 to [facility] with STEMI and cardiac arrest.  IABP -> Impella CP, LAD stent  Upgraded to [facility] and transferred to [facility] on 7/3/25  S/p [facility] ECMO decannulation & R Femoral artery repair on 7/8  VT arrest - CPR for 1min without shock and RoSC on 7/11    24 Hour events:  Started CRRT   DNR/DNI  remains on vasopressors       OBJECTIVE:  Vital Signs Last 24 Hrs  T(C): 37.4 (13 Jul 2025 04:00), Max: 38.4 (12 Jul 2025 12:00)  T(F): 99.3 (13 Jul 2025 04:00), Max: 101.1 (12 Jul 2025 12:00)  HR: 92 (13 Jul 2025 07:00) (92 - 113)  BP: --  BP(mean): --  RR: 13 (13 Jul 2025 07:00) (12 - 38)  SpO2: 97% (13 Jul 2025 07:00) (84% - 100%)    Parameters below as of 13 Jul 2025 05:18  Patient On (Oxygen Delivery Method): nasal cannula, high flow  O2 Flow (L/min): 40  O2 Concentration (%): 40              Physical Exam:   General: Sleepy but will interact  Neurology: Following commands  Respiratory: Bilateral breath sounds.  CV:  Afib  Abdominal: Soft, Nontender  Extremities: Warm, well-perfused  Yap       -------------------------------------------------------------------------------------------------------------------------------    Labs:                        9.9    24.86 )-----------( 126      ( 13 Jul 2025 01:23 )             30.1     07-13    138  |  97  |  92[H]  ----------------------------<  154[H]  4.4   |  20[L]  |  4.45[H]    Ca    8.6      13 Jul 2025 01:23  Phos  5.8     07-13  Mg     3.0     07-13    TPro  6.5  /  Alb  3.7  /  TBili  2.7[H]  /  DBili  x   /  AST  47[H]  /  ALT  23  /  AlkPhos  186[H]  07-13    LIVER FUNCTIONS - ( 13 Jul 2025 01:23 )  Alb: 3.7 g/dL / Pro: 6.5 g/dL / ALK PHOS: 186 U/L / ALT: 23 U/L / AST: 47 U/L / GGT: x           PT/INR - ( 12 Jul 2025 03:58 )   PT: 13.3 sec;   INR: 1.16 ratio         PTT - ( 13 Jul 2025 01:23 )  PTT:41.7 sec  ABG - ( 13 Jul 2025 00:55 )  pH, Arterial: 7.36  pH, Blood: x     /  pCO2: 36    /  pO2: 147   / HCO3: 20    / Base Excess: -4.6  /  SaO2: 99.8      ------------------------------------------------------------------------------------------------------------------------------  Assessment:  70 yo M presented 7/3/25 to [facility] with STEMI and cardiac arrest.  IABP -> Impella CP,  LAD stented.  Upgraded to [facility]O and transferred to [facility]     STEMI  Cardiogenic shock  Postop acute respiratory insufficiency  Thrombocytopenia  Hyperglycemia  VANDANA  Shock liver, Hyperbilirubinemia  Leukocytosis    ***Neuro***  Zyprexa nightly   Sedation with Precedex for comfort  Dilaudid & prns for discomfort  Optimize day / night cycle    ***Cardiovascular***  Cardiogenic shock, STEMI, s/p LAD stent  s/p impella CP removal 7/10   Amiodarone for A fib/flutter and Ectopy  VT arrest on 7/11 - Amio and lidocaine infusions, Lido d/kiersten, can continue amio for 10gm load as able  Vasopressin & Levophed for MAP > 65  Midodrine 10q8 to wean off pressors  off inotropic support   ASA daily    ***Pulmonary***  Acute post operative respiratory insufficiency   Deep breathing and coughing exercises.  Wean oxygen as able.    ***GI***  Tolerating tube feeds  Protonix for stress ulcer prophylaxis   Bowel regimen.  Trend LFTs, avoid hepatotoxins.    ***Renal***  VANDANA  - on CRRT  Yap catheter for strict I/O measurements.  Off diuretics  Trend creatinine and electrolytes.  Nephrology Consulted     ***ID***  IV Vanco / cefepime  bronchial aspirate + for Klebs,+ staph auras  Monitor WBC & fevers    ***Endocrine***  Hyperglycemia - Insulin infusion.    ***Hematology***  Acute blood loss anemia and Thrombocytopenia   No transfusion indication currently  Cangrelor switched to Plavix  Heparin gtt   Trend CBC and coags and monitor for bleeding      ***Skin/Musculoskeletal***  Out of bed in chair  PT/OT  Walking ICU as able       Care plan discussed with the ICU care team.   Patient remains critical, at risk for life threatening decompensation.    I have spent 50 minutes providing critical care management to this patient.    By signing my name below, I, Charan Martines, attest that this documentation has been prepared under the direction and in the presence of Macho Rodriguez MD.  Electronically signed: Charan Martines.    I, Macho Rodriguez MD,  personally performed the services described in this documentation. all medical record entries made by the scribe were at my direction and in my presence. I have reviewed the chart and agree that the record reflects my personal performance and is accurate and complete  Electronically signed: Macho Rodriguez MD.

## 2025-07-13 NOTE — PROGRESS NOTE ADULT - ASSESSMENT
69-year-old male with PMHx of HTN, HLD, DM2, CAD with stents, current smoker presenting as transfer from [facility] s/p cardiac arrest x4 on [facility] in setting of LAD occlusion s/p DESx1. Nephrology consulted for non-oliguric VANDANA.

## 2025-07-13 NOTE — PROGRESS NOTE ADULT - TIME BILLING
clinical exam, review of labs, review and modification of CRRT prescription and discussion of assesment and plan with other team members and ICU team.

## 2025-07-13 NOTE — PROGRESS NOTE ADULT - PROBLEM SELECTOR PLAN 1
VANDANA/ATN 2/2 cardiogenic shock/arrests and ALKA from CTAs + C s/p CURTIS. [facility] admission was 1.48 (6/29/25) and 1.14 (8/26/24). U/A (7/4) cloudy, mod blood, trace LE, trace protein, 9 RBC. CTA (6/30) with complex L renal cyst and small R renal cysts. SCr on admission 4.47 (7/3) and now 4.69 (7/7) was  on VA ECMO (previously with IABP then Impella) removed on 7/8 and now off bumex gtt (7/6) and pressors- vasopressin and NE.   -  patient had a drop in UOP for 2 hrs post ECMO removal was restarted on Bumex drip and UOP improved. 24 hr UOP 2.3 L. Bumex drip discontinued on 7/10. Received alubmin 7/11 and diuretics were held.   -Although non-oliguric, but renal fx continued to worsen, also had VT arrest (7/12). CVPs increased to 15-17. Start CRRT (7/12) for volume optimization and also possible uremic encephalopathy with BUN >100 (unable to assess currently as pt was started on mild sedation for agitation).   -Pt remains on pressor support, oliguric uoP 240cc/24hr. Will cw CRRT. [facility] underway       Leighton Mcgraw  Nephrology Fellow  Feel free to contact me on TEAMS  After 5 pm and on weekends please contact the on-call Fellow.

## 2025-07-13 NOTE — PROGRESS NOTE ADULT - SUBJECTIVE AND OBJECTIVE BOX
[facility] DIVISION OF KIDNEY DISEASES AND HYPERTENSION   FOLLOW UP NOTE    --------------------------------------------------------------------------------  Chief Complaint:    24 hour events/subjective: Pt. was seen and examined today.       PAST HISTORY  --------------------------------------------------------------------------------  No significant changes to PMH, PSH, FHx, SHx, unless otherwise noted    ALLERGIES & MEDICATIONS  --------------------------------------------------------------------------------  Allergies    No Known Allergies    Intolerances      Standing Inpatient Medications  aMIOdarone Infusion 1 mG/Min IV Continuous <Continuous>  aspirin  chewable 81 milliGRAM(s) Oral daily  cefepime   IVPB      cefepime   IVPB 1000 milliGRAM(s) IV Intermittent every 12 hours  chlorhexidine 2% Cloths 1 Application(s) Topical <User Schedule>  chlorhexidine 4% Liquid 1 Application(s) Topical <User Schedule>  clopidogrel Tablet 75 milliGRAM(s) Oral daily  CRRT Treatment    <Continuous>  dexMEDEtomidine Infusion 0.5 MICROgram(s)/kG/Hr IV Continuous <Continuous>  dextrose 50% Injectable 50 milliLiter(s) IV Push every 15 minutes  heparin  Infusion 1000 Unit(s)/Hr IV Continuous <Continuous>  insulin regular Infusion 3 Unit(s)/Hr IV Continuous <Continuous>  lidocaine   4% Patch 2 Patch Transdermal daily  midodrine 10 milliGRAM(s) Oral every 8 hours  norepinephrine Infusion 0.05 MICROgram(s)/kG/Min IV Continuous <Continuous>  OLANZapine 5 milliGRAM(s) Oral at bedtime  OLANZapine 2.5 milliGRAM(s) Oral <User Schedule>  pantoprazole  Injectable 40 milliGRAM(s) IV Push daily  polyethylene glycol 3350 17 Gram(s) Oral daily  PrismaSATE Dialysate BK 0 / 3.5 5000 milliLiter(s) CRRT <Continuous>  PrismaSOL Filtration BGK 0 / 2.5 5000 milliLiter(s) CRRT <Continuous>  PrismaSOL Filtration BGK 4 / 2.5 5000 milliLiter(s) CRRT <Continuous>  senna 2 Tablet(s) Oral at bedtime  sodium chloride 0.9% for Nebulization 3 milliLiter(s) Nebulizer two times a day  sodium chloride 0.9%. 1000 milliLiter(s) IV Continuous <Continuous>  vancomycin  IVPB 1000 milliGRAM(s) IV Intermittent every 24 hours  vasopressin Infusion 0.02 Unit(s)/Min IV Continuous <Continuous>    PRN Inpatient Medications  HYDROmorphone  Injectable 0.5 milliGRAM(s) IV Push every 6 hours PRN  sodium chloride 0.65% Nasal 1 Spray(s) Both Nostrils two times a day PRN  sodium chloride 0.9% lock flush 10 milliLiter(s) IV Push every 1 hour PRN      REVIEW OF SYSTEMS  --------------------------------------------------------------------------------  unable to obtain due to clinic status      VITALS/PHYSICAL EXAM  --------------------------------------------------------------------------------  T(C): 37.2 (07-13-25 @ 08:00), Max: 38.4 (07-12-25 @ 12:00)  HR: 57 (07-13-25 @ 10:00) (45 - 113)  BP: --  RR: 16 (07-13-25 @ 10:00) (12 - 38)  SpO2: 99% (07-13-25 @ 10:00) (84% - 100%)  Wt(kg): --        07-12-25 @ 07:01  -  07-13-25 @ 07:00  --------------------------------------------------------  IN: 3025.1 mL / OUT: 3159 mL / NET: -133.9 mL    07-13-25 @ 07:01  -  07-13-25 @ 10:35  --------------------------------------------------------  IN: 144.6 mL / OUT: 280 mL / NET: -135.4 mL        Physical Exam:  	Gen: NAD  	HEENT: on HFNC   	Pulm: CTA b/l  	CV: S1S2+  	Abd: Soft, +BS   	Ext: No LE edema B/L  	Neuro: Confused   	Skin: Warm and dry  	Dialysis [facility]      LABS/STUDIES  --------------------------------------------------------------------------------              9.9    24.86 >-----------<  126      [07-13-25 @ 01:23]              30.1     138  |  97  |  92  ----------------------------<  154      [07-13-25 @ 01:23]  4.4   |  20  |  4.45        Ca     8.6     [07-13-25 @ 01:23]      Mg     3.0     [07-13-25 @ 01:23]      Phos  5.8     [07-13-25 @ 01:23]    TPro  6.5  /  Alb  3.7  /  TBili  2.7  /  DBili  x   /  AST  47  /  ALT  23  /  AlkPhos  186  [07-13-25 @ 01:23]    PT/INR: PT 13.3 , INR 1.16       [07-12-25 @ 03:58]  PTT: 41.7       [07-13-25 @ 01:23]      Creatinine Trend:  SCr 4.45 [07-13 @ 01:23]  SCr 2.87 [07-13 @ 00:32]  SCr 7.86 [07-12 @ 14:10]  SCr 7.28 [07-12 @ 00:44]  SCr 6.97 [07-11 @ 12:58]    Urinalysis - [07-13-25 @ 01:23]      Color  / Appearance  / SG  / pH       Gluc 154 / Ketone   / Bili  / Urobili        Blood  / Protein  / Leuk Est  / Nitrite       RBC  / WBC  / Hyaline  / Gran  / Sq Epi  / Non Sq Epi  / Bacteria           Tacrolimus  Cyclosporine  Sirolimus  Mycophenolate  BK PCR  CMV PCR  Parvo PCR  EBV PCR

## 2025-07-14 NOTE — CONSULT NOTE ADULT - PROBLEM SELECTOR RECOMMENDATION 9
Currently on VA-ECMO and Impella CP   - Currently on Epi; Agree with weaning as tolerated today  - Now off levo  - Currently on Amiodarone  - Started on Bumex gtts for decreased UOP overnight; Plan to re-assess chemistries this evening  - Trend perfusion markers  - Agree with wean vent today
VANDANA likely hemodynamically mediated iso of cardiac arrests/shock and ALKA from CTAs + C s/p CURTIS. [facility] admission was 1.48 (6/29/25) and 1.14 (8/26/24). SCr on admission 4.47 (7/3) and now 4.11 (7/4) on VA ECMO (previously with IABP then Impella). U/A (7/4) cloudy, mod blood, trace LE, trace protein, 9 RBC. CTA (6/30) with complex L renal cyst and small R renal cysts. Raya catheter in place, bumex 2mg IVP and 2mg/hr gtt started ~5:30AM 7/4 AM for decreasing UOP. CVPs have remained 7-8 and UOP is 100cc/hr since bumex gtt initiated. Hemodynamically unstable on epi gtt, VA ECMO.  - No current urgent indication for RRT at this time. Repeat CBC/CMP and volume status eval in PM to reassess. Discussed with CTU team.   - Maintain raya catheter   - Monitor labs and I/Os. Avoid nephrotoxins including, ACE/ARB, NSAIDs, contrast, etc.    If you have any questions, please feel free to contact me:  Lola Faustin MD PGY-5  Nephrology Chief Fellow  [employer] Teams (Preferred)/ Pager [phone]   (After 5pm or on weekends please page the on-call fellow)
On Vasopressin, Norepinephrine, and Midodrine.   Treatment as per CICU

## 2025-07-14 NOTE — CONSULT NOTE ADULT - PROBLEM SELECTOR RECOMMENDATION 7
d/w Chaplaincy that indicate the patient already received the last rites.   Child life will be contacted.    Will continue to f/u for symptoms Rx and support.         Sylvain Correia MD  Associate Chief Geriatrics and Palliative Care (GaP) [facility]   [place] Consult Service   , [employer] at [facility]      Please contact me via Teams from Monday through Friday between 9am-5pm. If not answering, please call the palliative care pager [phone]    After 5pm and on weekends, please see the contact information below:    In the event of newly developing, evolving, or worsening symptoms, please contact the Palliative Medicine team via pager (if the patient is at [facility] #[phone] or if the patient is at [facility] #[phone]) The Geriatric and Palliative Medicine service has coverage 24 hours a day/ 7 days a week to provide medical recommendations regarding symptom management needs via telephone

## 2025-07-14 NOTE — CONSULT NOTE ADULT - NSCONSULTADDITIONALINFOA_GEN_ALL_CORE
Full note to f/u  Had a lengthy FM with the patient's wife and daughter. They agreed with capped care (capped pressors. He in alreyad DNR/I  and adjusting medication for comfort; however, his wife would like to continue CRRT until tomorrow. She understand that CRRT may need to be stopped due to malfunction or due to side effects like hypotension. I was indicated that prognosis may be of hours to days; however, it appeared to be mostly hours. Child life consult was called. Jaja is already f/u     Medication:   Dilaudid 0.5-1mg IV q 1hrs PRN moderate to severe pain  Dilaudid 1mgIV q 1hr PRN respiratory distress. Can increase to 2 mg V q1 PRN if 1 mg IV are not relieving symptoms.  Midazolam 1 mg IV q 4 PRN anxiety, agitation, or intractable respiratory distress   Glyco 0.4mg IV q 4 PRN secretions.        Sylvain Correia MD  Associate Chief Geriatrics and Palliative Care (GaP) [facility]   [place] Consult Service   , Medicine, [employer] at [facility]      Please contact me via Teams from Monday through Friday between 9am-5pm. If not answering, please call the palliative care pager [phone]    After 5pm and on weekends, please see the contact information below:    In the event of newly developing, evolving, or worsening symptoms, please contact the Palliative Medicine team via pager (if the patient is at [facility] #[phone] or if the patient is at [facility] #[phone]) The Geriatric and Palliative Medicine service has coverage 24 hours a day/ 7 days a week to provide medical recommendations regarding symptom management needs via telephone Had a lengthy FM with the patient's wife and daughter. They agreed with capped care (capped pressors. He in alreyad DNR/I  and adjusting medication for comfort; however, his wife would like to continue CRRT until tomorrow. She understand that CRRT may need to be stopped due to malfunction or due to side effects like hypotension. I was indicated that prognosis may be of hours to days; however, it appeared to be mostly hours. Child life consult was called. Chaplaincy is already f/u

## 2025-07-14 NOTE — PROGRESS NOTE ADULT - NUTRITIONAL ASSESSMENT
This patient has been assessed with a concern for Malnutrition and has been determined to have a diagnosis/diagnoses of Severe protein-calorie malnutrition.    This patient is being managed with:   Diet NPO with Tube Feed-  Tube Feeding Modality: Nasogastric  Nepro with Carb Steady (NEPRORTH)  Total Volume for 24 Hours (mL): 960  Continuous  Starting Tube Feed Rate {mL per Hour}: 20  Increase Tube Feed Rate by (mL): 10     Every 4 hours  Until Goal Tube Feed Rate (mL per Hour): 40  Tube Feed Duration (in Hours): 24  Tube Feed Start Time: 17:00  Entered: Jul 12 2025  4:39PM

## 2025-07-14 NOTE — PROGRESS NOTE ADULT - SUBJECTIVE AND OBJECTIVE BOX
Patient seen and examined at the bedside.    Remains critically ill on continuous ICU monitoring, at risk for life threatening decompensation.  All Labs, data reviewed. Plan of care discussed in length during multi-disciplinary ICU rounds.       Brief Summary:  68 yo M presented 7/3/25 to [facility] with STEMI and cardiac arrest.  IABP -> Impella CP, LAD stent  Upgraded to VA ECMO and transferred to [facility] on 7/3/25  S/p VA ECMO decannulation & R Femoral artery repair on 7/8  VT arrest - CPR for 1min without shock and RoSC on 7/11  started CRRT     24 Hour events:  DNR/DNI  remains on vasopressors   Had family meeting with wife, older daughter and Palliative team  cap care w/ vasopressors  Vaso 0.1 and Nor epi 0.05  Sedation/analgesia meds adjusted for patient's comfort  continue CRRT for now but if clots - will not restart  The younger daughter is 13yr old and will be coming in tomorrow - plans to withdraw care tomorrow         Objective:  Vital Signs Last 24 Hrs  T(C): 38.2 (14 Jul 2025 16:00), Max: 38.2 (14 Jul 2025 16:00)  T(F): 100.8 (14 Jul 2025 16:00), Max: 100.8 (14 Jul 2025 16:00)  HR: 64 (14 Jul 2025 18:15) (51 - 68)  BP: --  BP(mean): --  RR: 15 (14 Jul 2025 18:15) (10 - 25)  SpO2: 100% (14 Jul 2025 18:15) (90% - 100%)    Parameters below as of 14 Jul 2025 17:54  Patient On (Oxygen Delivery Method): nasal cannula                    Physical Exam:   General: intermittently follows commands, encephalopathic  Neurology: Following commands  Respiratory: Bilateral breath sounds.  CV:  Afib  Abdominal: Soft, Nontender  Extremities: Warm, well-perfused  Yap       -------------------------------------------------------------------------------------------------------------------------------    Labs:                        9.9    24.86 )-----------( 126      ( 13 Jul 2025 01:23 )             30.1     07-13    138  |  97  |  92[H]  ----------------------------<  154[H]  4.4   |  20[L]  |  4.45[H]    Ca    8.6      13 Jul 2025 01:23  Phos  5.8     07-13  Mg     3.0     07-13    TPro  6.5  /  Alb  3.7  /  TBili  2.7[H]  /  DBili  x   /  AST  47[H]  /  ALT  23  /  AlkPhos  186[H]  07-13    LIVER FUNCTIONS - ( 13 Jul 2025 01:23 )  Alb: 3.7 g/dL / Pro: 6.5 g/dL / ALK PHOS: 186 U/L / ALT: 23 U/L / AST: 47 U/L / GGT: x           PTT - ( 13 Jul 2025 01:23 )  PTT:41.7 sec  ABG - ( 14 Jul 2025 04:03 )  pH, Arterial: 7.39  pH, Blood: x     /  pCO2: 30    /  pO2: 141   / HCO3: 18    / Base Excess: -5.8  /  SaO2: 99.7                  ALL MEDICATIONS   MEDICATIONS  (STANDING):  aspirin  chewable 81 milliGRAM(s) Oral daily  chlorhexidine 2% Cloths 1 Application(s) Topical <User Schedule>  chlorhexidine 4% Liquid 1 Application(s) Topical <User Schedule>  clopidogrel Tablet 75 milliGRAM(s) Oral daily  dexMEDEtomidine Infusion 0.5 MICROgram(s)/kG/Hr (11.3 mL/Hr) IV Continuous <Continuous>  dextrose 50% Injectable 50 milliLiter(s) IV Push every 15 minutes  HYDROmorphone  Injectable 0.5 milliGRAM(s) IV Push every 4 hours  insulin regular Infusion 3 Unit(s)/Hr (3 mL/Hr) IV Continuous <Continuous>  norepinephrine Infusion 0.05 MICROgram(s)/kG/Min (8.44 mL/Hr) IV Continuous <Continuous>  pantoprazole  Injectable 40 milliGRAM(s) IV Push daily  sodium chloride 0.9% for Nebulization 3 milliLiter(s) Nebulizer two times a day  sodium chloride 0.9%. 1000 milliLiter(s) (10 mL/Hr) IV Continuous <Continuous>  vasopressin Infusion 0.02 Unit(s)/Min (3 mL/Hr) IV Continuous <Continuous>    MEDICATIONS  (PRN):  glycopyrrolate Injectable 0.4 milliGRAM(s) IV Push four times a day PRN Secretions  HYDROmorphone  Injectable 0.5 milliGRAM(s) IV Push every 1 hour PRN Moderate Pain (4 - 6)  HYDROmorphone  Injectable 1 milliGRAM(s) IV Push every 1 hour PRN Respiratory distress or RR > 28  HYDROmorphone  Injectable 1 milliGRAM(s) IV Push every 1 hour PRN Severe Pain (7 - 10)  midazolam Injectable 1 milliGRAM(s) IV Push every 1 hour PRN Intractable respiratory distress, anxiety, or agitation.  midodrine 10 milliGRAM(s) Oral every 8 hours PRN Hypotension  ondansetron Injectable 4 milliGRAM(s) IV Push every 6 hours PRN Nausea and/or Vomiting  sodium chloride 0.65% Nasal 1 Spray(s) Both Nostrils two times a day PRN Nasal Congestion  sodium chloride 0.9% lock flush 10 milliLiter(s) IV Push every 1 hour PRN Pre/post blood products, medications, blood draw, and to maintain line patency    ------------------------------------------------------------------------------------------------------------------------------  Assessment:  68 yo M presented 7/3/25 to [facility] with STEMI and cardiac arrest.  IABP -> Impella CP,  LAD stented.  Upgraded to VA ECMO and transferred to [facility]     STEMI  Cardiogenic shock  Postop acute respiratory insufficiency  Thrombocytopenia  Hyperglycemia  VANDANA  Shock liver, Hyperbilirubinemia  Leukocytosis    ***Neuro***  Zyprexa nightly   Sedation with Precedex for comfort  Dilaudid & prns for discomfort  Optimize day / night cycle    ***Cardiovascular***  Cardiogenic shock, STEMI, s/p LAD stent  s/p impella CP removal 7/10   Amiodarone for A fib/flutter and Ectopy  VT arrest on 7/11 - Amio and lidocaine infusions, Lido d/kiersten, can continue amio for 10gm load as able  DNR/DNI  remains on vasopressors   Had family meeting with wife, older daughter and Palliative team  cap care w/ vasopressors  Vaso 0.1 and Nor epi 0.05  Sedation/analgesia meds adjusted for patient's comfort  continue CRRT for now but if clots - will not restart  The younger daughter is 13yr old and will be coming in tomorrow - plans to withdraw care tomorrow     ***Pulmonary***  Acute post operative respiratory insufficiency   Deep breathing and coughing exercises.  Wean oxygen as able.    ***GI***  Tolerating tube feeds  Protonix for stress ulcer prophylaxis   Bowel regimen.  Trend LFTs, avoid hepatotoxins.    ***Renal***  VANDANA  - on CRRT  Yap catheter for strict I/O measurements.  Off diuretics  Trend creatinine and electrolytes.  Nephrology Consulted     ***ID***  monitor off antibiotics    ***Endocrine***  Hyperglycemia - Insulin infusion.    ***Hematology***  Acute blood loss anemia and Thrombocytopenia   No transfusion indication currently  ASA, plavix  DVT Ppx w/ SQH          I, Ethan Oconnell MD, personally performed the services described in this documentation. all medical record entries made by the scribe were at my direction and in my presence. I have reviewed the chart and agree that the record reflects my personal performance and is accurate and complete  Electronically signed:   Ethan Oconnell MD  CT ICU attending     ICU time: 54 mins

## 2025-07-14 NOTE — PROGRESS NOTE ADULT - SUBJECTIVE AND OBJECTIVE BOX
infectious diseases progress note:    Patient is a 69y old  Male who presents with a chief complaint of Chest pain (13 Jul 2025 10:35)        Cardiogenic shock               Allergies    No Known Allergies    Intolerances        ANTIBIOTICS/RELEVANT:  antimicrobials  cefepime   IVPB      cefepime   IVPB 1000 milliGRAM(s) IV Intermittent every 12 hours  vancomycin  IVPB 1000 milliGRAM(s) IV Intermittent every 24 hours    immunologic:    OTHER:  aMIOdarone Infusion 1 mG/Min IV Continuous <Continuous>  aspirin  chewable 81 milliGRAM(s) Oral daily  chlorhexidine 2% Cloths 1 Application(s) Topical <User Schedule>  chlorhexidine 4% Liquid 1 Application(s) Topical <User Schedule>  clopidogrel Tablet 75 milliGRAM(s) Oral daily  CRRT Treatment    <Continuous>  dexMEDEtomidine Infusion 0.5 MICROgram(s)/kG/Hr IV Continuous <Continuous>  dextrose 50% Injectable 50 milliLiter(s) IV Push every 15 minutes  heparin  Infusion 1000 Unit(s)/Hr IV Continuous <Continuous>  HYDROmorphone  Injectable 0.5 milliGRAM(s) IV Push every 6 hours PRN  insulin regular Infusion 3 Unit(s)/Hr IV Continuous <Continuous>  lidocaine   4% Patch 2 Patch Transdermal daily  midodrine 10 milliGRAM(s) Oral every 8 hours  morphine  - Injectable 2 milliGRAM(s) IV Push every 2 hours PRN  norepinephrine Infusion 0.05 MICROgram(s)/kG/Min IV Continuous <Continuous>  OLANZapine 5 milliGRAM(s) Oral at bedtime  OLANZapine 2.5 milliGRAM(s) Oral <User Schedule>  pantoprazole  Injectable 40 milliGRAM(s) IV Push daily  polyethylene glycol 3350 17 Gram(s) Oral daily  PrismaSATE Dialysate BK 0 / 3.5 5000 milliLiter(s) CRRT <Continuous>  PrismaSOL Filtration BGK 0 / 2.5 5000 milliLiter(s) CRRT <Continuous>  PrismaSOL Filtration BGK 4 / 2.5 5000 milliLiter(s) CRRT <Continuous>  scopolamine 1 mG/72 Hr(s) Patch 1 Patch Transdermal every 72 hours  senna 2 Tablet(s) Oral at bedtime  sodium chloride 0.65% Nasal 1 Spray(s) Both Nostrils two times a day PRN  sodium chloride 0.9% for Nebulization 3 milliLiter(s) Nebulizer two times a day  sodium chloride 0.9% lock flush 10 milliLiter(s) IV Push every 1 hour PRN  sodium chloride 0.9%. 1000 milliLiter(s) IV Continuous <Continuous>  vasopressin Infusion 0.02 Unit(s)/Min IV Continuous <Continuous>      Objective:  Vital Signs Last 24 Hrs  T(C): 37.7 (14 Jul 2025 08:00), Max: 37.7 (14 Jul 2025 08:00)  T(F): 99.9 (14 Jul 2025 08:00), Max: 99.9 (14 Jul 2025 08:00)  HR: 64 (14 Jul 2025 08:00) (45 - 88)  BP: --  BP(mean): --  RR: 11 (14 Jul 2025 08:00) (11 - 48)  SpO2: 98% (14 Jul 2025 08:00) (84% - 100%)    Parameters below as of 14 Jul 2025 08:00  Patient On (Oxygen Delivery Method): nasal cannula  O2 Flow (L/min): 6         Ear/Nose/Throat: no oral lesion, no sinus tenderness on percussion	  Neck:no JVD, no lymphadenopathy, supple  Respiratory: CTA jeannie  Cardiovascular: S1S2 RRR, no murmurs  Gastrointestinal:soft, (+) BS, no HSM  Extremities:no e/e/c        LABS:                        9.9    24.86 )-----------( 126      ( 13 Jul 2025 01:23 )             30.1     07-13    138  |  97  |  92[H]  ----------------------------<  154[H]  4.4   |  20[L]  |  4.45[H]    Ca    8.6      13 Jul 2025 01:23  Phos  5.8     07-13  Mg     3.0     07-13    TPro  6.5  /  Alb  3.7  /  TBili  2.7[H]  /  DBili  x   /  AST  47[H]  /  ALT  23  /  AlkPhos  186[H]  07-13    PTT - ( 13 Jul 2025 01:23 )  PTT:41.7 sec  Urinalysis Basic - ( 13 Jul 2025 01:23 )    Color: x / Appearance: x / SG: x / pH: x  Gluc: 154 mg/dL / Ketone: x  / Bili: x / Urobili: x   Blood: x / Protein: x / Nitrite: x   Leuk Esterase: x / RBC: x / WBC x   Sq Epi: x / Non Sq Epi: x / Bacteria: x          MICROBIOLOGY:    RECENT CULTURES:  07-12 @ 13:00 Blood Blood-Peripheral                No growth at 24 hours    07-12 @ 12:50 Blood Blood-Peripheral                No growth at 24 hours          RESPIRATORY CULTURES:              RADIOLOGY & ADDITIONAL STUDIES:        Pager [phone]  After 5 pm/weekends or if no response :[phone]

## 2025-07-14 NOTE — PROGRESS NOTE ADULT - SUBJECTIVE AND OBJECTIVE BOX
[facility] Division of Kidney Diseases & Hypertension  FOLLOW UP NOTE  [phone]--------------------------------------------------------------------------------  Chief Complaint:    24 hour events/subjective: Pt. seen and examined at bedside earlier today.   PAST HISTORY  --------------------------------------------------------------------------------  No significant changes to PMH, PSH, FHx, SHx from H&P unless otherwise noted.    ALLERGIES & MEDICATIONS  --------------------------------------------------------------------------------  Allergies    No Known Allergies    Intolerances      Standing Inpatient Medications  aspirin  chewable 81 milliGRAM(s) Oral daily  cefepime   IVPB      cefepime   IVPB 1000 milliGRAM(s) IV Intermittent every 12 hours  chlorhexidine 2% Cloths 1 Application(s) Topical <User Schedule>  chlorhexidine 4% Liquid 1 Application(s) Topical <User Schedule>  clopidogrel Tablet 75 milliGRAM(s) Oral daily  dexMEDEtomidine Infusion 0.5 MICROgram(s)/kG/Hr IV Continuous <Continuous>  dextrose 50% Injectable 50 milliLiter(s) IV Push every 15 minutes  heparin  Infusion 1000 Unit(s)/Hr IV Continuous <Continuous>  insulin regular Infusion 3 Unit(s)/Hr IV Continuous <Continuous>  lidocaine   4% Patch 2 Patch Transdermal daily  midodrine 10 milliGRAM(s) Oral every 8 hours  norepinephrine Infusion 0.05 MICROgram(s)/kG/Min IV Continuous <Continuous>  OLANZapine 5 milliGRAM(s) Oral at bedtime  OLANZapine 2.5 milliGRAM(s) Oral <User Schedule>  pantoprazole  Injectable 40 milliGRAM(s) IV Push daily  polyethylene glycol 3350 17 Gram(s) Oral daily  scopolamine 1 mG/72 Hr(s) Patch 1 Patch Transdermal every 72 hours  senna 2 Tablet(s) Oral at bedtime  sodium chloride 0.9% for Nebulization 3 milliLiter(s) Nebulizer two times a day  sodium chloride 0.9%. 1000 milliLiter(s) IV Continuous <Continuous>  vancomycin  IVPB 1000 milliGRAM(s) IV Intermittent every 24 hours  vasopressin Infusion 0.02 Unit(s)/Min IV Continuous <Continuous>    PRN Inpatient Medications  HYDROmorphone  Injectable 0.5 milliGRAM(s) IV Push every 6 hours PRN  morphine  - Injectable 2 milliGRAM(s) IV Push every 2 hours PRN  sodium chloride 0.65% Nasal 1 Spray(s) Both Nostrils two times a day PRN  sodium chloride 0.9% lock flush 10 milliLiter(s) IV Push every 1 hour PRN      REVIEW OF SYSTEMS  --------------------------------------------------------------------------------  Gen: No fevers/chills  Unable to obtain ROS due to current clinical status.    All other systems were reviewed and are negative, except as noted above.    VITALS/PHYSICAL EXAM  --------------------------------------------------------------------------------  T(C): 37.8 (07-14-25 @ 12:00), Max: 37.8 (07-14-25 @ 12:00)  HR: 67 (07-14-25 @ 13:00) (51 - 68)  BP: --  RR: 11 (07-14-25 @ 13:00) (10 - 25)  SpO2: 99% (07-14-25 @ 13:00) (91% - 100%)  Wt(kg): --        07-13-25 @ 07:01  -  07-14-25 @ 07:00  --------------------------------------------------------  IN: 3238.6 mL / OUT: 3177 mL / NET: 61.6 mL    07-14-25 @ 07:01  -  07-14-25 @ 13:06  --------------------------------------------------------  IN: 713.2 mL / OUT: 881 mL / NET: -167.8 mL      Physical Exam:  Gen: NAD  Pulm: CTA B/L  CV: RRR, S1S2;  Abd: +BS, soft  : No suprapubic tenderness  Extremities: no bilateral LE edema.  Neuro: Awake, alert  Skin: Warm  Vascular access:    LABS/STUDIES  --------------------------------------------------------------------------------              9.9    24.86 >-----------<  126      [07-13-25 @ 01:23]              30.1     138  |  97  |  92  ----------------------------<  154      [07-13-25 @ 01:23]  4.4   |  20  |  4.45        Ca     8.6     [07-13-25 @ 01:23]      Mg     3.0     [07-13-25 @ 01:23]      Phos  5.8     [07-13-25 @ 01:23]    TPro  6.5  /  Alb  3.7  /  TBili  2.7  /  DBili  x   /  AST  47  /  ALT  23  /  AlkPhos  186  [07-13-25 @ 01:23]      PTT: 41.7       [07-13-25 @ 01:23]      Creatinine Trend:  SCr 4.45 [07-13 @ 01:23]  SCr 2.87 [07-13 @ 00:32]  SCr 7.86 [07-12 @ 14:10]  SCr 7.28 [07-12 @ 00:44]  SCr 6.97 [07-11 @ 12:58]    Urinalysis - [07-13-25 @ 01:23]      Color  / Appearance  / SG  / pH       Gluc 154 / Ketone   / Bili  / Urobili        Blood  / Protein  / Leuk Est  / Nitrite       RBC  / WBC  / Hyaline  / Gran  / Sq Epi  / Non Sq Epi  / Bacteria

## 2025-07-14 NOTE — CONSULT NOTE ADULT - PROBLEM SELECTOR RECOMMENDATION 3
- Currently on Bumex gtts at 2mg/hr  - Cardiorenal following; Plan for repeat chemistries this evening to assess need for clearance
O2 by NC.   Based on PRN opioid needs, will start:   Dilaudid 0.5mg IV q 4 hrs ATC   Dilaudid 1mgIV q 1hr PRN respiratory distress. Can increase to 2 mg V q1 PRN if 1 mg IV are not relieving symptoms.  Midazolam 1 mg IV q 4 PRN anxiety, agitation, or intractable respiratory distress   Glyco 0.4mg IV q 4 PRN secretions.

## 2025-07-14 NOTE — PROGRESS NOTE ADULT - PROBLEM SELECTOR PLAN 1
VANDANA/ATN 2/2 cardiogenic shock/arrests and ALKA from CTAs + C s/p CURTIS. [facility] was 1.48 (6/29/25) and 1.14 (8/26/24). U/A (7/4) cloudy, mod blood, trace LE, trace protein, 9 RBC. CTA (6/30) with complex L renal cyst and small R renal cysts. SCr on admission 4.47 (7/3) and now 4.69 (7/7) was  on VA ECMO (previously with IABP then Impella) removed on 7/8 and now off bumex gtt (7/6) and pressors- vasopressin and NE.    patient had a drop in UOP for 2 hrs post ECMO removal was restarted on Bumex drip and UOP improved. 24 hr UOP 2.3 L. Bumex drip discontinued on 7/10. Received alubmin 7/11 and diuretics were held.   -Although non-oliguric, but renal fx continued to worsen, also had VT arrest (7/12). CVPs increased to 15-17. Start CRRT (7/12) for volume optimization and also possible uremic encephalopathy with BUN >100 (unable to assess currently as pt was started on mild sedation for agitation).   -Pt remains on pressor support, anuric uoP 10cc/24hr. Will cw CRRT as tolerated.  [facility] underway- no escalation of care for now.     Feel free to contact me via TEAMS.     Susanne Bennett MD   Nephrology Fellow   After 5PM/Weekends/Holidays please contact the on call fellow.

## 2025-07-14 NOTE — CONSULT NOTE ADULT - PROBLEM SELECTOR RECOMMENDATION 2
s/p PCI to LAD 7/3  - Agree with initiate Cangrelor today
Please refer to the [facility] note for details; however, in summary, the current plan, if medically feasible, is to continue CRRT until tomorrow. The patient's partner has indicated that she will be stopping it then, with the hope that their daughter will have the opportunity to see him before he dies.

## 2025-07-14 NOTE — PROGRESS NOTE ADULT - ATTENDING COMMENTS
goals of care conversations reviewed   CRRT started over the weekend  no escalation of care - likely comfort measures once family visits     rosita floyd  nephrology attending   please contact me on TEAMS   Office- [phone]

## 2025-07-14 NOTE — PROGRESS NOTE ADULT - ASSESSMENT
69-year-old male with PMHx of HTN, HLD, DM2, CAD with stents, current smoker presenting as transfer from [facility] s/p cardiac arrest x4 on VA ECMO in setting of LAD occlusion s/p DESx1. Nephrology consulted for non-oliguric VANDANA. Patient had a VT Cardiac arrest on 7/12 and became anuric after was started on CRRT on 7/12.

## 2025-07-14 NOTE — CONSULT NOTE ADULT - ASSESSMENT
69-year-old male, has significant cardiac and systemic health issues, including hypertension, hyperlipidemia, diabetes, and coronary artery disease, complicated by smoking. Following a cardiac arrest at his primary care visit and multiple resuscitations, he underwent cardiac catheterization and advanced interventions. Despite being off ECMO and Impella, he remains in persistent multi-organ failure. The Geriatrics and Palliative Medicine team is involved to discuss goals of care and manage symptoms.

## 2025-07-14 NOTE — PROGRESS NOTE ADULT - ASSESSMENT
69 year old with many problems  CAd  CHF  DM  admitted after cardiac arrest requiring resuscitation  transferred and had ECMO place and left femoral impella   had right and left fem artery catheterization   currently in OR in attempt to get ECMO and impella out  pt has  f sputum with MSSA and sensitive  klebsiella        pt is to go on comfort care and so we can dc antibiotics

## 2025-07-14 NOTE — PROGRESS NOTE ADULT - SUBJECTIVE AND OBJECTIVE BOX
Patient seen and examined at the bedside.    Remained critically ill on continuous ICU monitoring.    OBJECTIVE:  Vital Signs Last 24 Hrs  T(C): 38.5 (14 Jul 2025 20:00), Max: 38.5 (14 Jul 2025 20:00)  T(F): 101.3 (14 Jul 2025 20:00), Max: 101.3 (14 Jul 2025 20:00)  HR: 71 (14 Jul 2025 20:00) (51 - 71)  BP: --  BP(mean): --  RR: 11 (14 Jul 2025 20:00) (10 - 25)  SpO2: 98% (14 Jul 2025 20:00) (90% - 100%)    Parameters below as of 14 Jul 2025 20:00  Patient On (Oxygen Delivery Method): nasal cannula  O2 Flow (L/min): 4      Physical Exam:   General: NAD  Neurology: sedated, moves all extremities, nods yes/no to questions   Eyes: bilateral pupils equal and reactive   ENT/Neck: Neck supple, trachea midline, RIJ intro, LIJ HD cath   Respiratory: rhonchi bilaterally  CV: S1S2, no murmurs        [x] Aflutter  Abdominal: Soft, NT, ND +BS   Extremities: 1+ pedal edema noted, + peripheral pulses, L fem impella site stable   Skin: No Rashes                      Assessment:  70 yo M presented 7/3/25 to [facility] with STEMI and cardiac arrest.  IABP -> Impella CP,  LAD stented.  Upgraded to VA ECMO and transferred to [facility]   ECMO decann 7/8/2025    STEMI  Cardiogenic shock  Postop acute respiratory insufficiency  Thrombocytopenia  Hyperglycemia  VANDANA  Shock liver, Hyperbilirubinemia  Leukocytosis      Plan:   ***Neuro***  [x] Sedated with Precedex as needed  Post operative neuro assessment   Pain management with Dilaudid and prns    ***Cardiovascular***  Invasive hemodynamic monitoring, assess perfusion indices   AF 64-71 / CVP 5-12 / MAP 76-99 / Hct 30.1 / Lactate 4.1  [x] Levophed 0.05 mcg/kg/min  [x] Vasopressin 0.1 units/min  [x] ASA [x] Plavix   [x] K<4 Mg>2 Phos>2.5  Serial EKG and cardiac enzymes     ***Pulmonary***  [x] NC 4L  Encourage incentive spirometry, continue pulse ox monitoring, follow ABGs, continue nebulizers          ***GI***  [x] Diet: Nepro TF goal @40 cc/hr  [x] Protonix     ***Renal***  GFR 13  Continue to monitor I/Os, BUN/Creatinine.   Replete lytes ZACKERY Yap present  Off CVVHD    ***Heme***  Acute blood loss anemia and Thrombocytopenia   No transfusion indication currently  ASA, Plavix    ***ID***  No active antibiotic coverage      ***Endocrine***  [x] Stress Hyperglycemia : HbA1c 8.9%                - [x] Insulin gtt              - Need tight glycemic control to prevent wound infection.    Patient requires continuous monitoring with bedside rhythm monitoring, pulse oximetry monitoring, and continuous central venous and arterial pressure monitoring; and intermittent blood gas analysis. Care plan discussed with the ICU care team.   Patient remained critical, at risk for life threatening decompensation.    I have spent 51 minutes providing critical care management to this patient.    By signing my name below, I, Kraig Diaz, attest that this documentation has been prepared under the direction and in the presence of Nathan Magaña NP  Electronically signed: Jenny Francis, 07-14-25 @ 21:28    I, Nathan Magaña NP, personally performed the services described in this documentation. all medical record entries made by the jenny were at my direction and in my presence. I have reviewed the chart and agree that the record reflects my personal performance and is accurate and complete  Electronically signed: Nathan Magaña NP

## 2025-07-14 NOTE — CONSULT NOTE ADULT - PROBLEM SELECTOR PROBLEM 7
Patient discharged to [facility]  Prescriptions and Discharge instructions were given and reviewed with patient  All questions answered  IV was removed per policy and procedure  Pt tolerated this well without complaint  Occlusive dressing was applied to the site  Patient left the unit with all belongings and a firm understanding of discharge instructions  This rn called leana rangel 5 times, all 5 unsuccessful with no answer and no way to leave a message 
Witnessed security return $200 to patient before discharge
Palliative care encounter

## 2025-07-14 NOTE — CONSULT NOTE ADULT - CONSULT REASON
loss of pedal pulses s/p impella removal
pna
VANDANA
Cardiogenic Shock
s/p VA ECMO, Impella, both now removed, not a candidate for any more MCS, cardiogenic shock with worsening hemodynamics, currently DNR/DNI, possible transfer to PCU

## 2025-07-14 NOTE — CONSULT NOTE ADULT - TIME BILLING
pt eval  chart review  imaging review  coordination of care
Total time spent including the following  [x] Physical chart review and documentation   An extensive review of the physical chart, electronic health record, and documentation was conducted to obtain collateral information including but not limited to:   - Current inpatient records (ED, H&P, primary team, and consultants [ Nephro  ])   - Inpatient values/results (CBC, CMP, Imaging)   - Prior inpatient records (if available)   - Social work assessments   - Current or proposed treatment plans   - Pharmacotherapy review (including I-STOP if applicable)  []review of medical literature related to pathogenesis, disease/illness progress, treatment and/or prognosis  [x]care coordination  [x]discussion with the primary team  [x]discussion with floor staff  [x]discussion with the patient's surrogate decision maker, or family  [x]Physical Exam and/or review of systems   [x]Formulation of assessment and plan   [x]Evaluating for response to treatment and side effects of opioids or benzodiazepines

## 2025-07-14 NOTE — CONSULT NOTE ADULT - SUBJECTIVE AND OBJECTIVE BOX
Date of Service:07-14-25 @ 17:08  HPI:  70 y/o Male w/PMHx Hypertension, hyperlipidemia, diabetes, CAD with stents, current smoker (2-3 PPD) brought in to [facility] by EMS from PCP's office status post cardiac arrest, ROSC achieved in 5-10 min.  Patient was in the lobby of his PCP when he was not feeling well, family told patient to sit down, and when came back with a wheelchair patient lost pulse.  Police showed up on scene and did 1 round of CPR, no meds were given.  ROSC achieved.  Patient brought in by EMS, altered, moaning, awake, unresponsive to questions on nonrebreather saturating in the 80s.  As per family patient has been feeling chest pain the past few days. On arrival to the ed, he was agitated and fighting restraints. unclear mental status otherwise, was moving his leg at the end of first round of ACLS. In the ED, pt lost his pulse, ACLS started, regained a pulse after 2 min. Patient was given etomidate, versed, and intubated. Then pt lost a pulse again, regained a pulse after another round of CPR. Patient started on epi gtt. pt had runs of VT, was given total of 2 epi, lido x2, amio 150 mg. Was given bicarb x2. Patient brought to cath lab, underwent LHC which showed LAD occlusion. Left femoral Impella CP inserted for support, s/p CURTIS to the LAD. Upgraded to VA ECMO (25Fr left femoral venous cannula, 17Fr right femoral arterial cannula with 6Fr distal reperfusion cannula) for continued cardiogenic shock after stent placement. Patient was then transferred to [facility] from [facility] for further care. (03 Jul 2025 20:11)    PERTINENT PM/SXH:   Hypertension    CAD (coronary artery disease)    Hyperlipemia    Diabetes      S/P cardiac catheterization      FAMILY HISTORY:    Family Hx substance abuse [ ]yes [ ]no  ITEMS NOT CHECKED ARE NOT PRESENT    SOCIAL HISTORY:   Significant other/partner[ ]  Children[ ]  Anglican/Spirituality:  Substance hx:  [ ]   Tobacco hx:  [ ]   Alcohol hx: [ ]   Home Opioid hx:  [ ] I-Stop Reference No:  Living Situation: [ ]Home  [ ]Long term care  [ ]Rehab [ ]Other    ADVANCE DIRECTIVES:    DNR/MOLST  [ ]  Living Will  [ ]   DECISION MAKER(s):  [ ] Health Care Proxy(s)  [ ] Surrogate(s)  [ ] Guardian           Name(s): Phone Number(s):    BASELINE (I)ADL(s) (prior to admission):  [place]: [ ]Total  [ ] Moderate [ ]Dependent    Allergies    No Known Allergies    Intolerances    MEDICATIONS  (STANDING):  aspirin  chewable 81 milliGRAM(s) Oral daily  chlorhexidine 2% Cloths 1 Application(s) Topical <User Schedule>  chlorhexidine 4% Liquid 1 Application(s) Topical <User Schedule>  clopidogrel Tablet 75 milliGRAM(s) Oral daily  dexMEDEtomidine Infusion 0.5 MICROgram(s)/kG/Hr (11.3 mL/Hr) IV Continuous <Continuous>  dextrose 50% Injectable 50 milliLiter(s) IV Push every 15 minutes  HYDROmorphone  Injectable 0.5 milliGRAM(s) IV Push every 4 hours  insulin regular Infusion 3 Unit(s)/Hr (3 mL/Hr) IV Continuous <Continuous>  norepinephrine Infusion 0.05 MICROgram(s)/kG/Min (8.44 mL/Hr) IV Continuous <Continuous>  pantoprazole  Injectable 40 milliGRAM(s) IV Push daily  sodium chloride 0.9% for Nebulization 3 milliLiter(s) Nebulizer two times a day  sodium chloride 0.9%. 1000 milliLiter(s) (10 mL/Hr) IV Continuous <Continuous>  vasopressin Infusion 0.02 Unit(s)/Min (3 mL/Hr) IV Continuous <Continuous>    MEDICATIONS  (PRN):  glycopyrrolate Injectable 0.4 milliGRAM(s) IV Push four times a day PRN Secretions  HYDROmorphone  Injectable 0.5 milliGRAM(s) IV Push every 1 hour PRN Moderate Pain (4 - 6)  HYDROmorphone  Injectable 1 milliGRAM(s) IV Push every 1 hour PRN Respiratory distress or RR > 28  HYDROmorphone  Injectable 1 milliGRAM(s) IV Push every 1 hour PRN Severe Pain (7 - 10)  midazolam Injectable 1 milliGRAM(s) IV Push every 1 hour PRN Intractable respiratory distress, anxiety, or agitation.  midodrine 10 milliGRAM(s) Oral every 8 hours PRN Hypotension  ondansetron Injectable 4 milliGRAM(s) IV Push every 6 hours PRN Nausea and/or Vomiting  sodium chloride 0.65% Nasal 1 Spray(s) Both Nostrils two times a day PRN Nasal Congestion  sodium chloride 0.9% lock flush 10 milliLiter(s) IV Push every 1 hour PRN Pre/post blood products, medications, blood draw, and to maintain line patency    PRESENT SYMPTOMS: [ ]Unable to self-report  [ ] CPOT [ ] PAINADs [ ] RDOS  Source if other than patient:  [ ]Family   [ ]Team     Pain: [ ]yes [ ]no  QOL impact -   Location -                    Aggravating factors -  Quality -  Radiation -  Timing-  Severity (0-10 scale):  Minimal acceptable level (0-10 scale):     CPOT:    https://www.[facility].org/getattachment/[email or web address]/Critical-Care-Pain-Observation-Tool-(CPOT)    PAINAD Score: See PAINAD tool and score below     Dyspnea:                           [ ]Mild [ ]Moderate [ ]Severe    RDOS: See RDOS tool and score below   0 to 2  minimal or no respiratory distress   3  mild distress  4 to 6 moderate distress  >7 severe distress      Anxiety:                             [ ]Mild [ ]Moderate [ ]Severe  Fatigue:                             [ ]Mild [ ]Moderate [ ]Severe  Nausea:                             [ ]Mild [ ]Moderate [ ]Severe  Loss of appetite:              [ ]Mild [ ]Moderate [ ]Severe  Constipation:                    [ ]Mild [ ]Moderate [ ]Severe    PCSSQ[Palliative Care Spiritual Screening Question]   Severity (0-10):  Score of 4 or > indicate consideration of Chaplaincy referral.  Chaplaincy Referral: [ ] yes [ ] refused [ ] following [ ] Deferred     Caregiver [place]? : [ ] yes [ ] no [ ] Deferred [ ] Declined             Social work referral [ ] Patient & Family Centered Care Referral [ ]     Anticipatory Grief present?:  [ ] yes [ ] no  [ ] Deferred                  Social work referral [ ] Chaplaincy Referral [ ]    		  Other Symptoms:  [ ]All other review of systems negative     Palliative Performance Status Version 2:   See PPSv2 tool and score below          PHYSICAL EXAM:  Vital Signs Last 24 Hrs  T(C): 38.2 (14 Jul 2025 16:00), Max: 38.2 (14 Jul 2025 16:00)  T(F): 100.8 (14 Jul 2025 16:00), Max: 100.8 (14 Jul 2025 16:00)  HR: 64 (14 Jul 2025 17:00) (51 - 68)  BP: --  BP(mean): --  RR: 11 (14 Jul 2025 17:00) (10 - 25)  SpO2: 100% (14 Jul 2025 17:00) (90% - 100%)    Parameters below as of 14 Jul 2025 16:00  Patient On (Oxygen Delivery Method): nasal cannula  O2 Flow (L/min): 6   I&O's Summary    13 Jul 2025 07:01  -  14 Jul 2025 07:00  --------------------------------------------------------  IN: 3238.6 mL / OUT: 3177 mL / NET: 61.6 mL    14 Jul 2025 07:01  -  14 Jul 2025 17:08  --------------------------------------------------------  IN: 1164 mL / OUT: 1549 mL / NET: -385 mL      GENERAL: [ ]Cachexia    [ ]Alert  [ ]Oriented x   [ ]Lethargic  [ ]Unarousable  [ ]Verbal  [ ]Non-Verbal  Behavioral:   [ ] Anxiety  [ ] Delirium [ ] Agitation [ ] Other  HEENT:  [ ]Normal   [ ]Dry mouth   [ ]ET Tube/Trach  [ ]Oral lesions  PULMONARY:   [ ]Clear [ ]Tachypnea  [ ]Audible excessive secretions   [ ]Rhonchi        [ ]Right [ ]Left [ ]Bilateral  [ ]Crackles        [ ]Right [ ]Left [ ]Bilateral  [ ]Wheezing     [ ]Right [ ]Left [ ]Bilateral  [ ]Diminished breath sounds [ ]right [ ]left [ ]bilateral  CARDIOVASCULAR:    [ ]Regular [ ]Irregular [ ]Tachy  [ ]Alexander [ ]Murmur [ ]Other  GASTROINTESTINAL:  [ ]Soft  [ ]Distended   [ ]+BS  [ ]Non tender [ ]Tender  [ ]Other [ ]PEG [ ]OGT/ NGT  Last BM:  GENITOURINARY:  [ ]Normal [ ] Incontinent   [ ]Oliguria/Anuria   [ ]Yap  MUSCULOSKELETAL:   [ ]Normal   [ ]Weakness  [ ]Bed/Wheelchair bound [ ]Edema  NEUROLOGIC:   [ ]No focal deficits  [ ]Cognitive impairment  [ ]Dysphagia [ ]Dysarthria [ ]Paresis [ ]Other   SKIN:   [ ]Normal  [ ]Rash  [ ]Other  [ ]Pressure ulcer(s)       Present on admission [ ]y [ ]n    CRITICAL CARE:  [ ] Shock Present  [ ]Septic [ ]Cardiogenic [ ]Neurologic [ ]Hypovolemic  [ ]  Vasopressors [ ]  Inotropes   [ ]Respiratory failure present [ ]Mechanical ventilation [ ]Non-invasive ventilatory support [ ]High flow    [ ]Acute  [ ]Chronic [ ]Hypoxic  [ ]Hypercarbic [ ]Other  [ ]Other organ failure     LABS:                        9.9    24.86 )-----------( 126      ( 13 Jul 2025 01:23 )             30.1   07-13    138  |  97  |  92[H]  ----------------------------<  154[H]  4.4   |  20[L]  |  4.45[H]    Ca    8.6      13 Jul 2025 01:23  Phos  5.8     07-13  Mg     3.0     07-13    TPro  6.5  /  Alb  3.7  /  TBili  2.7[H]  /  DBili  x   /  AST  47[H]  /  ALT  23  /  AlkPhos  186[H]  07-13  PTT - ( 13 Jul 2025 01:23 )  PTT:41.7 sec    Urinalysis Basic - ( 13 Jul 2025 01:23 )    Color: x / Appearance: x / SG: x / pH: x  Gluc: 154 mg/dL / Ketone: x  / Bili: x / Urobili: x   Blood: x / Protein: x / Nitrite: x   Leuk Esterase: x / RBC: x / WBC x   Sq Epi: x / Non Sq Epi: x / Bacteria: x      RADIOLOGY & ADDITIONAL STUDIES:    PROTEIN CALORIE MALNUTRITION PRESENT: [ ]mild [ ]moderate [ ]severe [ ]underweight [ ]morbid obesity  https://www.andeal.org/vault/9830/web/files/ONC/Table_Clinical%20Characteristics%20to%20Document%20Malnutrition-White%20JV%20et%20al%202012.pdf    Height (cm): 168 (07-08-25 @ 11:13), 167.6 (07-03-25 @ 12:16), 167.6 (06-29-25 @ 22:09)  Weight (kg): 90 (07-08-25 @ 11:13), 90 (07-03-25 @ 13:16), 90.7 (06-29-25 @ 22:09)  BMI (kg/m2): 31.9 (07-08-25 @ 11:13), 32 (07-03-25 @ 13:16), 32.3 (07-03-25 @ 12:16)    [ ]PPSV2 < or = to 30% [ ]significant weight loss  [ ]poor nutritional intake  [ ]anasarca[ ]Artificial Nutrition      Other REFERRALS:  [ ]Hospice  [ ]Child Life  [ ]Social Work  [ ]Case management [ ]Holistic Therapy     Goals of Care Document:  Initial Consult    Date of Service: 07-14-25 @ 17:08    HPI: 69-year-old male with a history of hypertension, hyperlipidemia, diabetes, coronary artery disease treated with stents, and a current smoking habit of 2-3 packs per day, was brought to the hospital after experiencing cardiac arrest while visiting his primary care physician. The patient achieved return of spontaneous circulation (ROSC) within 5-10 minutes following one round of CPR administered by police officers. Since arriving at the emergency department, the patient has lost his pulse multiple times, necessitating several rounds of CPR and administration of medications such as epinephrine. He underwent a cardiac catheterization revealing an occlusion in the left anterior descending artery, resulting in the placement of a drug-eluting stent and subsequent insertion of Impella CP device for support, later upgraded to VA ECMO for continued cardiogenic shock. He is now off ECMO and Impella; however, with poor mental status and in multi organ failure (respiratory, cardiovascular, hepatic, and renal). He is already DNR/I. Geriatrics and Palliative Medicine (GaP) Team was consulted for goals of care, symptoms management and support     CONSULTANTS INPUTS:   Nephrology-Cardiorenal: The patient is suffering from acute kidney injury (VANDANA) secondary to cardiogenic shock and contrast-induced nephropathy. CRRT was started for volume management due to anuric conditions and possible uremic encephalopathy. No escalation of care is planned, leaning towards comfort measures.    Critical Care: The plan involves continuous ICU monitoring due to the patient's risk for life-threatening decompensation, with sedation and analgesia adjusted for patient comfort. Care remains supportive with focus on managing cardiogenic shock and respiratory insufficiency.    PERTINENT PM/SXH:   Hypertension    CAD    Hyperlipemia    Diabetes    S/P cardiac catheterization      FAMILY HISTORY:   No pertinent family history in first degree relatives      Family Hx substance abuse [ ]yes [ ]no    ITEMS NOT CHECKED ARE NOT PRESENT      SOCIAL HISTORY:    Significant other/partner[x]  Children[x ] Roman Catholic/Spirituality: Denominational     Substance hx: [ ]  Tobacco hx: [ ]  Alcohol hx: [ ]    Home Opioid hx: [ ] I-Stop Reference No:    Living Situation: [ ]Home [ ]Long term care [ ]Rehab [ ]Other    Also, social history as per Care Coordination notes: Unable to assess primary caregiver and living situation since patient is unresponsive, but known to be a current smoker as reported by family. Emergency contacts identified. He and his partner have been together for 15 years and have a daughter of their own. She is 12 yo. He has an adult daughter from a prior partnership and 4 grandkids.     ADVANCE DIRECTIVES:    DNR/MOLST [x ] Living Will [ ]    DECISION MAKER(s):    [ ] Health Care Proxy(s) [ ] Surrogate(s) [ ] Guardian            Name(s): Phone Number(s):      BASELINE (I)ADL(s) (prior to admission):    [place]: [ ]Total [ ] Moderate [ ]Dependent      Allergies    No Known Allergies      Intolerances      MEDICATIONS (STANDING):    aspirin chewable 81 milliGRAM(s) Oral daily    chlorhexidine 2% Cloths 1 Application(s) Topical <User Schedule>    chlorhexidine 4% Liquid 1 Application(s) Topical <User Schedule>    clopidogrel Tablet 75 milliGRAM(s) Oral daily    dexMEDEtomidine Infusion 0.5 MICROgram(s)/kG/Hr (11.3 mL/Hr) IV Continuous <Continuous>    dextrose 50% Injectable 50 milliLiter(s) IV Push every 15 minutes    HYDROmorphone Injectable 0.5 milliGRAM(s) IV Push every 4 hours    insulin regular Infusion 3 Unit(s)/Hr (3 mL/Hr) IV Continuous <Continuous>    norepinephrine Infusion 0.05 MICROgram(s)/kG/Min (8.44 mL/Hr) IV Continuous <Continuous>    pantoprazole Injectable 40 milliGRAM(s) IV Push daily    sodium chloride 0.9% for Nebulization 3 milliLiter(s) Nebulizer two times a day    sodium chloride 0.9%. 1000 milliLiter(s) (10 mL/Hr) IV Continuous <Continuous>    vasopressin Infusion 0.02 Unit(s)/Min (3 mL/Hr) IV Continuous <Continuous>      MEDICATIONS (PRN):    glycopyrrolate Injectable 0.4 milliGRAM(s) IV Push four times a day PRN Secretions    HYDROmorphone Injectable 0.5 milliGRAM(s) IV Push every 1 hour PRN Moderate Pain (4 - 6)    HYDROmorphone Injectable 1 milliGRAM(s) IV Push every 1 hour PRN Respiratory distress or RR > 28    HYDROmorphone Injectable 1 milliGRAM(s) IV Push every 1 hour PRN Severe Pain (7 - 10)    midazolam Injectable 1 milliGRAM(s) IV Push every 1 hour PRN Intractable respiratory distress, anxiety, or agitation.    midodrine 10 milliGRAM(s) Oral every 8 hours PRN Hypotension    ondansetron Injectable 4 milliGRAM(s) IV Push every 6 hours PRN Nausea and/or Vomiting    sodium chloride 0.65% Nasal 1 Spray(s) Both Nostrils two times a day PRN Nasal Congestion    sodium chloride 0.9% lock flush 10 milliLiter(s) IV Push every 1 hour PRN Pre/post blood products, medications, blood draw, and to maintain line patency      ITEMS UNCHECKED ARE NOT PRESENT      PRESENT SYMPTOMS: [ x]Unable to self-report  [ ] CPOT [ ] PAINADs [ ] RDOS    Source if other than patient: [ ]Family   [ ]Team      Pain: [ ]yes  [ ]no    QOL impact -    Location -                      Aggravating factors -    Quality -    Radiation -    Timing-    Severity (0-10 scale):    Minimal acceptable level (0-10 scale):      CPOT:    https://www.[email or web address]/getattachment/[email or web address]/Critical-Care-Pain-Observation-Tool-(CPOT)      PAINAD Score: See PAINAD tool and score below      Dyspnea: [ ]Mild [ ]Moderate [ ]Severe      RDOS: See RDOS tool and score below    0 to 2  minimal or no respiratory distress    3  mild distress    4 to 6 moderate distress    >7 severe distress      Anxiety: [ ]Mild [ ]Moderate [ ]Severe    Fatigue: [ ]Mild [ ]Moderate [ ]Severe    Nausea: [ ]Mild [ ]Moderate [ ]Severe    Loss of appetite: [ ]Mild [ ]Moderate [ ]Severe    Constipation: [ ]Mild [ ]Moderate [ ]Severe      Spiritual Screening:    Spiritual Pain (Do you have pain deep in your soul/being that is not physical?)    Severity (0-10): See ESAS numeral 10 for scoring.    Chaplaincy Referral: [ ] yes [ ] refused [ x] following [ ] Deferred      Caregiver [place]? [ ] yes [ x] no [ ] Deferred [ ] Declined               Social Work Referral [ ] Social Work Following [ ] Patient & Family Centered Care Referral [ ] Patient & Family Centered Care Following [ ]    Anticipatory Grief present? [ x] yes [ ] no  [ ] Deferred                    Social Work Referral [ ] Chaplaincy Referral [ x] Social Work Following [ ] Chaplaincy Following [x ]    Other referrals: Child life     Other Symptoms:    [ ]All other review of systems negative      Palliative Performance Status Version 2: See PPSv2 tool and score below          PHYSICAL EXAM:    Vital Signs Last 24 Hrs    T(C): 38.2 (14 Jul 2025 16:00), Max: 38.2 (14 Jul 2025 16:00)    T(F): 100.8 (14 Jul 2025 16:00), Max: 100.8 (14 Jul 2025 16:00)    HR: 64 (14 Jul 2025 17:00) (51 - 68)    BP: --    BP(mean): --    RR: 11 (14 Jul 2025 17:00) (10 - 25)    SpO2: 100% (14 Jul 2025 17:00) (90% - 100%)      Parameters below as of 14 Jul 2025 16:00    Patient On (Oxygen Delivery Method): nasal cannula    O2 Flow (L/min): 6      I&O's Summary    13 Jul 2025 07:01 - 14 Jul 2025 07:00    --------------------------------------------------------    IN: 3238.6 mL / OUT: 3177 mL / NET: 61.6 mL      14 Jul 2025 07:01 - 14 Jul 2025 17:08    --------------------------------------------------------    IN: 1164 mL / OUT: 1549 mL / NET: -385 mL      GENERAL: [ ]Cachexia     [ ]Alert  [ ]Oriented x   [x ]Lethargic  [ ]Unarousable  [ ]Verbal  [ ]Non-Verbal    Behavioral:     [ ] Anxiety  [ ] Delirium [ ] Agitation [ ] Other    HEENT:    [ ]Normal   [x ]Dry mouth   [ ]ET Tube/Trach  [ ]Oral lesions    PULMONARY:     [ ]Clear [ ]Tachypnea  [ ]Audible excessive secretions     [ ]Rhonchi        [ ]Right [ ]Left [ ]Bilateral    [ ]Crackles        [ ]Right [ ]Left [ ]Bilateral    [ ]Wheezing     [ ]Right [ ]Left [ ]Bilateral    [ ]Diminished breath sounds [ ]right [ ]left [ ]bilateral    [x] Labored breathing   CARDIOVASCULAR:      [ ]Regular [ ]Irregular [ ]Tachy  [ ]Alexander [ ]Murmur [ ]Other    GASTROINTESTINAL:    [ ]Soft  [ ]Distended   [ ]+BS  [ ]Non tender [ ]Tender  [ ]Other [ ]PEG [ ]OGT/ NGT  Last BM:    Last Bowel Movement: 14-Jul-2025 (07-14-25 @ 20:00)  GENITOURINARY:    [ ]Normal [ ] Incontinent   [ ]Oliguria/Anuria   [ x]Yap    MUSCULOSKELETAL:     [ ]Normal   [ ]Weakness  [x ]Bed/Wheelchair bound [ ]Edema    NEUROLOGIC:     [ ]No focal deficits  [ ]Cognitive impairment  [ ]Dysphagia [ ]Dysarthria [ ]Paresis [x ]Other: Acute encephalopathy   SKIN:     [ ]Normal  [ ]Rash  [ ]Other    [ ]Pressure ulcer(s)       Present on admission [ ]y [ ]n      CRITICAL CARE:    [ ] Shock Present    [ ]Septic [ ]Cardiogenic [ ]Neurologic [ ]Hypovolemic  [ ]  Vasopressors [ ]  Inotropes     [ ]Respiratory failure present [ ]Mechanical ventilation [ ]Non-invasive ventilatory support [ ]High flow      [ ]Acute  [ ]Chronic [ ]Hypoxic  [ ]Hypercarbic [ ]Other    [ ]Other organ failure       LABS:                           9.9     24.86 )-----------( 126      ( 13 Jul 2025 01:23 )               30.1     07-13      138  |  97  |  92[H]    ----------------------------<  154[H]    4.4   |  20[L]  |  4.45[H]      Ca    8.6      13 Jul 2025 01:23    Phos  5.8     07-13    Mg     3.0     07-13      TPro 6.5 / Alb 3.7 / TBili 2.7[H] / DBili x / AST 47[H] / ALT 23 / AlkPhos 186[H] 07-13    PTT - ( 13 Jul 2025 01:23 ) PTT:41.7 sec      Urinalysis Basic - ( 13 Jul 2025 01:23 )      Color: x / Appearance: x / SG: x / pH: x    Gluc: 154 mg/dL / Ketone: x / Bili: x / Urobili: x   Blood: x / Protein: x / Nitrite: x   Leuk Esterase: x / RBC: x / WBC x   Sq Epi: x / Non Sq Epi: x / Bacteria: x      RADIOLOGY & ADDITIONAL STUDIES:    XR CHEST PORTABLE ROUTINE 1V    Date: 07/13/2025    EXAM: Chest X-ray    IMPRESSION: Patchy right midlung airspace opacities which could represent atelectasis.    TRANSTHORACIC ECHOCARDIOGRAM REPORT    Date: 7/12/2025    EXAM: ECHO    CONCLUSIONS:    1. Left ventricular systolic function is severely decreased.    2. Multiple segmental abnormalities exist. See findings.    3. There is severe (grade 3) left ventricular diastolic dysfunction.    4. Normal right ventricular cavity size and normal right ventricular systolic function.    5. Estimated pulmonary artery systolic pressure is 45 mmHg.    6. Severe tricuspid regurgitation.    7. Compared to the transthoracic echocardiogram performed on 7/5/2025, the Impella is no longer present.    PROTEIN CALORIE MALNUTRITION PRESENT: [ ]mild [ ]moderate [ ]severe [ ]underweight [ ]morbid obesity    https://www.andeal.org/vault/2440/web/files/ONC/Table_Clinical%20Characteristics%20to%20Document%20Malnutrition-White%20JV%20et%20al%202012.pdf      Height (cm): 168 (07-08-25 @ 11:13), 167.6 (07-03-25 @ 12:16), 167.6 (06-29-25 @ 22:09)    Weight (kg): 90 (07-08-25 @ 11:13), 90 (07-03-25 @ 13:16), 90.7 (06-29-25 @ 22:09)    BMI (kg/m2): 31.9 (07-08-25 @ 11:13), 32 (07-03-25 @ 13:16), 32.3 (07-03-25 @ 12:16)      [ ]PPSV2 < or = to 30% [ ]significant weight loss  [ ]poor nutritional intake  [ ]anasarca[ ]Artificial Nutrition      Other REFERRALS:  [ ]Hospice  [ ]Child Life  [ ]Social Work  [ ]Case management [ ]Holistic Therapy      Goals of Care Document: Summary from goals of care note indicates capped care with adjustments to medications for comfort, understanding of prognosis being hours to days, and continence of CRRT for comfort until potential withdrawal of care as agreed upon with family. Patient and provider, share common language, Bengali.   Initial Consult    Date of Service: 07-14-25 @ 17:08    HPI: 69-year-old male with a history of hypertension, hyperlipidemia, diabetes, coronary artery disease treated with stents, and a current smoking habit of 2-3 packs per day, was brought to the hospital after experiencing cardiac arrest while visiting his primary care physician. The patient achieved return of spontaneous circulation (ROSC) within 5-10 minutes following one round of CPR administered by police officers. Since arriving at the emergency department, the patient has lost his pulse multiple times, necessitating several rounds of CPR and administration of medications such as epinephrine. He underwent a cardiac catheterization revealing an occlusion in the left anterior descending artery, resulting in the placement of a drug-eluting stent and subsequent insertion of Impella CP device for support, later upgraded to VA ECMO for continued cardiogenic shock. He is now off ECMO and Impella; however, with poor mental status and in multi organ failure (respiratory, cardiovascular, hepatic, and renal). He is already DNR/I. Geriatrics and Palliative Medicine (GaP) Team was consulted for goals of care, symptoms management and support     CONSULTANTS INPUTS:   Nephrology-Cardiorenal: The patient is suffering from acute kidney injury (VANDANA) secondary to cardiogenic shock and contrast-induced nephropathy. CRRT was started for volume management due to anuric conditions and possible uremic encephalopathy. No escalation of care is planned, leaning towards comfort measures.    Critical Care: The plan involves continuous ICU monitoring due to the patient's risk for life-threatening decompensation, with sedation and analgesia adjusted for patient comfort. Care remains supportive with focus on managing cardiogenic shock and respiratory insufficiency.    PERTINENT PM/SXH:   Hypertension    CAD    Hyperlipemia    Diabetes    S/P cardiac catheterization      FAMILY HISTORY:   No pertinent family history in first degree relatives      Family Hx substance abuse [ ]yes [ ]no    ITEMS NOT CHECKED ARE NOT PRESENT      SOCIAL HISTORY:    Significant other/partner[x]  Children[x ] Christian/Spirituality: Druze     Substance hx: [ ]  Tobacco hx: [ ]  Alcohol hx: [ ]    Home Opioid hx: [ ] I-Stop Reference No:    Living Situation: [ ]Home [ ]Long term care [ ]Rehab [ ]Other    Also, social history as per Care Coordination notes: Unable to assess primary caregiver and living situation since patient is unresponsive, but known to be a current smoker as reported by family. Emergency contacts identified. He and his partner have been together for 15 years and have a daughter of their own. She is 12 yo. He has an adult daughter from a prior partnership and 4 grandkids.     ADVANCE DIRECTIVES:    DNR/MOLST [x ] Living Will [ ]    DECISION MAKER(s):    [ ] Health Care Proxy(s) [ ] Surrogate(s) [ ] Guardian            Name(s): Phone Number(s):      BASELINE (I)ADL(s) (prior to admission):    [place]: [ ]Total [ ] Moderate [ ]Dependent      Allergies    No Known Allergies      Intolerances      MEDICATIONS (STANDING):    aspirin chewable 81 milliGRAM(s) Oral daily    chlorhexidine 2% Cloths 1 Application(s) Topical <User Schedule>    chlorhexidine 4% Liquid 1 Application(s) Topical <User Schedule>    clopidogrel Tablet 75 milliGRAM(s) Oral daily    dexMEDEtomidine Infusion 0.5 MICROgram(s)/kG/Hr (11.3 mL/Hr) IV Continuous <Continuous>    dextrose 50% Injectable 50 milliLiter(s) IV Push every 15 minutes    HYDROmorphone Injectable 0.5 milliGRAM(s) IV Push every 4 hours    insulin regular Infusion 3 Unit(s)/Hr (3 mL/Hr) IV Continuous <Continuous>    norepinephrine Infusion 0.05 MICROgram(s)/kG/Min (8.44 mL/Hr) IV Continuous <Continuous>    pantoprazole Injectable 40 milliGRAM(s) IV Push daily    sodium chloride 0.9% for Nebulization 3 milliLiter(s) Nebulizer two times a day    sodium chloride 0.9%. 1000 milliLiter(s) (10 mL/Hr) IV Continuous <Continuous>    vasopressin Infusion 0.02 Unit(s)/Min (3 mL/Hr) IV Continuous <Continuous>      MEDICATIONS (PRN):    glycopyrrolate Injectable 0.4 milliGRAM(s) IV Push four times a day PRN Secretions    HYDROmorphone Injectable 0.5 milliGRAM(s) IV Push every 1 hour PRN Moderate Pain (4 - 6)    HYDROmorphone Injectable 1 milliGRAM(s) IV Push every 1 hour PRN Respiratory distress or RR > 28    HYDROmorphone Injectable 1 milliGRAM(s) IV Push every 1 hour PRN Severe Pain (7 - 10)    midazolam Injectable 1 milliGRAM(s) IV Push every 1 hour PRN Intractable respiratory distress, anxiety, or agitation.    midodrine 10 milliGRAM(s) Oral every 8 hours PRN Hypotension    ondansetron Injectable 4 milliGRAM(s) IV Push every 6 hours PRN Nausea and/or Vomiting    sodium chloride 0.65% Nasal 1 Spray(s) Both Nostrils two times a day PRN Nasal Congestion    sodium chloride 0.9% lock flush 10 milliLiter(s) IV Push every 1 hour PRN Pre/post blood products, medications, blood draw, and to maintain line patency      ITEMS UNCHECKED ARE NOT PRESENT      PRESENT SYMPTOMS: [ x]Unable to self-report  [ ] CPOT [ ] PAINADs [ ] RDOS    Source if other than patient: [ ]Family   [ ]Team      Pain: [ ]yes  [ ]no    QOL impact -    Location -                      Aggravating factors -    Quality -    Radiation -    Timing-    Severity (0-10 scale):    Minimal acceptable level (0-10 scale):      CPOT:    https://www.[facility].org/getattachment/[email or web address]-[email or web address]-[email or web address]/Critical-Care-Pain-Observation-Tool-(CPOT)      PAINAD Score: See PAINAD tool and score below      Dyspnea: [ ]Mild [ ]Moderate [ ]Severe      RDOS: See RDOS tool and score below    0 to 2  minimal or no respiratory distress    3  mild distress    4 to 6 moderate distress    >7 severe distress      Anxiety: [ ]Mild [ ]Moderate [ ]Severe    Fatigue: [ ]Mild [ ]Moderate [ ]Severe    Nausea: [ ]Mild [ ]Moderate [ ]Severe    Loss of appetite: [ ]Mild [ ]Moderate [ ]Severe    Constipation: [ ]Mild [ ]Moderate [ ]Severe      Spiritual Screening:    Spiritual Pain (Do you have pain deep in your soul/being that is not physical?)    Severity (0-10): See ESAS numeral 10 for scoring.    Chaplaincy Referral: [ ] yes [ ] refused [ x] following [ ] Deferred      Caregiver Atco? [ ] yes [ x] no [ ] Deferred [ ] Declined               Social Work Referral [ ] Social Work Following [ ] Patient & Family Centered Care Referral [ ] Patient & Family Centered Care Following [ ]    Anticipatory Grief present? [ x] yes [ ] no  [ ] Deferred                    Social Work Referral [ ] Chaplaincy Referral [ x] Social Work Following [ ] Chaplaincy Following [x ]    Other referrals: Child life     Other Symptoms:    [ ]All other review of systems negative      Palliative Performance Status Version 2: See PPSv2 tool and score below          PHYSICAL EXAM:    Vital Signs Last 24 Hrs    T(C): 38.2 (14 Jul 2025 16:00), Max: 38.2 (14 Jul 2025 16:00)    T(F): 100.8 (14 Jul 2025 16:00), Max: 100.8 (14 Jul 2025 16:00)    HR: 64 (14 Jul 2025 17:00) (51 - 68)    BP: --    BP(mean): --    RR: 11 (14 Jul 2025 17:00) (10 - 25)    SpO2: 100% (14 Jul 2025 17:00) (90% - 100%)      Parameters below as of 14 Jul 2025 16:00    Patient On (Oxygen Delivery Method): nasal cannula    O2 Flow (L/min): 6      I&O's Summary    13 Jul 2025 07:01 - 14 Jul 2025 07:00    --------------------------------------------------------    IN: 3238.6 mL / OUT: 3177 mL / NET: 61.6 mL      14 Jul 2025 07:01 - 14 Jul 2025 17:08    --------------------------------------------------------    IN: 1164 mL / OUT: 1549 mL / NET: -385 mL      GENERAL: [ ]Cachexia     [ ]Alert  [ ]Oriented x   [x ]Lethargic  [ ]Unarousable  [ ]Verbal  [ ]Non-Verbal    Behavioral:     [ ] Anxiety  [ ] Delirium [ ] Agitation [ ] Other    HEENT:    [ ]Normal   [x ]Dry mouth   [ ]ET Tube/Trach  [ ]Oral lesions    PULMONARY:     [ ]Clear [ ]Tachypnea  [ ]Audible excessive secretions     [ ]Rhonchi        [ ]Right [ ]Left [ ]Bilateral    [ ]Crackles        [ ]Right [ ]Left [ ]Bilateral    [ ]Wheezing     [ ]Right [ ]Left [ ]Bilateral    [ ]Diminished breath sounds [ ]right [ ]left [ ]bilateral    [x] Labored breathing   CARDIOVASCULAR:      [ ]Regular [ ]Irregular [ ]Tachy  [ ]Alexander [ ]Murmur [ ]Other    GASTROINTESTINAL:    [ ]Soft  [ ]Distended   [ ]+BS  [ ]Non tender [ ]Tender  [ ]Other [ ]PEG [ ]OGT/ NGT  Last BM:    Last Bowel Movement: 14-Jul-2025 (07-14-25 @ 20:00)  GENITOURINARY:    [ ]Normal [ ] Incontinent   [ ]Oliguria/Anuria   [ x]Yap    MUSCULOSKELETAL:     [ ]Normal   [ ]Weakness  [x ]Bed/Wheelchair bound [ ]Edema    NEUROLOGIC:     [ ]No focal deficits  [ ]Cognitive impairment  [ ]Dysphagia [ ]Dysarthria [ ]Paresis [x ]Other: Acute encephalopathy   SKIN:     [ ]Normal  [ ]Rash  [ ]Other    [ ]Pressure ulcer(s)       Present on admission [ ]y [ ]n      CRITICAL CARE:    [ ] Shock Present    [ ]Septic [ ]Cardiogenic [ ]Neurologic [ ]Hypovolemic  [ ]  Vasopressors [ ]  Inotropes     [ ]Respiratory failure present [ ]Mechanical ventilation [ ]Non-invasive ventilatory support [ ]High flow      [ ]Acute  [ ]Chronic [ ]Hypoxic  [ ]Hypercarbic [ ]Other    [ ]Other organ failure       LABS:                           9.9     24.86 )-----------( 126      ( 13 Jul 2025 01:23 )               30.1     07-13      138  |  97  |  92[H]    ----------------------------<  154[H]    4.4   |  20[L]  |  4.45[H]      Ca    8.6      13 Jul 2025 01:23    Phos  5.8     07-13    Mg     3.0     07-13      TPro 6.5 / Alb 3.7 / TBili 2.7[H] / DBili x / AST 47[H] / ALT 23 / AlkPhos 186[H] 07-13    PTT - ( 13 Jul 2025 01:23 ) PTT:41.7 sec      Urinalysis Basic - ( 13 Jul 2025 01:23 )      Color: x / Appearance: x / SG: x / pH: x    Gluc: 154 mg/dL / Ketone: x / Bili: x / Urobili: x   Blood: x / Protein: x / Nitrite: x   Leuk Esterase: x / RBC: x / WBC x   Sq Epi: x / Non Sq Epi: x / Bacteria: x      RADIOLOGY & ADDITIONAL STUDIES:    XR CHEST PORTABLE ROUTINE 1V    Date: 07/13/2025    EXAM: Chest X-ray    IMPRESSION: Patchy right midlung airspace opacities which could represent atelectasis.    TRANSTHORACIC ECHOCARDIOGRAM REPORT    Date: 7/12/2025    EXAM: ECHO    CONCLUSIONS:    1. Left ventricular systolic function is severely decreased.    2. Multiple segmental abnormalities exist. See findings.    3. There is severe (grade 3) left ventricular diastolic dysfunction.    4. Normal right ventricular cavity size and normal right ventricular systolic function.    5. Estimated pulmonary artery systolic pressure is 45 mmHg.    6. Severe tricuspid regurgitation.    7. Compared to the transthoracic echocardiogram performed on 7/5/2025, the Impella is no longer present.    PROTEIN CALORIE MALNUTRITION PRESENT: [ ]mild [ ]moderate [ ]severe [ ]underweight [ ]morbid obesity    https://www.andeal.org/vault/2440/web/files/ONC/Table_Clinical%20Characteristics%20to%20Document%20Malnutrition-White%20JV%20et%20al%202012.pdf      Height (cm): 168 (07-08-25 @ 11:13), 167.6 (07-03-25 @ 12:16), 167.6 (06-29-25 @ 22:09)    Weight (kg): 90 (07-08-25 @ 11:13), 90 (07-03-25 @ 13:16), 90.7 (06-29-25 @ 22:09)    BMI (kg/m2): 31.9 (07-08-25 @ 11:13), 32 (07-03-25 @ 13:16), 32.3 (07-03-25 @ 12:16)      [ ]PPSV2 < or = to 30% [ ]significant weight loss  [ ]poor nutritional intake  [ ]anasarca[ ]Artificial Nutrition      Other REFERRALS:  [ ]Hospice  [ ]Child Life  [ ]Social Work  [ ]Case management [ ]Holistic Therapy      Goals of Care Document: Summary from goals of care note indicates capped care with adjustments to medications for comfort, understanding of prognosis being hours to days, and continence of CRRT for comfort until potential withdrawal of care as agreed upon with family.

## 2025-07-14 NOTE — CONSULT NOTE ADULT - CONVERSATION DETAILS
I met with the patient's wife, the patient's adult daughter, the adult daughter's , Dr. Oconnell, and the [facility]  Jesusita Carney to discuss the patient's current state, prognosis, and plan of care. The patient's wife was able to indicate her understanding that the patient is critically ill with heart failure and a poor mental state, and acknowledged that the treatment options were limited. We provided further information, explaining that the patient is experiencing multi-organ failure involving respiratory, renal, cardiovascular, and hepatic systems. He is entering the dying process with a prognosis of hours to days, most likely hours. We noted that the current treatments were not significantly aiding the patient and were merely delaying his dying process. We highlighted that CRRT was only addressing one aspect of his illness and not resolving his overall issues. The patient's partner appreciated the information and recognized through substituted judgment that the patient might not have wanted to remain in this state.    Consequently, we considered whether to continue CRRT, with the wife ultimately understanding and accepting the situation, deciding to stop CRRT tomorrow after his 13-year-old daughter is able to see him. The patient is already DNR/DNI, and his wife understood that even if CRRT continued, the patient might die while on it. She also acknowledged that improving symptom management was essential and prioritized using opioids and other medications to provide symptomatic relief. Based on this, opioids were ordered around the clock and as needed, alongside midazolam to address intractable respiratory stress and any possible agitation.    The patient's wife disclosed concerns regarding their 13-year-old daughter, who is conflicted and struggling to comprehend the situation. We offered contact with child life services to assist the younger daughter, as well as providing activities such as creating handprints for the patient's grandchildren living down [facility]. The patient's eldest daughter understood that prolonging life under these conditions might not have been what the patient wanted. She was agreeable to withdrawing life support but deferred decision-making to the patient's partner, honoring the plan to stop CRRT tomorrow after the daughter visits.

## 2025-07-15 NOTE — PROGRESS NOTE ADULT - PROBLEM SELECTOR PROBLEM 3
VANDANA (acute kidney injury)
Acute hypoxemic respiratory failure

## 2025-07-15 NOTE — PROGRESS NOTE ADULT - PROBLEM SELECTOR PROBLEM 4
Positive culture findings in sputum
Acute encephalopathy
Positive culture findings in sputum
Positive culture findings in sputum

## 2025-07-15 NOTE — PROGRESS NOTE ADULT - CONVERSATION DETAILS
I met with the patient's wife who was aware that Continuous Renal Replacement Therapy (CRRT) had been discontinued due to the patient's hypotension. She acknowledged the gravity of her 's condition, understanding that he is actively dying, with a prognosis likely spanning only a few hours. At this moment, her primary hope is that the patient's two older children, who reside elsewhere, can manage to visit to see their father before his death, although she realizes they may not be able to make it in time. She has communicated the situation to them, granting them the opportunity to decide whether they can come. Currently, ensuring the patient's comfort is the utmost priority. I detailed the plan to his wife, indicating that based on the patient's need for PRN medications, I would be increasing the administration of Dilaudid to 1mg every four hours while retaining the option for additional dosages if necessary. The overall plan of care remains unchanged, with a decision against transferring to the Palliative  Care Unit (PCU) due to his hypotension, along with the prognosis that his death seems imminent within a short timeframe. Emotional support was provided to his wife during our meeting.    Later in the afternoon, Dr. Vincent contacted me to question whether continuing vasopressor medications was necessary, realizing that they were not alleviating symptoms and possibly only prolonging the dying process. The only consideration for continuing their use was to allow time for the patient's children to arrive. Dr. Vincent expressed her intention to follow up with the patient's wife to assess if further use of vasopressors was justified. If prolonging life was not a priority or if the children were unable to visit soon, discontinuing the vasopressors would be the most sensible action. Subsequently, I observed that the vasopressors had been discontinued, with symptom management now the primary focus of care.

## 2025-07-15 NOTE — PROGRESS NOTE ADULT - PROBLEM SELECTOR PROBLEM 5
Atrial fibrillation and flutter
Functional quadriplegia

## 2025-07-15 NOTE — PROGRESS NOTE ADULT - PROBLEM SELECTOR PROBLEM 1
VANDANA (acute kidney injury)
Cardiogenic shock
VANDANA (acute kidney injury)
VANDANA (acute kidney injury)
Cardiogenic shock
VANDANA (acute kidney injury)
Cardiogenic shock
Cardiogenic shock
VANDANA (acute kidney injury)
Cardiogenic shock

## 2025-07-15 NOTE — PROGRESS NOTE ADULT - PROBLEM SELECTOR PLAN 5
Previously had afib this admission. Episode of aflutter 7/9. IAN thrombus on imaging.  - holding amio gtt for IAN thrombus  - cw hep gtt
Previously had afib this admission. Episode of aflutter 7/9.  - cw amio  - cw hep gtt
Previously had afib this admission. Episode of aflutter 7/9. IAN thrombus on imaging.  - holding amio gtt for IAN thrombus  - cw hep gtt
He needs full nursing care. PPSv2 10%

## 2025-07-15 NOTE — PROGRESS NOTE ADULT - PROBLEM SELECTOR PLAN 7
Will continue to f/u for symptoms and support.   Child life, chaplaincy, and social work are following.       Sylvain Correia MD  Associate Chief Geriatrics and Palliative Care ([facility]) [facility]   [facility]   , [employer] [facility]      Please contact me via Teams from Monday through Friday between 9am-5pm. If not answering, please call the palliative care pager [phone]    After 5pm and on weekends, please see the contact information below:    In the event of newly developing, evolving, or worsening symptoms, please contact the Palliative Medicine team via pager (if the patient is at [facility] #[phone] or if the patient is at [facility] #[phone]) The Geriatric and Palliative Medicine service has coverage 24 hours a day/ 7 days a week to provide medical recommendations regarding symptom management needs via telephone

## 2025-07-15 NOTE — PROGRESS NOTE ADULT - SUBJECTIVE AND OBJECTIVE BOX
Date of Service: 07-15-25 @ 18:15    SUBJECTIVE AND OBJECTIVE:  Indication for Geriatrics and Palliative Care Services/INTERVAL HPI:    OVERNIGHT EVENTS:    DNR on chart:  Allergies    No Known Allergies    Intolerances    MEDICATIONS  (STANDING):  acetaminophen   IVPB .. 1000 milliGRAM(s) IV Intermittent once  aspirin  chewable 81 milliGRAM(s) Oral daily  chlorhexidine 2% Cloths 1 Application(s) Topical <User Schedule>  chlorhexidine 4% Liquid 1 Application(s) Topical <User Schedule>  clopidogrel Tablet 75 milliGRAM(s) Oral daily  dexMEDEtomidine Infusion 0.5 MICROgram(s)/kG/Hr (11.3 mL/Hr) IV Continuous <Continuous>  dextrose 50% Injectable 50 milliLiter(s) IV Push every 15 minutes  glycopyrrolate Injectable 0.4 milliGRAM(s) IV Push four times a day  HYDROmorphone  Injectable 1 milliGRAM(s) IV Push every 4 hours  HYDROmorphone  Injectable 2 milliGRAM(s) IV Push once  HYDROmorphone Infusion 1 mG/Hr (5 mL/Hr) IV Continuous <Continuous>  insulin regular Infusion 3 Unit(s)/Hr (3 mL/Hr) IV Continuous <Continuous>  pantoprazole  Injectable 40 milliGRAM(s) IV Push daily  sodium chloride 0.9% for Nebulization 3 milliLiter(s) Nebulizer two times a day  sodium chloride 0.9%. 1000 milliLiter(s) (10 mL/Hr) IV Continuous <Continuous>    MEDICATIONS  (PRN):  HYDROmorphone  Injectable 1 milliGRAM(s) IV Push every 1 hour PRN Respiratory distress or RR > 28  HYDROmorphone  Injectable 1 milliGRAM(s) IV Push every 1 hour PRN Severe Pain (7 - 10)  HYDROmorphone  Injectable 0.5 milliGRAM(s) IV Push every 1 hour PRN Moderate Pain (4 - 6)  HYDROmorphone  Injectable 1 milliGRAM(s) IV Push every 2 hours PRN Moderate Pain (4 - 6)  midazolam Injectable 1 milliGRAM(s) IV Push every 1 hour PRN Intractable respiratory distress, anxiety, or agitation.  sodium chloride 0.65% Nasal 1 Spray(s) Both Nostrils two times a day PRN Nasal Congestion  sodium chloride 0.9% lock flush 10 milliLiter(s) IV Push every 1 hour PRN Pre/post blood products, medications, blood draw, and to maintain line patency      ITEMS UNCHECKED ARE NOT PRESENT    PRESENT SYMPTOMS: [ ]Unable to self-report - see [ ] CPOT [ ] PAINADS [ ] RDOS  Source if other than patient:  [ ]Family   [ ]Team     Pain:  [ ]yes [ ]no  QOL impact -   Location -                    Aggravating factors -  Quality -  Radiation -  Timing-  Severity (0-10 scale):  Minimal acceptable level (0-10 scale):     CPOT:    https://www.[facility].org/getattachment/[email or web address]/Critical-Care-Pain-Observation-Tool-(CPOT)    PAINAD Score: See PAINAD tool and score below       Dyspnea:                           [ ]Mild [ ]Moderate [ ]Severe    RDOS: See RDOS tool and score below   0 to 2  minimal or no respiratory distress   3  mild distress  4 to 6 moderate distress  >7 severe distress      Anxiety:                             [ ]Mild [ ]Moderate [ ]Severe  Fatigue:                             [ ]Mild [ ]Moderate [ ]Severe  Nausea:                             [ ]Mild [ ]Moderate [ ]Severe  Loss of appetite:              [ ]Mild [ ]Moderate [ ]Severe  Constipation:                    [ ]Mild [ ]Moderate [ ]Severe    PCSSQ[Palliative Care Spiritual Screening Question]   Severity (0-10):  Score of 4 or > indicate consideration of Chaplaincy referral.  Chaplaincy Referral: [ ] yes [ ] refused [ ] following [ ] Deferred     Caregiver [place]? : [ ] yes [ ] no [ ] Deferred [ ] Declined             Social work referral [ ] Patient & Family Centered Care Referral [ ]     Anticipatory Grief present?:  [ ] yes [ ] no  [ ] Deferred                  Social work referral [ ] Chaplaincy Referral [ ]    		  Other Symptoms:  [ ]All other review of systems negative     Palliative Performance Status Version 2:   See PPSv2 tool and score below         PHYSICAL EXAM:  Vital Signs Last 24 Hrs  T(C): 39.2 (15 Jul 2025 16:00), Max: 39.2 (15 Jul 2025 16:00)  T(F): 102.6 (15 Jul 2025 16:00), Max: 102.6 (15 Jul 2025 16:00)  HR: 64 (15 Jul 2025 18:00) (62 - 101)  BP: 103/57 (15 Jul 2025 18:00) (85/51 - 131/74)  BP(mean): 75 (15 Jul 2025 18:00) (65 - 96)  RR: 29 (15 Jul 2025 18:00) (7 - 30)  SpO2: 94% (15 Jul 2025 18:00) (83% - 100%)    Parameters below as of 15 Jul 2025 16:00  Patient On (Oxygen Delivery Method): nasal cannula  O2 Flow (L/min): 4   I&O's Summary    14 Jul 2025 07:01  -  15 Jul 2025 07:00  --------------------------------------------------------  IN: 2761.8 mL / OUT: 1559 mL / NET: 1202.8 mL    15 Jul 2025 07:01  -  15 Jul 2025 18:15  --------------------------------------------------------  IN: 717.6 mL / OUT: 25 mL / NET: 692.6 mL       GENERAL: [ ]Cachexia    [ ]Alert  [ ]Oriented x   [ ]Lethargic  [ ]Unarousable  [ ]Verbal  [ ]Non-Verbal  Behavioral:   [ ]Anxiety  [ ]Delirium [ ]Agitation [ ]Other  HEENT:  [ ]Normal   [ ]Dry mouth   [ ]ET Tube/Trach  [ ]Oral lesions  PULMONARY:   [ ]Clear [ ]Tachypnea  [ ]Audible excessive secretions   [ ]Rhonchi        [ ]Right [ ]Left [ ]Bilateral  [ ]Crackles        [ ]Right [ ]Left [ ]Bilateral  [ ]Wheezing     [ ]Right [ ]Left [ ]Bilateral  [ ]Diminished BS [ ] Right [ ]Left [ ]Bilateral  CARDIOVASCULAR:    [ ]Regular [ ]Irregular [ ]Tachy  [ ]Alexander [ ]Murmur [ ]Other  GASTROINTESTINAL:  [ ]Soft  [ ]Distended   [ ]+BS  [ ]Non tender [ ]Tender  [ ]Other [ ]PEG [ ]OGT/ NGT   Last BM:   GENITOURINARY:  [ ]Normal [ ]Incontinent   [ ]Oliguria/Anuria   [ ]Yap  MUSCULOSKELETAL:   [ ]Normal   [ ]Weakness  [ ]Bed/Wheelchair bound [ ]Edema  NEUROLOGIC:   [ ]No focal deficits  [ ] Cognitive impairment  [ ] Dysphagia [ ]Dysarthria [ ] Paresis [ ]Other   SKIN:   [ ]Normal  [ ]Rash  [ ]Other  [ ]Pressure ulcer(s) [ ]y [ ]n present on admission    CRITICAL CARE:  [ ]Shock Present  [ ]Septic [ ]Cardiogenic [ ]Neurologic [ ]Hypovolemic  [ ]Vasopressors [ ]Inotropes  [ ]Respiratory failure present [ ]Mechanical Ventilation [ ]Non-invasive ventilatory support [ ]High-Flow   [ ]Acute  [ ]Chronic [ ]Hypoxic  [ ]Hypercarbic [ ]Other  [ ]Other organ failure     LABS:            RADIOLOGY & ADDITIONAL STUDIES:    Protein Calorie Malnutrition Present: [ ]mild [ ]moderate [ ]severe [ ]underweight [ ]morbid obesity  https://www.andeal.org/vault/2440/web/files/ONC/Table_Clinical%20Characteristics%20to%20Document%20Malnutrition-White%20JV%20et%20al%580019.pdf    Height (cm): 168 (07-08-25 @ 11:13), 167.6 (07-03-25 @ 12:16), 167.6 (06-29-25 @ 22:09)  Weight (kg): 90 (07-08-25 @ 11:13), 90 (07-03-25 @ 13:16), 90.7 (06-29-25 @ 22:09)  BMI (kg/m2): 31.9 (07-08-25 @ 11:13), 32 (07-03-25 @ 13:16), 32.3 (07-03-25 @ 12:16)    [ ]PPSV2 < or = 30%  [ ]significant weight loss [ ]poor nutritional intake [ ]anasarca[ ]Artificial Nutrition    Other REFERRALS:  [ ]Hospice  [ ]Child Life  [ ]Social Work  [ ]Case management [ ]Holistic Therapy     Goals of Care Document: No. DELPHINE screening performed.  STOP BANG Legend: 0-2 = LOW Risk; 3-4 = INTERMEDIATE Risk; 5-8 = HIGH Risk No. DELPHINE screening performed.  STOP BANG Legend: 0-2 = LOW Risk; 3-4 = INTERMEDIATE Risk; 5-8 = HIGH Risk Date of Service: 07-15-25 @ 18:15    HPI: 69-year-old male with a significant cardiac history, including coronary artery disease and diabetes, presented initially due to chest pain and was subsequently hospitalized following cardiac arrest. He underwent cardiac catheterization and was supported with Impella and VA ECMO. Now, after the removal of these devices, he is experiencing persistent multi-organ failure, including respiratory, cardiovascular, hepatic, and renal. Given the poor prognosis, the focus has shifted to palliative care to manage symptoms and discuss goals of care with the family.    SUBJECTIVE AND OBJECTIVE: The patient was seen with his wife and a friend by his bedside. He is actively transitioning. He had increased oropharyngeal secretions and his breathing was labored. He was deeply lethargic.     Indication for Geriatrics and Palliative Care Services/INTERVAL HPI: Goals of care, symptoms, and support.     OVERNIGHT EVENTS: CRRT had to be DC due to hypotension.     CONSULTANTS INPUTS    N/A    DNR on chart: Yes.   Allergies  No Known Allergies  Intolerances    MEDICATIONS (STANDING):  aspirin chewable 81 milliGRAM(s) Oral daily  chlorhexidine 2% Cloths 1 Application(s) Topical <User Schedule>  chlorhexidine 4% Liquid 1 Application(s) Topical <User Schedule>  clopidogrel Tablet 75 milliGRAM(s) Oral daily  dexMEDEtomidine Infusion 0.5 MICROgram(s)/kG/Hr (11.3 mL/Hr) IV Continuous <Continuous>  dextrose 50% Injectable 50 milliLiter(s) IV Push every 15 minutes  HYDROmorphone Injectable 0.5 milliGRAM(s) IV Push every 4 hours  insulin regular Infusion 3 Unit(s)/Hr (3 mL/Hr) IV Continuous <Continuous>  norepinephrine Infusion 0.05 MICROgram(s)/kG/Min (8.44 mL/Hr) IV Continuous <Continuous>  pantoprazole Injectable 40 milliGRAM(s) IV Push daily  sodium chloride 0.9% for Nebulization 3 milliLiter(s) Nebulizer two times a day  sodium chloride 0.9%. 1000 milliLiter(s) (10 mL/Hr) IV Continuous <Continuous>  vasopressin Infusion 0.02 Unit(s)/Min (3 mL/Hr) IV Continuous <Continuous>    MEDICATIONS (PRN):  glycopyrrolate Injectable 0.4 milliGRAM(s) IV Push four times a day PRN Secretions  HYDROmorphone Injectable 0.5 milliGRAM(s) IV Push every 1 hour PRN Moderate Pain (4 - 6)  HYDROmorphone Injectable 1 milliGRAM(s) IV Push every 1 hour PRN Respiratory distress or RR > 28  HYDROmorphone Injectable 1 milliGRAM(s) IV Push every 1 hour PRN Severe Pain (7 - 10)  midazolam Injectable 1 milliGRAM(s) IV Push every 1 hour PRN Intractable respiratory distress, anxiety, or agitation.  midodrine 10 milliGRAM(s) Oral every 8 hours PRN Hypotension  ondansetron Injectable 4 milliGRAM(s) IV Push every 6 hours PRN Nausea and/or Vomiting  sodium chloride 0.65% Nasal 1 Spray(s) Both Nostrils two times a day PRN Nasal Congestion  sodium chloride 0.9% lock flush 10 milliLiter(s) IV Push every 1 hour PRN Pre/post blood products, medications, blood draw, and to maintain line patency    ITEMS UNCHECKED ARE NOT PRESENT    PRESENT SYMPTOMS: [x]Unable to self-report [ ] CPOT [ ] PAINADs [ ] RDOS  Source if other than patient:  [ ]Family   [ ]Team     Pain: [ ]yes [ ]no  QOL impact -   Location -                    Aggravating factors -  Quality -  Radiation -  Timing-  Severity (0-10 scale):  Minimal acceptable level (0-10 scale):     CPOT:    https://www.[facility].org/getattachment/[email or web address]/Critical-Care-Pain-Observation-Tool-(CPOT)    PAINAD Score: See PAINAD tool and score below     Dyspnea:                           [ ]Mild [ ]Moderate [ ]Severe    RDOS: See RDOS tool and score below   0 to 2  minimal or no respiratory distress   3  mild distress  4 to 6 moderate distress  >7 severe distress      Anxiety:                             [ ]Mild [ ]Moderate [ ]Severe  Fatigue:                             [ ]Mild [ ]Moderate [ ]Severe  Nausea:                             [ ]Mild [ ]Moderate [ ]Severe  Loss of appetite:              [ ]Mild [ ]Moderate [ ]Severe  Constipation:                    [ ]Mild [ ]Moderate [ ]Severe    Spiritual Screening:   Spiritual Pain (Do you have pain deep in your soul/being that is not physical?)  Severity (0-10): See ESAS numeral 10 for scoring.   Chaplaincy Referral: [ ] yes [ ] refused [x ] following [ ] Deferred   7/7  saw pt per referral from [facility]. Pt was being treated. They stated had been seen by a  and appreciated the spiritual care services. Nurse advised pt neded rest. 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  will round back for continued care.  Caregiver [place]? [ ] yes [x] no [ ] Deferred [ ] Declined               Social Work Referral [ ] Social Work Following [ ] Patient & Family Centered Care Referral [ ] Patient & Family Centered Care Following [ ]    Anticipatory Grief present? [x ] yes [ ] no  [ ] Deferred                    Social Work Referral [ ] Chaplaincy Referral [x ] Social Work Following [ ] Chaplaincy Following [x ]    Other Symptoms:  [ ] All other review of systems negative     Palliative Performance Status Version 2:   See PPSv2 tool and score below          PHYSICAL EXAM:  Vital Signs Last 24 Hrs  T(C): 39.2 (15 Jul 2025 16:00), Max: 39.2 (15 Jul 2025 16:00)  T(F): 102.6 (15 Jul 2025 16:00), Max: 102.6 (15 Jul 2025 16:00)  HR: 64 (15 Jul 2025 18:00) (62 - 101)  BP: 103/57 (15 Jul 2025 18:00) (85/51 - 131/74)  BP(mean): 75 (15 Jul 2025 18:00) (65 - 96)  RR: 29 (15 Jul 2025 18:00) (7 - 30)  SpO2: 94% (15 Jul 2025 18:00) (83% - 100%)    Parameters below as of 15 Jul 2025 16:00  Patient On (Oxygen Delivery Method): nasal cannula  O2 Flow (L/min): 4    I&O's Summary  14 Jul 2025 07:01  -  15 Jul 2025 07:00  --------------------------------------------------------  IN: 2761.8 mL / OUT: 1559 mL / NET: 1202.8 mL    15 Jul 2025 07:01  -  15 Jul 2025 18:15  --------------------------------------------------------  IN: 717.6 mL / OUT: 25 mL / NET: 692.6 mL       GENERAL: [ ]Cachexia    [ ]Alert  [ ]Oriented x   [x ]Lethargic  [ ]Unarousable  [ ]Verbal  [ ]Non-Verbal  [x] Actively dying   Behavioral:   [ ]Anxiety  [ ]Delirium [ ]Agitation [ ]Other  HEENT:  [ ]Normal   [ ]Dry mouth   [ ]ET Tube/Trach  [ ]Oral lesions [x] copious oropharyngeal secretions.   PULMONARY:   [ ]Clear [ ]Tachypnea  [ ]Audible excessive secretions   [ ]Rhonchi        [ ]Right [ ]Left [ ]Bilateral  [ ]Crackles        [ ]Right [ ]Left [ ]Bilateral  [ ]Wheezing     [ ]Right [ ]Left [ ]Bilateral  [ ]Diminished BS [ ] Right [ ]Left [ ]Bilateral  [x] Labored breathing.   CARDIOVASCULAR:    [ ]Regular [ ]Irregular [ ]Tachy  [ ]Alexander [ ]Murmur [ ]Other  [x] Hypotensive.   GASTROINTESTINAL:  [ ]Soft  [ ]Distended   [ ]+BS  [ ]Non tender [ ]Tender  [ ]Other [ ]PEG [ ]OGT/ NGT   Last BM:   Last Bowel Movement: 14-Jul-2025 (07-15-25 @ 20:00)  GENITOURINARY:  [ ]Normal [ ]Incontinent   [ ]Oliguria/Anuria   [ x]Yap  MUSCULOSKELETAL:   [ ]Normal   [ ]Weakness  [x ]Bed/Wheelchair bound [ ]Edema  NEUROLOGIC:   [ ]No focal deficits  [ ] Cognitive impairment  [ ] Dysphagia [ ]Dysarthria [ ] Paresis [ x]Other: Encephalopathy   SKIN:   [ ]Normal  [ ]Rash  [ ]Other  [ ]Pressure ulcer(s) [ ]y [ ]n present on admission    CRITICAL CARE:  [ ]Shock Present  [ ]Septic [ ]Cardiogenic [ ]Neurologic [ ]Hypovolemic  [ ]Vasopressors [ ]Inotropes  [ ]Respiratory failure present [ ]Mechanical Ventilation [ ]Non-invasive ventilatory support [ ]High-Flow   [ ]Acute  [ ]Chronic [ ]Hypoxic  [ ]Hypercarbic [ ]Other  [ ]Other organ failure     LABS:                        10.6   3.79  )-----------( 117      ( 08 Dec 2022 08:45 )             32.7   12-07    139  |  99  |  12  ----------------------------<  185<H>  4.1   |  25  |  0.48<L>    Ca    9.0      07 Dec 2022 09:09  Phos  2.8     12-07  Mg     2.3     12-07    RADIOLOGY & ADDITIONAL STUDIES:  XR CHEST PORTABLE ROUTINE 1V  Date: 07/13/2025  EXAM: Chest X-ray  IMPRESSION: Patchy right midlung airspace opacities which could represent atelectasis.    TRANSTHORACIC ECHOCARDIOGRAM REPORT  Date: 7/12/2025  EXAM: ECHO  CONCLUSIONS:    1. Left ventricular systolic function is severely decreased.  2. Multiple segmental abnormalities exist. See findings.  3. There is severe (grade 3) left ventricular diastolic dysfunction.  4. Normal right ventricular cavity size and normal right ventricular systolic function.  5. Estimated pulmonary artery systolic pressure is 45 mmHg.  6. Severe tricuspid regurgitation.  7. Compared to the transthoracic echocardiogram performed on 7/5/2025, the Impella is no longer present.    Protein Calorie Malnutrition Present: [ ]mild [ ]moderate [ ]severe [ ]underweight [ ]morbid obesity  https://www.andeal.org/vault/2440/web/files/ONC/Table_Clinical%20Characteristics%20to%20Document%20Malnutrition-White%20JV%20et%20al%202012.pdf    Height (cm): 168 (07-08-25 @ 11:13), 167.6 (07-03-25 @ 12:16), 167.6 (06-29-25 @ 22:09)  Weight (kg): 90 (07-08-25 @ 11:13), 90 (07-03-25 @ 13:16), 90.7 (06-29-25 @ 22:09)  BMI (kg/m2): 31.9 (07-08-25 @ 11:13), 32 (07-03-25 @ 13:16), 32.3 (07-03-25 @ 12:16)    [ ]PPSV2 < or = 30%  [ ]significant weight loss [ ]poor nutritional intake [ ]anasarca[ ]Artificial Nutrition    Other REFERRALS:  [ ]Hospice  [ ]Child Life  [ ]Social Work  [ ]Case management [ ]Holistic Therapy   Goals of Care Document: Summary from goals of care note indicates capped care with adjustments to medications for comfort, understanding of prognosis being hours to days, and continence of CRRT for comfort until potential withdrawal of care as agreed upon with family.

## 2025-07-15 NOTE — PROGRESS NOTE ADULT - PROBLEM SELECTOR PLAN 3
- suspected ATN, auto-diuresing
Diuresis as above
- holding diuresis  - nephrology following
- holding diuresis  - nephrology following, rec IVF today
Diuresis as above
Initially suspected ATN, now possibly cardiorenal  - cw diuresis as above
Based on PRN needs, will:   -Increase Dilaudid to 1mg IV q 4 hours (= to a Dilaudid infusion at 0.25mg/hr; however, I will not advise to start an infusion due to limited life expectancy. If needed will advise to increase ATC dosage). Also, if symptoms were to be an issues, PRN Dilaudid can be further increase by 50 to 100%.   -Continue Dilaudid 1mg IV q 1 PRN respiratory distress  -Continue Midazolam 1mg IV q 1 PRN for intractable symptoms.

## 2025-07-15 NOTE — PROGRESS NOTE ADULT - PROBLEM SELECTOR PLAN 6
For details about [facility], please refer to the C note above. In summary: The patient's wife acknowledged his terminal condition and the discontinuation of CRRT due to hypotension, with care focusing on symptom relief and comfort. Emotional support was provided, and she understood the plan involved increased ATC Dilaudid for symptoms. Dr. Aguirre questioned the necessity of vasopressors, and her team planned to follow up with the patient's surrogate. Since then, vasopressors have been discontinued, as prolonging life was not prioritized, and symptom management remains the focus.

## 2025-07-15 NOTE — PROGRESS NOTE ADULT - PROBLEM SELECTOR PROBLEM 2
CAD (coronary artery disease)
CAD (coronary artery disease)
VANDANA (acute kidney injury)
CAD (coronary artery disease)

## 2025-07-15 NOTE — PROGRESS NOTE ADULT - PROBLEM SELECTOR PLAN 2
s/p PCI to LAD 7/3  - cangrelor  - heparin gtt
s/p PCI to LAD 7/3  - Currently on Cangrelor
s/p PCI to LAD 7/3  - Currently on Cangrelor today.
s/p PCI to LAD 7/3  - switching cangrelor to plavix  - cw hep gtt
s/p PCI to LAD 7/3  - switching cangrelor to plavix  - cw hep gtt
s/p PCI to LAD 7/3  - plavix  - heparin gtt
Off of CRRT

## 2025-07-15 NOTE — PROGRESS NOTE ADULT - PROVIDER SPECIALTY LIST ADULT
CTU
Critical Care
Heart Failure
Infectious Disease
Infectious Disease
Nephrology-Cardiorenal
Nephrology-Cardiorenal
Palliative Care
CTU
Critical Care
ECMO
Infectious Disease
Nephrology-Cardiorenal
Critical Care
Infectious Disease
Nephrology-Cardiorenal
Heart Failure
Heart Failure
Vascular Surgery
Heart Failure
Nephrology-Cardiorenal
Critical Care
Nephrology-Cardiorenal
Heart Failure
warm
Heart Failure
Heart Failure

## 2025-07-15 NOTE — PROGRESS NOTE ADULT - SUBJECTIVE AND OBJECTIVE BOX
POD #     Brief history :     24 hour events :     ICU Vital Signs Last 24 Hrs  T(C): 39.1 (07-15-25 @ 12:00), Max: 39.1 (07-15-25 @ 12:00)  T(F): 102.4 (07-15-25 @ 12:00), Max: 102.4 (07-15-25 @ 12:00)  HR: 81 (07-15-25 @ 15:00) (62 - 101)  BP: 125/69 (07-15-25 @ 15:00) (85/51 - 131/74)  BP(mean): 92 (07-15-25 @ 15:00) (65 - 96)  ABP: 91/67 (07-15-25 @ 15:00) (74/56 - 157/65)  ABP(mean): 79 (07-15-25 @ 15:00) (66 - 126)  RR: 14 (07-15-25 @ 15:00) (7 - 30)  SpO2: 97% (07-15-25 @ 15:00) (83% - 100%)    I&O's Summary    14 Jul 2025 07:01  -  15 Jul 2025 07:00  --------------------------------------------------------  IN: 2761.8 mL / OUT: 1559 mL / NET: 1202.8 mL    15 Jul 2025 07:01  -  15 Jul 2025 15:30  --------------------------------------------------------  IN: 546 mL / OUT: 25 mL / NET: 521 mL    ABG - ( 14 Jul 2025 04:03 )  pH: 7.39  /  pCO2: 30    /  pO2: 141   / HCO3: 18    / Base Excess: -5.8  /  SaO2: 99.7        MEDICATIONS  (STANDING):  aspirin  chewable 81 milliGRAM(s) Oral daily  clopidogrel Tablet 75 milliGRAM(s) Oral daily  dexMEDEtomidine Infusion 0.5 MICROgram(s)/kG/Hr IV Continuous <Continuous>  glycopyrrolate Injectable 0.4 milliGRAM(s) IV Push four times a day  HYDROmorphone  Injectable 1 milliGRAM(s) IV Push every 4 hours  insulin regular Infusion 3 Unit(s)/Hr IV Continuous <Continuous>  norepinephrine Infusion 0.05 MICROgram(s)/kG/Min IV Continuous <Continuous>  pantoprazole  Injectable 40 milliGRAM(s) IV Push daily  vasopressin Infusion 0.02 Unit(s)/Min IV Continuous <Continuous>      Home Medications:  Advil 200 mg oral tablet: 2 tab(s) orally 2 times a day, As Needed (21 Dec 2014 13:00)  Aspirin Enteric Coated 81 mg oral delayed release tablet: 1 tab(s) orally once a day (21 Dec 2014 13:00)  atenolol 100 mg oral tablet: 1 tab(s) orally once a day (21 Dec 2014 13:00)  fosinopril 40 mg oral tablet: 1 tab(s) orally once a day (21 Dec 2014 13:00)  Vitamin D3 2000 intl units oral capsule: 1 cap(s) orally once a day (21 Dec 2014 13:00)    PHYSICAL EXAM:  Gen : no acute distress  Neck: No LAD, No JVD  Respiratory: decreased in the bases  Cardiovascular: S1 and S2, RRR, no M/G/R  Gastrointestinal: BS+, soft, NT/ND  Extremities: No peripheral edema  Vascular: 2+ peripheral pulses  Neurological: A/O x 3, no focal deficits  Incision: clean dry/ no sign of infection  Lines: no sign of infection      S/p   Acute post operative respiratory insufficiency  Acute blood loss anemia/Thrombocytopenia  Electrolyte abnormalities - Hypomag/Hypokalcemia  Hypotension   Cardiogenic shock  Post operative pain   Lactic acidosis  Post op AFib    Encounter for ventilator weaning      Neuro  Neuro assessment  Sedation   Multimodal pain management    CVS   s/p   EF  Pressors -   Inotrope -   MCS -   Rhythm  Chest tube management    Pulm   Ventilator weaning as tolerated   Fast track extubation     GI   GI proph/Bowel regimen       Monitor UOP  Correct Electrolytes K >4.0-4.5 Mag 2.0-2.5    Endo   A1c  Glycemic control per protocl Glucose <180  Thyroid     Heme   Correct coagulapathy, monitor for bleeding  ASA  P2Y12  DVT proph  Acute blood loss anemia expected post operative loss - IV Fe, Epo    ID   Periop antibiotics  MRSA screen         Critical care time spent    min      Brief history : 68 YO M with a history of CAD s/p PCI, active tobacco use, DM2 (A1c 8.9%) who presented to [facility] 7/3 from his PMD’s office after witnessed cardiac arrest c/b VT storm and went for urgent [facility] for delayed STEMI revealing occluded LAD s/p CURTIS and ultimately escalated to [facility]  for SCAI E AMI-CS. Of note he presented to the ED 3 days before the event with chest pain.  7/3/25 to [facility] with STEMI and cardiac arrest.  IABP -> Impella CP, LAD stent  Upgraded to [facility] ECMO and [facility] to [facility] on 7/3/25  S/p [facility] ECMO decannulation & R Femoral artery repair on 7/8  VT arrest - CPR for 1min without shock and RoSC on 7/11  currently capped care awaiting family     ICU Vital Signs Last 24 Hrs  T(C): 39.1 (07-15-25 @ 12:00), Max: 39.1 (07-15-25 @ 12:00)  T(F): 102.4 (07-15-25 @ 12:00), Max: 102.4 (07-15-25 @ 12:00)  HR: 81 (07-15-25 @ 15:00) (62 - 101)  BP: 125/69 (07-15-25 @ 15:00) (85/51 - 131/74)  BP(mean): 92 (07-15-25 @ 15:00) (65 - 96)  ABP: 91/67 (07-15-25 @ 15:00) (74/56 - 157/65)  ABP(mean): 79 (07-15-25 @ 15:00) (66 - 126)  RR: 14 (07-15-25 @ 15:00) (7 - 30)  SpO2: 97% (07-15-25 @ 15:00) (83% - 100%)    I&O's Summary    14 Jul 2025 07:01  -  15 Jul 2025 07:00  --------------------------------------------------------  IN: 2761.8 mL / OUT: 1559 mL / NET: 1202.8 mL    15 Jul 2025 07:01  -  15 Jul 2025 15:30  --------------------------------------------------------  IN: 546 mL / OUT: 25 mL / NET: 521 mL    ABG - ( 14 Jul 2025 04:03 )  pH: 7.39  /  pCO2: 30    /  pO2: 141   / HCO3: 18    / Base Excess: -5.8  /  SaO2: 99.7        MEDICATIONS  (STANDING):  aspirin  chewable 81 milliGRAM(s) Oral daily  clopidogrel Tablet 75 milliGRAM(s) Oral daily  dexMEDEtomidine Infusion 0.5 MICROgram(s)/kG/Hr IV Continuous <Continuous>  glycopyrrolate Injectable 0.4 milliGRAM(s) IV Push four times a day  HYDROmorphone  Injectable 1 milliGRAM(s) IV Push every 4 hours  insulin regular Infusion 3 Unit(s)/Hr IV Continuous <Continuous>  norepinephrine Infusion 0.05 MICROgram(s)/kG/Min IV Continuous <Continuous>  pantoprazole  Injectable 40 milliGRAM(s) IV Push daily  vasopressin Infusion 0.02 Unit(s)/Min IV Continuous <Continuous>    PHYSICAL EXAM:  distressed, agonal breathing, respiratory distress  Altered, withdraws to pain    S/p cardiogenic shock, end stage heart failure currently no meaningful cardiac recovery.  Discussed extensively with family.  Daughter expressed she wants her father at peace.  His wife is hopeful but understands, he is critical.  We have been awaiting his children to arrive but unclear when they will get here.  Clinically pt is in distress, with agonal breathing, pressors continued to allow for sometime for family to visit.  But my concern is he is uncomfortable and since it is unclear when rest of family will arrive, I expressed to family we should pivot to comfort measures and focus on pain control and alleviating his air hunger.  They have agreed to stop pressors and start pain medication for comfort.  All questions are answered, discussed with ACP and surgical team, goal is to proceed to comfort measures with dilaudid drip, scopalamine and additional meds as needed.  Pressors stopped.  All questions answered     Critical care time spent   50 min

## 2025-07-15 NOTE — PROGRESS NOTE ADULT - NS ATTEST RISK PROBLEM GEN_ALL_CORE FT
1. Number and complexity of problems addressed for this patient:    1.1 Moderate (At least 1)  [ ] 1 or more chronic illnesses with exacerbation, progression, or side effects of treatment  [ ] 2 or more stable chronic illnesses  [ ] 1 undiagnosed new problem with uncertain prognosis  [ ] 1 acute illness with systemic symptoms  [ ] 1 acute complicated injury  1.2 High (At least 1)   [x ] 1 or more chronic illnesses with severe exacerbation, progression, or side effects of treatment  [ x] 1 acute or chronic illnesses or injuries that may pose a threat to life or bodily function    2. Amount and/or Complexity of Data that was Reviewed and Analyzed for this case:       Moderate (1 out of 3)       High (2 out of 3)  2.1. (Any combination of 3 of the following)   [ ] Prior External notes were reviewed  [ ] Each test result was reviewed (see "LABS" and "RADIOLOGY & ADDITIONAL STUDIES" above)  [ ] The following tests were ordered and/or reviewed (Only count 1 point for ordering or reviewing a unique test):  	[ ]CBC  	[ ] Chemistry   	[ ] Imaging   	[ ] Other:   [ ] Assessment requiring an independent historian   		Name of historian and relationship:   2.2  [ ] Personally review and interpretation of  image or testing   2.3  [ ] Discussion of management or test interpretation with external physician/other qualified health care professional\appropriate source (not separately reported)    3. Risk of Complications and/or Morbidity or Mortality of for this Patient’s Management:  3.1 Moderate risk of morbidity from additional diagnostic testing or treatment (At least 1):   [ ] Prescription drug management   [ ] Decision regarding minor surgery, treatment, or procedure with identified patient or procedure risk factors  [ ] Decision regarding elective major surgery, treatment, or procedure without identified patient or procedure risk factors   [ ] Diagnosis or treatment significantly limited by social determinants of health   [ ] Other:   3.2 High risk of morbidity from additional diagnostic testing or treatment (At least 1):   [x ] Drug therapy requiring intensive monitoring for toxicity   [ ] Decision regarding elective major surgery, treatment, or procedure with identified patient or procedure risk factors   [ ] Decision regarding emergency major surgery, treatment, or procedure   [ ] Decision regarding hospitalization or escalation of hospital-level of care  [ ] Decision not to resuscitate, not to intubate, or to de-escalate care because of poor prognosis   [ ] Decision to proceed or not with artificial nutrition   [x ] Parenteral controlled substance  [ ] Other:

## 2025-07-16 NOTE — CHART NOTE - NSCHARTNOTESELECT_GEN_ALL_CORE
Event Note
Nutrition Services
Palliative Medicine/Event Note
Vascular Surgery/Event Note
Nephrology/Event Note
Nephrology/Off Service Note
Palliative Medicine/Event Note

## 2025-07-16 NOTE — CHART NOTE - NSCHARTNOTEFT_GEN_A_CORE
Patient decannulated from [facility] ECMO yesterday. No other palliative needs identified at this time.  Reconsult for GOC as needed. Discussed with CTU team.    Elizabeth Ivey MD  Geriatrics and Palliative Medicine Attending  [facility] pager: [phone]
Patient oligoanuric, on 2 IV pressors. UOP documented is 60-90 cc for the past few hours. Patient is currently on bumex infusion.    Consent obtained for CRRT from wife Ms Yuliana Johnson    Will follow    If you have any questions, please feel free to contact me  Candy Herbert MD  Nephrology Fellow  Page [phone] / [employer] Teams (Preferred); Please PAGE for urgent consults only.  (After 5pm or on weekends please page the on-call fellow)
goals of care conversations reviewed   we will sign off.   please let us know if we can be of further assistance     rosita floyd  nephrology attending   please contact me on TEAMS   Office- [phone]
Conversation had with Mr. Roque's wife, Yuliana Johnson regarding current plan of care. TOD Mc and myself asked the wife to clarify the [facility] conversations that were had over the weekend. Patient is confirmed DNR/DNI and the family has wishes to cap current care, meaning to not add or up-titrate any of the medications supporting his blood pressure/hemodynamics. His wife does not wish to withdraw all care at this current time until more of his family can come be with him later today. The plan is to have another conversation this afternoon after the family comes and visit.     Current medical plan is to continue all of our current medications without adding or increasing any support.     Francisco Gold PA-C
Geriatrics and Palliative medicine consulted in setting of [facility] ECMO.  Plan for decannulation today. Full consult to follow if needed pending clinical course.  Case discussed with Dr. Anderson.    For acute issues please page palliative team.    Elizabeth Ivey MD  Geriatrics and Palliative Medicine Attending  [facility] pager: [phone]
NUTRITION FOLLOW UP NOTE    PATIENT SEEN FOR: nutrition follow up.     SOURCE: [] Patient  [x] Current Medical Record  [x] RN  [] Family/support person at bedside  [x] Patient unavailable/inappropriate  [x] Other: interdisciplinary team rounds     CHART REVIEWED/EVENTS NOTED.  [] No changes to nutrition care plan to note  [x] Nutrition Status:  - Cardiac arrest requiring [facility] ECMO, Impella CP. S/p removal of [facility] ECMO on , Impella remains in place.  - VANDANA, s/p Bumex. No need for renal replacement therapy at this time per rounds  - Agitation, on Precedex   - Pt remains NPO s/p most recent extubation, per team pt with heavy secretions and likely not a good candidate for SLP evaluation at this time. Remains with NGT in for nutrition support - tube nearly post-pyloric per rounds.    DIET ORDER:   Diet, NPO with Tube Feed:   Tube Feeding Modality: Nasogastric  Vital 1.5 Krishan (VITAL1.5RTH)  Total Volume for 24 Hours (mL): 960  Continuous  Starting Tube Feed Rate {mL per Hour}: 20  Until Goal Tube Feed Rate (mL per Hour): 40  Tube Feed Duration (in Hours): 24  Tube Feed Start Time: 16:00 (25)    CURRENT DIET ORDER IS:  [] Appropriate:  [] Inadequate:  [x] Other: See recommendations below.     NUTRITION INTAKE/PROVISION:  [] PO:  [x] Enteral Nutrition: ENTERAL NUTRITION  EN Order Provides:  960 mL formula, 1440 kcal, 65 g protein, 733 mL free water.  Protein Modular(s) Provides: N/A  Current Pump Rate: 40mL/hr   EN provision: 43% EN volume goal provided in past 5 days  -- Pt meeting <50% estimated needs x 5 days   [] Parenteral Nutrition:    ANTHROPOMETRICS:  Drug Dosing Weight  Height (cm): 168 (2025 11:13)  Weight (kg): 90 (2025 11:13)  BMI (kg/m2): 31.9 (2025 11:13)  BSA (m2): 2 (2025 11:13)    Weights:   Daily Weight in k.8 (), Weight in k (07-10), Weight in k.3 (), Weight in k.1 (), Weight in k.7 (), Weight in k.3 (), Weight in k.4 ()   - Pt 73.7kg on initial RD assessment (). Pt being diuresed with lowest wt in house 73.1kg (). Unable to obtain pt's UBW at this time. Weight fluctuations expected secondary to perioperative fluid shifts/diuresis, will continue to monitor/trend as able.     Nutrition focused physical exam: moderate muscle loss to temples, mild muscle loss to clavicles. Moderate orbital fat loss.     MEDICATIONS:  MEDICATIONS  (STANDING):  cangrelor Infusion 0.25 MICROgram(s)/kG/Min (6.75 mL/Hr) IV Continuous <Continuous>  cefepime   IVPB 500 milliGRAM(s) IV Intermittent every 12 hours  dexMEDEtomidine Infusion 0.5 MICROgram(s)/kG/Hr (11.3 mL/Hr) IV Continuous <Continuous>  insulin regular Infusion 3 Unit(s)/Hr (3 mL/Hr) IV Continuous <Continuous>  norepinephrine Infusion 0.05 MICROgram(s)/kG/Min (8.44 mL/Hr) IV Continuous <Continuous>  OLANZapine 2.5 milliGRAM(s) Oral <User Schedule>  OLANZapine 2.5 milliGRAM(s) Oral at bedtime  pantoprazole  Injectable 40 milliGRAM(s) IV Push daily  polyethylene glycol 3350 17 Gram(s) Oral daily  senna 2 Tablet(s) Oral at bedtime  sodium chloride 0.9%. 1000 milliLiter(s) (10 mL/Hr) IV Continuous <Continuous>    NUTRITIONALLY PERTINENT LABS:   Na147 mmol/L[H] Glu 93 mg/dL K+ 3.8 mmol/L Cr  7.20 mg/dL[H]  mg/dL[H]  Phos 5.1 mg/dL[H]  Alb 3.4 g/dL  Chol 90 mg/dL LDL --    HDL 31 mg/dL[L] Trig 96 mg/dL ALT 16 U/L AST 52 U/L[H] Alkaline Phosphatase 178 U/L[H]  25 @ 19:06 a1c 8.9    A1C with Estimated Average Glucose Result: 8.9 % (25 @ 19:06)    Finger Sticks:  POCT Blood Glucose.: 168 mg/dL ( @ 09:57)  POCT Blood Glucose.: 169 mg/dL ( 09:04)  POCT Blood Glucose.: 160 mg/dL ( 07:56)  POCT Blood Glucose.: 221 mg/dL ( @ 06:49)  POCT Blood Glucose.: 213 mg/dL ( 06:02)  POCT Blood Glucose.: 228 mg/dL ( 04:56)  POCT Blood Glucose.: 243 mg/dL ( @ 04:03)  POCT Blood Glucose.: 128 mg/dL ( @ 02:20)  POCT Blood Glucose.: 97 mg/dL ( @ 01:45)  POCT Blood Glucose.: 91 mg/dL ( @ 01:12)  POCT Blood Glucose.: 103 mg/dL (07-10 @ 23:59)  POCT Blood Glucose.: 125 mg/dL (07-10 @ 23:02)  POCT Blood Glucose.: 122 mg/dL (07-10 @ 22:03)  POCT Blood Glucose.: 132 mg/dL (07-10 @ 20:57)  POCT Blood Glucose.: 187 mg/dL (07-10 @ 20:04)  POCT Blood Glucose.: 175 mg/dL (07-10 @ 18:50)  POCT Blood Glucose.: 191 mg/dL (07-10 @ 18:01)  POCT Blood Glucose.: 151 mg/dL (07-10 @ 17:12)  POCT Blood Glucose.: 98 mg/dL (07-10 @ 15:20)  POCT Blood Glucose.: 166 mg/dL (07-10 @ 14:05)  POCT Blood Glucose.: 143 mg/dL (07-10 @ 12:59)  POCT Blood Glucose.: 143 mg/dL (07-10 @ 11:54)    NUTRITIONALLY PERTINENT MEDICATIONS/LABS:  [x] Reviewed  [x] Relevant notes on medications/labs:  - Norepinephrine @ 0.06 mcgs/kg/min   - BG management with insulin drip   - Mildly elevated Phos noted    EDEMA:  [x] Reviewed  [x] Relevant notes: 1+ generalized     GI/ I&O:  [x] Reviewed  [x] Relevant notes: Last BM 7/10 x 5 noted as loose.   [] Other:    SKIN:   [x] No pressure injuries documented, per nursing flowsheet  [] Pressure injury previously noted  [] Change in pressure injury documentation:  [] Other:    ESTIMATED NEEDS:  [x] No change:  [] Updated:  Energy: 1842-2211kcal/day (25-30 kcal/kg)  Protein: 110-133 g/day (1.5-1.8 g/kg)  Fluid:   ml/day or [x] defer to team  Based on: based on weight of 73.7kg ()    NUTRITION DIAGNOSIS:  [x] Prior Dx: Increased Nutrient Needs (Protein/Energy)   [x] New Dx: Severe acute malnutrition related to inadequate protein-energy intake as evidenced by pt meeting <50% estimated needs x 5 days, moderate muscle/fat losses.     EDUCATION:  [] Yes:  [x] Not appropriate/warranted    NUTRITION CARE PLAN:  1. Consider change in feeding regimen to Glucerna 1.5 with goal rate of 55mL/hr x 24hr to provide 1320mL volume, 1980kcal, 109g protein, 1002mL free water. Meets 27kcal/kg, 1.5g/kg protein based on current weight 73.7kg. Defer additional free water to team.  2. Diet advancement/provision per team, recommend continuing full tube feeding support if pt unable/unwilling to take sufficient PO.    [] Achieved - Continue current nutrition intervention(s)  [] Current medical condition precludes nutrition intervention at this time.    MONITORING AND EVALUATION:   RD remains available upon request and will follow up per protocol.    Karlie Gross MS, RD, CDN, CNSC  Available on [place] TEAMS
Patient consulted for concern of PSA/hematoma of the left groin.    US duplex done. < from: VA Duplex Low Ext Arterial, Ltd, Left (07.11.25 @ 14:41) >    TECHNIQUE: Duplex and color flow Doppler evaluation of the left groin was   performed at the bedside.    The visualized segments of the left common femoral, deep femoral and   superficial femoral arteries are patent without evidence of significant   stenosis. The visualized segments of the left external iliac, common   femoral, femoral and deep femoral veins are patent. There is no evidence   of pseudoaneurysm.    IMPRESSION: No evidence of pseudoaneurysm.    < end of copied text >    Recommendation:  No acute surgical intervention needed.    Thank you for your consult.   Plan klaudia Attending  Oneyda Beach MD  Vascular fellow

## 2025-07-17 NOTE — DISCHARGE NOTE FOR THE EXPIRED PATIENT - HOSPITAL COURSE
69-year-old male with a history of hypertension, hyperlipidemia, diabetes, coronary artery disease treated with stents, and a current smoking habit of 2-3 packs per day, was brought to the hospital after experiencing cardiac arrest while visiting his primary care physician. The patient achieved return of spontaneous circulation (ROSC) within 5-10 minutes following one round of CPR administered by police officers. Since arriving at the emergency department, the patient has lost his pulse multiple times, necessitating several rounds of CPR and administration of medications such as epinephrine. He underwent a cardiac catheterization revealing an occlusion in the left anterior descending artery, resulting in the placement of a drug-eluting stent and subsequent insertion of Impella CP device for support, later upgraded to [facility] ECMO for continued cardiogenic shock. He is now off ECMO and Impella; however, with poor mental status and in multi organ failure (respiratory, cardiovascular, hepatic, and renal).  Patient transferred to PSCU for management of symptoms and end of life care - he  on  at 8:08 AM.  Family has been notified.

## 2025-07-17 NOTE — PROVIDER CONTACT NOTE (OTHER) - ASSESSMENT
No Response to external Stimuli  No spontaneous respirations  No apical Heart rate  Negative papillary response
pt dressing is saturated with the bed sheets

## 2025-07-17 NOTE — DISCHARGE NOTE FOR THE EXPIRED PATIENT - NS DECEASED NEXTOFKIN_PHONE
Outreach attempt was made to schedule a Medicare Wellness Visit. This was the first attempt. Contact was made, [facility] appointment scheduled.  Scheduled for 12/4 with Dr Sanders   [phone]

## (undated) DEVICE — WARMING BLANKET FULL UNDERBODY

## (undated) DEVICE — DRAIN JACKSON PRATT 10MM FLAT FULL NO TROCAR

## (undated) DEVICE — SPECIMEN CONTAINER 100ML

## (undated) DEVICE — SUT SOFSILK 0 30" TIES

## (undated) DEVICE — FOLEY TRAY 16FR 5CC LF LUBRISIL ADVANCE TEMP CLOSED

## (undated) DEVICE — MARKING PEN W RULER

## (undated) DEVICE — PREP CHLORAPREP HI-LITE ORANGE 26ML

## (undated) DEVICE — PACK CARDIAC YELLOW

## (undated) DEVICE — STEALTH CLAMP INSERT FIBRA/FIBRA 60MM

## (undated) DEVICE — SUT SILK 2-0 18" SH (POP-OFF)

## (undated) DEVICE — PACK UNIVERSAL CARDIAC

## (undated) DEVICE — SUT BOOT STANDARD (ASSORTED) 5 PAIR

## (undated) DEVICE — STEALTH CLAMP INSERT FIBRA/FIBRA 90MM

## (undated) DEVICE — Device

## (undated) DEVICE — SUT DOUBLE 6 WIRE STERNAL

## (undated) DEVICE — STOPCOCK 4-WAY 2 GANG W SWIVEL MALE LUER LOCK

## (undated) DEVICE — DRAPE MAYO STAND 30"

## (undated) DEVICE — SUT VICRYL 2-0 27" CT-1 UNDYED

## (undated) DEVICE — WARMING BLANKET DUO-THERM HYPER/HYPOTHERM ADULT

## (undated) DEVICE — BLADE SCALPEL SAFETYLOCK #15

## (undated) DEVICE — SYR ASEPTO

## (undated) DEVICE — SUT PROLENE 6-0 30" C-1

## (undated) DEVICE — SOL IRR POUR NS 0.9% 500ML

## (undated) DEVICE — VISITEC 4X4

## (undated) DEVICE — WOUND IRR IRRISEPT W 0.5 CHG

## (undated) DEVICE — SUT PLEDGET PRE PUNCH 4.8 X 9.5 X 1.5 MM

## (undated) DEVICE — DRAPE 1/2 SHEET 40X57"

## (undated) DEVICE — SENSOR MYOCARDIAL TEMP 15MM

## (undated) DEVICE — SUCTION CATH ARGYLE WHISTLE TIP 14FR STRAIGHT PACKED

## (undated) DEVICE — CONNECTOR STRAIGHT 3/8 X 3/8"

## (undated) DEVICE — DRAPE 3/4 SHEET W REINFORCEMENT 56X77"

## (undated) DEVICE — POSITIONER CARDIAC BUMP

## (undated) DEVICE — BLADE SCALPEL SAFETYLOCK #11

## (undated) DEVICE — SUT PROLENE 5-0 24" BV-1

## (undated) DEVICE — GOWN TRIMAX LG

## (undated) DEVICE — BLADE SCALPEL SAFETYLOCK #10

## (undated) DEVICE — STAPLER SKIN VISI-STAT 35 WIDE

## (undated) DEVICE — GOWN LG

## (undated) DEVICE — SUT VICRYL 0 27" CT-1 UNDYED

## (undated) DEVICE — FOLEY TRAY 16FR 5CC LTX UMETER CLOSED

## (undated) DEVICE — DRAIN RESERVOIR FOR JACKSON PRATT 100CC CARDINAL

## (undated) DEVICE — SUT BLUNT SZ 5

## (undated) DEVICE — SUT MONOCRYL 4-0 18" PS-2

## (undated) DEVICE — DRAPE TOWEL BLUE 17" X 24"

## (undated) DEVICE — DRSG TEGADERM 6 X 8"

## (undated) DEVICE — PACING CABLE A/V TEMP SCREW DOWN 6FT

## (undated) DEVICE — SUT SURGICAL STEEL 6 30" BP-1

## (undated) DEVICE — DRSG TEGADERM CHLORHEXIDINE 3.5 X 4.5"

## (undated) DEVICE — TOURNIQUET ESMARK 6"

## (undated) DEVICE — MEDICATION LABELS W MARKER

## (undated) DEVICE — LAP PAD 18 X 18"

## (undated) DEVICE — ELCTR BOVIE PENCIL HANDPIECE ROCKER SWITCH 15FT

## (undated) DEVICE — DRSG OPSITE 13.75 X 4"

## (undated) DEVICE — POSITIONER FOAM EGG CRATE ULNAR 2PCS (PINK)

## (undated) DEVICE — DRAPE INSTRUMENT POUCH 6.75" X 11"

## (undated) DEVICE — SUT PROLENE 3-0 36" MH

## (undated) DEVICE — SAW BLADE STRYKER STERNUM 31MM X 6.27 X .79

## (undated) DEVICE — SOL IRR POUR H2O 250ML

## (undated) DEVICE — ELCTR DEFIB PAD PRO-PADZ W 10FT LEAD WIRES ADULT

## (undated) DEVICE — VENODYNE/SCD SLEEVE CALF MEDIUM

## (undated) DEVICE — SUT STAINLESS STEEL 5 18" SCC

## (undated) DEVICE — SOL NORMOSOL-R PH7.4 1000ML

## (undated) DEVICE — SUCTION YANKAUER NO CONTROL VENT